# Patient Record
Sex: MALE | Race: WHITE
[De-identification: names, ages, dates, MRNs, and addresses within clinical notes are randomized per-mention and may not be internally consistent; named-entity substitution may affect disease eponyms.]

---

## 2017-01-23 ENCOUNTER — HOSPITAL ENCOUNTER (OUTPATIENT)
Dept: HOSPITAL 75 - PREOP | Age: 77
End: 2017-01-23
Attending: SURGERY
Payer: MEDICARE

## 2017-01-23 VITALS — HEIGHT: 66 IN | WEIGHT: 205 LBS | BODY MASS INDEX: 32.95 KG/M2

## 2017-01-23 DIAGNOSIS — K57.92: ICD-10-CM

## 2017-01-23 DIAGNOSIS — Z01.818: Primary | ICD-10-CM

## 2017-01-23 NOTE — XMS REPORT
Continuity of Care Document

 Created on: 2017



Moshe Lopez

External Reference #: [id number]

: 1940

Sex: Male



Demographics







 Address  [address] [place]  [address]

 

 Home Phone  [phone]

 

 Preferred Language  English

 

 Marital Status  

 

 Faith Affiliation  Unknown

 

 Race  White or 

 

 Ethnic Group  Not  or 





Author







 Author  [facility]

 

 Organization  [facility]

 

 Address  Unknown

 

 Phone  Unavailable







Care Team Providers







 Care Team Member Name  Role  Phone

 

 Jaelyn Velez  PCP  [phone]







Source Comments

Some departments are not documenting in the electronic medical record.  If you 
do not see the information that you expected, contact Release of Information in 
the Health Information Management department at [phone] for further 
assistance in locating additional records.[facility]



Active Allergies and Adverse Reactions







    



  Allergen   Noted Date   Severity   Reactions   Comments

 

    



  Niacin   2012    RASH 







Current Medications







      



  Prescription   Sig.   Disp.   Refills   Start   End Date   Status



      Date  

 

      



  clopiDOGrel (PLAVIX) 75   Take 75 mg by mouth       Active



  mg tablet   daily.     

 

      



  DOCOSAHEXANOIC ACID/EPA   Take 1,400 mg by mouth       Active



  (FISH OIL PO)   daily.     

 

      



  ascorbic acid (VITAMIN-C)   Take 1,000 mg by mouth       Active



  500 mg tablet   daily.     

 

      



  vitamins, multiple   Take 1 Tab by mouth       Active



  (MULTI-VITAMIN HI-PO)   daily.     



  tablet      

 

      



  CALCIUM POLYCARBOPHIL   Take  by mouth.       Active



  (FIBERCON PO)      

 

      



  vitamins, B complex Tab   Take 1 Tab by mouth       Active



   daily.     

 

      



  Calcium Carbonate-Vitamin   Take  by mouth.       Active



  D3 (VITAMIN D-3)      



  180-5,000 mg-unit Tab      

 

      



  docusate (COLACE) 100 mg   Take 100 mg by mouth       Active



  capsule   twice daily.     

 

      



  isosorbide mononitrate SR   Take 60 mg by mouth twice       Active



  (IMDUR) 60 mg tablet   daily.     

 

      



  Phenylephrine-Guaifenesin   Take  by mouth.       Active



  (MUCUS RELIEF SINUS)      



   mg Tab      

 

      



  vitamin E 100 unit   Take 100 Units by mouth       Active



  capsule   daily.     

 

      



  ALPRAZolam (XANAX) 0.5 mg   Take 0.5 mg by mouth       Active



  tablet   three times daily as     



   needed.     

 

      



  testosterone cypionate   Inject 200 mg to area(s)       Active



  (DEPO-TESTOSTERONE) 200   as directed every 14     



  mg/mL injection   days.     

 

      



  aspirin EC 81 mg tablet   Take 81 mg by mouth       Active



   daily.     

 

      



  CETIRIZINE HCL (ZYRTEC   Take 1 Tab by mouth at       Active



  PO)   bedtime daily.     

 

      



  lisinopril (PRINIVIL;   Take 4 Tabs by mouth at   180 Tab   3   20    
Active



  ZESTRIL) 10 mg tablet   bedtime daily.     13  

 

      



  labetalol (NORMODYNE) 300   Take 1 Tab by mouth twice   180 Tab   3       Active



  mg tablet   daily.     13  

 

      



  hydrochlorothiazide   1 Tab daily.   90 Tab   3   20    Active



  (HYDRODIURIL) 25 mg      14  



  tablet      

 

      



  simvastatin (ZOCOR) 40 mg   TAKE 1 TABLET BY MOUTH   90 Tab   1   20    
Active



  tablet   EVERY NIGHT AT BEDTIME     14  







Active Problems







 



  Problem   Noted Date

 

 



  Hyperlipemia   2013













  Overview:



  Myalgias w/ Lipitor, tolerates Vytorin. HDL chronically under 30.





  Last Assessment & Plan:



  He can have a fasting lipid panel tomorrow. LDL goal is 70. He can increase



  Vytorin if needed.









 



  CAD (coronary artery disease)   2012













  Overview:



  0395-0124 Multiple PCIs prox & Mild LAD, Prox RCA DE & Bare metal stents



  [facility], last PCI [place] 10/18/12 Prox RCA BMetal Stent EF



  60%





  Post RCA PCI in late 2012L Diaphragm artifact EF 53%.





  2012: [facility] Normal LV size and function EF 60%





  





  L



  ast Assessment & Plan:



  He has exercise intolerance due to deconditioning and possibly the



  bradycardia from carvedilol. He had a benign stress nuclear study in



  October after his PCI and does not have sufficient symptoms to warrant



  another stress test.





  Increasing his exercise program gradually is the best route to improved



  exercise capacity and physical conditioning. I reassured him that his



  cardiovascular prognosis and findings after his  last PCI were favorable



  and that he faces greater risks from inactivity and deconditioning than



  from becoming more active.









 



  HTN (hypertension)   2012













  Overview:



  10/2012 Shortly after RCA Bare metal stent: BP  200 mmHg+ [place],  life



  flighted back to [place].where  myocardial perfusion imaging studyShowed Ef



  53%, th mild inferior wall hypokinesis and a borderline defect in the



  inferior wall that was felt to likely be diaphragmatic attenuation.Renal



  artery duplex:No   evidence of renal artery stenosis.





  Last Assessment & Plan:



  His blood pressure log show readings to 150 to170 range on carvedilol 25



  BID and lisinopril 20 BID. I'm starting him on hydrochlorothiazide 25.





  If blood pressure control remains suboptimal,  I plan to switch him to



  labetalol to improve blood pressure control over what we are seeing now



  with carvedilol . His heart rate in 50s may be part of his exercise



  intolerance. Labetalol with likely give him better blood pressure control



  with less bradycardia and fatigue.









 



  Low testosterone   2012

 

 



  Sleep apnea 













  Overview:



  Uses CPAP and has for years.





  L



  ast Assessment & Plan:



  Continuing CPAP









 



  Ruptured lumbar disc 

 

 



  Diverticulitis 







Social History







    



  Tobacco Use   Types   Packs/Day   Years Used   Date

 

    



  Former Smoker   Cigarettes, Pipe, Cigars   1   15   Quit: 1970









   



  Smokeless Tobacco: Never   



  Used   









   



  Alcohol Use   Drinks/Week   oz/Week   Comments

 

   



  Yes   1 Cans of   0.6   1 beer every 2 weeks



   beer  







Last Filed Vital Signs







  



  Vital Sign   Reading   Time Taken

 

  



  Blood Pressure   120/68   2013  4:18 PM CST

 

  



  Pulse   54   2013  4:18 PM CST

 

  



  Temperature   -   -

 

  



  Respiratory Rate   -   -

 

  



  Height   1.702 m (5' 7.01")   2013  4:18 PM CST

 

  



  Weight   100.517 kg (221 lb 9.6   2013  4:18 PM CST



   oz) 

 

  



  Body Mass Index   34.7   2013  4:18 PM CST

 

  



  Oxygen Saturation   -   -







Plan of Care







   



  Health Maintenance   Due Date   Last Done   Comments

 

   



  Physical (Comprehensive)   1948  



  Exam   

 

   



  Pertussis Vaccine   1952  

 

   



  Tetanus Vaccine   1958  

 

   



  Colorectal Cancer   1991  



  Screening   

 

   



  Shingles Vaccine   2001  

 

   



  Prevnar/Pneumovax (#1)   2006  

 

   



  Influenza Vaccine   2016  







Results from Last 3 Months

Not on file

## 2017-01-25 ENCOUNTER — HOSPITAL ENCOUNTER (OUTPATIENT)
Dept: HOSPITAL 75 - ENDO | Age: 77
Discharge: HOME | End: 2017-01-25
Attending: SURGERY
Payer: MEDICARE

## 2017-01-25 VITALS — DIASTOLIC BLOOD PRESSURE: 62 MMHG | SYSTOLIC BLOOD PRESSURE: 118 MMHG

## 2017-01-25 VITALS — WEIGHT: 205 LBS | HEIGHT: 66 IN | BODY MASS INDEX: 32.95 KG/M2

## 2017-01-25 VITALS — SYSTOLIC BLOOD PRESSURE: 118 MMHG | DIASTOLIC BLOOD PRESSURE: 62 MMHG

## 2017-01-25 VITALS — DIASTOLIC BLOOD PRESSURE: 60 MMHG | SYSTOLIC BLOOD PRESSURE: 112 MMHG

## 2017-01-25 VITALS — SYSTOLIC BLOOD PRESSURE: 149 MMHG | DIASTOLIC BLOOD PRESSURE: 74 MMHG

## 2017-01-25 DIAGNOSIS — E78.00: ICD-10-CM

## 2017-01-25 DIAGNOSIS — I25.10: ICD-10-CM

## 2017-01-25 DIAGNOSIS — Z87.891: ICD-10-CM

## 2017-01-25 DIAGNOSIS — Z86.010: ICD-10-CM

## 2017-01-25 DIAGNOSIS — K64.1: ICD-10-CM

## 2017-01-25 DIAGNOSIS — I10: ICD-10-CM

## 2017-01-25 DIAGNOSIS — N40.0: ICD-10-CM

## 2017-01-25 DIAGNOSIS — K57.90: Primary | ICD-10-CM

## 2017-01-25 RX ADMIN — FENTANYL CITRATE PRN MCG: 50 INJECTION, SOLUTION INTRAMUSCULAR; INTRAVENOUS at 09:42

## 2017-01-25 RX ADMIN — FENTANYL CITRATE PRN MCG: 50 INJECTION, SOLUTION INTRAMUSCULAR; INTRAVENOUS at 09:59

## 2017-01-25 RX ADMIN — MIDAZOLAM HYDROCHLORIDE PRN MG: 1 INJECTION, SOLUTION INTRAMUSCULAR; INTRAVENOUS at 09:43

## 2017-01-25 RX ADMIN — MIDAZOLAM HYDROCHLORIDE PRN MG: 1 INJECTION, SOLUTION INTRAMUSCULAR; INTRAVENOUS at 09:45

## 2017-01-25 RX ADMIN — MIDAZOLAM HYDROCHLORIDE PRN MG: 1 INJECTION, SOLUTION INTRAMUSCULAR; INTRAVENOUS at 09:51

## 2017-01-25 RX ADMIN — FENTANYL CITRATE PRN MCG: 50 INJECTION, SOLUTION INTRAMUSCULAR; INTRAVENOUS at 09:44

## 2017-01-25 RX ADMIN — FENTANYL CITRATE PRN MCG: 50 INJECTION, SOLUTION INTRAMUSCULAR; INTRAVENOUS at 09:52

## 2017-01-25 NOTE — PROGRESS NOTE-PRE OPERATIVE
Pre-Operative Progress Note


H&P Reviewed


The H&P was reviewed, patient examined and no changes noted.


Date H&P Reviewed:  Jan 25, 2017


Time H&P Reviewed:  09:15


Pre-Operative Diagnosis:  hx diverticulitis








DAIJA HAWK MD Jan 25, 2017 9:18 am

## 2017-01-25 NOTE — XMS REPORT
Continuity of Care Document

 Created on: 2017



Moshe Lopez

External Reference #: [id number]

: 1940

Sex: Male



Demographics







 Address  [address] [place]  [address]

 

 Home Phone  [phone]

 

 Preferred Language  English

 

 Marital Status  

 

 Faith Affiliation  Unknown

 

 Race  White or 

 

 Ethnic Group  Not  or 





Author







 Author  [facility]

 

 Organization  [facility]

 

 Address  Unknown

 

 Phone  Unavailable







Care Team Providers







 Care Team Member Name  Role  Phone

 

 Jaelyn Velez  PCP  [phone]







Source Comments

Some departments are not documenting in the electronic medical record.  If you 
do not see the information that you expected, contact Release of Information in 
the Health Information Management department at [phone] for further 
assistance in locating additional records.[facility]



Active Allergies and Adverse Reactions







    



  Allergen   Noted Date   Severity   Reactions   Comments

 

    



  Niacin   2012    RASH 







Current Medications







      



  Prescription   Sig.   Disp.   Refills   Start   End Date   Status



      Date  

 

      



  clopiDOGrel (PLAVIX) 75   Take 75 mg by mouth       Active



  mg tablet   daily.     

 

      



  DOCOSAHEXANOIC ACID/EPA   Take 1,400 mg by mouth       Active



  (FISH OIL PO)   daily.     

 

      



  ascorbic acid (VITAMIN-C)   Take 1,000 mg by mouth       Active



  500 mg tablet   daily.     

 

      



  vitamins, multiple   Take 1 Tab by mouth       Active



  (MULTI-VITAMIN HI-PO)   daily.     



  tablet      

 

      



  CALCIUM POLYCARBOPHIL   Take  by mouth.       Active



  (FIBERCON PO)      

 

      



  vitamins, B complex Tab   Take 1 Tab by mouth       Active



   daily.     

 

      



  Calcium Carbonate-Vitamin   Take  by mouth.       Active



  D3 (VITAMIN D-3)      



  180-5,000 mg-unit Tab      

 

      



  docusate (COLACE) 100 mg   Take 100 mg by mouth       Active



  capsule   twice daily.     

 

      



  isosorbide mononitrate SR   Take 60 mg by mouth twice       Active



  (IMDUR) 60 mg tablet   daily.     

 

      



  Phenylephrine-Guaifenesin   Take  by mouth.       Active



  (MUCUS RELIEF SINUS)      



   mg Tab      

 

      



  vitamin E 100 unit   Take 100 Units by mouth       Active



  capsule   daily.     

 

      



  ALPRAZolam (XANAX) 0.5 mg   Take 0.5 mg by mouth       Active



  tablet   three times daily as     



   needed.     

 

      



  testosterone cypionate   Inject 200 mg to area(s)       Active



  (DEPO-TESTOSTERONE) 200   as directed every 14     



  mg/mL injection   days.     

 

      



  aspirin EC 81 mg tablet   Take 81 mg by mouth       Active



   daily.     

 

      



  CETIRIZINE HCL (ZYRTEC   Take 1 Tab by mouth at       Active



  PO)   bedtime daily.     

 

      



  lisinopril (PRINIVIL;   Take 4 Tabs by mouth at   180 Tab   3   20    
Active



  ZESTRIL) 10 mg tablet   bedtime daily.     13  

 

      



  labetalol (NORMODYNE) 300   Take 1 Tab by mouth twice   180 Tab   3       Active



  mg tablet   daily.     13  

 

      



  hydrochlorothiazide   1 Tab daily.   90 Tab   3   20    Active



  (HYDRODIURIL) 25 mg      14  



  tablet      

 

      



  simvastatin (ZOCOR) 40 mg   TAKE 1 TABLET BY MOUTH   90 Tab   1   20    
Active



  tablet   EVERY NIGHT AT BEDTIME     14  







Active Problems







 



  Problem   Noted Date

 

 



  Hyperlipemia   2013













  Overview:



  Myalgias w/ Lipitor, tolerates Vytorin. HDL chronically under 30.





  Last Assessment & Plan:



  He can have a fasting lipid panel tomorrow. LDL goal is 70. He can increase



  Vytorin if needed.









 



  CAD (coronary artery disease)   2012













  Overview:



  9566-1204 Multiple PCIs prox & Mild LAD, Prox RCA DE & Bare metal stents



  [facility], last PCI [place] 10/18/12 Prox RCA BMetal Stent EF



  60%





  Post RCA PCI in late 2012L Diaphragm artifact EF 53%.





  2012: [facility] Normal LV size and function EF 60%





  





  L



  ast Assessment & Plan:



  He has exercise intolerance due to deconditioning and possibly the



  bradycardia from carvedilol. He had a benign stress nuclear study in



  October after his PCI and does not have sufficient symptoms to warrant



  another stress test.





  Increasing his exercise program gradually is the best route to improved



  exercise capacity and physical conditioning. I reassured him that his



  cardiovascular prognosis and findings after his  last PCI were favorable



  and that he faces greater risks from inactivity and deconditioning than



  from becoming more active.









 



  HTN (hypertension)   2012













  Overview:



  10/2012 Shortly after RCA Bare metal stent: BP  200 mmHg+ [place],  life



  flighted back to [place].where  myocardial perfusion imaging studyShowed Ef



  53%, th mild inferior wall hypokinesis and a borderline defect in the



  inferior wall that was felt to likely be diaphragmatic attenuation.Renal



  artery duplex:No   evidence of renal artery stenosis.





  Last Assessment & Plan:



  His blood pressure log show readings to 150 to170 range on carvedilol 25



  BID and lisinopril 20 BID. I'm starting him on hydrochlorothiazide 25.





  If blood pressure control remains suboptimal,  I plan to switch him to



  labetalol to improve blood pressure control over what we are seeing now



  with carvedilol . His heart rate in 50s may be part of his exercise



  intolerance. Labetalol with likely give him better blood pressure control



  with less bradycardia and fatigue.









 



  Low testosterone   2012

 

 



  Sleep apnea 













  Overview:



  Uses CPAP and has for years.





  L



  ast Assessment & Plan:



  Continuing CPAP









 



  Ruptured lumbar disc 

 

 



  Diverticulitis 







Social History







    



  Tobacco Use   Types   Packs/Day   Years Used   Date

 

    



  Former Smoker   Cigarettes, Pipe, Cigars   1   15   Quit: 1970









   



  Smokeless Tobacco: Never   



  Used   









   



  Alcohol Use   Drinks/Week   oz/Week   Comments

 

   



  Yes   1 Cans of   0.6   1 beer every 2 weeks



   beer  







Last Filed Vital Signs







  



  Vital Sign   Reading   Time Taken

 

  



  Blood Pressure   120/68   2013  4:18 PM CST

 

  



  Pulse   54   2013  4:18 PM CST

 

  



  Temperature   -   -

 

  



  Respiratory Rate   -   -

 

  



  Height   1.702 m (5' 7.01")   2013  4:18 PM CST

 

  



  Weight   100.517 kg (221 lb 9.6   2013  4:18 PM CST



   oz) 

 

  



  Body Mass Index   34.7   2013  4:18 PM CST

 

  



  Oxygen Saturation   -   -







Plan of Care







   



  Health Maintenance   Due Date   Last Done   Comments

 

   



  Physical (Comprehensive)   1948  



  Exam   

 

   



  Pertussis Vaccine   1952  

 

   



  Tetanus Vaccine   1958  

 

   



  Colorectal Cancer   1991  



  Screening   

 

   



  Shingles Vaccine   2001  

 

   



  Prevnar/Pneumovax (#1)   2006  

 

   



  Influenza Vaccine   2016  







Results from Last 3 Months

Not on file

## 2017-01-25 NOTE — DISCHARGE INST-SURGICAL
D/C Lap Instructions-CARINE


Will call for F/U. will schedule surgery





Activity as tolerated








High Fiber Diet 25g or more per day





Avoid Alcohol, Caffeine, Spicy Greasy and Acid foods.





Drink 64 fluid oz or more of fluids per day.





Symptoms to Report: Fever over 101 degree F, Nausea/Vomiting 


If any problems/questions: Contact your physician or go to Emergency Room








DAIJA HAWK MD Jan 25, 2017 10:29 am

## 2017-01-25 NOTE — XMS REPORT
Continuity of Care Document

 Created on: 2017



Moshe Lopez

External Reference #: [id number]

: 1940

Sex: Male



Demographics







 Address  [address] [place]  [address]

 

 Home Phone  [phone]

 

 Preferred Language  English

 

 Marital Status  

 

 Rastafarian Affiliation  Unknown

 

 Race  White or 

 

 Ethnic Group  Not  or 





Author







 Author  [facility]

 

 Organization  [facility]

 

 Address  Unknown

 

 Phone  Unavailable







Care Team Providers







 Care Team Member Name  Role  Phone

 

 Jaelyn Velez  PCP  [phone]







Source Comments

Some departments are not documenting in the electronic medical record.  If you 
do not see the information that you expected, contact Release of Information in 
the Health Information Management department at [phone] for further 
assistance in locating additional records.[facility]



Active Allergies and Adverse Reactions







    



  Allergen   Noted Date   Severity   Reactions   Comments

 

    



  Niacin   2012    RASH 







Current Medications







      



  Prescription   Sig.   Disp.   Refills   Start   End Date   Status



      Date  

 

      



  clopiDOGrel (PLAVIX) 75   Take 75 mg by mouth       Active



  mg tablet   daily.     

 

      



  DOCOSAHEXANOIC ACID/EPA   Take 1,400 mg by mouth       Active



  (FISH OIL PO)   daily.     

 

      



  ascorbic acid (VITAMIN-C)   Take 1,000 mg by mouth       Active



  500 mg tablet   daily.     

 

      



  vitamins, multiple   Take 1 Tab by mouth       Active



  (MULTI-VITAMIN HI-PO)   daily.     



  tablet      

 

      



  CALCIUM POLYCARBOPHIL   Take  by mouth.       Active



  (FIBERCON PO)      

 

      



  vitamins, B complex Tab   Take 1 Tab by mouth       Active



   daily.     

 

      



  Calcium Carbonate-Vitamin   Take  by mouth.       Active



  D3 (VITAMIN D-3)      



  180-5,000 mg-unit Tab      

 

      



  docusate (COLACE) 100 mg   Take 100 mg by mouth       Active



  capsule   twice daily.     

 

      



  isosorbide mononitrate SR   Take 60 mg by mouth twice       Active



  (IMDUR) 60 mg tablet   daily.     

 

      



  Phenylephrine-Guaifenesin   Take  by mouth.       Active



  (MUCUS RELIEF SINUS)      



   mg Tab      

 

      



  vitamin E 100 unit   Take 100 Units by mouth       Active



  capsule   daily.     

 

      



  ALPRAZolam (XANAX) 0.5 mg   Take 0.5 mg by mouth       Active



  tablet   three times daily as     



   needed.     

 

      



  testosterone cypionate   Inject 200 mg to area(s)       Active



  (DEPO-TESTOSTERONE) 200   as directed every 14     



  mg/mL injection   days.     

 

      



  aspirin EC 81 mg tablet   Take 81 mg by mouth       Active



   daily.     

 

      



  CETIRIZINE HCL (ZYRTEC   Take 1 Tab by mouth at       Active



  PO)   bedtime daily.     

 

      



  lisinopril (PRINIVIL;   Take 4 Tabs by mouth at   180 Tab   3   20    
Active



  ZESTRIL) 10 mg tablet   bedtime daily.     13  

 

      



  labetalol (NORMODYNE) 300   Take 1 Tab by mouth twice   180 Tab   3       Active



  mg tablet   daily.     13  

 

      



  hydrochlorothiazide   1 Tab daily.   90 Tab   3   20    Active



  (HYDRODIURIL) 25 mg      14  



  tablet      

 

      



  simvastatin (ZOCOR) 40 mg   TAKE 1 TABLET BY MOUTH   90 Tab   1   20    
Active



  tablet   EVERY NIGHT AT BEDTIME     14  







Active Problems







 



  Problem   Noted Date

 

 



  Hyperlipemia   2013













  Overview:



  Myalgias w/ Lipitor, tolerates Vytorin. HDL chronically under 30.





  Last Assessment & Plan:



  He can have a fasting lipid panel tomorrow. LDL goal is 70. He can increase



  Vytorin if needed.









 



  CAD (coronary artery disease)   2012













  Overview:



  1527-9807 Multiple PCIs prox & Mild LAD, Prox RCA DE & Bare metal stents



  [facility], last PCI [place] 10/18/12 Prox RCA BMetal Stent EF



  60%





  Post RCA PCI in late 2012L Diaphragm artifact EF 53%.





  2012: [facility] Normal LV size and function EF 60%





  





  L



  ast Assessment & Plan:



  He has exercise intolerance due to deconditioning and possibly the



  bradycardia from carvedilol. He had a benign stress nuclear study in



  October after his PCI and does not have sufficient symptoms to warrant



  another stress test.





  Increasing his exercise program gradually is the best route to improved



  exercise capacity and physical conditioning. I reassured him that his



  cardiovascular prognosis and findings after his  last PCI were favorable



  and that he faces greater risks from inactivity and deconditioning than



  from becoming more active.









 



  HTN (hypertension)   2012













  Overview:



  10/2012 Shortly after RCA Bare metal stent: BP  200 mmHg+ [place],  life



  flighted back to [place].where  myocardial perfusion imaging studyShowed Ef



  53%, th mild inferior wall hypokinesis and a borderline defect in the



  inferior wall that was felt to likely be diaphragmatic attenuation.Renal



  artery duplex:No   evidence of renal artery stenosis.





  Last Assessment & Plan:



  His blood pressure log show readings to 150 to170 range on carvedilol 25



  BID and lisinopril 20 BID. I'm starting him on hydrochlorothiazide 25.





  If blood pressure control remains suboptimal,  I plan to switch him to



  labetalol to improve blood pressure control over what we are seeing now



  with carvedilol . His heart rate in 50s may be part of his exercise



  intolerance. Labetalol with likely give him better blood pressure control



  with less bradycardia and fatigue.









 



  Low testosterone   2012

 

 



  Sleep apnea 













  Overview:



  Uses CPAP and has for years.





  L



  ast Assessment & Plan:



  Continuing CPAP









 



  Ruptured lumbar disc 

 

 



  Diverticulitis 







Social History







    



  Tobacco Use   Types   Packs/Day   Years Used   Date

 

    



  Former Smoker   Cigarettes, Pipe, Cigars   1   15   Quit: 1970









   



  Smokeless Tobacco: Never   



  Used   









   



  Alcohol Use   Drinks/Week   oz/Week   Comments

 

   



  Yes   1 Cans of   0.6   1 beer every 2 weeks



   beer  







Last Filed Vital Signs







  



  Vital Sign   Reading   Time Taken

 

  



  Blood Pressure   120/68   2013  4:18 PM CST

 

  



  Pulse   54   2013  4:18 PM CST

 

  



  Temperature   -   -

 

  



  Respiratory Rate   -   -

 

  



  Height   1.702 m (5' 7.01")   2013  4:18 PM CST

 

  



  Weight   100.517 kg (221 lb 9.6   2013  4:18 PM CST



   oz) 

 

  



  Body Mass Index   34.7   2013  4:18 PM CST

 

  



  Oxygen Saturation   -   -







Plan of Care







   



  Health Maintenance   Due Date   Last Done   Comments

 

   



  Physical (Comprehensive)   1947  



  Exam   

 

   



  Pertussis Vaccine   1952  

 

   



  Tetanus Vaccine   1958  

 

   



  Colorectal Cancer   1991  



  Screening   

 

   



  Shingles Vaccine   2001  

 

   



  Prevnar/Pneumovax (#1)   2006  

 

   



  Influenza Vaccine   2016  







Results from Last 3 Months

Not on file

## 2017-01-25 NOTE — OPERATIVE REPORT
PROCEDURE PHYSICIAN:   DAIJA ARITA

 

DATE OF PROCEDURE:  

01/25/2017

 

ATTENDING PRIMARY CARE PHYSICIAN: 

Dr. Velez  

 

PREOPERATIVE DIAGNOSIS:

Recurrent symptomatic severe sigmoid diverticulitis. 

 

POSTOPERATIVE DIAGNOSIS:

1.   Chronic stage II external and internal hemorrhoids. 

2.   Severe sigmoid diverticulosis. 

 

PROCEDURE:

Colonoscopy. 

 

SURGEON:

Dr. Arita. 

 

ANESTHESIA:

Conscious sedation. 

 

ESTIMATED BLOOD LOSS:

Minimal. 

 

FINDINGS:

1.   Chronic, stage II external and internal hemorrhoids, not

actively edematous or inflamed and no bleeding. 

2.   Prostate gland was palpable and appeared normal. 

3.   There was a severe sigmoid diverticulosis with no mucosal

inflammatory changes to indicate any active diverticulitis. 

 

DISPOSITION:

The patient tolerated the procedure well. 

 

Mr. Moshe Lopez is a 76-year-old male known to us. He has a

history of severe sigmoid diverticulosis and recurrent episodes

of diverticulitis. He has had several colonoscopies done in the

past. He had one done in 2008 were a benign polyp was identified

and removed. We had done a colonoscopy on him December 2013,

which did show significant diverticulosis of the sigmoid colon;

however, there were no polyps identified. He reports overall he

has had 9 different episodes of diverticulitis which was

requiring extended hospitalization and IV antibiotics. A CT scan

was performed in 2016 which did show significant signs of chronic

inflammatory changes of the sigmoid colon and descending colon

consistent with chronic diverticulitis. He also does have other

medical comorbidities including coronary artery disease,

hypertension, hypercholesterolemia, as well as previous cardiac

catheterization and stent placement. Due to his recurrent

symptomatic episodes he would like to proceed with sigmoid colon

resection however, he did have a recent episode of diverticulitis

and we will proceed with the colonoscopy. 

 

PROCEDURE:

The patient was brought to the endoscopy suite, laid in the left

lateral decubitus position. After adequate IV pain and sedative

medications and conscious sedation anesthesia administered a

digital rectal examination was performed. Mild chronic, stage II

external and internal hemorrhoids were identified, which were not

actively edematous or inflamed and no bleeding. Normal sphincter

tone was felt and there were no palpable masses. The prostate

gland was palpable and appeared normal. 

 

The endoscope was then intubated into the anus and the rectum

gently insufflated. The endoscope was then advanced through the

valves of Rosas the rectum with no polyps or any neoplasms

identified.  We then proceeded through the sigmoid colon where a

severe sigmoid diverticulosis identified which did also encompass

part of the descending colon. There were no active inflammation to

indicate any active diverticulitis. The endoscope was then

advanced through the remainder of the descending, transverse, and

ascending colon to the cecum. These segments were normal. There

were no polyps or any neoplasms identified. The endoscope was

then slowly withdrawn while taking a second look and suctioning

of residual air with no additional findings. 

 

The patient tolerated the procedure well. We will have him

continue with medical management for now with a high fiber diet.

We will also await cardiac clearance and then proceed with

scheduling him for laparoscopic sigmoid colon resection and

anastomosis as well as a temporary diverting loop ileostomy which

will then be reversed later due to the significant medical

comorbidities,  

 

 

 

Job ID: [id number]

Dictated Date: 01/25/2017 10:27:44 

Transcription Date: 01/25/2017 14:30:46 / kat GARDNER

## 2017-01-25 NOTE — PROGRESS NOTE-POST OPERATIVE
Post-Operative Progess Note


Pre-Operative Diagnosis


hx diverticulitis





Post-Operative Diagnosis





chronic stage 2 ext and int hemorrhoids, severe sigmoid diverticulosis.





Post-Op Procedure Note


Date of Procedure:  Jan 25, 2017


Name of Procedure:  


Colonoscopy.


Anesthesia Type


CS


Estimated blood loss (mL):  DAIJA Parker MD Jan 25, 2017 10:27 am

## 2017-01-25 NOTE — CONSCIOUS SEDATION/ASA
Conscious Sedation Pre-Proced


Time Reviewed:  09:15


ASA Class:  2











Airway Mallampati Classification: (Pala appropriate class) I.  II.  III,  IV


 


Lungs 


 


Heart 


 


 ASA score


 


 ASA 1: a normal healthy patient


 


 ASA 2:  a patient with a mild systemic disease (mid diabetes, controlled 

hypertension, obesity 


 


 ASA 3:  a patient with a severe systemic disease that limits activity  (angina

, COPD, prior Myocardial infarction)


 


 ASA 4:  a patient with an incapacitating disease that is a constant threat to 

life (CHF, renal failure)


 


 ASA 5:  a moribund patient not expected to survive 24 hrs.  (ruptured aneurysm)


 


 ASA 6:  a declared brain dead patient whose organs are being harvested.


 


 For emergent operations, add the letter E after the classification








Grade 2


Sedation Plan:  Analgesia, Amnesia, Plan communicated to team members, 

Discussed options with patient/fam, Discussed risks with patient/fam


Note


The patient is an appropriate candidate to undergo the planned procedure, 

sedation, and anesthesia.





The patient immediately re-assessed prior to indication.








DAIJA HAWK MD Jan 25, 2017 9:18 am

## 2017-02-08 ENCOUNTER — HOSPITAL ENCOUNTER (OUTPATIENT)
Dept: HOSPITAL 75 - PREOP | Age: 77
Discharge: HOME | End: 2017-02-08
Attending: SURGERY
Payer: MEDICARE

## 2017-02-08 VITALS — SYSTOLIC BLOOD PRESSURE: 126 MMHG | DIASTOLIC BLOOD PRESSURE: 59 MMHG

## 2017-02-08 VITALS — BODY MASS INDEX: 33.91 KG/M2 | WEIGHT: 211 LBS | HEIGHT: 66 IN

## 2017-02-08 DIAGNOSIS — Z11.2: ICD-10-CM

## 2017-02-08 DIAGNOSIS — Z01.812: Primary | ICD-10-CM

## 2017-02-08 DIAGNOSIS — K57.32: ICD-10-CM

## 2017-02-08 LAB
ANION GAP SERPL CALC-SCNC: 11 MMOL/L (ref 5–14)
BASOPHILS # BLD AUTO: 0 10^3/UL (ref 0–0.1)
BASOPHILS NFR BLD AUTO: 1 % (ref 0–10)
BUN SERPL-MCNC: 27 MG/DL (ref 7–18)
BUN/CREAT SERPL: 18
CALCIUM SERPL-MCNC: 9.2 MG/DL (ref 8.5–10.1)
CHLORIDE SERPL-SCNC: 106 MMOL/L (ref 98–107)
CO2 SERPL-SCNC: 23 MMOL/L (ref 21–32)
CREAT SERPL-MCNC: 1.46 MG/DL (ref 0.6–1.3)
EOSINOPHIL # BLD AUTO: 0.3 10^3/UL (ref 0–0.3)
EOSINOPHIL NFR BLD AUTO: 4 % (ref 0–10)
ERYTHROCYTE [DISTWIDTH] IN BLOOD BY AUTOMATED COUNT: 13.6 % (ref 10–14.5)
GFR SERPLBLD BASED ON 1.73 SQ M-ARVRAT: 47 ML/MIN
GLUCOSE SERPL-MCNC: 86 MG/DL (ref 70–105)
LYMPHOCYTES # BLD AUTO: 1.9 X 10^3 (ref 1–4)
LYMPHOCYTES NFR BLD AUTO: 30 % (ref 12–44)
MCH RBC QN AUTO: 32 PG (ref 25–34)
MCHC RBC AUTO-ENTMCNC: 35 G/DL (ref 32–36)
MCV RBC AUTO: 91 FL (ref 80–99)
MONOCYTES # BLD AUTO: 0.6 X 10^3 (ref 0–1)
MONOCYTES NFR BLD AUTO: 9 % (ref 0–12)
NEUTROPHILS # BLD AUTO: 3.7 X 10^3 (ref 1.8–7.8)
NEUTROPHILS NFR BLD AUTO: 56 % (ref 42–75)
PLATELET # BLD: 199 10^3/UL (ref 130–400)
PMV BLD AUTO: 11 FL (ref 7.4–10.4)
POTASSIUM SERPL-SCNC: 4.1 MMOL/L (ref 3.6–5)
RBC # BLD AUTO: 4.26 10^6/UL (ref 4.35–5.85)
SODIUM SERPL-SCNC: 140 MMOL/L (ref 135–145)
WBC # BLD AUTO: 6.5 10^3/UL (ref 4.3–11)

## 2017-02-08 PROCEDURE — 85025 COMPLETE CBC W/AUTO DIFF WBC: CPT

## 2017-02-08 PROCEDURE — 86850 RBC ANTIBODY SCREEN: CPT

## 2017-02-08 PROCEDURE — 86900 BLOOD TYPING SEROLOGIC ABO: CPT

## 2017-02-08 PROCEDURE — 86901 BLOOD TYPING SEROLOGIC RH(D): CPT

## 2017-02-08 PROCEDURE — 36415 COLL VENOUS BLD VENIPUNCTURE: CPT

## 2017-02-08 PROCEDURE — 80048 BASIC METABOLIC PNL TOTAL CA: CPT

## 2017-02-08 PROCEDURE — 87081 CULTURE SCREEN ONLY: CPT

## 2017-02-08 NOTE — XMS REPORT
Continuity of Care Document

 Created on: 2017



Moshe Lopez

External Reference #: [id number]

: 1940

Sex: Male



Demographics







 Address  [address] [place]  [address]

 

 Home Phone  [phone]

 

 Preferred Language  English

 

 Marital Status  

 

 Shinto Affiliation  Unknown

 

 Race  White or 

 

 Ethnic Group  Not  or 





Author







 Author  [facility]

 

 Organization  [facility]

 

 Address  Unknown

 

 Phone  Unavailable







Care Team Providers







 Care Team Member Name  Role  Phone

 

 Jaelyn Velez  PCP  [phone]







Source Comments

Some departments are not documenting in the electronic medical record.  If you 
do not see the information that you expected, contact Release of Information in 
the Health Information Management department at [phone] for further 
assistance in locating additional records.[facility]



Active Allergies and Adverse Reactions







    



  Allergen   Noted Date   Severity   Reactions   Comments

 

    



  Niacin   2012    RASH 







Current Medications







      



  Prescription   Sig.   Disp.   Refills   Start   End Date   Status



      Date  

 

      



  clopiDOGrel (PLAVIX) 75   Take 75 mg by mouth       Active



  mg tablet   daily.     

 

      



  DOCOSAHEXANOIC ACID/EPA   Take 1,400 mg by mouth       Active



  (FISH OIL PO)   daily.     

 

      



  ascorbic acid (VITAMIN-C)   Take 1,000 mg by mouth       Active



  500 mg tablet   daily.     

 

      



  vitamins, multiple   Take 1 Tab by mouth       Active



  (MULTI-VITAMIN HI-PO)   daily.     



  tablet      

 

      



  CALCIUM POLYCARBOPHIL   Take  by mouth.       Active



  (FIBERCON PO)      

 

      



  vitamins, B complex Tab   Take 1 Tab by mouth       Active



   daily.     

 

      



  Calcium Carbonate-Vitamin   Take  by mouth.       Active



  D3 (VITAMIN D-3)      



  180-5,000 mg-unit Tab      

 

      



  docusate (COLACE) 100 mg   Take 100 mg by mouth       Active



  capsule   twice daily.     

 

      



  isosorbide mononitrate SR   Take 60 mg by mouth twice       Active



  (IMDUR) 60 mg tablet   daily.     

 

      



  Phenylephrine-Guaifenesin   Take  by mouth.       Active



  (MUCUS RELIEF SINUS)      



   mg Tab      

 

      



  vitamin E 100 unit   Take 100 Units by mouth       Active



  capsule   daily.     

 

      



  ALPRAZolam (XANAX) 0.5 mg   Take 0.5 mg by mouth       Active



  tablet   three times daily as     



   needed.     

 

      



  testosterone cypionate   Inject 200 mg to area(s)       Active



  (DEPO-TESTOSTERONE) 200   as directed every 14     



  mg/mL injection   days.     

 

      



  aspirin EC 81 mg tablet   Take 81 mg by mouth       Active



   daily.     

 

      



  CETIRIZINE HCL (ZYRTEC   Take 1 Tab by mouth at       Active



  PO)   bedtime daily.     

 

      



  lisinopril (PRINIVIL;   Take 4 Tabs by mouth at   180 Tab   3   20    
Active



  ZESTRIL) 10 mg tablet   bedtime daily.     13  

 

      



  labetalol (NORMODYNE) 300   Take 1 Tab by mouth twice   180 Tab   3       Active



  mg tablet   daily.     13  

 

      



  hydrochlorothiazide   1 Tab daily.   90 Tab   3   20    Active



  (HYDRODIURIL) 25 mg      14  



  tablet      

 

      



  simvastatin (ZOCOR) 40 mg   TAKE 1 TABLET BY MOUTH   90 Tab   1   20    
Active



  tablet   EVERY NIGHT AT BEDTIME     14  







Active Problems







 



  Problem   Noted Date

 

 



  Hyperlipemia   2013













  Overview:



  Myalgias w/ Lipitor, tolerates Vytorin. HDL chronically under 30.





  Last Assessment & Plan:



  He can have a fasting lipid panel tomorrow. LDL goal is 70. He can increase



  Vytorin if needed.









 



  CAD (coronary artery disease)   2012













  Overview:



  [id number] Multiple PCIs prox & Mild LAD, Prox RCA DE & Bare metal stents



  [facility], last PCI [facility] 10/18/12 Prox RCA BMetal Stent EF



  60%





  Post RCA PCI in late 2012L Diaphragm artifact EF 53%.





  2012: Echo [facility] Normal LV size and function EF 60%





  





  L



  ast Assessment & Plan:



  He has exercise intolerance due to deconditioning and possibly the



  bradycardia from carvedilol. He had a benign stress nuclear study in



  October after his PCI and does not have sufficient symptoms to warrant



  another stress test.





  Increasing his exercise program gradually is the best route to improved



  exercise capacity and physical conditioning. I reassured him that his



  cardiovascular prognosis and findings after his  last PCI were favorable



  and that he faces greater risks from inactivity and deconditioning than



  from becoming more active.









 



  HTN (hypertension)   2012













  Overview:



  10/2012 Shortly after RCA Bare metal stent: BP  200 mmHg+ [place],  life



  flighted back to [facility].where  myocardial perfusion imaging studyShowed Ef



  53%, th mild inferior wall hypokinesis and a borderline defect in the



  inferior wall that was felt to likely be diaphragmatic attenuation.Renal



  artery duplex:No   evidence of renal artery stenosis.





  Last Assessment & Plan:



  His blood pressure log show readings to 150 to170 range on carvedilol 25



  BID and lisinopril 20 BID. I'm starting him on hydrochlorothiazide 25.





  If blood pressure control remains suboptimal,  I plan to switch him to



  labetalol to improve blood pressure control over what we are seeing now



  with carvedilol . His heart rate in 50s may be part of his exercise



  intolerance. Labetalol with likely give him better blood pressure control



  with less bradycardia and fatigue.









 



  Low testosterone   2012

 

 



  Sleep apnea 













  Overview:



  Uses CPAP and has for years.





  L



  ast Assessment & Plan:



  Continuing CPAP









 



  Ruptured lumbar disc 

 

 



  Diverticulitis 







Social History







    



  Tobacco Use   Types   Packs/Day   Years Used   Date

 

    



  Former Smoker   Cigarettes, Pipe, Cigars   1   15   Quit: 1970









   



  Smokeless Tobacco: Never   



  Used   









   



  Alcohol Use   Drinks/Week   oz/Week   Comments

 

   



  Yes   1 Cans of   0.6   1 beer every 2 weeks



   beer  







Last Filed Vital Signs







  



  Vital Sign   Reading   Time Taken

 

  



  Blood Pressure   120/68   2013  4:18 PM CST

 

  



  Pulse   54   2013  4:18 PM CST

 

  



  Temperature   -   -

 

  



  Respiratory Rate   -   -

 

  



  Height   1.702 m (5' 7.01")   2013  4:18 PM CST

 

  



  Weight   100.517 kg (221 lb 9.6   2013  4:18 PM CST



   oz) 

 

  



  Body Mass Index   34.7   2013  4:18 PM CST

 

  



  Oxygen Saturation   -   -







Plan of Care







   



  Health Maintenance   Due Date   Last Done   Comments

 

   



  Physical (Comprehensive)   1947  



  Exam   

 

   



  Pertussis Vaccine   1952  

 

   



  Tetanus Vaccine   1958  

 

   



  Colorectal Cancer   1991  



  Screening   

 

   



  Shingles Vaccine   2001  

 

   



  Prevnar/Pneumovax (#1)   2006  

 

   



  Influenza Vaccine   2016  







Results from Last 3 Months

Not on file

## 2017-02-23 ENCOUNTER — HOSPITAL ENCOUNTER (INPATIENT)
Dept: HOSPITAL 75 - SURG | Age: 77
LOS: 4 days | Discharge: HOME HEALTH | DRG: 330 | End: 2017-02-27
Attending: SURGERY | Admitting: SURGERY
Payer: MEDICARE

## 2017-02-23 VITALS — WEIGHT: 211 LBS | BODY MASS INDEX: 33.91 KG/M2 | HEIGHT: 66 IN

## 2017-02-23 VITALS — DIASTOLIC BLOOD PRESSURE: 67 MMHG | SYSTOLIC BLOOD PRESSURE: 137 MMHG

## 2017-02-23 VITALS — DIASTOLIC BLOOD PRESSURE: 54 MMHG | SYSTOLIC BLOOD PRESSURE: 132 MMHG

## 2017-02-23 VITALS — DIASTOLIC BLOOD PRESSURE: 66 MMHG | SYSTOLIC BLOOD PRESSURE: 126 MMHG

## 2017-02-23 VITALS — DIASTOLIC BLOOD PRESSURE: 71 MMHG | SYSTOLIC BLOOD PRESSURE: 135 MMHG

## 2017-02-23 VITALS — SYSTOLIC BLOOD PRESSURE: 163 MMHG | DIASTOLIC BLOOD PRESSURE: 76 MMHG

## 2017-02-23 VITALS — DIASTOLIC BLOOD PRESSURE: 73 MMHG | SYSTOLIC BLOOD PRESSURE: 156 MMHG

## 2017-02-23 VITALS — DIASTOLIC BLOOD PRESSURE: 74 MMHG | SYSTOLIC BLOOD PRESSURE: 149 MMHG

## 2017-02-23 VITALS — DIASTOLIC BLOOD PRESSURE: 57 MMHG | SYSTOLIC BLOOD PRESSURE: 128 MMHG

## 2017-02-23 VITALS — DIASTOLIC BLOOD PRESSURE: 64 MMHG | SYSTOLIC BLOOD PRESSURE: 139 MMHG

## 2017-02-23 VITALS — SYSTOLIC BLOOD PRESSURE: 160 MMHG | DIASTOLIC BLOOD PRESSURE: 68 MMHG

## 2017-02-23 DIAGNOSIS — E29.1: ICD-10-CM

## 2017-02-23 DIAGNOSIS — N40.0: ICD-10-CM

## 2017-02-23 DIAGNOSIS — I12.9: ICD-10-CM

## 2017-02-23 DIAGNOSIS — E78.00: ICD-10-CM

## 2017-02-23 DIAGNOSIS — K21.9: ICD-10-CM

## 2017-02-23 DIAGNOSIS — K42.9: ICD-10-CM

## 2017-02-23 DIAGNOSIS — K57.30: Primary | ICD-10-CM

## 2017-02-23 DIAGNOSIS — K94.12: ICD-10-CM

## 2017-02-23 DIAGNOSIS — I25.10: ICD-10-CM

## 2017-02-23 DIAGNOSIS — G47.33: ICD-10-CM

## 2017-02-23 DIAGNOSIS — N18.9: ICD-10-CM

## 2017-02-23 DIAGNOSIS — Z80.0: ICD-10-CM

## 2017-02-23 DIAGNOSIS — L03.311: ICD-10-CM

## 2017-02-23 DIAGNOSIS — Z95.5: ICD-10-CM

## 2017-02-23 DIAGNOSIS — F41.9: ICD-10-CM

## 2017-02-23 DIAGNOSIS — J98.11: ICD-10-CM

## 2017-02-23 DIAGNOSIS — F32.9: ICD-10-CM

## 2017-02-23 DIAGNOSIS — Z87.891: ICD-10-CM

## 2017-02-23 PROCEDURE — 84484 ASSAY OF TROPONIN QUANT: CPT

## 2017-02-23 PROCEDURE — 0WQF0ZZ REPAIR ABDOMINAL WALL, OPEN APPROACH: ICD-10-PCS | Performed by: SURGERY

## 2017-02-23 PROCEDURE — 36415 COLL VENOUS BLD VENIPUNCTURE: CPT

## 2017-02-23 PROCEDURE — 81000 URINALYSIS NONAUTO W/SCOPE: CPT

## 2017-02-23 PROCEDURE — 0DTN4ZZ RESECTION OF SIGMOID COLON, PERCUTANEOUS ENDOSCOPIC APPROACH: ICD-10-PCS | Performed by: SURGERY

## 2017-02-23 PROCEDURE — 88307 TISSUE EXAM BY PATHOLOGIST: CPT

## 2017-02-23 PROCEDURE — 0DBM4ZZ EXCISION OF DESCENDING COLON, PERCUTANEOUS ENDOSCOPIC APPROACH: ICD-10-PCS | Performed by: SURGERY

## 2017-02-23 PROCEDURE — 85025 COMPLETE CBC W/AUTO DIFF WBC: CPT

## 2017-02-23 PROCEDURE — 85027 COMPLETE CBC AUTOMATED: CPT

## 2017-02-23 PROCEDURE — 80048 BASIC METABOLIC PNL TOTAL CA: CPT

## 2017-02-23 PROCEDURE — 0DBP4ZZ EXCISION OF RECTUM, PERCUTANEOUS ENDOSCOPIC APPROACH: ICD-10-PCS | Performed by: SURGERY

## 2017-02-23 PROCEDURE — 94664 DEMO&/EVAL PT USE INHALER: CPT

## 2017-02-23 PROCEDURE — 94761 N-INVAS EAR/PLS OXIMETRY MLT: CPT

## 2017-02-23 PROCEDURE — 94640 AIRWAY INHALATION TREATMENT: CPT

## 2017-02-23 PROCEDURE — 93005 ELECTROCARDIOGRAM TRACING: CPT

## 2017-02-23 PROCEDURE — 0D1B0Z4 BYPASS ILEUM TO CUTANEOUS, OPEN APPROACH: ICD-10-PCS | Performed by: SURGERY

## 2017-02-23 PROCEDURE — 71010: CPT

## 2017-02-23 PROCEDURE — 94760 N-INVAS EAR/PLS OXIMETRY 1: CPT

## 2017-02-23 PROCEDURE — 80053 COMPREHEN METABOLIC PANEL: CPT

## 2017-02-23 RX ADMIN — MORPHINE SULFATE PRN MG: 10 INJECTION, SOLUTION INTRAMUSCULAR; INTRAVENOUS at 13:48

## 2017-02-23 RX ADMIN — SODIUM CHLORIDE, SODIUM LACTATE, POTASSIUM CHLORIDE, AND CALCIUM CHLORIDE SCH MLS/HR: 600; 310; 30; 20 INJECTION, SOLUTION INTRAVENOUS at 15:52

## 2017-02-23 RX ADMIN — METRONIDAZOLE SCH MLS/HR: 5 INJECTION, SOLUTION INTRAVENOUS at 23:03

## 2017-02-23 RX ADMIN — Medication SCH ML: at 23:02

## 2017-02-23 RX ADMIN — SODIUM CHLORIDE, SODIUM LACTATE, POTASSIUM CHLORIDE, AND CALCIUM CHLORIDE PRN MLS/HR: 600; 310; 30; 20 INJECTION, SOLUTION INTRAVENOUS at 11:17

## 2017-02-23 RX ADMIN — ALBUTEROL SULFATE SCH MG: 2.5 SOLUTION RESPIRATORY (INHALATION) at 18:20

## 2017-02-23 RX ADMIN — METOPROLOL TARTRATE SCH MG: 1 INJECTION, SOLUTION INTRAVENOUS at 23:02

## 2017-02-23 RX ADMIN — MORPHINE SULFATE PRN MG: 10 INJECTION, SOLUTION INTRAMUSCULAR; INTRAVENOUS at 13:53

## 2017-02-23 RX ADMIN — ENOXAPARIN SODIUM SCH MG: 100 INJECTION SUBCUTANEOUS at 23:02

## 2017-02-23 RX ADMIN — METRONIDAZOLE SCH MLS/HR: 5 INJECTION, SOLUTION INTRAVENOUS at 15:53

## 2017-02-23 RX ADMIN — Medication PRN MCG: at 23:53

## 2017-02-23 RX ADMIN — SODIUM CHLORIDE, SODIUM LACTATE, POTASSIUM CHLORIDE, AND CALCIUM CHLORIDE PRN MLS/HR: 600; 310; 30; 20 INJECTION, SOLUTION INTRAVENOUS at 07:00

## 2017-02-23 RX ADMIN — ALBUTEROL SULFATE SCH MG: 2.5 SOLUTION RESPIRATORY (INHALATION) at 15:28

## 2017-02-23 RX ADMIN — SODIUM CHLORIDE, SODIUM LACTATE, POTASSIUM CHLORIDE, AND CALCIUM CHLORIDE SCH MLS/HR: 600; 310; 30; 20 INJECTION, SOLUTION INTRAVENOUS at 23:01

## 2017-02-23 RX ADMIN — METOPROLOL TARTRATE SCH MG: 1 INJECTION, SOLUTION INTRAVENOUS at 17:07

## 2017-02-23 RX ADMIN — Medication PRN MCG: at 15:58

## 2017-02-23 RX ADMIN — ALBUTEROL SULFATE SCH MG: 2.5 SOLUTION RESPIRATORY (INHALATION) at 22:38

## 2017-02-23 RX ADMIN — SODIUM CHLORIDE, SODIUM LACTATE, POTASSIUM CHLORIDE, AND CALCIUM CHLORIDE PRN MLS/HR: 600; 310; 30; 20 INJECTION, SOLUTION INTRAVENOUS at 09:30

## 2017-02-23 RX ADMIN — ALBUTEROL SULFATE SCH MG: 2.5 SOLUTION RESPIRATORY (INHALATION) at 15:32

## 2017-02-23 RX ADMIN — CEFAZOLIN SCH MLS/HR: 10 INJECTION, POWDER, FOR SOLUTION INTRAVENOUS at 23:01

## 2017-02-23 NOTE — DIAGNOSTIC IMAGING REPORT
Indication: Central line placement.



Discussion: Single portable semiupright view of the chest was

obtained, comparison 4/20/2016. Cardiomegaly is stable. Low lung

volumes. Central atelectasis is present. No pneumothorax. New

left subclavian line with tip just into the proximal SVC.

Enteric tube tip is in the gastric body.



Impression:

Left subclavian central venous catheter with tip just entering

the SVC. No pneumothorax.



Dictated by:



Dictated on workstation # [id number]

## 2017-02-23 NOTE — PROGRESS NOTE-PRE OPERATIVE
Pre-Operative Progress Note


H&P Reviewed


The H&P was reviewed, patient examined and no changes noted.


Date H&P Reviewed:  Feb 23, 2017


Time H&P Reviewed:  07:40


Pre-Operative Diagnosis:  Severe Recurrent Diverticulitis








MUKESH MARQUEZ Feb 23, 2017 7:44 am

## 2017-02-23 NOTE — PROGRESS NOTE-POST OPERATIVE
Post-Operative Progess Note


Pre-Operative Diagnosis


Severe Recurrent Diverticulitis





Post-Operative Diagnosis





same, umbilical hernia





Post-Op Procedure Note


Date of Procedure:  Feb 23, 2017


Name of Procedure:  


laparoscopic low anterior sigmoid colorectal resection, takedown splenic


flexure, laparoscopic


umbilical hernia repair, loop diverting ileostomy, left subclavian central


venous cath placement.


Anesthesia Type


GET


Estimated blood loss (mL):  minimal


Specimen(s) collected


sigmoid colon








DAIJA HAWK MD Feb 23, 2017 12:45 pm

## 2017-02-23 NOTE — XMS REPORT
[facility] Clinical Summary

 Created on: 2015



Moshe Machado

External Reference #: [id number]

: 1940

Sex: Male



Demographics







 Address  [address] [place]  [address]

 

 Home Phone  [phone]

 

 Preferred Language  Unknown

 

 Marital Status  M

 

 [employer] Affiliation  Unknown

 

 Race  [id number]

 

 Ethnic Group  Not  or 





Author







 Author  User, BECCA

 

 Organization  [facility]

 

 Address  Unknown

 

 Phone  [phone]







Allergies, Adverse Reactions, Alerts







 Allergy Name  Reaction Description  Start Date  Severity  Status  Provider

 

 NIACIN        Critical  Active  Karla Valles







Conditions or Problems







 Problem Name  Problem Code  Onset Date  Status  Entry Date  Provider  Comment  
Standard Description  Annotate

 

 COMPRESSION FRACTURE, LUMBAR VERTEBRAE  805.4    Resolved    Jaelyn Velez     Closed fracture of lumbar vertebra without mention 
of spinal cord injury   

 

 CAD  414.00    Resolved    Jaelyn Velez     
Coronary atherosclerosis of unspecified type of vessel, native or graft   

 

 MERALGIA PARESTHETICA  355.1    Resolved    Jaelyn Velez     Meralgia paresthetica  left

 

 HYPERCHOLESTEROLEMIA  272.0    Active    Jaelyn Velez     Pure hypercholesterolemia   

 

 BACK PAIN  724.5    Resolved    Jaelyn Velez     
Backache, unspecified   

 

 HERPES ZOSTER  053.9    Resolved    Jaelyn Velez  
   Herpes zoster without mention of complication  presumed w/o rash left chest ~
T4 dermatome

 

 LESION, NONALLOPATHIC, LUMBAR REGION  739.3    Resolved    
Jaelyn Velez     Nonallopathic lesions of lumbar region, not elsewhere 
classified   

 

 DIABETES MELLITUS, TYPE II, FAMILY HX  V18.0    Resolved  
  Jaelyn Velez     Family history of diabetes mellitus   

 

 BURSITIS, LEFT ELBOW  726.33    Resolved    Jaelyn Velez     Olecranon bursitis   

 

 HEMATOCHEZIA  578.1  2007/05/10  Resolved    Jaelyn Velez   
  Blood in stool   

 

 RECTAL FISSURE  565.0  2007/05/10  Resolved    Jaelyn Velez 
    Anal fissure   

 

 SHOULDER PAIN  719.41    Resolved    Jaelyn Velez 
    Pain in joint involving shoulder region   

 

 SINUSITIS  473.9    Resolved    Jaelyn Velez     
Unspecified sinusitis (chronic)   

 

 MUSCLE PAIN  729.1  2007/10/16  Resolved    Jaelyn Velez     
Myalgia and myositis, unspecified   

 

 RHINITIS, ALLERGIC NEC  477.8    Resolved    Jaelyn Velez     Allergic rhinitis due to other allergen   

 

 VACCINE AGAINST STREPTOCOCCUS PNEUMONIAE  V03.82    Resolved    Jaelyn Velez     Need for prophylactic vaccination against 
Streptococcus pneumoniae [pneumococcus]   

 

 COUGH  786.2    Resolved    Jaelyn Velez     Cough
   

 

 SYMPTOM, CYANOSIS  782.5    Resolved    Jaelyn Velez     Cyanosis   

 

 HYPOXEMIA  799.02    Resolved    Jaelyn Veelz     
Hypoxemia   

 

 BRONCHITIS  490    Resolved    Jaelyn Velez     
Bronchitis, not specified as acute or chronic   

 

 BENIGN POSITIONAL VERTIGO  386.11    Resolved    Jaelyn Velez     Benign paroxysmal positional vertigo   

 

 NAUSEA  787.02    Resolved    Jaelyn Velez     
Nausea alone   

 

 FOOT PAIN, RIGHT  729.5    Resolved    Jaelyn Velez     Pain in limb   

 

 CELLULITIS  682.9    Resolved    Jaelyn Velez     
Cellulitis and abscess of unspecified sites   

 

 SYNCOPE  780.2    Resolved    Jaelyn Velez     
Syncope and collapse   

 

 BENIGN POSITIONAL VERTIGO  386.11    Resolved    Jaelyn Velez     Benign paroxysmal positional vertigo   

 

 ELBOW PAIN  719.42    Resolved    Jaelyn Velez     
Pain in joint involving upper arm   

 

 HYPERTENSION  401.1    Active    Jaelyn Velez     
Benign essential hypertension   

 

 EPICONDYLITIS, RIGHT  726.32    Resolved    Jaelyn Velez     Lateral epicondylitis   

 

 CORONARY ATHEROSCLEROSIS, NATIVE VESSEL  414.01    Active    Jaelyn Velez     Coronary atherosclerosis of native coronary artery
   

 

 PHARYNGITIS, ACUTE  462  2010/12/10  Resolved    Jaelyn Velez     Acute pharyngitis   

 

 SUPRAVENTRICULAR TACHYCARDIA  427.89    Active    Jaelyn Velez     Other specified cardiac dysrhythmias   

 

 AV RHETT REENTRY TACHYCARDIA  427.89    Active    Jaelyn Velez     Other specified cardiac dysrhythmias   

 

 CHEST PAIN, ATYPICAL  786.59    Resolved    Jaelyn Velez     Other chest pain   

 

 SINUSITIS, SPHENOIDAL, ACUTE  461.3    Resolved    Jaelyn Velez     Acute sphenoidal sinusitis   

 

 DIVERTICULITIS, ACUTE  562.11    Active    Jaelyn Velez     Diverticulitis of colon without mention of hemorrhage   

 

 ACUTE PROSTATITIS  601.0    Resolved    Jaelyn Velez     Acute prostatitis   

 

 BENIGN PROSTATIC HYPERTROPHY, WITH OBSTRUCTION  600.01    Active  
  Jaelyn Velez     Hypertrophy (benign) of prostate with 
urinary obstruction and other lower urinary tract symptoms (LUTS)   

 

 ANGINA  413.9    Active    Jaelyn Velez     Other 
and unspecified angina pectoris   

 

 SINUSITIS, SPHENOIDAL, ACUTE  461.3    Inactive    Jaelyn Velez     Acute sphenoidal sinusitis   

 

 POLYCYTHEMIA  289.0  2014/07/15  Active  2014/07/15  Jaelyn Velez     
Polycythemia, secondary   

 

 OBSTRUCTIVE SLEEP APNEA  780.57  2014/07/15  Active  2014/07/15  Jaelyn Velez     Unspecified sleep apnea   

 

 PVD  443.9    Active    Jaelyn Velez     
Peripheral vascular disease, unspecified   

 

 FOOT ULCER, RIGHT  707.10    Resolved  2015/02/10  Jaelyn Velez     Ulcer of lower limb, unspecified   

 

 DIABETES MELLITUS, NONINSULIN DEPENDENT (NIDDM)  250.02    Active  
  Jaelyn Velez     Diabetes mellitus without mention of 
complication, type II or unspecified type, uncontrolled   

 

 ANGINA, UNSTABLE  411.1    Resolved  2015/02/10  Jaelyn Velez     Intermediate coronary syndrome   

 

 SINUSITIS, SPHENOIDAL, ACUTE  461.3    Inactive    Jaelyn Velez     Acute sphenoidal sinusitis   

 

 WEIGHT GAIN, ABNORMAL  783.1  2015/02/10  Active  2015/02/10  Jaelyn Velez     Abnormal weight gain   







Medication List







 Medication  Instructions  Start Date  Stop Date  Generic Name  NDC  Status  
Provider  Patient Instruction

 

 LIPITOR 20 MG TAB  1 PO QD  2015/02/10     ATORVASTATIN CALCIUM  67703502873  
Active  Jaelyn Velez   

 

 NORVASC 10 MG TAB  1 PO QD       AMLODIPINE BESYLATE  18829552687  
Active  Jaelyn Velez   

 

 NITROSTAT 0.4 MG SL TAB  1 pill under tongue prn chest pain. May repeat twice 
then must go to ER       NITROGLYCERIN  24653959935  Active  Karla Valles   

 

 ISOSORBIDE MONONITRATE 10 MG TABS  1 PO BID    
ISOSORBIDE MONONITRATE  66184431541  No Longer Active  Jaelyn Velez   

 

 FAMOTIDINE 20 MG TABS  1 PO BID       FAMOTIDINE  98498662429  
Active  Karla Valles   

 

 IMDUR 30 MG XR24H-TAB  1 po daily       ISOSORBIDE MONONITRATE  
49407014567  No Longer Active  Jaelyn Velez   

 

 AMOXICILLIN 875 MG TABS  1 PO BID    AMOXICILLIN  
31899777072  No Longer Active  Jaelyn Velez   

 

 ZOSTAVAX 70700 UNT/0.65ML SOLR  1 injection once to prevent Shingles    ZOSTER VACCINE LIVE  31259593856  No Longer Active  Jaelyn Velez   

 

 MUCINEX 600 MG TB12  1 PO BID        GUAIFENESIN  43472328142  Active  Jaelyn 
Lexi Velez   

 

 PROSCAR 5 MG TABS  1 PO daily        FINASTERIDE  96971462298  Active  Jaelyn 
Lexi Velez   

 

 LISINOPRIL 10 MG TABS  2 PO BID        LISINOPRIL  71960304369  Active  Jaelyn 
Lexi Velez   

 

 ZOCOR 20 MG TAB  1 PO daily        SIMVASTATIN  31492569294  No Longer Active  
Jealyn Lexi ROCHA'S NASAL SPRAY (DEXAMETHASONE, GENTAMICIN, SALINE)  2 puffs each 
nostril TID for 10 days    DR. ROCHA'S NASAL SPRAY (
DEXAMETHASONE, GENTAMICIN, SALINE)     No Longer Active  Jaelyn Lexi Velez   

 

 AUGMENTIN 500-125 MG TAB  1 PO BID    AMOXICILLIN-POT 
CLAVULANATE  26979546479  No Longer Active  Jaelyn Lexi Velez   

 

 RANEXA 500 MG XR12H-TAB  1 po bid       RANOLAZINE  71349147061  No 
Longer Active  Jaelyn Lexi Velez   

 

 DEPO-TESTOSTERONE 100 MG/ML OIL  1 injection every 2 weeks per Dr Tawil        
TESTOSTERONE CYPIONATE  67375023208  Active  Jaelyn Lexi Velez   

 

 FLONASE 50 MCG/ACT SUSP  2 puffs each nostril once daily    FLUTICASONE PROPIONATE  97904086000  No Longer Active  Jaelyn Lexi Velez
   

 

 TOPROL XL 25 MG TB24  1 PO daily        METOPROLOL SUCCINATE  76157213449  
Active  Jaelyn Lexi Velez   

 

 PENICILLIN V POTASSIUM 500 MG TAB  1 PO QID    
PENICILLIN V POTASSIUM  11118819309  No Longer Active  Jaelyn Lexi Velez   

 

 VITAMIN E 400 UNIT CAPS  1 po daily       VITAMIN E  34799342027  No 
Longer Active  Jaelyn Lexi Velez   

 

 VITAMIN B COMPLEX TABS  1 po daily       B COMPLEX VITAMINS  
95974203132  No Longer Active  Jaelyn Lexi Velez   

 

 MIRALAX POWD  1 tbsp in 8 oz of fluid BID PRN  2007/05/10    
POLYETHYLENE GLYCOL 3350  62193333157  No Longer Active  Jaelyn Lexi Velez   

 

 FIBERCON 625 MG TABS  1 po daily       POLYCARBOPHIL CALCIUM  
63000953826  No Longer Active  Jaelyn Lexi Velez   

 

 VITAMIN C 1000 MG TABS  1 po daily       ASCORBIC ACID  36092686241  
No Longer Active  Jaelyn Lexi Velez   

 

 DEPO-TESTOSTERONE 200 MG/ML OIL  1 injection every 2 weeks       
TESTOSTERONE CYPIONATE  81188970760  No Longer Active  Jaelyn Lexi Velez   

 

 MUCINEX 600 MG TB12  1 PO BID       GUAIFENESIN  56103711727  No 
Longer Active  Jaelyn Lexi Velez   

 

 MECLIZINE HCL 25 MG TABS  1 po Q 6 hrs prn dizziness    
MECLIZINE HCL  68063403466  No Longer Active  Jaelyn Lexi Velez   

 

 DARVOCET-N 100 TABS  1 PO Q4hrs PRN  2007/05/10    PROPOXYPHENE N-
APAP TABS  76658082755  No Longer Active  Jaelyndonell Velez   

 

 LABETALOL  MG TABS  1 PO BID       LABETALOL HCL  10696093791
  No Longer Active  Jaelyn Lexi Velez   

 

 PROTONIX 40 MG TBEC  1 po qd       PANTOPRAZOLE SODIUM  13378583988  
Active  Jaelyn Lexi Velez   

 

 FLOMAX 0.4 MG CAPS  1 PO qhs        TAMSULOSIN HCL  35552565026  Active  Jaelyn 
Lexi Velez   

 

 ALPRAZOLAM 0.5 MG TABS  1 PO Q4 hrs prn       ALPRAZOLAM  
80640447653  Active  Karla Valles   

 

 AUGMENTIN 500-125 MG TAB  1 PO BID    AMOXICILLIN-POT 
CLAVULANATE  92296098989  No Longer Active  Jaelyndonell Velez   

 

 TOPROL XL 25 MG TB24  1/2 tablet QOD       METOPROLOL SUCCINATE  
99116472803  No Longer Active  Jaelyn ROCHA'S NASAL SPRAY (DEXAMETHASONE, GENTAMICIN, SALINE)  2 puffs each 
nostril TID for 10 days    DR. ROCHA'S NASAL SPRAY (
DEXAMETHASONE, GENTAMICIN, SALINE)     No Longer Active  Jaelyn Lexi Velez   

 

 ATROVENT 0.06 % SOLN  2 puffs each nostril TID prn runny nose  2010/12/10  2010
/12/17  IPRATROPIUM BROMIDE  73491561782  No Longer Active  Jaelyndonell Velez   

 

 TUSSIONEX PENNKINETIC ER 8-10 MG/5ML LQCR  1 tsp po q 6hrs prn cough  2010/12/
10    CHLORPHENIRAMINE-HYDROCODONE  01433941810  No Longer Active  
Jaelyndonell Velez   

 

 AMOXICILLIN 500 MG CAP  1 PO TID  2010/12/10    AMOXICILLIN  
48928894603  No Longer Active  Jaelyn Lexi Velez   

 

 LORTAB 5 5-500 MG TABS  1 PO Q4-6hrs prn pain       HYDROCODONE-
ACETAMINOPHEN  87463288082  No Longer Active  Jaelyndonell Velez   

 

 VITAMIN D 1000 UNIT TABS  2 PO Daily        CHOLECALCIFEROL  05404729747  
Active  Jaelyndonell Velez   

 

 FEXOFENADINE  MG TABS  1 po daily for 10 days    
FEXOFENADINE HCL  76225387684  No Longer Active  Jaelyn Velez   

 

 TRANSDERM-SCOP 1.5 MG PT72  1 patch behind ear and change every 3 days    SCOPOLAMINE BASE  04354825920  No Longer Active  Jean Gibson
   

 

 PREDNISONE 20 MG TAB  3 pills daily at once for 2 days, 2 pills daily at once 
for 2 days, 1 once daily for 2 days    PREDNISONE  
48094364063  No Longer Active  Jaelyn Velez   

 

 BIAXIN 500 MG TABS  1PO BID for 7 days    CLARITHROMYCIN
  65083045259  No Longer Active  Jaelyn Velez   

 

 METROGEL 1 % GEL  apply to affected area TID    2009/06/10  
METRONIDAZOLE  25842226974  No Longer Active  Jaelyndonell Velez   

 

 TETRACYCLINE  MG CAPS  1 PO TID with meals    2009/06/10  
TETRACYCLINE HCL  01668659239  No Longer Active  Jaelyndonell Velez   

 

 PREDNISONE 20 MG TAB  3 pills daily at once for 2 days, 2 pills daily at once 
for 2 days, 1 once daily for 2 days    PREDNISONE  
30586815441  No Longer Active  Jaelyn Lexi Velez   

 

 TESSALON 200 MG CAPS  1 PO TID prn cough    BENZONATATE  
24182085024  No Longer Active  Jaelyn Lexi Velez   

 

 AMBIEN 10 MG TABS  1 PO QHS PRN  2007/05/10    ZOLPIDEM TARTRATE  
41248925855  No Longer Active  Jaelyn Lexi Velez   

 

 PHENTERMINE HCL 37.5 MG CAPS  1 PO Daily       PHENTERMINE HCL  
79173866875  No Longer Active  Jaelyn Lexi Velez   

 

 VYTORIN 10-20 MG TABS  1 PO daily       EZETIMIBE-SIMVASTATIN  
49704975450  No Longer Active  Serge Parada   

 

 ALLEGRA 180 MG TABS  1 PO Daily    FEXOFENADINE HCL  
47589486728  No Longer Active  Jaelyn Lexi Velez   

 

 COLACE 100 MG CAPS  1 PO BID to prevent constipation.  2007/05/10    
DOCUSATE SODIUM  23594026626  No Longer Active  Jaelyn Lexi Velez   

 

 LACTULOSE 10 GM/15ML SOLN  1 tbsp PO TID PRN  2007/05/10    
LACTULOSE  59244722266  No Longer Active  Jaelyn Velez   

 

 NASACORT AQ  AERS  2 puffs each nostril BID for 7 days  
  TRIAMCINOLONE ACETONIDE(NASAL) AERS  74757192936  No Longer Active  Ramonita Springer   

 

 PREDNISONE 20 MG TAB  2 Daily for 3 days and 1 Daily for 2 Days    PREDNISONE  95511918920  No Longer Active  Jaelyn Lexi Velez   

 

 VOLTAREN- MG TB24  1 PO daily    2007/10/16  DICLOFENAC SODIUM
  64951990260  No Longer Active  Jaelyn Lexi Velez   

 

 VYTORIN 10-20 MG TABS  1 PO daily    2007/10/16  EZETIMIBE-
SIMVASTATIN  88189743306  No Longer Active  Jaelyn Lexi Velez   

 

 AUGMENTIN 500-125 MG TAB  1 PO BID    AMOXICILLIN-POT 
CLAVULANATE  26078623106  No Longer Active  Jaelyn ROCHA'S NASAL SPRAY (DEXAMETHASONE, GENTAMICIN, SALINE)  2 puffs each 
nostril TID for 7 days.    DR. ROCHA'S NASAL SPRAY (
DEXAMETHASONE, GENTAMICIN, SALINE)     No Longer Active  Jaelyn Velez   

 

 VYTORIN 10-20 MG TABS  1 po daily       EZETIMIBE-SIMVASTATIN  
07586890530  No Longer Active  Jaelyn Velez   

 

 LORTAB 5 5-500 MG TABS  1 to 2 PO Q6hrs prn    
ACETAMINOPHEN-HYDROCODONE  23995899511  No Longer Active  Jaelyn Velez 
  

 

 VALTREX 500 MG TABS  1 PO BID for 7 days    VALACYCLOVIR 
HCL  31682528584  No Longer Active  Jaelyn Velez   

 

 CHLORPHENIRAMINE MALEATE 4 MG TABS  1 PO Q6hrs prn for cold    CHLORPHENIRAMINE MALEATE  90435924027  No Longer Active  Karla Valles   

 

 ZYRTEC 10 MG TABS  1 po daily        CETIRIZINE HCL  20186063866  Active  
Cass Megee   

 

 FISH OIL 1000 MG CAPS  1 po TID        OMEGA-3 FATTY ACIDS  43605168766  
Active  Karla Valles   

 

 ASPIRIN 81 MG TABS  1 po daily        ASPIRIN  83827945932  Active  Karla Valles   

 

 PLAVIX 75 MG TABS  1 po daily        CLOPIDOGREL BISULFATE  41361363625  
Active  Jaelyn Velez   







Immunizations







 Vaccine  Administration Date  Value  Standard Description

 

 Influenza vaccine given    done  influenza virus vaccine, 
unspecified formulation

 

 tetanus immunization   at ER  tetanus toxoid, unspecified 
formulation







Vital Signs







 Date  Name  Value  Unit  Range  Description

 

 2015/02/10  blood pressure, diastolic - 8462-4  62  mm[Hg]     BP haskins

 

 2015/02/10  blood pressure, systolic - 8480-6  124  mm[Hg]     BP sys

 

 2015/02/10  pulse rate E&M - 8867-4  66  /min     Heart rate

 

 2015/02/10  respiratory rate E&M - 9279-1  14  /min     Resp rate

 

 2015/02/10  temperature E&M  98.6  [degF]     Body temperature

 

 2015/02/10  weight E&M - 3141-9  221  [lb_av]     Weight Measured

 

   blood pressure, diastolic - 8462-4  68  mm[Hg]     BP haskins

 

   blood pressure, systolic - 8480-6  132  mm[Hg]     BP sys

 

   pulse rate E&M - 8867-4  72  /min     Heart rate

 

   respiratory rate E&M - 9279-1  14  /min     Resp rate

 

   weight E&M - 3141-9  218  [lb_av]     Weight Measured

 

   blood pressure, diastolic - 8462-4  60  mm[Hg]     BP haskins

 

   blood pressure, systolic - 8480-6  112  mm[Hg]     BP sys

 

   pulse rate E&M - 8867-4  64  /min     Heart rate

 

   respiratory rate E&M - 9279-1  14  /min     Resp rate

 

   temperature E&M  98.6  [degF]     Body temperature

 

   weight E&M - 3141-9  208  [lb_av]     Weight Measured

 

 2014/07/15  blood pressure, diastolic - 8462-4  55  mm[Hg]     BP haskins

 

 2014/07/15  blood pressure, systolic - 8480-6  110  mm[Hg]     BP sys

 

 2014/07/15  pulse rate E&M - 8867-4  64  /min     Heart rate

 

 2014/07/15  respiratory rate E&M - 9279-1  14  /min     Resp rate

 

 2014/07/15  temperature E&M  98.6  [degF]     Body temperature

 

 2014/07/15  weight E&M - 3141-9  210  [lb_av]     Weight Measured







Diagnostic Results







 Date  Name  Value  Unit  Range  Description









 Clinical Lists Update: CBC,CMP,FLP   [facility] - Chemistry









   calcium, serum  9.4  mg/dL      

 

   bilirubin, serum, total  0.5  mg/dL      

 

   HDL cholesterol, serum  24.0  mg/dL      

 

   cholesterol/HDL ratio, serum, percent  5.1         

 

   triglyceride, serum, fasting  109  mg/dL      

 

   LDL cholesterol, serum  76  mg/dL      

 

   Estimated Glomerular Filtration Rate (calc)  53  mL/min/1.73m2    
  

 

   potassium, serum  4.7  mmol/L      

 

   albumin, serum  4.4  g/dL      

 

   sodium, serum  139  mmol/L      

 

   alkaline phosphatase, serum  58  U/L      

 

   protein, total, serum  6.6  g/dL      

 

   urea nitrogen, blood  31  mg/dL      

 

   glucose, plasma fasting  100  mg/dL      

 

   creatinine, serum  1.4  mg/dL      

 

   aspartate aminotransferase (SGOT), serum  17  U/L      

 

   chloride, serum  104  mmol/L      

 

   anion gap, serum  11         

 

   cholesterol, serum  122  mg/dL      

 

   alanine aminotransferase (SGPT), serum  22  U/L      

 

   carbon dioxide, venous blood  29.0  mmol/L      









 Clinical Lists Update: CBC,CMP,FLP   [facility] - Hematology









   hematocrit, blood  55  %      

 

   red blood cell distribution width  15.9  %      

 

   mean corpuscular volume, RBC  99  fL      

 

   leukocyte count, blood  7.0  10*3/mm3      

 

   erythrocyte (RBC) count  5.57  10*6/mm3      

 

   platelet count  167  10*3/mm3      

 

   hemoglobin, blood  17.6  g/dL      









 Clinical Lists Update: CMP,FLP,HGA1C - Chemistry









   albumin, serum  4.3  g/dL      

 

   alkaline phosphatase, serum  64  U/L      

 

   urea nitrogen, blood  27  mg/dL      

 

   calcium, serum  9.0  mg/dL      

 

   chloride, serum  105  mmol/L      

 

   cholesterol, serum  136  mg/dL      

 

   carbon dioxide, venous blood  30.0  mmol/L      

 

   creatinine, serum  1.3  mg/dL      

 

   HDL cholesterol, serum  29.0  mg/dL      

 

   hemoglobin A1C, blood, as % of total hemoglobin  5.9  %      

 

   LDL cholesterol, serum  81  mg/dL      

 

   potassium, serum  4.3  mmol/L      

 

   protein, total, serum  6.3  g/dL      

 

   aspartate aminotransferase (SGOT), serum  18  U/L      

 

   alanine aminotransferase (SGPT), serum  32  U/L      

 

   bilirubin, serum, total  0.4  mg/dL      

 

   triglyceride, serum, fasting  130  mg/dL      

 

   sodium, serum  142  mmol/L      

 

   anion gap, serum  11         

 

   cholesterol/HDL ratio, serum, percent  4.7         

 

   glucose, plasma fasting  107  mg/dL      

 

   Estimated Glomerular Filtration Rate (calc)  1.3  mL/min/1.73m2   
   









 Clinical Lists Update: [facility] - Chemistry









   Estimated Glomerular Filtration Rate (calc)  >60  mL/min/1.73m2   
   

 

   glucose, plasma fasting  91  mg/dL      

 

   sodium, serum  139  mmol/L      

 

   bilirubin, serum, total  0.5  mg/dL      

 

   alanine aminotransferase (SGPT), serum  37  U/L      

 

   aspartate aminotransferase (SGOT), serum  28  U/L      

 

   protein, total, serum  6.4  g/dL      

 

   potassium, serum  4.6  mmol/L      

 

   creatinine, serum  1.16  mg/dL      

 

   carbon dioxide, venous blood  23  mmol/L      

 

   chloride, serum  107  mmol/L      

 

   calcium, serum  8.4  mg/dL      

 

   urea nitrogen, blood  24  mg/dL      

 

   alkaline phosphatase, serum  59  U/L      

 

   albumin, serum  3.7  g/dL      









 Clinical Lists Update: [facility] - Coagulation









   prothrombin time (patient)  12.9  s      

 

   PTT patient  34  s      

 

   international normalized ratio (INR)  1.0         









 Clinical Lists Update: [facility] - Hematology









   red blood cell distribution width  14.0  %      

 

   hemoglobin, blood  18.0  g/dL      

 

   hematocrit, blood  52  %      

 

   mean corpuscular volume, RBC  91  fL      

 

   leukocyte count, blood  6.9  10*3/mm3      

 

   erythrocyte (RBC) count  5.71  10*6/mm3      

 

   platelet count  143  10*3/mm3      









 Clinical Lists Update: TSH,HgA1c - Chemistry









   thyroid stimulating hormone, serum  3.02  u[iU]/mL      

 

   hemoglobin A1C, blood, as % of total hemoglobin  6.1  %      







Encounters







 Code  Encounter  Date  Provider  Facility

 

 CPT-55886  Ofc Vst, Est Level IV   20:15:33 CST  Jaelyn Lexi Velez  [facility]

 

 CPT-74749  Ofc Vst, Est Level IV   16:05:52 CDT  Jaelyn Lexi Velez  [facility]

 

 CPT-55842  Ofc Vst, Est Level IV  2014/07/15 16:12:48 CDT  Jaelyn Lexi Velez  [facility]

 

 CPT-40908  Ofc Vst, Est Level III   12:54:51 CST  Jaelyn Velez DO, FACP

 

 CPT-70469  Ofc Vst, Est Level IV   20:06:11 CST  Jaelyn Velez DO, FACP

 

 CPT-69613  Ofc Vst, Est Level IV   15:27:45 CDT  Jaelyndonell Velez DO, FACP

 

 CPT-12567  Ofc Vst, Est Level III   13:12:11 CST  Jaelyn Velez DO, FACP

 

 CPT-88313  Ofc Vst, Est Level IV   14:28:32 CDT  Jaelyn Lexi Velez  [facility]

 

 CPT-45587  Ofc Vst, Est Level IV   13:58:02 CST  Jaelyn Velez  [facility]

 

 CPT-41025  Ofc Vst, Est Level IV   16:02:21 CDT  Jaelyn Lexi Velez  [facility]

 

 CPT-30288  Ofc Vst, Est Level III  2010/12/10 16:52:27 CST  Jaelyn Velez  [facility]

 

 CPT-86383  Ofc Vst, Est Level IV   14:59:32 CDT  Jaelyndonell Velez  [facility]

 

 CPT-08026  Ofc Vst, Est Level IV   15:18:37 CST  Jaelyn Lexi Velez  [facility]

 

 CPT-46517  Ofc Vst, Est Level IV   11:31:16 CST  Jaelyn CUMMINS Velez, DO, FACP

 

 CPT-38621  Ofc Vst, Est Level IV   10:24:20 CDT  Jaelyn Lexi CUMMINS Velez, DO, FACP

 

 CPT-19736  Ofc Vst, Est Level V   11:49:45 CDT  Jaelyn Lexi CUMMINS Velez, DO, FACP

 

 CPT-68041  Ofc Vst, Est Level III   11:39:15 CDT  Jaelyn Lexi CUMMINS Agustin, DO, FACP

 

 CPT-00341  Ofc Vst, Est Level IV   16:34:41 CDT  Jaelyn Lexi Velez  [place] [facility]

 

 CPT-01630  Ofc Vst, Est Level IV   14:01:28 CDT  Jaelyndonell eVlez  [place] [facility]

 

 CPT-28154  Ofc Vst, Est Level V   14:37:33 CDT  Jaelyndonell Velez
  [facility]

 

 CPT-19280  Ofc Vst, Est Level IV   14:05:07 CST  Jaelyn CUMMINS Velez, DO, FACP

 

 CPT-81077  Ofc Vst, Est Level III   16:08:45 CDT  Jaelyn Lexi CUMMINS Velez, DO, FACP

 

 CPT-80812  Ofc Vst, Est Level II   16:04:32 CST  Jaelyn Lexi Christy S Velez, DO, FACP

 

 CPT-11698  Ofc Vst, Est Level IV   14:42:05 CST  Jaelyn Lexi CUMMINS Velez, DO, FACP

 

 CPT-94270  Ofc Vst, Est Level III   13:48:14 CST  Jaelyn Lexi Velez, DO, FACP

 

 CPT-91627  Ofc Vst, Est Level III  2007/10/23 11:18:49 CDT  Jaelyn Lexi 
Agustin Velez, DO, FACP

 

 CPT-79918  Ofc Vst, Est Level IV  2007/10/16 11:31:49 CDT  Jaelyndonell Elliott 
Agustin Velez, DO, FACP

 

 CPT-59059  Ofc Vst, Est Level III   14:59:37 CDT  Jaelyn Lexi 
Agustin Velez, DO, FACP

 

 CPT-16606  Ofc Vst, Est Level IV   09:42:47 CDT  Jaelyn Velez  [facility] [place]

 

 CPT-33788  Ofc Vst, Est Level IV  2007/05/10 14:28:25 CDT  Jaelyn Velez  [facility] [place]

 

 CPT-56034  Ofc Vst, Est Level III   14:19:28 CST  Jaelyn Velez  [facility] [place]

 

 CPT-56413  Ofc Vst, Est Level III   15:18:37 CDT  Jaelyn Lexi Velez  [facility] [place]

 

 CPT-42727  Ofc Vst, Est Level III   13:17:16 CST  Jaelyn Velez  [facility] [place]

 

 CPT-95763  Ofc Vst, Est Level III   13:57:33 CST  Jaelyn Velez  [facility] [facility] [place]

 

 CPT-11632  Ofc Vst, New Level III   10:57:25 CST  Jaelyn Velez  [facility] [place]







Procedures







 Code  Procedure Name  Date  Entry Date  Standard Description

 

 CPT-  Medicare Annual Wellness Visit  2015/02/10 15:36:56 CST  2015/02/10
   

 

 CPT-  E-Prescribing Medication Sent   12:54:51 CST   
  

 

 CPT-  E-Prescribing Not sent due to no medication given   20:06:
11 CST     

 

 CPT-  E-Prescribing not done due to controlled substance   14:23
:25 CDT     

 

 CPT-  Medicare Annual Wellness Visit Initial   14:23:25 CDT  
   

 

 CPT-  E-Prescribing Medication Sent   15:27:45 CDT   
  

 

 CPT-  E-Prescribing not done due to controlled substance   13:12
:11 CST     

 

 CPT-  E-Prescribing Not sent due to no medication given   14:28:
32 CDT     

 

 CPT-8446  E-Prescribing not done due to controlled substance   11:39:
15 CDT     

 

 CPT-8446  E-Prescribing not done due to controlled substance   16:34:
41 CDT     

 

 CPT-  E-Prescribing Medication Sent   14:01:28 CDT   
  

 

 CPT-85835  Injection, Pneumovax   16:04:32 CST     

 

 CPT-  Welcome to Medicare EK   15:18:37 CDT

## 2017-02-24 VITALS — SYSTOLIC BLOOD PRESSURE: 146 MMHG | DIASTOLIC BLOOD PRESSURE: 73 MMHG

## 2017-02-24 VITALS — DIASTOLIC BLOOD PRESSURE: 60 MMHG | SYSTOLIC BLOOD PRESSURE: 127 MMHG

## 2017-02-24 VITALS — DIASTOLIC BLOOD PRESSURE: 55 MMHG | SYSTOLIC BLOOD PRESSURE: 113 MMHG

## 2017-02-24 VITALS — DIASTOLIC BLOOD PRESSURE: 56 MMHG | SYSTOLIC BLOOD PRESSURE: 116 MMHG

## 2017-02-24 VITALS — DIASTOLIC BLOOD PRESSURE: 56 MMHG | SYSTOLIC BLOOD PRESSURE: 129 MMHG

## 2017-02-24 VITALS — DIASTOLIC BLOOD PRESSURE: 56 MMHG | SYSTOLIC BLOOD PRESSURE: 127 MMHG

## 2017-02-24 VITALS — SYSTOLIC BLOOD PRESSURE: 115 MMHG | DIASTOLIC BLOOD PRESSURE: 66 MMHG

## 2017-02-24 VITALS — DIASTOLIC BLOOD PRESSURE: 54 MMHG | SYSTOLIC BLOOD PRESSURE: 121 MMHG

## 2017-02-24 VITALS — SYSTOLIC BLOOD PRESSURE: 146 MMHG | DIASTOLIC BLOOD PRESSURE: 66 MMHG

## 2017-02-24 VITALS — SYSTOLIC BLOOD PRESSURE: 132 MMHG | DIASTOLIC BLOOD PRESSURE: 60 MMHG

## 2017-02-24 VITALS — DIASTOLIC BLOOD PRESSURE: 71 MMHG | SYSTOLIC BLOOD PRESSURE: 126 MMHG

## 2017-02-24 VITALS — DIASTOLIC BLOOD PRESSURE: 58 MMHG | SYSTOLIC BLOOD PRESSURE: 120 MMHG

## 2017-02-24 VITALS — DIASTOLIC BLOOD PRESSURE: 59 MMHG | SYSTOLIC BLOOD PRESSURE: 131 MMHG

## 2017-02-24 VITALS — DIASTOLIC BLOOD PRESSURE: 54 MMHG | SYSTOLIC BLOOD PRESSURE: 120 MMHG

## 2017-02-24 VITALS — SYSTOLIC BLOOD PRESSURE: 131 MMHG | DIASTOLIC BLOOD PRESSURE: 58 MMHG

## 2017-02-24 VITALS — DIASTOLIC BLOOD PRESSURE: 59 MMHG | SYSTOLIC BLOOD PRESSURE: 130 MMHG

## 2017-02-24 LAB
ANION GAP SERPL CALC-SCNC: 10 MMOL/L (ref 5–14)
BUN SERPL-MCNC: 21 MG/DL (ref 7–18)
BUN/CREAT SERPL: 14
CALCIUM SERPL-MCNC: 8.8 MG/DL (ref 8.5–10.1)
CHLORIDE SERPL-SCNC: 102 MMOL/L (ref 98–107)
CO2 SERPL-SCNC: 26 MMOL/L (ref 21–32)
CREAT SERPL-MCNC: 1.49 MG/DL (ref 0.6–1.3)
ERYTHROCYTE [DISTWIDTH] IN BLOOD BY AUTOMATED COUNT: 13.4 % (ref 10–14.5)
GFR SERPLBLD BASED ON 1.73 SQ M-ARVRAT: 46 ML/MIN
GLUCOSE SERPL-MCNC: 117 MG/DL (ref 70–105)
MCH RBC QN AUTO: 32 PG (ref 25–34)
MCHC RBC AUTO-ENTMCNC: 35 G/DL (ref 32–36)
MCV RBC AUTO: 93 FL (ref 80–99)
PLATELET # BLD: 170 10^3/UL (ref 130–400)
PMV BLD AUTO: 11 FL (ref 7.4–10.4)
POTASSIUM SERPL-SCNC: 4.6 MMOL/L (ref 3.6–5)
RBC # BLD AUTO: 3.75 10^6/UL (ref 4.35–5.85)
SODIUM SERPL-SCNC: 138 MMOL/L (ref 135–145)
WBC # BLD AUTO: 10.8 10^3/UL (ref 4.3–11)

## 2017-02-24 RX ADMIN — CEFAZOLIN SCH MLS/HR: 10 INJECTION, POWDER, FOR SOLUTION INTRAVENOUS at 04:24

## 2017-02-24 RX ADMIN — SODIUM CHLORIDE, SODIUM LACTATE, POTASSIUM CHLORIDE, AND CALCIUM CHLORIDE SCH MLS/HR: 600; 310; 30; 20 INJECTION, SOLUTION INTRAVENOUS at 07:40

## 2017-02-24 RX ADMIN — ALBUTEROL SULFATE SCH MG: 2.5 SOLUTION RESPIRATORY (INHALATION) at 22:29

## 2017-02-24 RX ADMIN — METOPROLOL TARTRATE SCH MG: 1 INJECTION, SOLUTION INTRAVENOUS at 14:05

## 2017-02-24 RX ADMIN — METRONIDAZOLE SCH MLS/HR: 5 INJECTION, SOLUTION INTRAVENOUS at 06:44

## 2017-02-24 RX ADMIN — METOPROLOL TARTRATE SCH MG: 1 INJECTION, SOLUTION INTRAVENOUS at 18:28

## 2017-02-24 RX ADMIN — Medication SCH ML: at 06:45

## 2017-02-24 RX ADMIN — ACETAMINOPHEN PRN MG: 325 TABLET ORAL at 21:23

## 2017-02-24 RX ADMIN — Medication SCH ML: at 14:00

## 2017-02-24 RX ADMIN — SODIUM CHLORIDE, SODIUM LACTATE, POTASSIUM CHLORIDE, AND CALCIUM CHLORIDE SCH MLS/HR: 600; 310; 30; 20 INJECTION, SOLUTION INTRAVENOUS at 20:55

## 2017-02-24 RX ADMIN — ALBUTEROL SULFATE SCH MG: 2.5 SOLUTION RESPIRATORY (INHALATION) at 06:41

## 2017-02-24 RX ADMIN — Medication PRN MCG: at 10:07

## 2017-02-24 RX ADMIN — METOPROLOL TARTRATE SCH MG: 1 INJECTION, SOLUTION INTRAVENOUS at 09:58

## 2017-02-24 RX ADMIN — METOPROLOL TARTRATE SCH MG: 1 INJECTION, SOLUTION INTRAVENOUS at 21:23

## 2017-02-24 RX ADMIN — PANTOPRAZOLE SODIUM SCH MG: 40 INJECTION, POWDER, FOR SOLUTION INTRAVENOUS at 09:57

## 2017-02-24 RX ADMIN — ALBUTEROL SULFATE SCH MG: 2.5 SOLUTION RESPIRATORY (INHALATION) at 19:08

## 2017-02-24 RX ADMIN — ALBUTEROL SULFATE SCH MG: 2.5 SOLUTION RESPIRATORY (INHALATION) at 10:31

## 2017-02-24 RX ADMIN — METOPROLOL TARTRATE SCH MG: 1 INJECTION, SOLUTION INTRAVENOUS at 04:24

## 2017-02-24 RX ADMIN — ALBUTEROL SULFATE SCH MG: 2.5 SOLUTION RESPIRATORY (INHALATION) at 02:23

## 2017-02-24 RX ADMIN — Medication PRN MCG: at 16:54

## 2017-02-24 RX ADMIN — Medication SCH ML: at 21:24

## 2017-02-24 RX ADMIN — METOPROLOL TARTRATE SCH MG: 1 INJECTION, SOLUTION INTRAVENOUS at 00:00

## 2017-02-24 RX ADMIN — ENOXAPARIN SODIUM SCH MG: 100 INJECTION SUBCUTANEOUS at 09:57

## 2017-02-24 RX ADMIN — SODIUM CHLORIDE, SODIUM LACTATE, POTASSIUM CHLORIDE, AND CALCIUM CHLORIDE SCH MLS/HR: 600; 310; 30; 20 INJECTION, SOLUTION INTRAVENOUS at 02:30

## 2017-02-24 RX ADMIN — ALBUTEROL SULFATE SCH MG: 2.5 SOLUTION RESPIRATORY (INHALATION) at 15:10

## 2017-02-24 RX ADMIN — SODIUM CHLORIDE, SODIUM LACTATE, POTASSIUM CHLORIDE, AND CALCIUM CHLORIDE SCH MLS/HR: 600; 310; 30; 20 INJECTION, SOLUTION INTRAVENOUS at 14:05

## 2017-02-24 RX ADMIN — ENOXAPARIN SODIUM SCH MG: 100 INJECTION SUBCUTANEOUS at 20:55

## 2017-02-24 NOTE — ANESTHESIA-GENERAL POST-OP
General


Patient Condition


Mental Status/LOC:  Same as Preop


Cardiovascular:  Satisfactory


Nausea/Vomiting:  Absent


Respiratory:  Satisfactory


Pain:  Controlled


Complications:  Absent





Post Op Complications


Complications


None





Follow Up Care/Instructions


Patient Instructions


None needed.





Anesthesia/Patient Condition


Patient Condition


Patient is doing well, no complaints, stable vital signs, no apparent adverse 

anesthesia problems.   


No complications reported per nursing.








JERMAINE CARTER CRNA Feb 24, 2017 12:21

## 2017-02-24 NOTE — PROGRESS NOTE (SOAP)
Subjective


Subjective/Events-last exam


doing well. pain better controlled. no SOB. ileostomy functional.





Objective


Exam





 Vital Signs








  Date Time  Temp Pulse Resp B/P Pulse Ox O2 Delivery O2 Flow Rate FiO2


 


2/24/17 10:32     98   


 


2/24/17 10:07   20     


 


2/24/17 07:00  68      


 


2/24/17 06:43     98  4.00 


 


2/24/17 06:00  64 20 129/56 99 Room Air  


 


2/24/17 05:00  64 22 120/58 99 Room Air  


 


2/24/17 04:00      Nasal Cannula 5.00 


 


2/24/17 04:00  68 24 121/54 97 Room Air  


 


2/24/17 04:00 100.0       


 


2/24/17 03:00  67 12 113/55 97 Room Air  


 


2/24/17 02:24     94  5.00 


 


2/24/17 02:00  67 15 116/56 98 Room Air  


 


2/24/17 01:00  69      


 


2/24/17 01:00  68 12 120/54 97 Room Air  


 


2/24/17 00:00 98.3       


 


2/24/17 00:00  73 18 127/56 95 Room Air  


 


2/24/17 00:00      Nasal Cannula 5.00 


 


2/23/17 23:53   15     


 


2/23/17 23:00  92 21 132/54 97 Room Air  


 


2/23/17 22:38     96  5.00 


 


2/23/17 22:00  68 13 128/57 96 Room Air  


 


2/23/17 21:00   18     


 


2/23/17 21:00      Nasal Cannula 5.00 


 


2/23/17 21:00  72 13 126/66 98 Room Air  


 


2/23/17 20:00  76 17 137/67 98 Room Air  


 


2/23/17 20:00 99.0       


 


2/23/17 19:00   15     


 


2/23/17 19:00  76 19 135/71 97 Room Air  


 


2/23/17 19:00  73      


 


2/23/17 18:21     94  5.00 


 


2/23/17 18:00  75 15 139/64 97 Room Air  


 


2/23/17 17:00  82 16 156/73 97 Room Air  


 


2/23/17 16:00  83 7 163/76 97 Room Air  


 


2/23/17 16:00 98.8       


 


2/23/17 15:58   16     


 


2/23/17 15:33     97  5.00 


 


2/23/17 15:21  77      


 


2/23/17 15:00  71 15 149/74 96 Room Air  


 


2/23/17 14:30      Nasal Cannula 5.00 














 I & O 


 


 2/24/17





 07:00


 


Intake Total 3300 ml


 


Output Total 3025 ml


 


Balance 275 ml





Capillary Refill :


General Appearance:   No Apparent Distress


HEENT:   PERRL/EOMI


Neck:   Full Range of Motion


Respiratory:   Chest Non Tender Lungs Clear Normal Breath Sounds


Cardiovascular:   Regular Rate, Rhythm


Gastrointestinal:   soft tenderness other (wounds clean/dry. ostomy viable)


Extremity:   Normal Capillary Refill


Neurologic/Psychiatric:   Alert Oriented x3


Skin:   Normal Color





Results


Lab


 Laboratory Tests


2/24/17 04:05: 


Anion Gap 10, BUN/Creatinine Ratio 14, Blood Urea Nitrogen 21H, Calcium Level 

8.8, Carbon Dioxide Level 26, Chloride Level 102, Creatinine 1.49H, Estimat 

Glomerular Filtration Rate 46, Glucose Level 117H, Hematocrit 35L, Hemoglobin 

12.0L, Mean Corpuscular Hemoglobin 32, Mean Corpuscular Hemoglobin Concent 35, 

Mean Corpuscular Volume 93, Mean Platelet Volume 11.0H, Platelet Count 170, 

Potassium Level 4.6, Red Blood Count 3.75L, Red Cell Distribution Width 13.4, 

Sodium Level 138, White Blood Count 10.8





Assessment/Plan


Assessment/Plan


Assess & Plan/Chief Complaint


s/p laparoscopic LAR, umblical hernia repair, diverting loop ileostomy.


d/c NGT and dee.


clearl liquid diet. 


ambulate.


transfer to floor.


Diagnosis/Problems:  





Clinical Quality Measures


DVT/VTE Risk/Contraindication:


Risk Factor Score Per Nursing:  10


RFS Level Per Nursing on Admit:  4+=Very High








DAIJA HAWK MD Feb 24, 2017 10:39 am

## 2017-02-24 NOTE — CONSULTATION-HOSPITALIST
HPI


History of Present Illness:


HPI/Chief Complaint


CC: Medical management following intestinal surgery





HPI: This is a 76yoWM clinic pt of [facility] for the past 13 years with past medical 

hx of HTN, extensive CAD, Hyperlipidemia and BPH that presents to ICU10. Dr. Arita performed sigmoidectomy that required diverting ileostomy because of the 

extensive nature of the recurrent diverticulitis scarring issues. Max fever 

100.0, WBC 10.8, Hgb 12, Creat 1.49.





RN Review:


RN states that pt is doing well and is able to move to 4th floor.





Patient Interview:


Pt states that he has pain in his lower abdomen.


Pt has not been ambulatory since surgery.


Physical exam stable.


Pt states that his throat is sore.


Pt normally sleeps with CPAP.


Dr. Velez encourages pt and informs him that he is doing very well.





Scribed by Placido Nunez under the direct supervision of Dr. Velez.


Source:  patient


Date Seen


2/24/17


Attending Physician


Leona Arita MD


PCP


Jaelyn Velez DO


Referring Physician





Date of Admission


Feb 23, 2017 at 06:22





Home Medications & Allergies


Home Medications


Reviewed patient Home Medication Reconciliation Form





Allergies


Coded Allergies:  


     niacin (Verified  Allergy, Unknown, 2/8/17)


Uncoded Allergies:  


     TAPE (Allergy, Intermediate, BLISTERS, 2/8/17)





Past Medical-Social-Family Hx


Patient Social History


Marrital Status:  


Employed/Student:  retired


Alcohol Use:  Occasionally Uses


Recreational Drug Use:  No


Smoking Status:  Former Smoker


Former smoker/When Quit:  Dec 21, 1973


Type Used:  Cigarettes


Physical Abuse Screen:  No


Sexual Abuse:  No


Recent Foreign Travel:  No


Contact w/other who traveled:  No


Recent Hopitalizations:  No


Recent Infectious Disease Expo:  No





Immunizations Up To Date


Tetanus Booster (TDap):  Unknown


Date of Pneumonia Vaccine:  Oct 1, 2011


Date of Influenza Vaccine:  Oct 1, 2016





Seasonal Allergies


Seasonal Allergies:  Yes





Surgeries


HX Surgeries:  Yes (rotator cuff x3, bilat CTR, )


Surgeries:  Cardiac, Coronary Stent, Orthopedic





Respiratory


Hx Respiratory Disorders:  Yes


Respiratory Disorders:  Sleep Apnea





Cardiovascular


Hx Cardiovascular Disorders:  Yes (CARDIAC STENTS X 9)


Cardiac Disorders:  Coronary Artery Disease, Hypertension





Neurological


Hx Neurological Disorders:  Yes


Neurological Disorders:  Vertigo





Reproductive System


Hx Reproductive Disorders:  No


Sexually Transmitted Disease:  No


HIV/AIDS:  No





Genitourinary


Hx Genitourinary Disorders:  Yes


Genitourinary Disorders:  Benign Prostatic Hyperpl, Prostate Problems





Gastrointestinal


Hx Gastrointestinal Disorders:  Yes


Gastrointestinal Disorders:  Gastroesophageal Reflux, Diverticulosis





Musculoskeletal


Hx Musculoskeletal Disorders:  Yes (ROTATOR CUFF AND CARPAL TUNNEL SURGERIES)


Musculoskeletal Disorders:  Arthritis, Gout





Endocrine


Hx Endocrine Disorders:  No





HEENT


HX ENT Disorders:  No (GLASSES)


Loss of Vision:  Denies


Hearing Impairment:  Denies





Cancer


Hx Cancer:  No





Psychosocial


Hx Psychiatric Problems:  Yes


Behavioral Health Disorders:  Anxiety, Depression





Integumentary


HX Skin/Integumentary Disorder:  No





Blood Transfusions


Hx Blood Disorders:  No


Adverse Reaction to a Blood Tr:  No





Family Medical History


Family Hx:  


Cancer


  03 FATHER


  09 SISTER


Cancer of colon


  03 FATHER


Cardiovascular disease


  19 MOTHER


  GRANDMOTHER


  GRANDFATHER


Myocardial infarction


  GRANDFATHER


Stroke


  AUNT





No Family History of:


  Abdominal aortic aneurysm


  [place]'s disease


  Alcoholism


  Aphasia


  Cataract


  Chest pain


  Congenital heart disease


  Congestive heart failure


  Cystic fibrosis


  Dementia


  Dysphagia


  Family history: Allergy


  Family history: Alzheimer's disease


  Family history: Arthritis


  Family history: Asthma


  Family history: Breast disease


  Family history: Cardiovascular disease


  Family history: Coronary thrombosis


  Family history: Diabetes mellitus


  Family history: Gastrointestinal disease


  Family history: Glaucoma


  Family history: Hypertension


  Family history: Osteoporosis


  Family history: Thyroid disorder


  Headache


  Hearing loss


  Heart disease


  Hereditary disease


  History of - anemia


  History of - disorder


  History of - respiratory disease


  History of drug abuse


  Human immunodeficiency virus (HIV) seropositivity


  Hypercholesterolemia


  Infertile


  Kidney disease


  Malignant neoplasm of lung


  Parkinson's disease


  Prostate cancer


  Psychotic disorder


  Seizure disorder


  Tuberculosis


  Visual impairment





Review of Systems


Constitutional:   see HPI


EENTM:   throat pain


Respiratory:   no symptoms reported


Cardiovascular:   no symptoms reported


Gastrointestinal:   abdominal pain (LUQ)


Genitourinary:   no symptoms reported


Musculoskeletal:   no symptoms reported


Skin:   no symptoms reported


Psychiatric/Neurological:   No Symptoms Reported


All Other Systems Reviewed


Negative Unless Noted:  Yes





Physical Exam


Physical Exam


Vital Signs





 Vital Sign - Last 12Hours








 2/23/17 2/23/17





 08:07 14:30


 


Temp 97.7 


 


Pulse 70 


 


Resp 16 


 


B/P 160/68 


 


Pulse Ox 95 


 


O2 Delivery Room Air 


 


O2 Flow Rate  5.00





Capillary Refill :


General Appearance:   No Apparent Distress WD/WN Chronically ill Obese


Eyes:  Bilateral Eye Normal Inspection, Bilateral Eye PERRL


HEENT:   PERRL/EOMI Normal ENT Inspection Pharynx Normal Other (NGT in place)


Neck:   Full Range of Motion Normal Inspection Non Tender Supple Carotid Bruit


Respiratory:   Chest Non Tender Lungs Clear Normal Breath Sounds No Accessory 

Muscle Use No Respiratory Distress


Cardiovascular:   Regular Rate, Rhythm No Edema No Gallop No JVD No Murmur 

Normal Peripheral Pulses


Gastrointestinal:   No Organomegaly No Pulsatile Mass Abnormal Bowel Sounds 

Distended Guarding Tenderness


Back:   Normal Inspection No CVA Tenderness No Vertebral Tenderness


Extremity:   Normal Capillary Refill Normal Inspection Normal Range of Motion 

Non Tender No Calf Tenderness No Pedal Edema


Neurologic/Psychiatric:   Alert Oriented x3 No Motor/Sensory Deficits Normal 

Mood/Affect


Skin:   Normal Color Warm/Dry


Lymphatic:   No Adenopathy





Results


Results/Procedures


Lab


Laboratory Tests


2/24/17 04:05














Assessment/Plan


Admission Diagnosis


Assessment:


S/P uncomplicated sigmoidectomy that required diverting ileostomy due to 

extensive scar tissue from


recurrent diverticulitis now w/post op ileus


CAD multiple stents placed in past


HTN


HLP


MATHEW on CPAP


CRI


BPH


Hypogonadism





Assessment and Plan


Plan:


Chloraseptic spray


Consult Cardiology for monitoring due to extensive CAD


Move to 4th when able





Clinical Quality Measures


DVT/VTE Risk/Contraindication:


Risk Factor Score Per Nursing:  10


RFS Level Per Nursing on Admit:  4+=Very High








JAELYN VELEZ DO Feb 24, 2017 11:00

## 2017-02-24 NOTE — OPERATIVE REPORT
PROCEDURE PHYSICIAN:   DAIJA ARITA

 

DATE OF PROCEDURE:  

02/23/2017

 

ATTENDING PRIMARY CARE PHYSICIAN: 

Dr. Jaelyn Velez. 

 

PREOPERATIVE DIAGNOSIS:

Severe recurrent symptomatic sigmoid diverticulitis. 

 

POSTOPERATIVE DIAGNOSIS: 

Severe recurrent symptomatic sigmoid diverticulitis. 

 

PROCEDURE:

1.   Laparoscopic low anterior sigmoid rectal resection.

2.   Laparoscopic umbilical hernia repair.

3.   Diverting loop ileostomy placement. 

4.   Placement left subclavian Groshong implantable catheter. 

 

SURGEON:

Dr. Arita. 

 

ASSISTANT:

NOELLE Howell. 

 

ANESTHESIA:

General endotracheal. 

 

ESTIMATED BLOOD LOSS:

150 mL. 

 

FINDINGS:

Severe sigmoid diverticulosis with chronic inflammatory changes.

There were no signs of any acute infection. There was also an

umbilical hernia, approximately 1.5 cm in size. 

 

DISPOSITION:

The patient tolerated the procedure well. 

 

Mr. Moshe Machado is a 76-year-old male known to us. He has a

history of severe diverticulosis and diverticulitis. He has had

several colonoscopies in the past and the first we had done was

in 2008 were a benign polyp was identified; however, he was also

found to have significant diverticulosis. He then underwent a

colonoscopy 2013 and found again to have a significant

diverticulosis. This gentleman also does have a family history of

colon cancer with his father having the disease. He reports that

he has had over 10 episodes of acute diverticulitis with two

requiring hospitalization with extended IV antibiotics. A CT scan

was done in September 2016, which did show significant signs of

chronic inflammatory changes of the sigmoid colon and descending

colon. He had another colonoscopy performed which again showed

the diverticulosis and no active diverticulitis as well as no

other lesions identified. This gentleman also does have other

medical comorbidities including coronary artery disease,

hypertension, hypercholesterolemia, as well as previous cardiac

catheterization and stent placement. 

 

The patient was brought to the operating room, laid supine on the

table. After adequate IV pain and sedative medications the neck

and chest were prepped and draped in the standard surgical

fashion. 

 

The left subclavian vein was cannulated withdrawing of venous

blood. Guidewire was inserted without any resistance. The

cannulating needle removed and a small incision made using 11

blade. A tract was then created using a venous dilator. Through

this opening a triple lumen central venous catheter was placed

over the guidewire using Seldinger technique. The guidewire was

removed and all 3 ports abdoulaye venous blood and flush pushed in

without any resistance. The catheter was then sutured to the skin

using 3-0 interrupted sutures. The catheter was then cleaned and

covered with sterile gauze, followed by OpSite. 

 

The abdomen and then perineum were then prepped and draped in

standard surgical fashion. The patient was placed in a modified

lithotomy position as well. 

 

0.5% Marcaine with epinephrine was used to anesthetize the

overlying skin in the left upper abdominal quadrant. A small

transverse skin incision made using a 15 blade. An 0 silk suture

was applied to the medial aspect of the incision for retraction.

A Veress needle inserted with a low opening pressure of 0 mmHg

and the abdomen was then insufflated 15 mmHg pressure. The Veress

needle removed and a 5 mm Xcel trocar placed followed by a 5 mm,

45 degrees angle laparoscope, visualizing the peritoneal cavity.

 

 

A four-quadrant abdominal exploration was performed. There was

significant diverticulosis identified at the sigmoid colon and

descending colon, as well as chronic inflammatory changes;

however, no acute inflammation. An umbilical hernia was also

identified. No other abnormalities detected. Under direct

visualization we then proceeded to place a supraumbilical 10 mm

port under direct visualization after the skin and peritoneum

were anesthetized using 0.5% Marcaine with epinephrine and a

transverse skin incision made using a 15 blade. In a similar

manner, a suprapubic 10 mm port was placed. 

 

The patient was then placed in Trendelenburg position as well as

planed left side up, right side down. We first  proceeded with

mesorectal excision from a mediolateral standpoint of the sigmoid

colon using a Sonicision. We then proceeded superiorly. We then

proceeded with lateral dissection taking down the white lines of

Toldt. We then proceeded with inferior dissection of the

sigmoidal branch arteries until we were near the sigmoid rectal

junction. Good hemostasis was observed. The diverticulosis was

extensive and we found an area that showed minimal diverticulosis

of the descending colon. We did not have enough length at that

point to make the anastomosis so it was decided to take down the

hepatic flexure. The hepatic flexure connective tissue fibers, as

well the gastrocolic ligament was then taken down along the left

lateral aspect using the Sonicision with visualization of good

hemostasis. 

 

A transverse skin incision then made in the left lateral abdomen

and the abdominal wall layers opening through the fascia using

electrocautery under direct visualization. Before this, the

sigmoid rectal junction was stapled and transected using a EILEEN 60

mm stapler with a 3.5 mm load with visualization of good

hemostasis. The transected end was then pulled through this

incision and the clear area of the descending colon was

identified. This was pursestring and cut with a 10 blade. The

descending colon was then dilated to 28 mm.  It was decided to

use a 29 mm EEA stapler. The anvil was placed into the end and

sutured with a pursestring. 

 

The descending colon, as well as the anvil was then placed back

into the peritoneal cavity. The rectum was then dilated with a 28

mm dilator with minimal resistance. The EEA stapler was then

placed under direct visualization. The stem was opened under

direct visualization the anvil was then placed on the stem close

to moderate resistance and fired. The stapler was then removed

and the rings identified which were both intact. The peritoneal

cavity was then irrigated and suctioned out. 

 

The area of the distal ileum, approximately 15 cm from the

ileocecal valve was identified and brought up to the abdominal

wall through a small separate incision made for our diverting

loop ileostomy in the right upper abdominal quadrant.  Our skin

incision was made using cautery on the cut setting and the fascia

was opened using cautery as well. Good hemostasis was observed.

The ileostomy was brought through this and sutured to the

subcutaneous tissue to hold it in place using 3-0 Vicryl

interrupted sutures. 

 

Under direct visualization the suprapubic 10 mm port was closed

using a Jag-Kt device and an 0 Vicryl suture. It was

then decided to fix the umbilical hernia primarily and

laparoscopically. Using a Jag-Kt device and multiple

interrupted 2-0 PDS sutures the fascial defect of the umbilicus

was closed in a vertical fashion with visualization of good

hemostasis. The abdomen was then desufflated. The left lateral

incision fascia and peritoneum were then closed using 0 PDS loop

suture. Subcutaneous tissue was then reapproximated using 3-0

Vicryl interrupted sutures. All skin incisions were closed using

4-0 Monocryl running subcuticular sutures. 

 

The loop ileostomy was then matured. A vertical incision was made

using a Metzenbaum scissors. There was a good perfusion with

bleeding edges. We then proceeded with imbrication of the

ileostomy using 3-0 Vicryl interrupted sutures causing it to

pucker out as well. Good hemostasis was observed. The ostomy was

then covered with an ostomy bag. 

 

The patient tolerated the procedure well. We will admit him to

the ICU. We will start IV and oral pain medication as well as DVT

prophylaxis with early ambulation, calf SCDs, as well as Lovenox

injections. We will also proceed with a Fentanyl PCA for pain

control. Once he has ileostomy output we will remove his NG tube

and start clear liquids and advance as tolerated. Once he is

tolerating diet, has good pain control with oral pain medication

is ambulating well, we will discharge him home. We will in

approximately 6 to 8 weeks proceed with lower GI contrast study

and if no leaks identified, we will reverse the ileostomy. 

 

 

 

Job ID: [id number]

Dictated Date: 02/23/2017 13:19:05 

Transcription Date: 02/24/2017 11:58:28 / kat GARDNER

## 2017-02-25 VITALS — DIASTOLIC BLOOD PRESSURE: 71 MMHG | SYSTOLIC BLOOD PRESSURE: 122 MMHG

## 2017-02-25 VITALS — SYSTOLIC BLOOD PRESSURE: 148 MMHG | DIASTOLIC BLOOD PRESSURE: 72 MMHG

## 2017-02-25 VITALS — DIASTOLIC BLOOD PRESSURE: 58 MMHG | SYSTOLIC BLOOD PRESSURE: 126 MMHG

## 2017-02-25 VITALS — SYSTOLIC BLOOD PRESSURE: 137 MMHG | DIASTOLIC BLOOD PRESSURE: 64 MMHG

## 2017-02-25 VITALS — SYSTOLIC BLOOD PRESSURE: 139 MMHG | DIASTOLIC BLOOD PRESSURE: 63 MMHG

## 2017-02-25 VITALS — DIASTOLIC BLOOD PRESSURE: 65 MMHG | SYSTOLIC BLOOD PRESSURE: 145 MMHG

## 2017-02-25 LAB
ALBUMIN SERPL-MCNC: 3.4 G/DL (ref 3.2–4.5)
ALT SERPL-CCNC: 26 U/L (ref 0–55)
ANION GAP SERPL CALC-SCNC: 10 MMOL/L (ref 5–14)
AST SERPL-CCNC: 26 U/L (ref 5–34)
BASOPHILS # BLD AUTO: 0 10^3/UL (ref 0–0.1)
BASOPHILS NFR BLD AUTO: 0 % (ref 0–10)
BILIRUB SERPL-MCNC: 0.7 MG/DL (ref 0.1–1)
BILIRUB UR QL STRIP: NEGATIVE
BUN SERPL-MCNC: 18 MG/DL (ref 7–18)
BUN/CREAT SERPL: 13
CALCIUM SERPL-MCNC: 8.7 MG/DL (ref 8.5–10.1)
CHLORIDE SERPL-SCNC: 104 MMOL/L (ref 98–107)
CO2 SERPL-SCNC: 25 MMOL/L (ref 21–32)
CREAT SERPL-MCNC: 1.38 MG/DL (ref 0.6–1.3)
EOSINOPHIL # BLD AUTO: 0.1 10^3/UL (ref 0–0.3)
EOSINOPHIL NFR BLD AUTO: 1 % (ref 0–10)
ERYTHROCYTE [DISTWIDTH] IN BLOOD BY AUTOMATED COUNT: 13.7 % (ref 10–14.5)
GFR SERPLBLD BASED ON 1.73 SQ M-ARVRAT: 50 ML/MIN
GLUCOSE SERPL-MCNC: 91 MG/DL (ref 70–105)
KETONES UR QL STRIP: NEGATIVE
LEUKOCYTE ESTERASE UR QL STRIP: NEGATIVE
LYMPHOCYTES # BLD AUTO: 1.4 X 10^3 (ref 1–4)
LYMPHOCYTES NFR BLD AUTO: 17 % (ref 12–44)
MCH RBC QN AUTO: 32 PG (ref 25–34)
MCHC RBC AUTO-ENTMCNC: 34 G/DL (ref 32–36)
MCV RBC AUTO: 95 FL (ref 80–99)
MONOCYTES # BLD AUTO: 0.7 X 10^3 (ref 0–1)
MONOCYTES NFR BLD AUTO: 9 % (ref 0–12)
NEUTROPHILS # BLD AUTO: 5.8 X 10^3 (ref 1.8–7.8)
NEUTROPHILS NFR BLD AUTO: 73 % (ref 42–75)
NITRITE UR QL STRIP: NEGATIVE
PH UR STRIP: 7 [PH] (ref 5–9)
PLATELET # BLD: 138 10^3/UL (ref 130–400)
PMV BLD AUTO: 10.8 FL (ref 7.4–10.4)
POTASSIUM SERPL-SCNC: 4.2 MMOL/L (ref 3.6–5)
PROT SERPL-MCNC: 5.5 G/DL (ref 6.4–8.2)
PROT UR QL STRIP: NEGATIVE
RBC # BLD AUTO: 3.48 10^6/UL (ref 4.35–5.85)
SODIUM SERPL-SCNC: 139 MMOL/L (ref 135–145)
SP GR UR STRIP: 1 (ref 1.02–1.02)
UROBILINOGEN UR-MCNC: NORMAL MG/DL
WBC # BLD AUTO: 8 10^3/UL (ref 4.3–11)
WBC #/AREA URNS HPF: (no result) /HPF

## 2017-02-25 RX ADMIN — METOPROLOL TARTRATE SCH MG: 1 INJECTION, SOLUTION INTRAVENOUS at 11:46

## 2017-02-25 RX ADMIN — Medication SCH ML: at 14:26

## 2017-02-25 RX ADMIN — SODIUM CHLORIDE, SODIUM LACTATE, POTASSIUM CHLORIDE, AND CALCIUM CHLORIDE SCH MLS/HR: 600; 310; 30; 20 INJECTION, SOLUTION INTRAVENOUS at 23:20

## 2017-02-25 RX ADMIN — SODIUM CHLORIDE, SODIUM LACTATE, POTASSIUM CHLORIDE, AND CALCIUM CHLORIDE SCH MLS/HR: 600; 310; 30; 20 INJECTION, SOLUTION INTRAVENOUS at 16:53

## 2017-02-25 RX ADMIN — ALBUTEROL SULFATE SCH MG: 2.5 SOLUTION RESPIRATORY (INHALATION) at 10:55

## 2017-02-25 RX ADMIN — METOPROLOL TARTRATE SCH MG: 1 INJECTION, SOLUTION INTRAVENOUS at 09:30

## 2017-02-25 RX ADMIN — ENOXAPARIN SODIUM SCH MG: 100 INJECTION SUBCUTANEOUS at 20:29

## 2017-02-25 RX ADMIN — ENOXAPARIN SODIUM SCH MG: 100 INJECTION SUBCUTANEOUS at 09:30

## 2017-02-25 RX ADMIN — SODIUM CHLORIDE SCH MLS/HR: 900 INJECTION INTRAVENOUS at 16:53

## 2017-02-25 RX ADMIN — METOPROLOL TARTRATE SCH MG: 1 INJECTION, SOLUTION INTRAVENOUS at 16:54

## 2017-02-25 RX ADMIN — SODIUM CHLORIDE, SODIUM LACTATE, POTASSIUM CHLORIDE, AND CALCIUM CHLORIDE SCH MLS/HR: 600; 310; 30; 20 INJECTION, SOLUTION INTRAVENOUS at 09:29

## 2017-02-25 RX ADMIN — PANTOPRAZOLE SODIUM SCH MG: 40 INJECTION, POWDER, FOR SOLUTION INTRAVENOUS at 09:30

## 2017-02-25 RX ADMIN — ALBUTEROL SULFATE SCH MG: 2.5 SOLUTION RESPIRATORY (INHALATION) at 02:40

## 2017-02-25 RX ADMIN — Medication SCH ML: at 20:29

## 2017-02-25 RX ADMIN — Medication SCH ML: at 06:36

## 2017-02-25 RX ADMIN — METOPROLOL TARTRATE SCH MG: 1 INJECTION, SOLUTION INTRAVENOUS at 00:38

## 2017-02-25 RX ADMIN — ACETAMINOPHEN PRN MG: 325 TABLET ORAL at 20:34

## 2017-02-25 RX ADMIN — METOPROLOL TARTRATE SCH MG: 1 INJECTION, SOLUTION INTRAVENOUS at 20:28

## 2017-02-25 RX ADMIN — ALBUTEROL SULFATE SCH MG: 2.5 SOLUTION RESPIRATORY (INHALATION) at 07:39

## 2017-02-25 RX ADMIN — Medication PRN MCG: at 02:49

## 2017-02-25 RX ADMIN — SODIUM CHLORIDE, SODIUM LACTATE, POTASSIUM CHLORIDE, AND CALCIUM CHLORIDE SCH MLS/HR: 600; 310; 30; 20 INJECTION, SOLUTION INTRAVENOUS at 02:46

## 2017-02-25 RX ADMIN — METOPROLOL TARTRATE SCH MG: 1 INJECTION, SOLUTION INTRAVENOUS at 04:53

## 2017-02-25 NOTE — PROGRESS NOTE (SOAP)
Subjective


Subjective/Events-last exam


Patient seen with Dr. Arita. Patient reports doing well. Just finished shower 

and reports that abdomen painful when getting out of bed but minimal discomfort 

once standing. No N/V. Does report a fever last night that was around 101.0 F. 

Patient reports that LLQ incision hurts the most. No SOB, but reports that his 

O2 stats were low this morning around the lower 80's when he woke up. 

Tolerating diet.


Review of Systems


General:  No Chills,  Night Sweats


Gastrointestinal:  : Abdominal PainNo: Nausea, Vomiting





Objective


Exam





 Vital Signs








  Date Time  Temp Pulse Resp B/P Pulse Ox O2 Delivery O2 Flow Rate FiO2


 


2/25/17 07:39     84   


 


2/25/17 04:00 98.5 73 20 137/64 93 NIV/CPAP 2.00 


 


2/25/17 02:51   20     


 


2/25/17 02:49   20     


 


2/25/17 02:40     91  2.00 


 


2/25/17 00:40  59      


 


2/25/17 00:00 98.0 75 20 122/71 91 NIV/CPAP 2.00 


 


2/24/17 23:26 100.2       


 


2/24/17 22:32  74      


 


2/24/17 22:29     94  2.00 


 


2/24/17 22:25 100.2       


 


2/24/17 21:23 100.8       


 


2/24/17 20:05 100.8 75 20 146/73 92 Nasal Cannula 2.00 


 


2/24/17 20:00      Nasal Cannula 2.00 


 


2/24/17 19:08     92  2.00 


 


2/24/17 18:33     94 Nasal Cannula 2.00 


 


2/24/17 18:27  73  146/66    


 


2/24/17 16:54   21     


 


2/24/17 16:25 99.1 75 18 130/59 91 Nasal Cannula 3.00 


 


2/24/17 15:10     95  2.00 


 


2/24/17 14:06 100.0 65  132/60    


 


2/24/17 12:00 98.9     Nasal Cannula 2.00 


 


2/24/17 11:00  68 21 131/58 96 Nasal Cannula 4.00 


 


2/24/17 10:37 100.0       


 


2/24/17 10:32     98   


 


2/24/17 10:07   20     


 


2/24/17 10:00  65 24 126/71 97 Nasal Cannula 4.00 














 I & O 


 


 2/25/17





 07:00


 


Intake Total 2390 ml


 


Output Total 1400 ml


 


Balance 990 ml





Capillary Refill :


General Appearance:   No Apparent Distress WD/WN


HEENT:   PERRL/EOMI


Neck:   Full Range of Motion Normal Inspection Non Tender Supple


Respiratory:   No Accessory Muscle Use No Respiratory Distress


Cardiovascular:   Regular Rate, Rhythm


Gastrointestinal:   normal bowel sounds soft tenderness (LLQ incision painful 

with mild palpation. All incisions C/D/I. No redness.) other (Ileostomy 

functioning with liquid brown stool in bag. Stoma pink and moist.)


Extremity:   Normal Capillary Refill Normal Inspection Normal Range of Motion


Neurologic/Psychiatric:   Alert Oriented x3


Skin:   Normal Color Warm/Dry





Results


Lab


 Laboratory Tests


2/25/17 05:37: 


Alanine Aminotransferase (ALT/SGPT) 26, Albumin 3.4, Alkaline Phosphatase 42, 

Anion Gap 10, Aspartate Amino Transf (AST/SGOT) 26, BUN/Creatinine Ratio 13, 

Basophils # (Auto) 0.0, Basophils (%) (Auto) 0, Blood Urea Nitrogen 18, Calcium 

Level 8.7, Carbon Dioxide Level 25, Chloride Level 104, Creatinine 1.38H, 

Eosinophils # (Auto) 0.1, Eosinophils (%) (Auto) 1, Estimat Glomerular 

Filtration Rate 50, Glucose Level 91, Hematocrit 33L, Hemoglobin 11.1L, 

Lymphocytes # (Auto) 1.4, Lymphocytes (%) (Auto) 17, Mean Corpuscular 

Hemoglobin 32, Mean Corpuscular Hemoglobin Concent 34, Mean Corpuscular Volume 

95, Mean Platelet Volume 10.8H, Monocytes # (Auto) 0.7, Monocytes (%) (Auto) 9, 

Neutrophils # (Auto) 5.8, Neutrophils (%) (Auto) 73, Platelet Count 138, 

Potassium Level 4.2, Red Blood Count 3.48L, Red Cell Distribution Width 13.7, 

Sodium Level 139, Total Bilirubin 0.7, Total Protein 5.5L, White Blood Count 8.0





Assessment/Plan


Assessment/Plan


Assess & Plan/Chief Complaint


A 76 year old male with recurrent severe diverticulitis, S/P laparoscopic low 

anterior sigmoid colon resection with diverting loop ileostomy.


Fever - UA and Chest x-ray for possible pneumonia and start zosyn.


Continue pain and nausea medication.


Ambulation.


Continue IS and Albuterol.


Labs stable.


PUD prophylaxis


DYS3 Diet.


Medical management.


Diagnosis/Problems:  





Clinical Quality Measures


DVT/VTE Risk/Contraindication:


Risk Factor Score Per Nursing:  10


RFS Level Per Nursing on Admit:  4+=Very High








MUKESH MARQUEZ Feb 25, 2017 09:46

## 2017-02-25 NOTE — PROGRESS NOTE-HOSPITALIST
Progress Note


HPI/CC on Admission


CC: Medical management following intestinal surgery





HPI: This is a 76yoWM clinic pt of [facility] for the past 13 years with past medical 

hx of HTN, extensive CAD, Hyperlipidemia and BPH that presents to ICU10. Dr. Arita performed sigmoidectomy that required diverting ileostomy because of the 

extensive nature of the recurrent diverticulitis scarring issues. Max fever 

100.0, WBC 10.8, Hgb 12, Creat 1.49.





RN Review:


RN states that pt is doing well and is able to move to 4th floor.





Patient Interview:


Pt states that he has pain in his lower abdomen.


Pt has not been ambulatory since surgery.


Physical exam stable.


Pt states that his throat is sore.


Pt normally sleeps with CPAP.


Dr. Velez encourages pt and informs him that he is doing very well.





Scribed by Placido Nunez under the direct supervision of Dr. Velez.





Progress Notes/Assess & Plan


Date Seen


2/25/17


Admission Dx/Process


Assessment:


S/P uncomplicated sigmoidectomy that required diverting ileostomy due to 

extensive scar tissue from


recurrent diverticulitis now w/post op ileus


CAD multiple stents placed in past


HTN


HLP


MATHEW on CPAP


CRI


BPH


Hypogonadism


Diagonsis/Assessment & Plan


Patient doing much better and is able to ambulate and ileostomy is working well


Noted low-grade fever and the ileostomy site is mildly pink so addressing that


Chest x-ray has been ordered but on exam atelectasis is suspected but nebulizer 

treatments and incentive spirometry are ordered


Pain is well-controlled





Low-grade fever 100.1, pleasant, oriented 3, improved, wife at bedside


Regular rate and rhythm, clear to auscultation bilaterally but diminished in 

bases


No edema





Laboratory Tests


2/25/17 05:37

















Assessment:


S/P uncomplicated sigmoidectomy that required diverting ileostomy due to 

extensive scar tissue from recurrent diverticulitis now w/post op ileus POD # 2


CAD multiple stents placed in past


HTN


HLP


MATHEW on CPAP


CRI


BPH


Hypogonadism





Plan:


Ambulate


Follow-up chest x-ray


Monitor fever but likely Atelectasis


Home medMICHOACANO Nair DO Feb 25, 2017 10:28

## 2017-02-25 NOTE — CONSULTATION-CARDIOLOGY
HPI-Cardiology


Cardiology Consultation:


Date of Consultation


2/25/17


Date of Admission





Attending Physician


Leona Arita MD


Admitting Physician


Jaelyn Velez DO


Consulting Physician


MACHO ROA MD





HPI:


Chief Complaint:


CAD


this is a 76-year-old gentleman who is a patient of Dr. Mayfeild.  He has history 

of severe recurrent diverticulitis and underwent abdominal surgery with Dr. ARITA.  He is now recovering postoperatively.  He denies any cardiac symptoms 

including chest pain or shortness of breath.  Cardiology is consulted for 

management of coronary artery disease and timing of reintroduction of cardiac 

medications.





Review of Systems-Cardiology


Review of Systems


Constitutional:  No As described under HPI, No no symptoms reported, No chills, 

No fever, No lightheadedness, No malaise, No tiredness, No weight loss, No 

weight gain, No other


Eyes:  No As described under HPI, No no symptoms reported, No blindness, No 

blurred vision, No contact lenses, No drainage, No decreased acuity, No foreign 

body sensation, No glasses, No inflammation, No pain, No photophobia, No 

previous injury, No shadows, No tunnel vision, No other, No vision change


Ears/Nose/Throat:  No As described under HPI, No no symptoms reported, No 

chronic hearing loss, No epistaxis, No ear discharge, No ear pain, No loose 

teeth, No mouth pain, No mouth swelling, No nasal drainage, No nose pain, No 

recent hearing loss, No throat pain, No throat swelling, No ulcerations, No 

other


Respiratory:  No no symptoms reported, No As described under HPI, No cough, No 

orthopnea, No shortness of breath, No SOB with excertion, No SOB at rest, No 

stridor, No wheezing, No other


Cardiovascular:  No no symptoms reported, No As described under HPI, No chest 

pain, No edema, No irregular heart rate, No lightheadedness, No palpitations, 

No syncope, No other


Gastrointestinal:   As described under HPI


Genitourinary:  No no symptoms reported, No As described under HPI, No burning, 

No dysuria, No discharge, No frequency, No flank pain, No hematuria, No 

incontinence, No pain, No urgency, No other, No urine frequency changes, No 

urine coloration changes


Musculoskeletal:  No no symptoms reported, No As describe under HPI, No back 

pain, No gout, No joint pain, No joint swelling, No muscle pain, No muscle 

stiffness, No neck pain, No other


Skin:  No no symptoms reported, No As described under HPI, No change in color, 

No change in hair/nails, No dryness, No lesions, No lumps, No rash, No other, 

No skin related problems, No ulcerations, No rash on exposed areas, No 

ulcerations on exposed areas


Psychiatric/Neurological:  No As described under HPI, No anxiety, No depression

, No emotional problems, No focal weakness, No headache, No no symptoms reported

, No numbness, No other, No pre-existing deficit, No seizure, No syncope, No 

tingling, No tremors, No weakness


Hematologic:  No no symptoms reported, No As described under HPI, No anemia, No 

blood clots, No easy bleeding, No easy bruising, No swollen glands, No other, 

No bleeding abnormalities





All Other Systems Reviewed


Negative Unless Noted:  Yes





PMH-Social-Family Hx


Patient Social History


Marrital Status:  


Employed/Student:  retired


Alcohol Use:  Occasionally Uses


Recreational Drug Use:  No


Smoking Status:  Former Smoker


Former smoker/When Quit:  Dec 21, 1973


Type Used:  Cigarettes


Recent Foreign Travel:  No


Recent Infectious Disease Expo:  No


Physical Abuse Screen:  No


Sexual Abuse:  No





Immunizations Up To Date


Tetanus Booster (TDap):  Unknown


Date of Pneumonia Vaccine:  Oct 1, 2011


Date of Influenza Vaccine:  Oct 1, 2016





Past Medical History


PMH


As described under Assessment.





Family Medical History


Family History:  


Cancer


  03 FATHER


  09 SISTER


Cancer of colon


  03 FATHER


Cardiovascular disease


  19 MOTHER


  GRANDMOTHER


  GRANDFATHER


Myocardial infarction


  GRANDFATHER


Stroke


  AUNT





No Family History of:


  Abdominal aortic aneurysm


  [place]'s disease


  Alcoholism


  Aphasia


  Cataract


  Chest pain


  Congenital heart disease


  Congestive heart failure


  Cystic fibrosis


  Dementia


  Dysphagia


  Family history: Allergy


  Family history: Alzheimer's disease


  Family history: Arthritis


  Family history: Asthma


  Family history: Breast disease


  Family history: Cardiovascular disease


  Family history: Coronary thrombosis


  Family history: Diabetes mellitus


  Family history: Gastrointestinal disease


  Family history: Glaucoma


  Family history: Hypertension


  Family history: Osteoporosis


  Family history: Thyroid disorder


  Headache


  Hearing loss


  Heart disease


  Hereditary disease


  History of - anemia


  History of - disorder


  History of - respiratory disease


  History of drug abuse


  Human immunodeficiency virus (HIV) seropositivity


  Hypercholesterolemia


  Infertile


  Kidney disease


  Malignant neoplasm of lung


  Parkinson's disease


  Prostate cancer


  Psychotic disorder


  Seizure disorder


  Tuberculosis


  Visual impairment





Allergies and Home Medications


Allergies


Coded Allergies:  


     niacin (Verified  Allergy, Unknown, 2/8/17)


Uncoded Allergies:  


     TAPE (Allergy, Intermediate, BLISTERS, 2/8/17)





Home Medications


Ascorbic Acid 500 Mg Capsule.sa 1,000 MG PO DAILY (Reported) 


Aspirin 81 Mg Tablet.dr 81 MG PO DAILY (Reported) 


Calcium Polycarbophil 625 Mg Tablet 625 MG PO DAILY (Reported) 


Cetirizine HCl 10 Mg Tablet 10 MG PO HS (Reported) 


Cholecalciferol (Vitamin D3) 2,000 Unit Tablet 2,000 UNIT PO DAILY (Reported) 


Clopidogrel Bisulfate 75 Mg Tablet 75 MG PO DAILY (Reported) 


Docusate Sodium 100 Mg Tablet 100 MG PO BID PRN PRN CONSTIPATION (Reported) 


Famotidine 20 Mg Tablet 20 MG PO BID (Reported) 


Fenofibrate 160 Mg Tablet 160 MG PO HS (Reported) 


Finasteride 5 Mg Tablet 5 MG PO DAILY (Reported) 


Hydrochlorothiazide 25 Mg Tablet 25 MG PO DAILY (Reported) 


Lisinopril 20 Mg Tablet 20 MG PO DAILY (Reported) 


Loratadine 10 Mg Tablet 10 MG PO DAILY (Reported) 


Magnesium Oxide 250 Mg Tablet 250 MG PO DAILY (Reported) 


Multivitamin 1 Each Tablet 1 TAB PO DAILY (Reported) 


Omega-3/Dha/Epa/Fish Oil 1 Each Capsule.dr 1,400 MG PO BID (Reported) 


Omeprazole 20 Mg Capsule.dr 20 MG PO DAILY (Reported) 


Rosuvastatin Calcium 5 Mg Tablet 5 MG PO HS (Reported) 


Tamsulosin HCl 0.4 Mg Cap.er.24h 0.4 MG PO HS (Reported) 


Vitamin B Complex 1 Cap Capsule 1 CAP PO DAILY (Reported) 





Physical Exam-Cardiology


Physical Exam


Vital Signs/I&O





 Vital Sign - Last 12Hours








 2/25/17 2/25/17 2/25/17 2/25/17





 02:40 02:49 02:51 04:00


 


Temp    98.5


 


Pulse    73


 


Resp  20 20 20


 


B/P    137/64


 


Pulse Ox 91   93


 


O2 Delivery    NIV/CPAP


 


O2 Flow Rate 2.00   2.00


 


    





 2/25/17 2/25/17 2/25/17 2/25/17





 07:39 08:00 10:57 12:00


 


Temp  101.0  99.9


 


Pulse  80  70


 


Resp  18  20


 


B/P  148/72  126/58


 


Pulse Ox 84 93  91


 


O2 Delivery  Nasal Cannula  Nasal Cannula


 


O2 Flow Rate  4.00 2.00 4.00














 Intake and Output 


 


 2/25/17





 00:00


 


Intake Total 1290 ml


 


Output Total 250 ml


 


Balance 1040 ml





Capillary Refill :


Constitutional:  No appears stated age, No AAO x 3, No apparent distress, No 

PERRL, No well-developed, No well-nourished, No other


HEENT:  No PERRL, No normal ENT inspection, No TMs normal, No pharynx normal, 

No scleral icterus (R), No scleral icterus (L), No pale conjunctivae (R), No 

pale conjunctivae (L), No photophobia, No TM abnormal (R), No TM abnormal (L), 

No pharyngeal erythema, No tonsillar exudate, No other, No discharge, No EOMI, 

No hearing is well preserved, No hard of hearing, No oral hygience is good, No 

ulceration, No xanthelasmas are seen


Neck:  No non-tender, No full range of motion, No supple, No normal inspection, 

No carotid bruit, No limited range of motion, No lymphadenopathy (R), No 

lymphadenopathy (L), No tender lateral, No tender midline, No thyromegaly, No 

other, No carotid pulses are 2 + bilaterally, No with good upstrokes


Respiratory:  No accessory muscle use, No respiratory distress, No chest tender

, No chest expansion is symmetric, No chest is bilaterally symmetric, No lungs 

clear to percussion, No lungs clear to auscultation, No crackles, No rhonchi, 

No rales, No stridor, No wheezing, No pleural rub, No other


Cardiovascular:  No regular rate-rhythm, No irregularly irregular, No extra 

beats, No parasternal heave is noted, No JVD, No edema, No bradycardia, No 

tachycardia, No point of maximal impulse, No cardiac thrills are palpable, No 

S1 and S2, No gallop/S3, No gallop/S4, No diastolic murmur, No systolic murmur, 

No friction rub, No click, No other


Gastrointestinal:   soft round


Rectal:   deferred


Extremities:  No normal range of motion, No non-tender, No normal inspection, 

No pedal edema, No calf tenderness, No normal capillary refill, No pelvis stable

, No calf tenderness, No inflammation, No pedal edema, No slow capillary refill

, No swelling, No other, No abrasion, No clubbing, No cyanosis, No ecchymosis, 

No laceration, No no lower extremity edema bilateral, No significant edema, No 

tenderness, No wound


Neurologic/Psychiatric:  No CNs II-XII nml as tested, No no motor/sensory 

deficits, No alert, No normal mood/affect, No oriented x 3, No abnormal 

cerebellar tests, No abnormal CNs II-XII, No abnormal gait, No aphasia, No EOM 

palsy, No facial droop, No motor weakness, No sensory deficit, No depressed 

affect, No disoriented x 3, No other, No grossly intact, No power is 5/5 both 

on sides


Skin:  No normal color, No warm/dry, No cyanosis, No cool, No diaphoresis, No 

damp, No ecchymosis, No jaundice, No mottled, No pallor, No rash, No tattoos/

piercings, No ulcerations, No rash on exposed areas, No ulcerations on exposed 

areas, No other





Data Review


Labs


 Laboratory Tests


2/25/17 05:37: 


Alanine Aminotransferase (ALT/SGPT) 26, Albumin 3.4, Alkaline Phosphatase 42, 

Anion Gap 10, Aspartate Amino Transf (AST/SGOT) 26, BUN/Creatinine Ratio 13, 

Basophils # (Auto) 0.0, Basophils (%) (Auto) 0, Blood Urea Nitrogen 18, Calcium 

Level 8.7, Carbon Dioxide Level 25, Chloride Level 104, Creatinine 1.38H, 

Eosinophils # (Auto) 0.1, Eosinophils (%) (Auto) 1, Estimat Glomerular 

Filtration Rate 50, Glucose Level 91, Hematocrit 33L, Hemoglobin 11.1L, 

Lymphocytes # (Auto) 1.4, Lymphocytes (%) (Auto) 17, Mean Corpuscular 

Hemoglobin 32, Mean Corpuscular Hemoglobin Concent 34, Mean Corpuscular Volume 

95, Mean Platelet Volume 10.8H, Monocytes # (Auto) 0.7, Monocytes (%) (Auto) 9, 

Neutrophils # (Auto) 5.8, Neutrophils (%) (Auto) 73, Platelet Count 138, 

Potassium Level 4.2, Red Blood Count 3.48L, Red Cell Distribution Width 13.7, 

Sodium Level 139, Total Bilirubin 0.7, Total Protein 5.5L, White Blood Count 8.0








A/P-Cardiology


Assessment/Admission Diagnosis


recent abdominal surgery,


coronary artery disease





Plan


this is a 76-year-old gentleman who is a patient of Dr. Mayfield as an outpatient.

  He has history of coronary artery disease and multiple PCI and stents.  For 

severe recurrent diverticulitis he underwent abdominal surgery during this 

admission with Dr. ARITA.  Dr. Velez consulted cardiology for advise on 

coronary artery disease and reintroduction of cardiac medications.  I have 

discussed with Dr. ARITA and we will start aspirin, Plavix, statin, beta blocker 

and ACE inhibitor today.  Dr. Mayfield will take over care on Monday.  If the 

patient is already discharge he can follow-up with Dr. Mayfield as an outpatient.








Thank you for your consultation. Please call me if you have any questions.








MIKE Roa MD, FACP, FACC, FSCAI, FHRS, CCDS


Interventional Cardiology


Cardiac Electrophysiology


Vascular Medicine and Endovascular Interventions





Clinical Quality Measures


DVT/VTE Risk/Contraindication:


Risk Factor Score Per Nursing:  10


RFS Level Per Nursing on Admit:  4+=Very High








MACHO ROA MD Feb 25, 2017 13:30

## 2017-02-25 NOTE — DIAGNOSTIC IMAGING REPORT
INDICATION: Fever.



Comparison is made with prior examination from 2/23/17



FINDINGS: There is cardiomegaly. There is bilateral perihilar

atelectasis and/or pneumonitis, left greater than right. There

is no pleural effusion or pneumothorax. Mediastinum is

unremarkable. Left subclavian central venous catheter remains in

place.



IMPRESSION: Cardiomegaly and some bilateral perihilar

atelectasis and/or pneumonitis, left greater than right.



Dictated by:



Dictated on workstation # [id number]

## 2017-02-26 VITALS — SYSTOLIC BLOOD PRESSURE: 128 MMHG | DIASTOLIC BLOOD PRESSURE: 60 MMHG

## 2017-02-26 VITALS — SYSTOLIC BLOOD PRESSURE: 125 MMHG | DIASTOLIC BLOOD PRESSURE: 61 MMHG

## 2017-02-26 VITALS — DIASTOLIC BLOOD PRESSURE: 60 MMHG | SYSTOLIC BLOOD PRESSURE: 128 MMHG

## 2017-02-26 VITALS — DIASTOLIC BLOOD PRESSURE: 71 MMHG | SYSTOLIC BLOOD PRESSURE: 163 MMHG

## 2017-02-26 VITALS — DIASTOLIC BLOOD PRESSURE: 62 MMHG | SYSTOLIC BLOOD PRESSURE: 136 MMHG

## 2017-02-26 VITALS — SYSTOLIC BLOOD PRESSURE: 146 MMHG | DIASTOLIC BLOOD PRESSURE: 65 MMHG

## 2017-02-26 RX ADMIN — SODIUM CHLORIDE, SODIUM LACTATE, POTASSIUM CHLORIDE, AND CALCIUM CHLORIDE SCH MLS/HR: 600; 310; 30; 20 INJECTION, SOLUTION INTRAVENOUS at 05:53

## 2017-02-26 RX ADMIN — SODIUM CHLORIDE SCH MLS/HR: 900 INJECTION INTRAVENOUS at 23:34

## 2017-02-26 RX ADMIN — METOPROLOL TARTRATE SCH MG: 1 INJECTION, SOLUTION INTRAVENOUS at 20:00

## 2017-02-26 RX ADMIN — Medication SCH ML: at 14:00

## 2017-02-26 RX ADMIN — METOPROLOL TARTRATE SCH MG: 1 INJECTION, SOLUTION INTRAVENOUS at 00:36

## 2017-02-26 RX ADMIN — METOPROLOL TARTRATE SCH MG: 1 INJECTION, SOLUTION INTRAVENOUS at 12:45

## 2017-02-26 RX ADMIN — SODIUM CHLORIDE SCH MLS/HR: 900 INJECTION INTRAVENOUS at 00:36

## 2017-02-26 RX ADMIN — PANTOPRAZOLE SODIUM SCH MG: 40 INJECTION, POWDER, FOR SOLUTION INTRAVENOUS at 09:05

## 2017-02-26 RX ADMIN — METOPROLOL TARTRATE SCH MG: 1 INJECTION, SOLUTION INTRAVENOUS at 16:37

## 2017-02-26 RX ADMIN — SODIUM CHLORIDE SCH MLS/HR: 900 INJECTION INTRAVENOUS at 09:06

## 2017-02-26 RX ADMIN — ENOXAPARIN SODIUM SCH MG: 100 INJECTION SUBCUTANEOUS at 09:05

## 2017-02-26 RX ADMIN — SODIUM CHLORIDE SCH MLS/HR: 900 INJECTION INTRAVENOUS at 16:37

## 2017-02-26 RX ADMIN — ENOXAPARIN SODIUM SCH MG: 100 INJECTION SUBCUTANEOUS at 21:09

## 2017-02-26 RX ADMIN — METOPROLOL TARTRATE SCH MG: 1 INJECTION, SOLUTION INTRAVENOUS at 03:42

## 2017-02-26 RX ADMIN — Medication SCH ML: at 05:53

## 2017-02-26 RX ADMIN — METOPROLOL TARTRATE SCH MG: 1 INJECTION, SOLUTION INTRAVENOUS at 09:05

## 2017-02-26 NOTE — PROGRESS NOTE-HOSPITALIST
Progress Note


HPI/CC on Admission


CC: Medical management following intestinal surgery





HPI: This is a 76yoWM clinic pt of [facility] for the past 13 years with past medical 

hx of HTN, extensive CAD, Hyperlipidemia and BPH that presents to ICU10. Dr. Arita performed sigmoidectomy that required diverting ileostomy because of the 

extensive nature of the recurrent diverticulitis scarring issues. Max fever 

100.0, WBC 10.8, Hgb 12, Creat 1.49.





RN Review:


RN states that pt is doing well and is able to move to 4th floor.





Patient Interview:


Pt states that he has pain in his lower abdomen.


Pt has not been ambulatory since surgery.


Physical exam stable.


Pt states that his throat is sore.


Pt normally sleeps with CPAP.


Dr. Velez encourages pt and informs him that he is doing very well.





Scribed by Placido Nunez under the direct supervision of Dr. Velez.





Progress Notes/Assess & Plan


Date Seen


2/26/17


Admission Dx/Process


Assessment:


S/P uncomplicated sigmoidectomy that required diverting ileostomy due to 

extensive scar tissue from


recurrent diverticulitis now w/post op ileus


CAD multiple stents placed in past


HTN


HLP


MATHEW on CPAP


CRI


BPH


Hypogonadism


Diagonsis/Assessment & Plan


Patient doing much better and is able to ambulate and ileostomy is working well


On antibiotics due to the severity of his atelectasis on chest x-ray and to 

treat any type early cellulitis around ileostomy


Pain is well-controlled





Low-grade fever 99, pleasant, oriented 3, improved, wife at bedside


Regular rate and rhythm, clear to auscultation bilaterally much improved from 

yesterday


No edema





Assessment:


S/P uncomplicated sigmoidectomy that required diverting ileostomy due to 

extensive scar tissue from recurrent diverticulitis now w/post op ileus now 

resolved POD # 3


CAD multiple stents placed in past


HTN


HLP


MATHEW on CPAP


CRI


BPH


Hypogonadism


Low-grade fever due to atelectasis and early cellulitis around ileostomy site 

placed on Zosyn empirically and we'll shift Augmentin once discharge tomorrow





Plan:


Ambulate


Monitor fever but covered for bacterial causes


Home meds








MICHOACANO VELEZ DO Feb 26, 2017 11:25

## 2017-02-26 NOTE — PROGRESS NOTE (SOAP)
Subjective


Subjective/Events-last exam


Patient seen with Dr. Arita.  Patient reports that he is doing well. No SOB. No N

/V. Continued abdominal pain, especially LLQ incision. Ileostomy functioning. 

Tolerating diet and fluids. Ambulating.


Review of Systems


General:  No Chills,  Night Sweats


Gastrointestinal:  : Abdominal PainNo: Nausea, Vomiting





Objective


Exam





 Vital Signs








  Date Time  Temp Pulse Resp B/P Pulse Ox O2 Delivery O2 Flow Rate FiO2


 


2/26/17 06:00   20     


 


2/26/17 04:00 98.3 59 20 128/60 95 NIV/CPAP 2.00 


 


2/26/17 01:00  65      


 


2/26/17 00:00 99.9 67 20 136/62 95 NIV/CPAP 2.00 


 


2/25/17 22:00   20     


 


2/25/17 20:51       2.00 


 


2/25/17 20:20      Nasal Cannula 2.00 


 


2/25/17 20:10 100.4 72 20 145/65 72 Nasal Cannula 2.00 


 


2/25/17 19:00  64      


 


2/25/17 16:52 99.0 69 18 139/63 94 Nasal Cannula 2.00 


 


2/25/17 14:00   20     


 


2/25/17 13:20  86      


 


2/25/17 12:00 99.9 70 20 126/58 91 Nasal Cannula 4.00 


 


2/25/17 10:57       2.00 














 I & O 


 


 2/26/17





 07:00


 


Intake Total 3580 ml


 


Output Total 3525 ml


 


Balance 55 ml





Capillary Refill :


General Appearance:   No Apparent Distress WD/WN


HEENT:   PERRL/EOMI


Neck:   Full Range of Motion Normal Inspection Non Tender Supple


Respiratory:   No Accessory Muscle Use No Respiratory Distress


Cardiovascular:   Regular Rate, Rhythm No Edema No JVD


Gastrointestinal:   normal bowel sounds soft tenderness other (Incisions C/D/I. 

LLQ incision painful with mild palpation. No redness/erythema/drainage noted. 

Ileostomy has liquid brown stool in bag.)


Extremity:   Normal Capillary Refill Normal Inspection Normal Range of Motion 

Non Tender No Calf Tenderness


Neurologic/Psychiatric:   Alert Oriented x3


Skin:   Normal Color Warm/Dry





Results


Lab


 Laboratory Tests


2/25/17 16:05: 


Urine Bacteria NONE, Urine Bilirubin NEGATIVE, Urine Casts NONE, Urine Clarity 

CLEAR, Urine Color YELLOW, Urine Crystals NONE, Urine Culture Indicated NO, 

Urine Glucose (UA) NEGATIVE, Urine Ketones NEGATIVE, Urine Leukocyte Esterase 

NEGATIVE, Urine Mucus NEGATIVE, Urine Nitrite NEGATIVE, Urine Protein NEGATIVE, 

Urine RBC RARE, Urine RBC (Auto) NEGATIVE, Urine Specific Gravity 1.005L, Urine 

Urobilinogen NORMAL, Urine WBC RARE, Urine pH 7





Assessment/Plan


Assessment/Plan


Assess & Plan/Chief Complaint


A 76 year old male with recurrent severe diverticulitis, S/P laparoscopic low 

anterior sigmoid colon resection with diverting loop ileostomy.


Fever - UA and Chest x-ray for possible pneumonia and start zosyn.


Continue pain and nausea medication.


Ambulation.


Continue IS and Albuterol.


Labs stable.


PUD prophylaxis


DYS3 Diet.


May stop fluids.


DC PCA.


Can restart anticoag.


Medical management.


Diagnosis/Problems:  





Clinical Quality Measures


DVT/VTE Risk/Contraindication:


Risk Factor Score Per Nursing:  10


RFS Level Per Nursing on Admit:  4+=Very High








MUKESH MARQUEZ Feb 26, 2017 09:35

## 2017-02-27 VITALS — SYSTOLIC BLOOD PRESSURE: 176 MMHG | DIASTOLIC BLOOD PRESSURE: 74 MMHG

## 2017-02-27 VITALS — DIASTOLIC BLOOD PRESSURE: 67 MMHG | SYSTOLIC BLOOD PRESSURE: 148 MMHG

## 2017-02-27 VITALS — DIASTOLIC BLOOD PRESSURE: 88 MMHG | SYSTOLIC BLOOD PRESSURE: 138 MMHG

## 2017-02-27 VITALS — SYSTOLIC BLOOD PRESSURE: 168 MMHG | DIASTOLIC BLOOD PRESSURE: 71 MMHG

## 2017-02-27 VITALS — SYSTOLIC BLOOD PRESSURE: 138 MMHG | DIASTOLIC BLOOD PRESSURE: 65 MMHG

## 2017-02-27 RX ADMIN — OXYCODONE HYDROCHLORIDE AND ACETAMINOPHEN PRN TAB: 5; 325 TABLET ORAL at 07:01

## 2017-02-27 RX ADMIN — CLOPIDOGREL BISULFATE ONE MG: 75 TABLET, FILM COATED ORAL at 01:49

## 2017-02-27 RX ADMIN — SODIUM CHLORIDE SCH MLS/HR: 900 INJECTION INTRAVENOUS at 08:18

## 2017-02-27 RX ADMIN — ACETAMINOPHEN PRN MG: 325 TABLET ORAL at 01:02

## 2017-02-27 RX ADMIN — ENOXAPARIN SODIUM SCH MG: 100 INJECTION SUBCUTANEOUS at 08:05

## 2017-02-27 RX ADMIN — ASPIRIN ONE MG: 81 TABLET ORAL at 01:48

## 2017-02-27 RX ADMIN — ASPIRIN ONE MG: 81 TABLET ORAL at 02:49

## 2017-02-27 RX ADMIN — PANTOPRAZOLE SODIUM SCH MG: 40 INJECTION, POWDER, FOR SOLUTION INTRAVENOUS at 08:08

## 2017-02-27 RX ADMIN — CLOPIDOGREL BISULFATE ONE MG: 75 TABLET, FILM COATED ORAL at 02:49

## 2017-02-27 RX ADMIN — OXYCODONE HYDROCHLORIDE AND ACETAMINOPHEN PRN TAB: 5; 325 TABLET ORAL at 11:59

## 2017-02-27 RX ADMIN — OXYCODONE HYDROCHLORIDE AND ACETAMINOPHEN PRN TAB: 5; 325 TABLET ORAL at 01:02

## 2017-02-27 NOTE — CARDIOLOGY PROGRESS NOTE
Cardiology SOAP Progress Note


Subjective:


No cardiac symptoms





Objective:


I&O/Vital Signs





 Vital Sign - Last 12Hours








 2/27/17 2/27/17 2/27/17 2/27/17





 04:00 08:00 09:56 12:00


 


Temp 98.3 99.7  100.0


 


Pulse 59 57  61


 


Resp 18 12  20


 


B/P 148/67 168/71  138/65


 


Pulse Ox 97 95 90 95


 


O2 Delivery Room Air Room Air  Room Air


 


O2 Flow Rate 2.00  1.00 














 Intake and Output 


 


 2/27/17





 00:00


 


Intake Total 2980 ml


 


Output Total 1725 ml


 


Balance 1255 ml








Weight (Pounds):  211


Weight (Ounces):  0.0


Weight (Calculated Kilograms):  95.900524


Constitutional:  No appears stated age, No AAO x 3, No apparent distress, No 

PERRL, No well-developed, No well-nourished, No other


Respiratory:  No accessory muscle use, No respiratory distress, No chest tender

, No chest expansion is symmetric, No chest is bilaterally symmetric, No lungs 

clear to percussion, No lungs clear to auscultation, No crackles, No rhonchi, 

No rales, No stridor, No wheezing, No pleural rub, No other


Cardiovascular:  No regular rate-rhythm, No irregularly irregular, No extra 

beats, No parasternal heave is noted, No JVD, No edema, No bradycardia, No 

tachycardia, No point of maximal impulse, No cardiac thrills are palpable, No 

S1 and S2, No gallop/S3, No gallop/S4, No diastolic murmur, No systolic murmur, 

No friction rub, No click, No other


Gastrointestional:   soft round


Extremities:  No normal range of motion, No non-tender, No normal inspection, 

No pedal edema, No calf tenderness, No normal capillary refill, No pelvis stable

, No calf tenderness, No inflammation, No pedal edema, No slow capillary refill

, No swelling, No other, No abrasion, No clubbing, No cyanosis, No ecchymosis, 

No laceration, No no lower extremity edema bilateral, No significant edema, No 

tenderness, No wound


Neurologic/Psychiatric:  No CNs II-XII nml as tested, No no motor/sensory 

deficits, No alert, No normal mood/affect, No oriented x 3, No abnormal 

cerebellar tests, No abnormal CNs II-XII, No abnormal gait, No aphasia, No EOM 

palsy, No facial droop, No motor weakness, No sensory deficit, No depressed 

affect, No disoriented x 3, No other, No grossly intact, No power is 5/5 both 

on sides


Skin:  No normal color, No warm/dry, No cyanosis, No cool, No diaphoresis, No 

damp, No ecchymosis, No jaundice, No mottled, No pallor, No rash, No tattoos/

piercings, No ulcerations, No rash on exposed areas, No ulcerations on exposed 

areas, No other





Results/Procedures:


Labs


 Laboratory Tests


2/27/17 01:55: Troponin I < 0.30


2/27/17 08:20: Troponin I < 0.30








A/P:


Assessment/Dx:


recent abdominal surgery,


coronary artery disease


Plan:


this is a 76-year-old gentleman who is a patient of Dr. Mayfield as an outpatient.

  He has history of coronary artery disease and multiple PCI and stents.  For 

severe recurrent diverticulitis he underwent abdominal surgery during this 

admission with Dr. HAWK.  





Patient was restarted on aspirin, Plavix, statin, TriCor, ACE inhibitor.  Dr. Mayfield to follow the patient from tomorrow if still inpatient.








Thank you for your consultation. Please call me if you have any questions.








MIKE Roa MD, FACP, FACC, FSCAI, FHRS, CCDS


Interventional Cardiology


Cardiac Electrophysiology


Vascular Medicine and Endovascular Interventions








MACHO ROA MD Feb 27, 2017 14:38

## 2017-02-27 NOTE — DISCHARGE INST-HOME HEALTH
Discharge Inst-to Home Health


Patient Instructions


Patient Instructions/FollowUp:  


maintain ileostomy with ostomy care per home health teaching


Patient Problems:  


new ileostomy placement with ostomy site


VIA [place], [place]


DISCHARGE ORDERS


Allergies:  


Coded Allergies:  


     niacin (Verified  Allergy, Unknown, 2/8/17)


Uncoded Allergies:  


     TAPE (Allergy, Intermediate, BLISTERS, 2/8/17)


Height (Feet):  5


Height (Inches):  6.00


Weight (Pounds):  211


Weight (Ounces):  0.0





Home Health Need/Face to Face


Reason Pt Homebound


major abdominal surgery


I Have Seen Pt Face-to-Face:  Yes


Date of Face to Face:  Feb 27, 2017


Discharged To:  Home


Diagnosis/Conditions HH Order:  


medication administration


Ileostomy teaching and care





Consult/Follow Up/New Order


*I certify that based on my findings, the following services are medically 

necessary Home Health Services:


Services:  Nursing Services, Physical Therapy-Evaluate & Treat, Other (ostomy 

care)


My clinical findings support the need for the above services; see Diagnosis.


Dicharge Diet:  Cardiac Diet, Soft Diet


Daily Activity as Tolerated:  Yes





Discharge Medications:


New, Converted, or Re-newed RX:  Transmited to Pharmacy


I certify that this patient is under my care and that I, a nurse practitioner 

or a physician; a assistant working with me, had a face to face encounter that -

meets the physician face to face encounter requirements with this patient as 

dated.








MICHOACANO DUNBAR DO Feb 27, 2017 09:30

## 2017-02-27 NOTE — PROGRESS NOTE-HOSPITALIST
Progress Note


HPI/CC on Admission


CC: Medical management following intestinal surgery





HPI: This is a 76yoWM clinic pt of [facility] for the past 13 years with past medical 

hx of HTN, extensive CAD, Hyperlipidemia and BPH that presents to ICU10. Dr. Arita performed sigmoidectomy that required diverting ileostomy because of the 

extensive nature of the recurrent diverticulitis scarring issues. Max fever 

100.0, WBC 10.8, Hgb 12, Creat 1.49.





RN Review:


RN states that pt is doing well and is able to move to 4th floor.





Patient Interview:


Pt states that he has pain in his lower abdomen.


Pt has not been ambulatory since surgery.


Physical exam stable.


Pt states that his throat is sore.


Pt normally sleeps with CPAP.


Dr. Velez encourages pt and informs him that he is doing very well.





Scribed by Placido Nunez under the direct supervision of Dr. Velez.





Progress Notes/Assess & Plan


Date Seen


2/27/17


Admission Dx/Process


Assessment:


S/P uncomplicated sigmoidectomy that required diverting ileostomy due to 

extensive scar tissue from


recurrent diverticulitis now w/post op ileus


CAD multiple stents placed in past


HTN


HLP


MATHEW on CPAP


CRI


BPH


Hypogonadism


Diagonsis/Assessment & Plan


Patient doing very well but did sleep a little bit too much after giving 

alprazolam 1 mg


Patient ready for discharge


Did require O2 throughout the night so will start process for home O2 evaluation





Vitals stable, Regular rate and rhythm, clear to auscultation bilaterally much 

improved


No edema





Assessment:


S/P uncomplicated sigmoidectomy that required diverting ileostomy due to 

extensive scar tissue from recurrent diverticulitis now w/post op ileus now 

resolved POD # 4


CAD multiple stents placed in past


HTN


HLP


MATHEW on CPAP


CRI


BPH


Hypogonadism


Low-grade fever due to atelectasis and early cellulitis around ileostomy site 

placed on Zosyn empirically and we'll shift Augmentin today at DC





Plan:


Ambulate


DC home on Abx


Home meds


Pain meds


O2 evaluation for home


HH orders given








MICHOACANO VELEZ DO Feb 27, 2017 09:35

## 2017-03-08 NOTE — DISCHARGE SUMMARY
DATE OF ADMISSION: 

02/23/2017

 

DATE OF DISCHARGE:

02/27/2017 

 

ATTENDING PRIMARY CARE PHYSICIAN:  Dr. Velez

 

ADMISSION DIAGNOSIS:

Recurrent complicated sigmoid diverticulitis. 

 

DISCHARGE DIAGNOSIS: 

Recurrent complicated sigmoid diverticulitis. 

 

ADDITIONAL DIAGNOSES:

1.   Coronary artery disease.

2.   Hypertension.

3.   Hypercholesterolemia.

4.   Degenerative joint disease. 

5.   BPH. 

 

PRINCIPAL PROCEDURE:   

Laparoscopic low anterior colorectal resection with anastomosis

and laparoscopic diverting loop ileostomy formation.  No

additional procedures. 

 

COMPLICATIONS:

None. 

 

DISPOSITION:

Home in stable condition. 

 

Mr. Moshe Machado is a 76-year-old male with a history of severe

sigmoid diverticulosis and diverticulitis. He has had several

colonoscopies done in the past. He had one in 2008 where a benign

polyp was identified and removed. We had done a colonoscopy on

him December 2013, which did show significant diverticulosis of

the sigmoid colon; however, no polyps identified. He does report

a family history of colon cancer with his father having the

disease. He reports that he has had approximately 10 episodes of

diverticulitis and 2 requiring hospitalization, as well as

extended IV antibiotics. A CT scan performed in September 2016

did show chronic inflammatory changes of the sigmoid colon and

descending colon consistent with diverticulitis. This gentleman

also has other comorbidities including coronary artery disease,

hypertension, hypercholesterolemia and has had previous cardiac

catheterizations and multiple stents placed. 

 

PAST MEDICAL HISTORY:

1.   Coronary artery disease.

2.   Hypertension.

3.   Hypercholesterolemia.

4.   Degenerative joint disease. 

5.   BPH. 

 

PAST SURGERIES:

1.   Bilateral carpal tunnel release. 

2.   Bilateral rotator cuff surgery.

3.   Repair deviated septum. 

4.   Multiple cardiac catheterizations and stent placement x9

last one in 2012. 

 

ALLERGIES:

NIACIN 

 

MEDICATIONS:

1.   Plavix 75 mg daily.

2.   Aspirin 81 mg daily.

3.   Protonix 40 mg daily.

4.   Famotidine daily.

5.   Fish oil 1400 mg b.i.d. 

6.   Hydrochlorothiazide 25 mg daily.

7.   Lisinopril 20 mg daily.

8.   Finasteride 5 mg daily.

9.   Tamsulosin 0.4 mg daily.

10.  Alprazolam 0.5 mg p.r.n. 

 

SOCIAL HISTORY:

Previous smoke, quit over 30 years ago, occasional pipe smoke.

Rare alcohol. 

 

FAMILY HISTORY:

Mother hypertension.  Father colon cancer. 

 

On 02/23/2017 he underwent laparoscopic low anterior colorectal

resection with primary anastomosis and laparoscopic diverting

loop ileostomy placement as well as placement of a left

subclavian central venous catheter. He did well after the surgery

was sent to the Intensive Care Unit. On postoperative day number

1, he did well. His vital signs were stable and he did have some

ileostomy output and the NG tube as well as Torres catheter were

removed.  He was also transferred to the general surgical floor.

He continued to do well and was able to ambulate. He then

developed adequate amounts of ileostomy output and was started on

diet which he was able to tolerate without any difficulty. He

also had adequate pain control with oral pain medication. Vital

signs remained stable. He did have a few episodes of fevers,

however this was due to atelectasis and resolved after increase

ambulation, as well as incentive spirometry. His labs remained

stable. 

 

The patient was discharged home on 02/27/2017. Home-going

instructions: 

Diet as tolerated. No activity restrictions; however, no heavy

lifting or exertion for the next 6 weeks. 

 

MEDICATIONS:

Resume previous home medications. Percocet p.r.n. alprazolam

p.r.n. 

 

Okay to shower. He is to follow-up in the office in approximately

2 weeks. 

 

 

 

Job ID: [id number] 

Dictated Date: 03/07/2017 11:13:02 

Transcription Date: 03/08/2017 11:45:13/kat

## 2017-04-11 ENCOUNTER — HOSPITAL ENCOUNTER (OUTPATIENT)
Dept: HOSPITAL 75 - PREOP | Age: 77
Discharge: HOME | End: 2017-04-11
Attending: SURGERY
Payer: MEDICARE

## 2017-04-11 VITALS — HEIGHT: 66 IN | BODY MASS INDEX: 31.74 KG/M2 | WEIGHT: 197.5 LBS

## 2017-04-11 VITALS — DIASTOLIC BLOOD PRESSURE: 54 MMHG | SYSTOLIC BLOOD PRESSURE: 126 MMHG

## 2017-04-11 DIAGNOSIS — K57.92: ICD-10-CM

## 2017-04-11 DIAGNOSIS — Z01.818: Primary | ICD-10-CM

## 2017-04-11 LAB
ANION GAP SERPL CALC-SCNC: 10 MMOL/L (ref 5–14)
BASOPHILS # BLD AUTO: 0 10^3/UL (ref 0–0.1)
BASOPHILS NFR BLD AUTO: 0 % (ref 0–10)
BUN SERPL-MCNC: 22 MG/DL (ref 7–18)
BUN/CREAT SERPL: 15
CALCIUM SERPL-MCNC: 9.4 MG/DL (ref 8.5–10.1)
CHLORIDE SERPL-SCNC: 107 MMOL/L (ref 98–107)
CO2 SERPL-SCNC: 22 MMOL/L (ref 21–32)
CREAT SERPL-MCNC: 1.48 MG/DL (ref 0.6–1.3)
EOSINOPHIL # BLD AUTO: 0.3 10^3/UL (ref 0–0.3)
EOSINOPHIL NFR BLD AUTO: 5 % (ref 0–10)
ERYTHROCYTE [DISTWIDTH] IN BLOOD BY AUTOMATED COUNT: 13.7 % (ref 10–14.5)
GFR SERPLBLD BASED ON 1.73 SQ M-ARVRAT: 46 ML/MIN
GLUCOSE SERPL-MCNC: 120 MG/DL (ref 70–105)
LYMPHOCYTES # BLD AUTO: 1.5 X 10^3 (ref 1–4)
LYMPHOCYTES NFR BLD AUTO: 22 % (ref 12–44)
MCH RBC QN AUTO: 31 PG (ref 25–34)
MCHC RBC AUTO-ENTMCNC: 35 G/DL (ref 32–36)
MCV RBC AUTO: 90 FL (ref 80–99)
MONOCYTES # BLD AUTO: 0.5 X 10^3 (ref 0–1)
MONOCYTES NFR BLD AUTO: 7 % (ref 0–12)
NEUTROPHILS # BLD AUTO: 4.2 X 10^3 (ref 1.8–7.8)
NEUTROPHILS NFR BLD AUTO: 65 % (ref 42–75)
PLATELET # BLD: 217 10^3/UL (ref 130–400)
PMV BLD AUTO: 11.1 FL (ref 7.4–10.4)
POTASSIUM SERPL-SCNC: 4.3 MMOL/L (ref 3.6–5)
RBC # BLD AUTO: 4.02 10^6/UL (ref 4.35–5.85)
SODIUM SERPL-SCNC: 139 MMOL/L (ref 135–145)
WBC # BLD AUTO: 6.5 10^3/UL (ref 4.3–11)

## 2017-04-11 PROCEDURE — 36415 COLL VENOUS BLD VENIPUNCTURE: CPT

## 2017-04-11 PROCEDURE — 80048 BASIC METABOLIC PNL TOTAL CA: CPT

## 2017-04-11 PROCEDURE — 87081 CULTURE SCREEN ONLY: CPT

## 2017-04-11 PROCEDURE — 85025 COMPLETE CBC W/AUTO DIFF WBC: CPT

## 2017-04-12 ENCOUNTER — HOSPITAL ENCOUNTER (INPATIENT)
Dept: HOSPITAL 75 - 4TH | Age: 77
LOS: 7 days | Discharge: HOME | DRG: 330 | End: 2017-04-19
Attending: SURGERY | Admitting: SURGERY
Payer: MEDICARE

## 2017-04-12 VITALS — BODY MASS INDEX: 31.74 KG/M2 | HEIGHT: 66 IN | WEIGHT: 197.5 LBS

## 2017-04-12 DIAGNOSIS — G47.33: ICD-10-CM

## 2017-04-12 DIAGNOSIS — R42: ICD-10-CM

## 2017-04-12 DIAGNOSIS — Z43.2: Primary | ICD-10-CM

## 2017-04-12 DIAGNOSIS — Z95.5: ICD-10-CM

## 2017-04-12 DIAGNOSIS — R09.02: ICD-10-CM

## 2017-04-12 DIAGNOSIS — R73.9: ICD-10-CM

## 2017-04-12 DIAGNOSIS — F41.9: ICD-10-CM

## 2017-04-12 DIAGNOSIS — M10.9: ICD-10-CM

## 2017-04-12 DIAGNOSIS — K56.7: ICD-10-CM

## 2017-04-12 DIAGNOSIS — E87.6: ICD-10-CM

## 2017-04-12 DIAGNOSIS — Z80.0: ICD-10-CM

## 2017-04-12 DIAGNOSIS — Z87.891: ICD-10-CM

## 2017-04-12 DIAGNOSIS — K21.9: ICD-10-CM

## 2017-04-12 DIAGNOSIS — N28.9: ICD-10-CM

## 2017-04-12 DIAGNOSIS — M19.91: ICD-10-CM

## 2017-04-12 DIAGNOSIS — F32.9: ICD-10-CM

## 2017-04-12 DIAGNOSIS — I25.10: ICD-10-CM

## 2017-04-12 DIAGNOSIS — N40.0: ICD-10-CM

## 2017-04-12 DIAGNOSIS — Z87.19: ICD-10-CM

## 2017-04-12 DIAGNOSIS — I10: ICD-10-CM

## 2017-04-12 DIAGNOSIS — E78.5: ICD-10-CM

## 2017-04-12 DIAGNOSIS — E66.9: ICD-10-CM

## 2017-04-12 PROCEDURE — 85027 COMPLETE CBC AUTOMATED: CPT

## 2017-04-12 PROCEDURE — 94640 AIRWAY INHALATION TREATMENT: CPT

## 2017-04-12 PROCEDURE — 81000 URINALYSIS NONAUTO W/SCOPE: CPT

## 2017-04-12 PROCEDURE — 85025 COMPLETE CBC W/AUTO DIFF WBC: CPT

## 2017-04-12 PROCEDURE — 94761 N-INVAS EAR/PLS OXIMETRY MLT: CPT

## 2017-04-12 PROCEDURE — 36415 COLL VENOUS BLD VENIPUNCTURE: CPT

## 2017-04-12 PROCEDURE — 94760 N-INVAS EAR/PLS OXIMETRY 1: CPT

## 2017-04-12 PROCEDURE — 71020: CPT

## 2017-04-12 PROCEDURE — 80053 COMPREHEN METABOLIC PANEL: CPT

## 2017-04-12 PROCEDURE — 80048 BASIC METABOLIC PNL TOTAL CA: CPT

## 2017-04-13 VITALS — SYSTOLIC BLOOD PRESSURE: 138 MMHG | DIASTOLIC BLOOD PRESSURE: 70 MMHG

## 2017-04-13 VITALS — DIASTOLIC BLOOD PRESSURE: 65 MMHG | SYSTOLIC BLOOD PRESSURE: 142 MMHG

## 2017-04-13 VITALS — DIASTOLIC BLOOD PRESSURE: 81 MMHG | SYSTOLIC BLOOD PRESSURE: 131 MMHG

## 2017-04-13 VITALS — SYSTOLIC BLOOD PRESSURE: 140 MMHG | DIASTOLIC BLOOD PRESSURE: 68 MMHG

## 2017-04-13 VITALS — SYSTOLIC BLOOD PRESSURE: 155 MMHG | DIASTOLIC BLOOD PRESSURE: 67 MMHG

## 2017-04-13 PROCEDURE — 0DBB0ZZ EXCISION OF ILEUM, OPEN APPROACH: ICD-10-PCS | Performed by: SURGERY

## 2017-04-13 RX ADMIN — HYDROMORPHONE HYDROCHLORIDE PRN MG: 2 INJECTION, SOLUTION INTRAMUSCULAR; INTRAVENOUS; SUBCUTANEOUS at 17:12

## 2017-04-13 RX ADMIN — ALBUTEROL SULFATE SCH MG: 2.5 SOLUTION RESPIRATORY (INHALATION) at 19:02

## 2017-04-13 RX ADMIN — HYDROMORPHONE HYDROCHLORIDE PRN MG: 2 INJECTION, SOLUTION INTRAMUSCULAR; INTRAVENOUS; SUBCUTANEOUS at 18:16

## 2017-04-13 RX ADMIN — ALBUTEROL SULFATE SCH MG: 2.5 SOLUTION RESPIRATORY (INHALATION) at 21:57

## 2017-04-13 RX ADMIN — MORPHINE SULFATE PRN MG: 10 INJECTION, SOLUTION INTRAMUSCULAR; INTRAVENOUS at 16:50

## 2017-04-13 RX ADMIN — ONDANSETRON SCH MG: 2 INJECTION, SOLUTION INTRAMUSCULAR; INTRAVENOUS at 17:13

## 2017-04-13 RX ADMIN — MORPHINE SULFATE PRN MG: 10 INJECTION, SOLUTION INTRAMUSCULAR; INTRAVENOUS at 17:00

## 2017-04-13 RX ADMIN — CEFAZOLIN SCH MLS/HR: 10 INJECTION, POWDER, FOR SOLUTION INTRAVENOUS at 22:06

## 2017-04-13 RX ADMIN — FENTANYL CITRATE PRN MCG: 50 INJECTION, SOLUTION INTRAMUSCULAR; INTRAVENOUS at 19:59

## 2017-04-13 RX ADMIN — METOPROLOL TARTRATE SCH MG: 1 INJECTION, SOLUTION INTRAVENOUS at 20:20

## 2017-04-13 RX ADMIN — METRONIDAZOLE SCH MLS/HR: 5 INJECTION, SOLUTION INTRAVENOUS at 18:25

## 2017-04-13 RX ADMIN — HYDROMORPHONE HYDROCHLORIDE PRN MG: 2 INJECTION, SOLUTION INTRAMUSCULAR; INTRAVENOUS; SUBCUTANEOUS at 17:08

## 2017-04-13 RX ADMIN — HYDROMORPHONE HYDROCHLORIDE PRN MG: 2 INJECTION, SOLUTION INTRAMUSCULAR; INTRAVENOUS; SUBCUTANEOUS at 22:02

## 2017-04-13 RX ADMIN — ENOXAPARIN SODIUM SCH MG: 30 INJECTION SUBCUTANEOUS at 20:24

## 2017-04-13 RX ADMIN — ONDANSETRON SCH MG: 2 INJECTION, SOLUTION INTRAMUSCULAR; INTRAVENOUS at 17:03

## 2017-04-13 RX ADMIN — SODIUM CHLORIDE AND POTASSIUM CHLORIDE SCH MLS/HR: 4.5; 1.49 INJECTION, SOLUTION INTRAVENOUS at 18:25

## 2017-04-13 NOTE — PROGRESS NOTE-POST OPERATIVE
Post-Operative Progess Note


Surgeon (s)/Assistant (s)


Surgeon


DAIJA HAWK MD


Assistant:  se alves APRN





Pre-Operative Diagnosis


complicated diverticulitis





Post-Operative Diagnosis





same





Post-Op Procedure Note


Date of Procedure:  Apr 13, 2017


Name of Procedure Performed:  


reversal ileostomy


Description of the Procedure:  


reversal ileostomy


Findings of the Procedure


.


Anesthesia Type


GET


Estimated blood loss (mL):  minimal


Specimen(s) collected/removed


none











DAIJA HAWK MD Apr 13, 2017 4:34 pm

## 2017-04-13 NOTE — PROGRESS NOTE-PRE OPERATIVE
Pre-Operative Progress Note


H&P Reviewed


The H&P was reviewed, patient examined and no changes noted.


Date H&P Reviewed:  Apr 13, 2017


Time H&P Reviewed:  14:50


Pre-Operative Diagnosis:  complicated diverticulitis











DAIJA HAWK MD Apr 13, 2017 2:54 pm

## 2017-04-14 VITALS — DIASTOLIC BLOOD PRESSURE: 49 MMHG | SYSTOLIC BLOOD PRESSURE: 108 MMHG

## 2017-04-14 VITALS — SYSTOLIC BLOOD PRESSURE: 113 MMHG | DIASTOLIC BLOOD PRESSURE: 55 MMHG

## 2017-04-14 VITALS — DIASTOLIC BLOOD PRESSURE: 66 MMHG | SYSTOLIC BLOOD PRESSURE: 134 MMHG

## 2017-04-14 VITALS — DIASTOLIC BLOOD PRESSURE: 50 MMHG | SYSTOLIC BLOOD PRESSURE: 107 MMHG

## 2017-04-14 VITALS — DIASTOLIC BLOOD PRESSURE: 54 MMHG | SYSTOLIC BLOOD PRESSURE: 117 MMHG

## 2017-04-14 VITALS — SYSTOLIC BLOOD PRESSURE: 113 MMHG | DIASTOLIC BLOOD PRESSURE: 51 MMHG

## 2017-04-14 LAB
ANION GAP SERPL CALC-SCNC: 10 MMOL/L (ref 5–14)
BUN SERPL-MCNC: 29 MG/DL (ref 7–18)
BUN/CREAT SERPL: 19
CALCIUM SERPL-MCNC: 8.9 MG/DL (ref 8.5–10.1)
CHLORIDE SERPL-SCNC: 107 MMOL/L (ref 98–107)
CO2 SERPL-SCNC: 21 MMOL/L (ref 21–32)
CREAT SERPL-MCNC: 1.56 MG/DL (ref 0.6–1.3)
ERYTHROCYTE [DISTWIDTH] IN BLOOD BY AUTOMATED COUNT: 14.1 % (ref 10–14.5)
GFR SERPLBLD BASED ON 1.73 SQ M-ARVRAT: 44 ML/MIN
GLUCOSE SERPL-MCNC: 114 MG/DL (ref 70–105)
MCH RBC QN AUTO: 31 PG (ref 25–34)
MCHC RBC AUTO-ENTMCNC: 34 G/DL (ref 32–36)
MCV RBC AUTO: 93 FL (ref 80–99)
PLATELET # BLD: 192 10^3/UL (ref 130–400)
PMV BLD AUTO: 11.5 FL (ref 7.4–10.4)
POTASSIUM SERPL-SCNC: 5.2 MMOL/L (ref 3.6–5)
RBC # BLD AUTO: 3.64 10^6/UL (ref 4.35–5.85)
SODIUM SERPL-SCNC: 138 MMOL/L (ref 135–145)
WBC # BLD AUTO: 12 10^3/UL (ref 4.3–11)

## 2017-04-14 RX ADMIN — METOPROLOL TARTRATE SCH MG: 1 INJECTION, SOLUTION INTRAVENOUS at 08:00

## 2017-04-14 RX ADMIN — HYDROCODONE BITARTRATE AND ACETAMINOPHEN PRN EA: 7.5; 325 TABLET ORAL at 18:06

## 2017-04-14 RX ADMIN — HYDROMORPHONE HYDROCHLORIDE PRN MG: 2 INJECTION, SOLUTION INTRAMUSCULAR; INTRAVENOUS; SUBCUTANEOUS at 05:04

## 2017-04-14 RX ADMIN — ONDANSETRON SCH MG: 2 INJECTION, SOLUTION INTRAMUSCULAR; INTRAVENOUS at 00:18

## 2017-04-14 RX ADMIN — METOPROLOL TARTRATE SCH MG: 1 INJECTION, SOLUTION INTRAVENOUS at 00:00

## 2017-04-14 RX ADMIN — METOCLOPRAMIDE PRN MG: 5 INJECTION, SOLUTION INTRAMUSCULAR; INTRAVENOUS at 20:13

## 2017-04-14 RX ADMIN — FENTANYL CITRATE PRN MCG: 50 INJECTION, SOLUTION INTRAMUSCULAR; INTRAVENOUS at 10:50

## 2017-04-14 RX ADMIN — FENOFIBRATE SCH MG: 134 CAPSULE ORAL at 20:09

## 2017-04-14 RX ADMIN — CEFAZOLIN SCH MLS/HR: 10 INJECTION, POWDER, FOR SOLUTION INTRAVENOUS at 05:08

## 2017-04-14 RX ADMIN — ONDANSETRON SCH MG: 2 INJECTION, SOLUTION INTRAMUSCULAR; INTRAVENOUS at 05:08

## 2017-04-14 RX ADMIN — SODIUM CHLORIDE AND POTASSIUM CHLORIDE SCH MLS/HR: 4.5; 1.49 INJECTION, SOLUTION INTRAVENOUS at 05:55

## 2017-04-14 RX ADMIN — FENTANYL CITRATE PRN MCG: 50 INJECTION, SOLUTION INTRAMUSCULAR; INTRAVENOUS at 08:20

## 2017-04-14 RX ADMIN — ALFUZOSIN HYDROCHLORIDE SCH MG: 10 TABLET, EXTENDED RELEASE ORAL at 18:06

## 2017-04-14 RX ADMIN — ALBUTEROL SULFATE SCH MG: 2.5 SOLUTION RESPIRATORY (INHALATION) at 19:08

## 2017-04-14 RX ADMIN — ALBUTEROL SULFATE SCH MG: 2.5 SOLUTION RESPIRATORY (INHALATION) at 10:19

## 2017-04-14 RX ADMIN — ENOXAPARIN SODIUM SCH MG: 30 INJECTION SUBCUTANEOUS at 08:20

## 2017-04-14 RX ADMIN — ROSUVASTATIN CALCIUM SCH MG: 5 TABLET, FILM COATED ORAL at 20:09

## 2017-04-14 RX ADMIN — METRONIDAZOLE SCH MLS/HR: 5 INJECTION, SOLUTION INTRAVENOUS at 09:40

## 2017-04-14 RX ADMIN — ALBUTEROL SULFATE SCH MG: 2.5 SOLUTION RESPIRATORY (INHALATION) at 06:33

## 2017-04-14 RX ADMIN — ALBUTEROL SULFATE SCH MG: 2.5 SOLUTION RESPIRATORY (INHALATION) at 15:02

## 2017-04-14 RX ADMIN — METRONIDAZOLE SCH MLS/HR: 5 INJECTION, SOLUTION INTRAVENOUS at 02:15

## 2017-04-14 RX ADMIN — METOPROLOL TARTRATE SCH MG: 1 INJECTION, SOLUTION INTRAVENOUS at 11:42

## 2017-04-14 RX ADMIN — ALBUTEROL SULFATE SCH MG: 2.5 SOLUTION RESPIRATORY (INHALATION) at 22:44

## 2017-04-14 RX ADMIN — OMEGA-3 FATTY ACIDS CAP 1000 MG SCH MG: 1000 CAP at 18:06

## 2017-04-14 RX ADMIN — METOPROLOL TARTRATE SCH MG: 1 INJECTION, SOLUTION INTRAVENOUS at 19:47

## 2017-04-14 RX ADMIN — OMEGA-3 FATTY ACIDS CAP 1000 MG SCH MG: 1000 CAP at 12:52

## 2017-04-14 RX ADMIN — ONDANSETRON SCH MG: 2 INJECTION, SOLUTION INTRAMUSCULAR; INTRAVENOUS at 12:52

## 2017-04-14 RX ADMIN — HYDROMORPHONE HYDROCHLORIDE PRN MG: 2 INJECTION, SOLUTION INTRAMUSCULAR; INTRAVENOUS; SUBCUTANEOUS at 16:28

## 2017-04-14 RX ADMIN — SODIUM CHLORIDE AND POTASSIUM CHLORIDE SCH MLS/HR: 4.5; 1.49 INJECTION, SOLUTION INTRAVENOUS at 04:06

## 2017-04-14 RX ADMIN — FENTANYL CITRATE PRN MCG: 50 INJECTION, SOLUTION INTRAMUSCULAR; INTRAVENOUS at 22:32

## 2017-04-14 RX ADMIN — METOPROLOL TARTRATE SCH MG: 1 INJECTION, SOLUTION INTRAVENOUS at 04:00

## 2017-04-14 RX ADMIN — CEFAZOLIN SCH MLS/HR: 10 INJECTION, POWDER, FOR SOLUTION INTRAVENOUS at 14:51

## 2017-04-14 RX ADMIN — HYDROMORPHONE HYDROCHLORIDE PRN MG: 2 INJECTION, SOLUTION INTRAMUSCULAR; INTRAVENOUS; SUBCUTANEOUS at 02:15

## 2017-04-14 RX ADMIN — SODIUM CHLORIDE SCH MLS/HR: 900 INJECTION, SOLUTION INTRAVENOUS at 09:40

## 2017-04-14 RX ADMIN — ENOXAPARIN SODIUM SCH MG: 30 INJECTION SUBCUTANEOUS at 20:09

## 2017-04-14 RX ADMIN — SODIUM CHLORIDE SCH MLS/HR: 900 INJECTION, SOLUTION INTRAVENOUS at 18:06

## 2017-04-14 RX ADMIN — HYDROCODONE BITARTRATE AND ACETAMINOPHEN PRN EA: 7.5; 325 TABLET ORAL at 10:45

## 2017-04-14 RX ADMIN — SODIUM CHLORIDE SCH MLS/HR: 900 INJECTION, SOLUTION INTRAVENOUS at 22:50

## 2017-04-14 RX ADMIN — HYDROCODONE BITARTRATE AND ACETAMINOPHEN PRN EA: 7.5; 325 TABLET ORAL at 14:43

## 2017-04-14 RX ADMIN — OXYCODONE HYDROCHLORIDE AND ACETAMINOPHEN SCH MG: 500 TABLET ORAL at 12:52

## 2017-04-14 RX ADMIN — METOPROLOL TARTRATE SCH MG: 1 INJECTION, SOLUTION INTRAVENOUS at 16:00

## 2017-04-14 RX ADMIN — PANTOPRAZOLE SODIUM SCH MG: 40 INJECTION, POWDER, FOR SOLUTION INTRAVENOUS at 08:20

## 2017-04-14 RX ADMIN — ALBUTEROL SULFATE SCH MG: 2.5 SOLUTION RESPIRATORY (INHALATION) at 02:34

## 2017-04-14 NOTE — ANESTHESIA-GENERAL POST-OP
General


Patient Condition


Mental Status/LOC:  Same as Preop


Cardiovascular:  Satisfactory


Nausea/Vomiting:  Absent


Respiratory:  Satisfactory


Pain:  Controlled


Complications:  Absent





Post Op Complications


Complications


None





Follow Up Care/Instructions


Patient Instructions


None needed.





Anesthesia/Patient Condition


Patient Condition


Patient is doing well, C/O pain getting in and out of bed, stable vital signs, 

no apparent adverse anesthesia problems.











MIGUEL DAUGHERTY DO Apr 14, 2017 13:43

## 2017-04-14 NOTE — DISCHARGE INST-SURGICAL
D/C Lap Instructions-ROCAELO


New, Converted, or Re-Newed RX:  RX on Chart


Follow Up Appt in 2 weeks





Activity as tolerated


No driving for 24 hours


No driving while on pain medications





gauze followed by ABD and medipore tape BID and PRN





Incentive Spirometry use every 2 hours while awake





Regular Diet





Symptoms to Report: Fever over 101 degree F, Nausea/Vomiting 


Infection Signs and Symptoms to report:  Increased redness, Foul odor of wound, 

Increased drainage





Bathing instructions: May shower


Operative Area Clean/Dry;  Keep incision clean/dry





If any problems/questions: Contact your physician or go to Emergency Room











DAIJA HAWK MD Apr 14, 2017 12:50 pm

## 2017-04-14 NOTE — CONSULTATION-HOSPITALIST
HPI


History of Present Illness:


HPI/Chief Complaint


CC: Medical management following reversal ileostomy





HPI: This is a 76-year-old white male clinic patient of [facility] with past medical 

history of severe and recurrent diverticulitis that underwent an uncomplicated 

sigmoid resection with diverting colostomy and has done well over the ensuing 

few months by Dr. Arita and has underwent an uncomplicated reversal ileostomy 

and is currently doing well without difficulties.  He states that the pain is 

controlled and overall having no significant issues since surgery.  He denies 

any nausea is not on any of his home medications due to ileus and has not 

passed any gas yet.


Source:  patient, caregiver


Exam Limitations:  no limitations


Date Seen


17


Attending Physician


Leona Arita MD


PCP


Jaelyn Velez DO


Referring Physician





Date of Admission


2017 at 11:50





Home Medications & Allergies


Home Medications


Reviewed patient Home Medication Reconciliation Form





Allergies





Allergies


Coded Allergies


  niacin (Verified Allergy, Unknown, 17)


Uncoded Allergies


  TAPE ( Allergy, Intermediate, BLISTERS, 17)








Past Medical-Social-Family Hx


Patient Social History


Marrital Status:  


Employed/Student:  retired


Alcohol Use:  Occasionally Uses


Recreational Drug Use:  No


Smoking Status:  Former Smoker


Former smoker/When Quit:  Dec 21, 1973


Type Used:  Cigarettes


Physical Abuse Screen:  No


Sexual Abuse:  No


Recent Foreign Travel:  No


Contact w/other who traveled:  No


Recent Hopitalizations:  No


Recent Infectious Disease Expo:  No





Immunizations Up To Date


Tetanus Booster (TDap):  Unknown


Date of Pneumonia Vaccine:  Oct 1, 2011


Date of Influenza Vaccine:  Oct 1, 2016





Seasonal Allergies


Seasonal Allergies:  Yes





Surgeries


HX Surgeries:  Yes (rotator cuff x3, bilat CTR, )


Surgeries:  Abdominal (sigmoid resection with diverting colostomy), Cardiac, 

Coronary Stent, Orthopedic





Respiratory


Hx Respiratory Disorders:  Yes


Respiratory Disorders:  Sleep Apnea





Cardiovascular


Hx Cardiovascular Disorders:  Yes (CARDIAC STENTS X 9)


Cardiac Disorders:  Coronary Artery Disease, High Cholesterol, Hypertension





Neurological


Hx Neurological Disorders:  Yes


Neurological Disorders:  Vertigo





Reproductive System


Hx Reproductive Disorders:  No


Sexually Transmitted Disease:  No


HIV/AIDS:  No





Genitourinary


Hx Genitourinary Disorders:  Yes


Genitourinary Disorders:  Benign Prostatic Hyperpl, Prostate Problems





Gastrointestinal


Hx Gastrointestinal Disorders:  Yes


Gastrointestinal Disorders:  Gastroesophageal Reflux, Diverticulosis





Musculoskeletal


Hx Musculoskeletal Disorders:  Yes (ROTATOR CUFF AND CARPAL TUNNEL SURGERIES)


Musculoskeletal Disorders:  Arthritis, Gout





Endocrine


Hx Endocrine Disorders:  No





HEENT


HX ENT Disorders:  Yes (GLASSES)


Loss of Vision:  Denies


Hearing Impairment:  Denies





Cancer


Hx Cancer:  No





Psychosocial


Hx Psychiatric Problems:  Yes


Behavioral Health Disorders:  Anxiety, Depression





Integumentary


HX Skin/Integumentary Disorder:  No





Blood Transfusions


Hx Blood Disorders:  No


Adverse Reaction to a Blood Tr:  No





Family Medical History


Family Hx:  


Cancer


  03 FATHER


  09 SISTER


Cancer of colon


  03 FATHER


Cardiovascular disease


  19 MOTHER


  GRANDMOTHER


  GRANDFATHER


Myocardial infarction


  GRANDFATHER


Stroke


  AUNT





No Family History of:


  Abdominal aortic aneurysm


  [place]'s disease


  Alcoholism


  Aphasia


  Cataract


  Chest pain


  Congenital heart disease


  Congestive heart failure


  Cystic fibrosis


  Dementia


  Dysphagia


  Family history: Allergy


  Family history: Alzheimer's disease


  Family history: Arthritis


  Family history: Asthma


  Family history: Breast disease


  Family history: Cardiovascular disease


  Family history: Coronary thrombosis


  Family history: Diabetes mellitus


  Family history: Gastrointestinal disease


  Family history: Glaucoma


  Family history: Hypertension


  Family history: Osteoporosis


  Family history: Thyroid disorder


  Headache


  Hearing loss


  Heart disease


  Hereditary disease


  History of - anemia


  History of - disorder


  History of - respiratory disease


  History of drug abuse


  Human immunodeficiency virus (HIV) seropositivity


  Hypercholesterolemia


  Infertile


  Kidney disease


  Malignant neoplasm of lung


  Parkinson's disease


  Prostate cancer


  Psychotic disorder


  Seizure disorder


  Tuberculosis


  Visual impairment





Review of Systems


Constitutional:  see HPI


EENTM:  no symptoms reported


Respiratory:  no symptoms reported


Cardiovascular:  no symptoms reported


Gastrointestinal:  abdominal pain (LLQ)


Genitourinary:  no symptoms reported


Musculoskeletal:  no symptoms reported


Skin:  no symptoms reported


Psychiatric/Neurological:  No Symptoms Reported


All Other Systems Reviewed


Negative Unless Noted:  Yes





Physical Exam


Physical Exam


Vital Signs





Vital Sign - Last 12Hours








 17





 01:21 14:20 18:00


 


Temp  98.0 


 


Pulse 53  


 


Resp  18 


 


B/P (MAP)  131/81 


 


Pulse Ox  98 


 


O2 Delivery  Room Air 


 


O2 Flow Rate   4.00





Capillary Refill :


General Appearance:  No Apparent Distress, WD/WN


Eyes:  Bilateral Eye Normal Inspection, Bilateral Eye PERRL


HEENT:  PERRL/EOMI, Normal ENT Inspection, Pharynx Normal


Neck:  Full Range of Motion, Normal Inspection, Non Tender, Supple, Carotid 

Bruit


Respiratory:  Chest Non Tender, Lungs Clear, Normal Breath Sounds, No Accessory 

Muscle Use, No Respiratory Distress


Cardiovascular:  Regular Rate, Rhythm, No Edema, No Gallop, No JVD, No Murmur, 

Normal Peripheral Pulses


Gastrointestinal:  Normal Bowel Sounds, No Organomegaly, No Pulsatile Mass, Soft

, Tenderness


Back:  Normal Inspection, No CVA Tenderness, No Vertebral Tenderness


Extremity:  Normal Capillary Refill, Normal Inspection, Normal Range of Motion, 

Non Tender, No Calf Tenderness, No Pedal Edema


Neurologic/Psychiatric:  Alert, Oriented x3, No Motor/Sensory Deficits, Normal 

Mood/Affect


Skin:  Normal Color, Warm/Dry


Lymphatic:  No Adenopathy





Results


Results/Procedures


Lab


Laboratory Tests


17 05:58














Assessment/Plan


Admission Diagnosis


Assessment:


Status post uncomplicated reversal ileostomy per Dr. Arita


Hypertension


Coronary artery disease managed by Dr. Mayfield


Chronic vertigo


Anxiety


Borderline hyperglycemia


Hyperlipidemia


Obstructive sleep apnea


Obesity


Severe diverticulosis with recurrent diverticulitis requiring the resection


Mild renal insufficiency





Assessment and Plan


Plan:


Ambulate


Pain control


Ileus management


Home meds when able to take oral





Clinical Quality Measures


DVT/VTE Risk/Contraindication:


Risk Factor Score Per Nursin


RFS Level Per Nursing on Admit:  4+=Very High











JAELYN VELEZ DO 2017 10:33

## 2017-04-14 NOTE — PROGRESS NOTE (SOAP)
Subjective


Subjective/Events-last exam


doing well. pain better controlled. tolerating ice chips. ambulating well. no 

nausea/vomiting.





Objective


Exam





Vital Signs








  Date Time  Temp Pulse Resp B/P (MAP) Pulse Ox O2 Delivery O2 Flow Rate FiO2


 


17 12:03 99.6 70 19 108/49 95 Room Air  


 


17 10:19     95   


 


17 08:42      Room Air  


 


17 08:06 98.8 61 17 107/50 92 Room Air  


 


17 06:34     95   


 


17 04:10 98.4 62 18 134/66 93 Room Air  


 


17 02:34     95  3.00 


 


17 00:26 98.7 61 18 117/54 95 Nasal Cannula 4.00 


 


17 21:57     98  4.00 


 


17 21:00 98.2 68 20 140/68 98 Nasal Cannula 4.00 


 


17 20:00 98.0 81 19 142/65 97 Nasal Cannula 4.00 


 


17 19:24  59      


 


17 19:02     97  4.00 


 


17 19:00 98.0 64 20 138/70 97 Nasal Cannula 4.00 


 


17 18:00 97.5 65 20 155/67 99 Nasal Cannula 4.00 


 


17 14:20 98.0 81 18 131/81 98 Room Air  














I & O 


 


 17





 07:00


 


Intake Total 1250 ml


 


Output Total 200 ml


 


Balance 1050 ml





Capillary Refill :


General Appearance:  No Apparent Distress


HEENT:  PERRL/EOMI


Neck:  Full Range of Motion


Respiratory:  Normal Breath Sounds


Cardiovascular:  Regular Rate, Rhythm


Gastrointestinal:  soft, other (wound draining SS, no redness erythema)


Extremity:  Normal Capillary Refill


Neurologic/Psychiatric:  Alert, Oriented x3


Skin:  Normal Color


Lymphatic:  No Adenopathy





Results


Lab


Laboratory Tests


17 05:58: 


White Blood Count 12.0H, Red Blood Count 3.64L, Hemoglobin 11.3L, Hematocrit 34L

, Mean Corpuscular Volume 93, Mean Corpuscular Hemoglobin 31, Mean Corpuscular 

Hemoglobin Concent 34, Red Cell Distribution Width 14.1, Platelet Count 192, 

Mean Platelet Volume 11.5H, Sodium Level 138, Potassium Level 5.2H, Chloride 

Level 107, Carbon Dioxide Level 21, Anion Gap 10, Blood Urea Nitrogen 29H, 

Creatinine 1.56H, Estimat Glomerular Filtration Rate 44, BUN/Creatinine Ratio 19

, Glucose Level 114H, Calcium Level 8.9





Assessment/Plan


Assessment/Plan


Assess & Plan/Chief Complaint


s/p reversal loop ileostomy for severe chronic diverticulitis.


clear liquid diet. 


increase ambulation. 


resume home meds.





Clinical Quality Measures


DVT/VTE Risk/Contraindication:


Risk Factor Score Per Nursin


RFS Level Per Nursing on Admit:  4+=Very High











DAIJA HAWK MD 2017 12:46

## 2017-04-14 NOTE — OPERATIVE REPORT
PROCEDURE PHYSICIAN:   DAIJA HAWK

 

DATE OF PROCEDURE:  

04/13/2017

 

PREOPERATIVE DIAGNOSIS: 

Complicated diverticulitis. 

 

POSTOPERATIVE DIAGNOSIS: 

Complicated diverticulitis. 

 

PROCEDURE:

Open reversal of loop ileostomy. 

 

SURGEON:

Dr. Hawk. 

 

ASSISTANT:

NOELLE Howell. 

 

ANESTHESIA:

General endotracheal. 

 

ESTIMATED BLOOD LOSS:

Minimal. 

 

FINDINGS:

Intact small bowel and well perfused. 

 

DISPOSITION:

The patient tolerated the procedure well. 

 

Mr. Moshe Machado is a 76-year-old male with a history of severe

sigmoid diverticulosis and diverticulitis. He has had several

colonoscopies done in the past. The first and was in 2008 where a

benign polyp was identified and removed. He was also found to

have significant diverticulosis. He underwent another colonoscopy

in 2013 and again diverticulosis identified. He also does report

a family history of colon cancer with his father having the

disease. He has had over 10 episodes of diverticulitis with 2

requiring hospitalizations and extended IV antibiotics. A CT scan

was performed in, September 2016 which did show significant signs

of chronic inflammatory changes of the sigmoid colon and

descending colon. He had another colonoscopy performed, which

again showed the diverticulosis and no polyps identified. This

gentleman also does have other medical comorbidities including

coronary artery disease, hypertension, hypercholesterolemia, as

well as previous cardiac catheterization and stent placement. 

 

On 02/23/2017 he underwent laparoscopic low anterior sigmoid

colon resection, as well as laparoscopic umbilical hernia repair

with diverting loop ileostomy placement. The patient tolerated

procedure well and was discharged later that admission. He has

done well on postoperative visits in the office. He is tolerating

a regular diet and having normal ileostomy output and has not

lost any weight. He is here for reversal of his ileostomy. 

 

The patient was brought to the operating room, laid supine on the

table. After adequate IV pain and sedative medications and

general endotracheal intubation skin of the ostomy was closed

transversely in reference to directionality of the two lumens of

the small bowel. An 0 silk running suture was applied to do this.

The abdomen was prepped and draped in standard surgical fashion.

 

 

0.5% Marcaine with epinephrine was then used to anesthetize the

overlying skin. An area of skin around the suture line was then

opened using a 10 blade. Subcutaneous tissue was then dissected

down using electrocautery. The two ends of the small bowel were

identified and completely dissected out until the fascia was

identified. At this junction, the fascia was opened using

Metzenbaum scissors. We then proceeded with circumferential

dissection of the small bowel lumens under direct visualization

using electrocautery as well as Metzenbaum scissors with

visualization of good hemostasis and no injury to the small

bowel. Adequate length of small bowel was able to be eviscerated

out of the incision. The skin and suture line were then resected

using electrocautery. We then proceeded with seromuscular sutures

were then placed over the staple end. Good hemostasis was

observed. These lumen were patent as well. The small bowel was

then reduced back into the peritoneal cavity. The fascia was then

closed transversely using number 1 PDS loop suture. The wound was

then copiously irrigated. A few interrupted 3-0 nylon sutures

were used to very loosely approximate the skin edges and allow

for granulation of the rest of the subcutaneous tissue as well as

skin over time and with proper drainage. For now we will proceed

with gauze dressing on a b.i.d. as well as p.r.n. basis. 

 

The patient tolerated the procedure well. We will admit him to

the floor and await bowel function. We will proceed with DVT

prophylaxis with calf SCDs, early ambulation, as well as Lovenox

injections. We will also await bowel function and once he does

have functional we will start a clear liquid diet and advance as

tolerated. Once he is tolerating a diet, has good pain control

with oral pain medication and continues to ambulate well, we will

discharge him home.

 

 

 

Job ID: [id number]

Dictated Date: 04/13/2017 16:53:30 

Transcription Date: 04/14/2017 10:03:54 / kat

## 2017-04-15 VITALS — SYSTOLIC BLOOD PRESSURE: 107 MMHG | DIASTOLIC BLOOD PRESSURE: 56 MMHG

## 2017-04-15 VITALS — SYSTOLIC BLOOD PRESSURE: 122 MMHG | DIASTOLIC BLOOD PRESSURE: 57 MMHG

## 2017-04-15 VITALS — SYSTOLIC BLOOD PRESSURE: 116 MMHG | DIASTOLIC BLOOD PRESSURE: 62 MMHG

## 2017-04-15 VITALS — DIASTOLIC BLOOD PRESSURE: 53 MMHG | SYSTOLIC BLOOD PRESSURE: 105 MMHG

## 2017-04-15 VITALS — DIASTOLIC BLOOD PRESSURE: 58 MMHG | SYSTOLIC BLOOD PRESSURE: 109 MMHG

## 2017-04-15 VITALS — DIASTOLIC BLOOD PRESSURE: 62 MMHG | SYSTOLIC BLOOD PRESSURE: 120 MMHG

## 2017-04-15 RX ADMIN — METOCLOPRAMIDE SCH MG: 5 INJECTION, SOLUTION INTRAMUSCULAR; INTRAVENOUS at 17:48

## 2017-04-15 RX ADMIN — ROSUVASTATIN CALCIUM SCH MG: 5 TABLET, FILM COATED ORAL at 20:45

## 2017-04-15 RX ADMIN — FENTANYL CITRATE PRN MCG: 50 INJECTION, SOLUTION INTRAMUSCULAR; INTRAVENOUS at 05:52

## 2017-04-15 RX ADMIN — METOPROLOL TARTRATE SCH MG: 1 INJECTION, SOLUTION INTRAVENOUS at 07:39

## 2017-04-15 RX ADMIN — METOPROLOL TARTRATE SCH MG: 1 INJECTION, SOLUTION INTRAVENOUS at 00:26

## 2017-04-15 RX ADMIN — METOPROLOL TARTRATE SCH MG: 1 INJECTION, SOLUTION INTRAVENOUS at 20:06

## 2017-04-15 RX ADMIN — METOPROLOL TARTRATE SCH MG: 1 INJECTION, SOLUTION INTRAVENOUS at 04:00

## 2017-04-15 RX ADMIN — FENTANYL CITRATE PRN MCG: 50 INJECTION, SOLUTION INTRAMUSCULAR; INTRAVENOUS at 23:18

## 2017-04-15 RX ADMIN — HYDROMORPHONE HYDROCHLORIDE PRN MG: 2 INJECTION, SOLUTION INTRAMUSCULAR; INTRAVENOUS; SUBCUTANEOUS at 07:35

## 2017-04-15 RX ADMIN — FENTANYL CITRATE PRN MCG: 50 INJECTION, SOLUTION INTRAMUSCULAR; INTRAVENOUS at 02:44

## 2017-04-15 RX ADMIN — OMEGA-3 FATTY ACIDS CAP 1000 MG SCH MG: 1000 CAP at 17:00

## 2017-04-15 RX ADMIN — OXYCODONE HYDROCHLORIDE AND ACETAMINOPHEN SCH MG: 500 TABLET ORAL at 06:11

## 2017-04-15 RX ADMIN — FENTANYL CITRATE PRN MCG: 50 INJECTION, SOLUTION INTRAMUSCULAR; INTRAVENOUS at 16:00

## 2017-04-15 RX ADMIN — FENOFIBRATE SCH MG: 134 CAPSULE ORAL at 20:45

## 2017-04-15 RX ADMIN — METOPROLOL TARTRATE SCH MG: 1 INJECTION, SOLUTION INTRAVENOUS at 16:00

## 2017-04-15 RX ADMIN — LISINOPRIL SCH MG: 20 TABLET ORAL at 09:16

## 2017-04-15 RX ADMIN — PANTOPRAZOLE SODIUM SCH MG: 40 INJECTION, POWDER, FOR SOLUTION INTRAVENOUS at 07:43

## 2017-04-15 RX ADMIN — HYDROCODONE BITARTRATE AND ACETAMINOPHEN PRN EA: 7.5; 325 TABLET ORAL at 13:10

## 2017-04-15 RX ADMIN — CLOPIDOGREL BISULFATE SCH MG: 75 TABLET, FILM COATED ORAL at 09:15

## 2017-04-15 RX ADMIN — Medication SCH EA: at 06:11

## 2017-04-15 RX ADMIN — LORATADINE SCH MG: 10 TABLET ORAL at 09:14

## 2017-04-15 RX ADMIN — ALBUTEROL SULFATE SCH MG: 2.5 SOLUTION RESPIRATORY (INHALATION) at 10:56

## 2017-04-15 RX ADMIN — Medication SCH MG: at 06:11

## 2017-04-15 RX ADMIN — ALFUZOSIN HYDROCHLORIDE SCH MG: 10 TABLET, EXTENDED RELEASE ORAL at 17:48

## 2017-04-15 RX ADMIN — METOPROLOL TARTRATE SCH MG: 1 INJECTION, SOLUTION INTRAVENOUS at 12:00

## 2017-04-15 RX ADMIN — ALBUTEROL SULFATE SCH MG: 2.5 SOLUTION RESPIRATORY (INHALATION) at 18:36

## 2017-04-15 RX ADMIN — ALBUTEROL SULFATE SCH MG: 2.5 SOLUTION RESPIRATORY (INHALATION) at 02:36

## 2017-04-15 RX ADMIN — ALBUTEROL SULFATE SCH MG: 2.5 SOLUTION RESPIRATORY (INHALATION) at 07:13

## 2017-04-15 RX ADMIN — FENTANYL CITRATE PRN MCG: 50 INJECTION, SOLUTION INTRAMUSCULAR; INTRAVENOUS at 20:06

## 2017-04-15 RX ADMIN — ENOXAPARIN SODIUM SCH MG: 30 INJECTION SUBCUTANEOUS at 20:45

## 2017-04-15 RX ADMIN — DOCUSATE SODIUM PRN MG: 100 CAPSULE ORAL at 09:19

## 2017-04-15 RX ADMIN — OMEGA-3 FATTY ACIDS CAP 1000 MG SCH MG: 1000 CAP at 06:11

## 2017-04-15 RX ADMIN — SODIUM CHLORIDE, SODIUM LACTATE, POTASSIUM CHLORIDE, AND CALCIUM CHLORIDE SCH MLS/HR: 600; 310; 30; 20 INJECTION, SOLUTION INTRAVENOUS at 17:48

## 2017-04-15 RX ADMIN — ALBUTEROL SULFATE SCH MG: 2.5 SOLUTION RESPIRATORY (INHALATION) at 22:33

## 2017-04-15 RX ADMIN — HYDROCHLOROTHIAZIDE SCH MG: 25 TABLET ORAL at 09:16

## 2017-04-15 RX ADMIN — ALBUTEROL SULFATE SCH MG: 2.5 SOLUTION RESPIRATORY (INHALATION) at 15:23

## 2017-04-15 RX ADMIN — ASPIRIN SCH MG: 81 TABLET ORAL at 09:15

## 2017-04-15 RX ADMIN — HYDROCODONE BITARTRATE AND ACETAMINOPHEN PRN EA: 7.5; 325 TABLET ORAL at 09:17

## 2017-04-15 RX ADMIN — FINASTERIDE SCH MG: 5 TABLET ORAL at 09:15

## 2017-04-15 RX ADMIN — CALCIUM POLYCARBOPHIL SCH MG: 625 TABLET, FILM COATED ORAL at 09:15

## 2017-04-15 RX ADMIN — ENOXAPARIN SODIUM SCH MG: 30 INJECTION SUBCUTANEOUS at 09:17

## 2017-04-15 NOTE — PROGRESS NOTE-STANDARD
Standard Progress Note


Progress Notes/Assess & Plan


Progress/Assessment & Plan


4 /15/17:erythema around the ileostomy incision.  Slight abdominal distention.  

Has not passed flatus.  Poor oxygen saturation requiring supplemental nasal 

oxygen.  Encouraged using incentive spirometry.


Final Diagnosis


diverticular disease, reversal of ileostomy











AMINTA HAMMOND MD Apr 15, 2017 12:36 pm

## 2017-04-15 NOTE — PROGRESS NOTE-HOSPITALIST
Subjective


HPI/CC On Admission


CC: Medical management following reversal ileostomy





HPI: This is a 76-year-old white male clinic patient of [facility] with past medical 

history of severe and recurrent diverticulitis that underwent an uncomplicated 

sigmoid resection with diverting colostomy and has done well over the ensuing 

few months by Dr. Arita and has underwent an uncomplicated reversal ileostomy 

and is currently doing well without difficulties.  He states that the pain is 

controlled and overall having no significant issues since surgery.  He denies 

any nausea is not on any of his home medications due to ileus and has not 

passed any gas yet.


Date Seen


4/15/17


Subjective/Events-last exam


patient complains of incisional site pain.  He walked today without any 

shortness of breath but is had persistent nausea.  Clear liquids has not had 

any flatus.





Review of Systems


Gastrointestinal:  Abdominal Pain, Nausea





Objective


Exam


Vital Signs





Vital Sign - Last 12Hours








 4/13/17 4/13/17 4/13/17





 01:21 14:20 18:00


 


Temp  98.0 


 


Pulse 53  


 


Resp  18 


 


B/P (MAP)  131/81 


 


Pulse Ox  98 


 


O2 Delivery  Room Air 


 


O2 Flow Rate   4.00





Capillary Refill :


General Appearance:  No Apparent Distress, Obese


HEENT:  Normal ENT Inspection


Respiratory:  Lungs Clear, Normal Breath Sounds, No Accessory Muscle Use, 

Decreased Breath Sounds


Cardiovascular:  Regular Rate, Rhythm, No Gallop, No Murmur


Gastrointestinal:  Normal Bowel Sounds, Soft


Extremity:  Pedal Edema


Neurologic/Psychiatric:  Alert, Oriented x3, Normal Mood/Affect





Assessment/Plan


Assessment and Plan


Assess & Plan/Chief Complaint


Assessment:


Status post uncomplicated reversal ileostomy per Dr. Arita-day number 2,mild  

nausea, no flatus


Hypertension


Coronary artery disease managed by Dr. Mayfield


Chronic vertigo


Anxiety


Borderline hyperglycemia


Hyperlipidemia


Obstructive sleep apnea-has had persistent low O2 sats today will continue to 

monitor


Obesity


Severe diverticulosis with recurrent diverticulitis requiring the resection


Mild renal insufficiency-we'll recheck labs in the morning











GRIS SCHROEDER MD Apr 15, 2017 10:53

## 2017-04-16 VITALS — DIASTOLIC BLOOD PRESSURE: 65 MMHG | SYSTOLIC BLOOD PRESSURE: 145 MMHG

## 2017-04-16 VITALS — DIASTOLIC BLOOD PRESSURE: 57 MMHG | SYSTOLIC BLOOD PRESSURE: 141 MMHG

## 2017-04-16 VITALS — DIASTOLIC BLOOD PRESSURE: 72 MMHG | SYSTOLIC BLOOD PRESSURE: 147 MMHG

## 2017-04-16 VITALS — DIASTOLIC BLOOD PRESSURE: 62 MMHG | SYSTOLIC BLOOD PRESSURE: 152 MMHG

## 2017-04-16 VITALS — DIASTOLIC BLOOD PRESSURE: 55 MMHG | SYSTOLIC BLOOD PRESSURE: 115 MMHG

## 2017-04-16 VITALS — DIASTOLIC BLOOD PRESSURE: 55 MMHG | SYSTOLIC BLOOD PRESSURE: 144 MMHG

## 2017-04-16 VITALS — SYSTOLIC BLOOD PRESSURE: 159 MMHG | DIASTOLIC BLOOD PRESSURE: 65 MMHG

## 2017-04-16 LAB
ALBUMIN SERPL-MCNC: 3.6 G/DL (ref 3.2–4.5)
ALT SERPL-CCNC: 25 U/L (ref 0–55)
ANION GAP SERPL CALC-SCNC: 10 MMOL/L (ref 5–14)
AST SERPL-CCNC: 20 U/L (ref 5–34)
BILIRUB SERPL-MCNC: 0.5 MG/DL (ref 0.1–1)
BUN SERPL-MCNC: 15 MG/DL (ref 7–18)
BUN/CREAT SERPL: 12
CALCIUM SERPL-MCNC: 8.9 MG/DL (ref 8.5–10.1)
CHLORIDE SERPL-SCNC: 104 MMOL/L (ref 98–107)
CO2 SERPL-SCNC: 23 MMOL/L (ref 21–32)
CREAT SERPL-MCNC: 1.23 MG/DL (ref 0.6–1.3)
ERYTHROCYTE [DISTWIDTH] IN BLOOD BY AUTOMATED COUNT: 14.2 % (ref 10–14.5)
GFR SERPLBLD BASED ON 1.73 SQ M-ARVRAT: 57 ML/MIN
GLUCOSE SERPL-MCNC: 120 MG/DL (ref 70–105)
MCH RBC QN AUTO: 31 PG (ref 25–34)
MCHC RBC AUTO-ENTMCNC: 33 G/DL (ref 32–36)
MCV RBC AUTO: 93 FL (ref 80–99)
PLATELET # BLD: 154 10^3/UL (ref 130–400)
PMV BLD AUTO: 11.1 FL (ref 7.4–10.4)
POTASSIUM SERPL-SCNC: 4.2 MMOL/L (ref 3.6–5)
PROT SERPL-MCNC: 5.8 G/DL (ref 6.4–8.2)
RBC # BLD AUTO: 3.28 10^6/UL (ref 4.35–5.85)
SODIUM SERPL-SCNC: 137 MMOL/L (ref 135–145)
WBC # BLD AUTO: 6.7 10^3/UL (ref 4.3–11)

## 2017-04-16 RX ADMIN — ASPIRIN SCH MG: 81 TABLET ORAL at 09:54

## 2017-04-16 RX ADMIN — ROSUVASTATIN CALCIUM SCH MG: 5 TABLET, FILM COATED ORAL at 20:33

## 2017-04-16 RX ADMIN — ALBUTEROL SULFATE SCH MG: 2.5 SOLUTION RESPIRATORY (INHALATION) at 14:27

## 2017-04-16 RX ADMIN — HYDROCODONE BITARTRATE AND ACETAMINOPHEN PRN EA: 7.5; 325 TABLET ORAL at 19:44

## 2017-04-16 RX ADMIN — METOPROLOL TARTRATE SCH MG: 1 INJECTION, SOLUTION INTRAVENOUS at 12:00

## 2017-04-16 RX ADMIN — ALPRAZOLAM PRN MG: 0.5 TABLET ORAL at 20:33

## 2017-04-16 RX ADMIN — METOCLOPRAMIDE PRN MG: 5 INJECTION, SOLUTION INTRAMUSCULAR; INTRAVENOUS at 03:26

## 2017-04-16 RX ADMIN — SODIUM CHLORIDE, SODIUM LACTATE, POTASSIUM CHLORIDE, AND CALCIUM CHLORIDE SCH MLS/HR: 600; 310; 30; 20 INJECTION, SOLUTION INTRAVENOUS at 03:23

## 2017-04-16 RX ADMIN — FENTANYL CITRATE PRN MCG: 50 INJECTION, SOLUTION INTRAMUSCULAR; INTRAVENOUS at 20:34

## 2017-04-16 RX ADMIN — HYDROCHLOROTHIAZIDE SCH MG: 25 TABLET ORAL at 09:54

## 2017-04-16 RX ADMIN — ALBUTEROL SULFATE SCH MG: 2.5 SOLUTION RESPIRATORY (INHALATION) at 02:31

## 2017-04-16 RX ADMIN — OMEGA-3 FATTY ACIDS CAP 1000 MG SCH MG: 1000 CAP at 05:59

## 2017-04-16 RX ADMIN — SODIUM CHLORIDE, SODIUM LACTATE, POTASSIUM CHLORIDE, AND CALCIUM CHLORIDE SCH MLS/HR: 600; 310; 30; 20 INJECTION, SOLUTION INTRAVENOUS at 22:23

## 2017-04-16 RX ADMIN — ALBUTEROL SULFATE SCH MG: 2.5 SOLUTION RESPIRATORY (INHALATION) at 10:37

## 2017-04-16 RX ADMIN — METOCLOPRAMIDE SCH MG: 5 INJECTION, SOLUTION INTRAMUSCULAR; INTRAVENOUS at 12:23

## 2017-04-16 RX ADMIN — ALBUTEROL SULFATE SCH MG: 2.5 SOLUTION RESPIRATORY (INHALATION) at 07:05

## 2017-04-16 RX ADMIN — FINASTERIDE SCH MG: 5 TABLET ORAL at 09:54

## 2017-04-16 RX ADMIN — METOCLOPRAMIDE PRN MG: 5 INJECTION, SOLUTION INTRAMUSCULAR; INTRAVENOUS at 10:43

## 2017-04-16 RX ADMIN — ENOXAPARIN SODIUM SCH MG: 30 INJECTION SUBCUTANEOUS at 08:13

## 2017-04-16 RX ADMIN — METOCLOPRAMIDE SCH MG: 5 INJECTION, SOLUTION INTRAMUSCULAR; INTRAVENOUS at 05:59

## 2017-04-16 RX ADMIN — SODIUM CHLORIDE, SODIUM LACTATE, POTASSIUM CHLORIDE, AND CALCIUM CHLORIDE SCH MLS/HR: 600; 310; 30; 20 INJECTION, SOLUTION INTRAVENOUS at 12:23

## 2017-04-16 RX ADMIN — LISINOPRIL SCH MG: 20 TABLET ORAL at 09:54

## 2017-04-16 RX ADMIN — FENOFIBRATE SCH MG: 134 CAPSULE ORAL at 20:33

## 2017-04-16 RX ADMIN — METOPROLOL TARTRATE SCH MG: 1 INJECTION, SOLUTION INTRAVENOUS at 08:00

## 2017-04-16 RX ADMIN — FENTANYL CITRATE PRN MCG: 50 INJECTION, SOLUTION INTRAMUSCULAR; INTRAVENOUS at 17:54

## 2017-04-16 RX ADMIN — LORATADINE SCH MG: 10 TABLET ORAL at 09:54

## 2017-04-16 RX ADMIN — CLOPIDOGREL BISULFATE SCH MG: 75 TABLET, FILM COATED ORAL at 09:54

## 2017-04-16 RX ADMIN — ALPRAZOLAM PRN MG: 0.5 TABLET ORAL at 09:54

## 2017-04-16 RX ADMIN — METOPROLOL TARTRATE SCH MG: 1 INJECTION, SOLUTION INTRAVENOUS at 19:45

## 2017-04-16 RX ADMIN — Medication SCH EA: at 06:00

## 2017-04-16 RX ADMIN — METOPROLOL TARTRATE SCH MG: 1 INJECTION, SOLUTION INTRAVENOUS at 00:05

## 2017-04-16 RX ADMIN — ALFUZOSIN HYDROCHLORIDE SCH MG: 10 TABLET, EXTENDED RELEASE ORAL at 17:29

## 2017-04-16 RX ADMIN — OMEGA-3 FATTY ACIDS CAP 1000 MG SCH MG: 1000 CAP at 17:29

## 2017-04-16 RX ADMIN — ENOXAPARIN SODIUM SCH MG: 30 INJECTION SUBCUTANEOUS at 20:33

## 2017-04-16 RX ADMIN — PANTOPRAZOLE SODIUM SCH MG: 40 INJECTION, POWDER, FOR SOLUTION INTRAVENOUS at 08:11

## 2017-04-16 RX ADMIN — HYDROCODONE BITARTRATE AND ACETAMINOPHEN PRN EA: 7.5; 325 TABLET ORAL at 14:54

## 2017-04-16 RX ADMIN — OXYCODONE HYDROCHLORIDE AND ACETAMINOPHEN SCH MG: 500 TABLET ORAL at 06:00

## 2017-04-16 RX ADMIN — ALBUTEROL SULFATE SCH MG: 2.5 SOLUTION RESPIRATORY (INHALATION) at 22:03

## 2017-04-16 RX ADMIN — HYDROCODONE BITARTRATE AND ACETAMINOPHEN PRN EA: 7.5; 325 TABLET ORAL at 00:05

## 2017-04-16 RX ADMIN — METOPROLOL TARTRATE SCH MG: 1 INJECTION, SOLUTION INTRAVENOUS at 03:58

## 2017-04-16 RX ADMIN — Medication SCH MG: at 06:00

## 2017-04-16 RX ADMIN — FENTANYL CITRATE PRN MCG: 50 INJECTION, SOLUTION INTRAMUSCULAR; INTRAVENOUS at 23:53

## 2017-04-16 RX ADMIN — ALBUTEROL SULFATE SCH MG: 2.5 SOLUTION RESPIRATORY (INHALATION) at 18:47

## 2017-04-16 RX ADMIN — METOCLOPRAMIDE SCH MG: 5 INJECTION, SOLUTION INTRAMUSCULAR; INTRAVENOUS at 17:29

## 2017-04-16 RX ADMIN — METOPROLOL TARTRATE SCH MG: 1 INJECTION, SOLUTION INTRAVENOUS at 23:47

## 2017-04-16 RX ADMIN — FENTANYL CITRATE PRN MCG: 50 INJECTION, SOLUTION INTRAMUSCULAR; INTRAVENOUS at 14:52

## 2017-04-16 RX ADMIN — CALCIUM POLYCARBOPHIL SCH MG: 625 TABLET, FILM COATED ORAL at 09:54

## 2017-04-16 RX ADMIN — METOPROLOL TARTRATE SCH MG: 1 INJECTION, SOLUTION INTRAVENOUS at 15:57

## 2017-04-16 RX ADMIN — FENTANYL CITRATE PRN MCG: 50 INJECTION, SOLUTION INTRAMUSCULAR; INTRAVENOUS at 10:43

## 2017-04-16 RX ADMIN — METOCLOPRAMIDE SCH MG: 5 INJECTION, SOLUTION INTRAMUSCULAR; INTRAVENOUS at 00:05

## 2017-04-16 RX ADMIN — HYDROCODONE BITARTRATE AND ACETAMINOPHEN PRN EA: 7.5; 325 TABLET ORAL at 10:43

## 2017-04-16 RX ADMIN — DOCUSATE SODIUM PRN MG: 100 CAPSULE ORAL at 09:54

## 2017-04-16 NOTE — PROGRESS NOTE-HOSPITALIST
Subjective


HPI/CC On Admission


CC: Medical management following reversal ileostomy





HPI: This is a 76-year-old white male clinic patient of [facility] with past medical 

history of severe and recurrent diverticulitis that underwent an uncomplicated 

sigmoid resection with diverting colostomy and has done well over the ensuing 

few months by Dr. Arita and has underwent an uncomplicated reversal ileostomy 

and is currently doing well without difficulties.  He states that the pain is 

controlled and overall having no significant issues since surgery.  He denies 

any nausea is not on any of his home medications due to ileus and has not 

passed any gas yet.


Date Seen


4/16/17


Subjective/Events-last exam


patient developed an ileus overnight.  He did pass some flatus last night and 

has felt a bit better but he continues to have hypoactive bowel sounds.





Review of Systems


Gastrointestinal:  Abdominal Pain





Objective


Exam


Vital Signs





Vital Sign - Last 12Hours








 4/13/17 4/13/17 4/13/17





 01:21 14:20 18:00


 


Temp  98.0 


 


Pulse 53  


 


Resp  18 


 


B/P (MAP)  131/81 


 


Pulse Ox  98 


 


O2 Delivery  Room Air 


 


O2 Flow Rate   4.00





Capillary Refill :


General Appearance:  Mild Distress


HEENT:  Normal ENT Inspection


Respiratory:  Lungs Clear, Decreased Breath Sounds


Cardiovascular:  Regular Rate, Rhythm, No Gallop


Gastrointestinal:  Abnormal Bowel Sounds, Distended, Tenderness


Rectal:  Deferred


Extremity:  No Calf Tenderness


Neurologic/Psychiatric:  Alert, Oriented x3, No Motor/Sensory Deficits, Normal 

Mood/Affect





Results/Procedures


Lab


Laboratory Tests


4/16/17 04:26














Assessment/Plan


Assessment and Plan


Assess & Plan/Chief Complaint


Assessment:


Status post uncomplicated reversal ileostomy per Dr. Arita-day number 3, 

interval development of an ileus


Hypertension


Coronary artery disease managed by Dr. Mayfield


Chronic vertigo


Anxiety


Borderline hyperglycemia


Hyperlipidemia


Obstructive sleep apnea-has had persistent low O2 sats today will continue to 

monitor


Obesity


Severe diverticulosis with recurrent diverticulitis requiring the resection


Mild renal insufficiency-we'll recheck labs in the morning-GRIS Flanagan MD Apr 16, 2017 12:12

## 2017-04-16 NOTE — PROGRESS NOTE-STANDARD
Standard Progress Note


Progress Notes/Assess & Plan


Progress/Assessment & Plan


4 /15/17:erythema around the ileostomy incision.  Slight abdominal distention.  

Has not passed flatus.  Poor oxygen saturation requiring supplemental nasal 

oxygen.  Encouraged using incentive spirometry.


4/16/17:setback last night with the nausea and more distention.  Currently 

improved.  Passed flatus.  We'll continue IV fluids for now


Final Diagnosis


diverticular disease.  Reversal of colostomy.  Postoperative ileus











AMINTA HAMMOND MD Apr 16, 2017 11:37 am

## 2017-04-17 VITALS — SYSTOLIC BLOOD PRESSURE: 127 MMHG | DIASTOLIC BLOOD PRESSURE: 57 MMHG

## 2017-04-17 VITALS — SYSTOLIC BLOOD PRESSURE: 147 MMHG | DIASTOLIC BLOOD PRESSURE: 63 MMHG

## 2017-04-17 VITALS — DIASTOLIC BLOOD PRESSURE: 67 MMHG | SYSTOLIC BLOOD PRESSURE: 138 MMHG

## 2017-04-17 VITALS — SYSTOLIC BLOOD PRESSURE: 119 MMHG | DIASTOLIC BLOOD PRESSURE: 50 MMHG

## 2017-04-17 VITALS — SYSTOLIC BLOOD PRESSURE: 127 MMHG | DIASTOLIC BLOOD PRESSURE: 56 MMHG

## 2017-04-17 LAB
ANION GAP SERPL CALC-SCNC: 10 MMOL/L (ref 5–14)
BUN SERPL-MCNC: 12 MG/DL (ref 7–18)
BUN/CREAT SERPL: 11
CALCIUM SERPL-MCNC: 8.7 MG/DL (ref 8.5–10.1)
CHLORIDE SERPL-SCNC: 101 MMOL/L (ref 98–107)
CO2 SERPL-SCNC: 27 MMOL/L (ref 21–32)
CREAT SERPL-MCNC: 1.07 MG/DL (ref 0.6–1.3)
ERYTHROCYTE [DISTWIDTH] IN BLOOD BY AUTOMATED COUNT: 14.2 % (ref 10–14.5)
GFR SERPLBLD BASED ON 1.73 SQ M-ARVRAT: > 60 ML/MIN
GLUCOSE SERPL-MCNC: 108 MG/DL (ref 70–105)
MCH RBC QN AUTO: 31 PG (ref 25–34)
MCHC RBC AUTO-ENTMCNC: 33 G/DL (ref 32–36)
MCV RBC AUTO: 93 FL (ref 80–99)
PLATELET # BLD: 156 10^3/UL (ref 130–400)
PMV BLD AUTO: 10.8 FL (ref 7.4–10.4)
POTASSIUM SERPL-SCNC: 3.9 MMOL/L (ref 3.6–5)
RBC # BLD AUTO: 3.15 10^6/UL (ref 4.35–5.85)
SODIUM SERPL-SCNC: 138 MMOL/L (ref 135–145)
WBC # BLD AUTO: 8.3 10^3/UL (ref 4.3–11)

## 2017-04-17 RX ADMIN — ALBUTEROL SULFATE SCH MG: 2.5 SOLUTION RESPIRATORY (INHALATION) at 07:27

## 2017-04-17 RX ADMIN — FENTANYL CITRATE PRN MCG: 50 INJECTION, SOLUTION INTRAMUSCULAR; INTRAVENOUS at 06:30

## 2017-04-17 RX ADMIN — ASPIRIN SCH MG: 81 TABLET ORAL at 08:32

## 2017-04-17 RX ADMIN — METOPROLOL TARTRATE SCH MG: 1 INJECTION, SOLUTION INTRAVENOUS at 17:07

## 2017-04-17 RX ADMIN — LORATADINE SCH MG: 10 TABLET ORAL at 08:32

## 2017-04-17 RX ADMIN — ALFUZOSIN HYDROCHLORIDE SCH MG: 10 TABLET, EXTENDED RELEASE ORAL at 18:06

## 2017-04-17 RX ADMIN — ALBUTEROL SULFATE SCH MG: 2.5 SOLUTION RESPIRATORY (INHALATION) at 18:27

## 2017-04-17 RX ADMIN — ALBUTEROL SULFATE SCH MG: 2.5 SOLUTION RESPIRATORY (INHALATION) at 22:27

## 2017-04-17 RX ADMIN — PANTOPRAZOLE SODIUM SCH MG: 40 INJECTION, POWDER, FOR SOLUTION INTRAVENOUS at 08:31

## 2017-04-17 RX ADMIN — FENTANYL CITRATE PRN MCG: 50 INJECTION, SOLUTION INTRAMUSCULAR; INTRAVENOUS at 20:08

## 2017-04-17 RX ADMIN — HYDROCHLOROTHIAZIDE SCH MG: 25 TABLET ORAL at 08:32

## 2017-04-17 RX ADMIN — ENOXAPARIN SODIUM SCH MG: 30 INJECTION SUBCUTANEOUS at 08:32

## 2017-04-17 RX ADMIN — METOCLOPRAMIDE SCH MG: 5 INJECTION, SOLUTION INTRAMUSCULAR; INTRAVENOUS at 18:05

## 2017-04-17 RX ADMIN — CALCIUM POLYCARBOPHIL SCH MG: 625 TABLET, FILM COATED ORAL at 08:32

## 2017-04-17 RX ADMIN — HYDROCODONE BITARTRATE AND ACETAMINOPHEN PRN EA: 7.5; 325 TABLET ORAL at 04:13

## 2017-04-17 RX ADMIN — METOCLOPRAMIDE SCH MG: 5 INJECTION, SOLUTION INTRAMUSCULAR; INTRAVENOUS at 00:01

## 2017-04-17 RX ADMIN — FENOFIBRATE SCH MG: 134 CAPSULE ORAL at 20:02

## 2017-04-17 RX ADMIN — FENTANYL CITRATE PRN MCG: 50 INJECTION, SOLUTION INTRAMUSCULAR; INTRAVENOUS at 09:42

## 2017-04-17 RX ADMIN — LISINOPRIL SCH MG: 20 TABLET ORAL at 08:32

## 2017-04-17 RX ADMIN — FENTANYL CITRATE PRN MCG: 50 INJECTION, SOLUTION INTRAMUSCULAR; INTRAVENOUS at 14:26

## 2017-04-17 RX ADMIN — Medication SCH MG: at 06:24

## 2017-04-17 RX ADMIN — ALBUTEROL SULFATE SCH MG: 2.5 SOLUTION RESPIRATORY (INHALATION) at 02:33

## 2017-04-17 RX ADMIN — METOPROLOL TARTRATE SCH MG: 1 INJECTION, SOLUTION INTRAVENOUS at 20:02

## 2017-04-17 RX ADMIN — METOCLOPRAMIDE SCH MG: 5 INJECTION, SOLUTION INTRAMUSCULAR; INTRAVENOUS at 06:23

## 2017-04-17 RX ADMIN — OMEGA-3 FATTY ACIDS CAP 1000 MG SCH MG: 1000 CAP at 17:10

## 2017-04-17 RX ADMIN — OMEGA-3 FATTY ACIDS CAP 1000 MG SCH MG: 1000 CAP at 06:24

## 2017-04-17 RX ADMIN — HYDROCODONE BITARTRATE AND ACETAMINOPHEN PRN EA: 7.5; 325 TABLET ORAL at 17:07

## 2017-04-17 RX ADMIN — SODIUM CHLORIDE, SODIUM LACTATE, POTASSIUM CHLORIDE, AND CALCIUM CHLORIDE SCH MLS/HR: 600; 310; 30; 20 INJECTION, SOLUTION INTRAVENOUS at 18:06

## 2017-04-17 RX ADMIN — ENOXAPARIN SODIUM SCH MG: 30 INJECTION SUBCUTANEOUS at 20:01

## 2017-04-17 RX ADMIN — CLOPIDOGREL BISULFATE SCH MG: 75 TABLET, FILM COATED ORAL at 08:32

## 2017-04-17 RX ADMIN — HYDROCODONE BITARTRATE AND ACETAMINOPHEN PRN EA: 7.5; 325 TABLET ORAL at 12:31

## 2017-04-17 RX ADMIN — FINASTERIDE SCH MG: 5 TABLET ORAL at 08:32

## 2017-04-17 RX ADMIN — ROSUVASTATIN CALCIUM SCH MG: 5 TABLET, FILM COATED ORAL at 20:01

## 2017-04-17 RX ADMIN — ALBUTEROL SULFATE SCH MG: 2.5 SOLUTION RESPIRATORY (INHALATION) at 11:13

## 2017-04-17 RX ADMIN — OXYCODONE HYDROCHLORIDE AND ACETAMINOPHEN SCH MG: 500 TABLET ORAL at 06:24

## 2017-04-17 RX ADMIN — SODIUM CHLORIDE, SODIUM LACTATE, POTASSIUM CHLORIDE, AND CALCIUM CHLORIDE SCH MLS/HR: 600; 310; 30; 20 INJECTION, SOLUTION INTRAVENOUS at 08:28

## 2017-04-17 RX ADMIN — ALBUTEROL SULFATE SCH MG: 2.5 SOLUTION RESPIRATORY (INHALATION) at 15:24

## 2017-04-17 RX ADMIN — METOCLOPRAMIDE SCH MG: 5 INJECTION, SOLUTION INTRAMUSCULAR; INTRAVENOUS at 12:31

## 2017-04-17 RX ADMIN — HYDROCODONE BITARTRATE AND ACETAMINOPHEN PRN EA: 7.5; 325 TABLET ORAL at 08:31

## 2017-04-17 RX ADMIN — METOPROLOL TARTRATE SCH MG: 1 INJECTION, SOLUTION INTRAVENOUS at 08:46

## 2017-04-17 RX ADMIN — Medication SCH EA: at 06:23

## 2017-04-17 RX ADMIN — FENTANYL CITRATE PRN MCG: 50 INJECTION, SOLUTION INTRAMUSCULAR; INTRAVENOUS at 04:12

## 2017-04-17 RX ADMIN — METOPROLOL TARTRATE SCH MG: 1 INJECTION, SOLUTION INTRAVENOUS at 12:00

## 2017-04-17 RX ADMIN — METOPROLOL TARTRATE SCH MG: 1 INJECTION, SOLUTION INTRAVENOUS at 04:00

## 2017-04-17 NOTE — PROGRESS NOTE-HOSPITALIST
Progress Note


HPI/CC on Admission


CC: Medical management following reversal ileostomy





HPI: This is a 76-year-old white male clinic patient of [facility] with past medical 

history of severe and recurrent diverticulitis that underwent an uncomplicated 

sigmoid resection with diverting colostomy and has done well over the ensuing 

few months by Dr. Arita and has underwent an uncomplicated reversal ileostomy 

and is currently doing well without difficulties.  He states that the pain is 

controlled and overall having no significant issues since surgery.  He denies 

any nausea is not on any of his home medications due to ileus and has not 

passed any gas yet.





Progress Notes/Assess & Plan


Date Seen


4/17/17


Admission Dx/Process


Assessment:


Status post uncomplicated reversal ileostomy per Dr. Arita


Hypertension


Coronary artery disease managed by Dr. Mayfield


Chronic vertigo


Anxiety


Borderline hyperglycemia


Hyperlipidemia


Obstructive sleep apnea


Obesity


Severe diverticulosis with recurrent diverticulitis requiring the resection


Mild renal insufficiency


Diagonsis/Assessment & Plan


Patient still dealing with ileus


No vomiting


Ambulating well


Hypoxia just like last time may require home O2 evaluation at discharge


Dr. Arita saw the patient and encouraged him to continue ambulating





No fever, vital signs stable, pleasant


Regular rate and rhythm, clear to auscultation bilaterally


No edema








Assessment:


Status post uncomplicated reversal ileostomy per Dr. Arita POD # 4


Hypertension


Coronary artery disease managed by Dr. Mayfield


Chronic vertigo


Anxiety


Borderline hyperglycemia


Hyperlipidemia


Obstructive sleep apnea


Obesity


Severe diverticulosis with recurrent diverticulitis requiring the resection


Mild renal insufficiency





Plan:


Ambulate


Pain control


Ileus management











MICHOACANO DUNBAR DO Apr 17, 2017 10:08

## 2017-04-17 NOTE — PROGRESS NOTE (SOAP)
Subjective


Subjective/Events-last exam


doing better today. less abd distention. tolerating sips of clears. no nausea/

vomiting. small amount flatus.





Objective


Exam





Vital Signs








  Date Time  Temp Pulse Resp B/P (MAP) Pulse Ox O2 Delivery O2 Flow Rate FiO2


 


17 07:29     92  2.00 


 


17 06:00 100.0       


 


17 05:02 100.6       


 


17 05:02 100.6       


 


17 04:25 101.6 85 20 127/57 90 Room Air  


 


17 04:13 101.6       


 


17 02:33     90  2.00 


 


17 01:00  68      


 


17 23:12 100.0 76 20 144/55 93 Nasal Cannula 2.00 


 


17 22:03     92  2.00 


 


17 20:00 100.2 86 20 145/65 94 Nasal Cannula 2.00 


 


17 18:57  70      


 


17 18:47     93  2.00 


 


17 16:00 98.6 77 20 141/57 94 Nasal Cannula 2.00 


 


17 14:27     95  2.00 


 


17 13:00  80      


 


17 12:00 98.9 104 18 115/55 94 Nasal Cannula 2.00 


 


17 10:37     92  2.00 














I & O 


 


 17





 07:00


 


Intake Total 2410 ml


 


Output Total 2000 ml


 


Balance 410 ml





Capillary Refill :


General Appearance:  No Apparent Distress


HEENT:  PERRL/EOMI


Neck:  Full Range of Motion


Respiratory:  Chest Non Tender, Normal Breath Sounds


Cardiovascular:  Regular Rate, Rhythm


Gastrointestinal:  soft, distended, other (wound clean, no redness/erythema)


Extremity:  Normal Capillary Refill


Neurologic/Psychiatric:  Alert, Oriented x3


Skin:  Normal Color





Results


Lab


Laboratory Tests


17 05:44: 


White Blood Count 8.3, Red Blood Count 3.15L, Hemoglobin 9.8L, Hematocrit 29L, 

Mean Corpuscular Volume 93, Mean Corpuscular Hemoglobin 31, Mean Corpuscular 

Hemoglobin Concent 33, Red Cell Distribution Width 14.2, Platelet Count 156, 

Mean Platelet Volume 10.8H, Sodium Level 138, Potassium Level 3.9, Chloride 

Level 101, Carbon Dioxide Level 27, Anion Gap 10, Blood Urea Nitrogen 12, 

Creatinine 1.07, Estimat Glomerular Filtration Rate > 60, BUN/Creatinine Ratio 

11, Glucose Level 108H, Calcium Level 8.7





Assessment/Plan


Assessment/Plan


Assess & Plan/Chief Complaint


s/p reversal loop ileostomy for severe chronic diverticulitis.


clear liquid diet. 


ileus, await more bowel fxn. ambulating well.





Clinical Quality Measures


DVT/VTE Risk/Contraindication:


Risk Factor Score Per Nursin


RFS Level Per Nursing on Admit:  4+=Very High











DAIJA HAWK MD 2017 9:34 am

## 2017-04-18 VITALS — SYSTOLIC BLOOD PRESSURE: 120 MMHG | DIASTOLIC BLOOD PRESSURE: 56 MMHG

## 2017-04-18 VITALS — DIASTOLIC BLOOD PRESSURE: 59 MMHG | SYSTOLIC BLOOD PRESSURE: 127 MMHG

## 2017-04-18 VITALS — DIASTOLIC BLOOD PRESSURE: 56 MMHG | SYSTOLIC BLOOD PRESSURE: 123 MMHG

## 2017-04-18 VITALS — SYSTOLIC BLOOD PRESSURE: 115 MMHG | DIASTOLIC BLOOD PRESSURE: 63 MMHG

## 2017-04-18 VITALS — DIASTOLIC BLOOD PRESSURE: 60 MMHG | SYSTOLIC BLOOD PRESSURE: 131 MMHG

## 2017-04-18 LAB
BILIRUB UR QL STRIP: NEGATIVE
KETONES UR QL STRIP: (no result)
LEUKOCYTE ESTERASE UR QL STRIP: (no result)
NITRITE UR QL STRIP: NEGATIVE
PH UR STRIP: 5 [PH] (ref 5–9)
PROT UR QL STRIP: (no result)
SP GR UR STRIP: 1.01 (ref 1.02–1.02)
SQUAMOUS #/AREA URNS HPF: (no result) /HPF
UROBILINOGEN UR-MCNC: NORMAL MG/DL
WBC #/AREA URNS HPF: (no result) /HPF

## 2017-04-18 RX ADMIN — ALBUTEROL SULFATE SCH MG: 2.5 SOLUTION RESPIRATORY (INHALATION) at 14:48

## 2017-04-18 RX ADMIN — OMEGA-3 FATTY ACIDS CAP 1000 MG SCH MG: 1000 CAP at 06:04

## 2017-04-18 RX ADMIN — HYDROCODONE BITARTRATE AND ACETAMINOPHEN PRN EA: 7.5; 325 TABLET ORAL at 10:41

## 2017-04-18 RX ADMIN — IBUPROFEN PRN MG: 600 TABLET ORAL at 13:20

## 2017-04-18 RX ADMIN — METOPROLOL TARTRATE SCH MG: 1 INJECTION, SOLUTION INTRAVENOUS at 13:24

## 2017-04-18 RX ADMIN — ALFUZOSIN HYDROCHLORIDE SCH MG: 10 TABLET, EXTENDED RELEASE ORAL at 16:45

## 2017-04-18 RX ADMIN — LORATADINE SCH MG: 10 TABLET ORAL at 10:28

## 2017-04-18 RX ADMIN — METOPROLOL TARTRATE SCH MG: 1 INJECTION, SOLUTION INTRAVENOUS at 00:16

## 2017-04-18 RX ADMIN — ALBUTEROL SULFATE SCH MG: 2.5 SOLUTION RESPIRATORY (INHALATION) at 02:20

## 2017-04-18 RX ADMIN — ALBUTEROL SULFATE SCH MG: 2.5 SOLUTION RESPIRATORY (INHALATION) at 07:31

## 2017-04-18 RX ADMIN — CLOPIDOGREL BISULFATE SCH MG: 75 TABLET, FILM COATED ORAL at 10:27

## 2017-04-18 RX ADMIN — LISINOPRIL SCH MG: 20 TABLET ORAL at 13:25

## 2017-04-18 RX ADMIN — HYDROCODONE BITARTRATE AND ACETAMINOPHEN PRN EA: 7.5; 325 TABLET ORAL at 00:19

## 2017-04-18 RX ADMIN — ROSUVASTATIN CALCIUM SCH MG: 5 TABLET, FILM COATED ORAL at 20:05

## 2017-04-18 RX ADMIN — FINASTERIDE SCH MG: 5 TABLET ORAL at 10:27

## 2017-04-18 RX ADMIN — OMEGA-3 FATTY ACIDS CAP 1000 MG SCH MG: 1000 CAP at 16:45

## 2017-04-18 RX ADMIN — CALCIUM POLYCARBOPHIL SCH MG: 625 TABLET, FILM COATED ORAL at 10:27

## 2017-04-18 RX ADMIN — ENOXAPARIN SODIUM SCH MG: 30 INJECTION SUBCUTANEOUS at 20:05

## 2017-04-18 RX ADMIN — FENOFIBRATE SCH MG: 134 CAPSULE ORAL at 20:05

## 2017-04-18 RX ADMIN — Medication SCH MG: at 06:04

## 2017-04-18 RX ADMIN — HYDROCODONE BITARTRATE AND ACETAMINOPHEN PRN EA: 7.5; 325 TABLET ORAL at 04:40

## 2017-04-18 RX ADMIN — ALBUTEROL SULFATE SCH MG: 2.5 SOLUTION RESPIRATORY (INHALATION) at 11:34

## 2017-04-18 RX ADMIN — METOCLOPRAMIDE SCH MG: 5 INJECTION, SOLUTION INTRAMUSCULAR; INTRAVENOUS at 00:16

## 2017-04-18 RX ADMIN — Medication SCH EA: at 06:04

## 2017-04-18 RX ADMIN — ALBUTEROL SULFATE SCH MG: 2.5 SOLUTION RESPIRATORY (INHALATION) at 22:52

## 2017-04-18 RX ADMIN — ENOXAPARIN SODIUM SCH MG: 30 INJECTION SUBCUTANEOUS at 10:28

## 2017-04-18 RX ADMIN — IBUPROFEN PRN MG: 600 TABLET ORAL at 18:10

## 2017-04-18 RX ADMIN — METOCLOPRAMIDE SCH MG: 5 INJECTION, SOLUTION INTRAMUSCULAR; INTRAVENOUS at 13:19

## 2017-04-18 RX ADMIN — METOCLOPRAMIDE SCH MG: 5 INJECTION, SOLUTION INTRAMUSCULAR; INTRAVENOUS at 06:04

## 2017-04-18 RX ADMIN — OXYCODONE HYDROCHLORIDE AND ACETAMINOPHEN SCH MG: 500 TABLET ORAL at 06:04

## 2017-04-18 RX ADMIN — PANTOPRAZOLE SODIUM SCH MG: 40 INJECTION, POWDER, FOR SOLUTION INTRAVENOUS at 10:27

## 2017-04-18 RX ADMIN — METOPROLOL TARTRATE SCH MG: 1 INJECTION, SOLUTION INTRAVENOUS at 04:09

## 2017-04-18 RX ADMIN — ALBUTEROL SULFATE SCH MG: 2.5 SOLUTION RESPIRATORY (INHALATION) at 19:34

## 2017-04-18 RX ADMIN — HYDROCHLOROTHIAZIDE SCH MG: 25 TABLET ORAL at 13:25

## 2017-04-18 RX ADMIN — ASPIRIN SCH MG: 81 TABLET ORAL at 09:30

## 2017-04-18 RX ADMIN — SODIUM CHLORIDE, SODIUM LACTATE, POTASSIUM CHLORIDE, AND CALCIUM CHLORIDE SCH MLS/HR: 600; 310; 30; 20 INJECTION, SOLUTION INTRAVENOUS at 03:41

## 2017-04-18 NOTE — PROGRESS NOTE-HOSPITALIST
Progress Note


HPI/CC on Admission


CC: Medical management following reversal ileostomy





HPI: This is a 76-year-old white male clinic patient of [facility] with past medical 

history of severe and recurrent diverticulitis that underwent an uncomplicated 

sigmoid resection with diverting colostomy and has done well over the ensuing 

few months by Dr. Ariat and has underwent an uncomplicated reversal ileostomy 

and is currently doing well without difficulties.  He states that the pain is 

controlled and overall having no significant issues since surgery.  He denies 

any nausea is not on any of his home medications due to ileus and has not 

passed any gas yet.





Progress Notes/Assess & Plan


Date Seen


4/18/17


Admission Dx/Process


Assessment:


Status post uncomplicated reversal ileostomy per Dr. Arita


Hypertension


Coronary artery disease managed by Dr. Mayfield


Chronic vertigo


Anxiety


Borderline hyperglycemia


Hyperlipidemia


Obstructive sleep apnea


Obesity


Severe diverticulosis with recurrent diverticulitis requiring the resection


Mild renal insufficiency


Diagonsis/Assessment & Plan


ileus resolved


Pain controlled


No vomiting


Ambulating well


Febrile and we discussed the fact that this could occur after extensive surgery


Once the urine checked to be sure it's not UTI which I doubt so will put in 

order


Home O2 eval since O2 sat continues to be borderline low needing supplement at 

discharge





No fever, vital signs stable, pleasant


Regular rate and rhythm, clear to auscultation bilaterally


No edema








Assessment:


Status post uncomplicated reversal ileostomy per Dr. Arita POD # 5


Hypertension


Coronary artery disease managed by Dr. Mayfield


Chronic vertigo


Anxiety


Borderline hyperglycemia


Hyperlipidemia


Obstructive sleep apnea


Obesity


Severe diverticulosis with recurrent diverticulitis requiring the resection


Mild renal insufficiency


Post op hypoxia





Plan:


Ambulate


Pain control


PO meds


home O2 eval











MICHOACANO DUNBAR DO Apr 18, 2017 10:50

## 2017-04-19 VITALS — DIASTOLIC BLOOD PRESSURE: 66 MMHG | SYSTOLIC BLOOD PRESSURE: 147 MMHG

## 2017-04-19 VITALS — DIASTOLIC BLOOD PRESSURE: 67 MMHG | SYSTOLIC BLOOD PRESSURE: 137 MMHG

## 2017-04-19 LAB
ALBUMIN SERPL-MCNC: 3.6 G/DL (ref 3.2–4.5)
ALT SERPL-CCNC: 20 U/L (ref 0–55)
ANION GAP SERPL CALC-SCNC: 11 MMOL/L (ref 5–14)
AST SERPL-CCNC: 19 U/L (ref 5–34)
BASOPHILS # BLD AUTO: 0 10^3/UL (ref 0–0.1)
BASOPHILS NFR BLD AUTO: 0 % (ref 0–10)
BILIRUB SERPL-MCNC: 0.6 MG/DL (ref 0.1–1)
BUN SERPL-MCNC: 14 MG/DL (ref 7–18)
BUN/CREAT SERPL: 13
CALCIUM SERPL-MCNC: 9 MG/DL (ref 8.5–10.1)
CHLORIDE SERPL-SCNC: 101 MMOL/L (ref 98–107)
CO2 SERPL-SCNC: 27 MMOL/L (ref 21–32)
CREAT SERPL-MCNC: 1.12 MG/DL (ref 0.6–1.3)
EOSINOPHIL # BLD AUTO: 0.4 10^3/UL (ref 0–0.3)
EOSINOPHIL NFR BLD AUTO: 5 % (ref 0–10)
ERYTHROCYTE [DISTWIDTH] IN BLOOD BY AUTOMATED COUNT: 14.6 % (ref 10–14.5)
GFR SERPLBLD BASED ON 1.73 SQ M-ARVRAT: > 60 ML/MIN
GLUCOSE SERPL-MCNC: 154 MG/DL (ref 70–105)
LYMPHOCYTES # BLD AUTO: 0.8 X 10^3 (ref 1–4)
LYMPHOCYTES NFR BLD AUTO: 10 % (ref 12–44)
MCH RBC QN AUTO: 31 PG (ref 25–34)
MCHC RBC AUTO-ENTMCNC: 33 G/DL (ref 32–36)
MCV RBC AUTO: 93 FL (ref 80–99)
MONOCYTES # BLD AUTO: 0.6 X 10^3 (ref 0–1)
MONOCYTES NFR BLD AUTO: 8 % (ref 0–12)
NEUTROPHILS # BLD AUTO: 5.9 X 10^3 (ref 1.8–7.8)
NEUTROPHILS NFR BLD AUTO: 77 % (ref 42–75)
PLATELET # BLD: 204 10^3/UL (ref 130–400)
PMV BLD AUTO: 10.6 FL (ref 7.4–10.4)
POTASSIUM SERPL-SCNC: 3.1 MMOL/L (ref 3.6–5)
PROT SERPL-MCNC: 6.1 G/DL (ref 6.4–8.2)
RBC # BLD AUTO: 3.44 10^6/UL (ref 4.35–5.85)
SODIUM SERPL-SCNC: 139 MMOL/L (ref 135–145)
WBC # BLD AUTO: 7.7 10^3/UL (ref 4.3–11)

## 2017-04-19 RX ADMIN — CLOPIDOGREL BISULFATE SCH MG: 75 TABLET, FILM COATED ORAL at 08:41

## 2017-04-19 RX ADMIN — Medication SCH EA: at 06:31

## 2017-04-19 RX ADMIN — ENOXAPARIN SODIUM SCH MG: 30 INJECTION SUBCUTANEOUS at 08:41

## 2017-04-19 RX ADMIN — LORATADINE SCH MG: 10 TABLET ORAL at 08:41

## 2017-04-19 RX ADMIN — HYDROCHLOROTHIAZIDE SCH MG: 25 TABLET ORAL at 08:41

## 2017-04-19 RX ADMIN — CALCIUM POLYCARBOPHIL SCH MG: 625 TABLET, FILM COATED ORAL at 08:40

## 2017-04-19 RX ADMIN — Medication SCH MG: at 06:31

## 2017-04-19 RX ADMIN — PANTOPRAZOLE SODIUM SCH MG: 40 INJECTION, POWDER, FOR SOLUTION INTRAVENOUS at 08:41

## 2017-04-19 RX ADMIN — ALBUTEROL SULFATE SCH MG: 2.5 SOLUTION RESPIRATORY (INHALATION) at 03:04

## 2017-04-19 RX ADMIN — LISINOPRIL SCH MG: 20 TABLET ORAL at 08:41

## 2017-04-19 RX ADMIN — ALBUTEROL SULFATE SCH MG: 2.5 SOLUTION RESPIRATORY (INHALATION) at 10:27

## 2017-04-19 RX ADMIN — IBUPROFEN PRN MG: 600 TABLET ORAL at 07:38

## 2017-04-19 RX ADMIN — HYDROCODONE BITARTRATE AND ACETAMINOPHEN PRN EA: 7.5; 325 TABLET ORAL at 09:47

## 2017-04-19 RX ADMIN — OMEGA-3 FATTY ACIDS CAP 1000 MG SCH MG: 1000 CAP at 06:31

## 2017-04-19 RX ADMIN — OXYCODONE HYDROCHLORIDE AND ACETAMINOPHEN SCH MG: 500 TABLET ORAL at 06:31

## 2017-04-19 RX ADMIN — ALBUTEROL SULFATE SCH MG: 2.5 SOLUTION RESPIRATORY (INHALATION) at 07:06

## 2017-04-19 RX ADMIN — FINASTERIDE SCH MG: 5 TABLET ORAL at 08:40

## 2017-04-19 RX ADMIN — ASPIRIN SCH MG: 81 TABLET ORAL at 08:41

## 2017-04-19 RX ADMIN — HYDROCODONE BITARTRATE AND ACETAMINOPHEN PRN EA: 7.5; 325 TABLET ORAL at 00:13

## 2017-04-19 NOTE — DIAGNOSTIC IMAGING REPORT
INDICATION: Shortness of air. Fever.



COMPARISON: 2/25/2017



FINDINGS: Frontal and lateral views of the chest demonstrate

normal heart size and pulmonary vascularity. The lungs show some

platelike atelectasis in the left perihilar region, but are

otherwise clear. There are no signs of infiltrate, pleural

effusions or pneumothoraces. The visualized osseous structures

show no acute abnormalities.



IMPRESSION:

1. No acute process. No signs of infiltrates, effusions or

pneumothoraces.



Dictated by:



Dictated on workstation # [id number]

## 2017-04-19 NOTE — PROGRESS NOTE-HOSPITALIST
Progress Note


HPI/CC on Admission


CC: Medical management following reversal ileostomy





HPI: This is a 76-year-old white male clinic patient of [facility] with past medical 

history of severe and recurrent diverticulitis that underwent an uncomplicated 

sigmoid resection with diverting colostomy and has done well over the ensuing 

few months by Dr. Arita and has underwent an uncomplicated reversal ileostomy 

and is currently doing well without difficulties.  He states that the pain is 

controlled and overall having no significant issues since surgery.  He denies 

any nausea is not on any of his home medications due to ileus and has not 

passed any gas yet.





Progress Notes/Assess & Plan


Date Seen


4/19/17


Admission Dx/Process


Assessment:


Status post uncomplicated reversal ileostomy per Dr. Arita


Hypertension


Coronary artery disease managed by Dr. Mayfield


Chronic vertigo


Anxiety


Borderline hyperglycemia


Hyperlipidemia


Obstructive sleep apnea


Obesity


Severe diverticulosis with recurrent diverticulitis requiring the resection


Mild renal insufficiency


Diagonsis/Assessment & Plan


Chart Review:


WBC 7.7


Hgb 10 .6


CMP pending


CXR no infiltrate





Patient Interview:


Pt was able to ambulate yesterday but had SOB.


Pt had a BM yesterday, it is very light colored though. He admits to eating a 

light diet.


Urine test was discussed, it was normal


Physical exam stable


Pt used home O2 for one week last time.








No fever, vital signs stable, pleasant


Regular rate and rhythm, clear to auscultation bilaterally


No edema








Assessment:


Status post uncomplicated reversal ileostomy per Dr. Arita POD # 6


Hypertension


Coronary artery disease managed by Dr. Mayfield


Chronic vertigo


Anxiety


Borderline hyperglycemia


Hyperlipidemia


Obstructive sleep apnea


Obesity


Severe diverticulosis with recurrent diverticulitis requiring the resection


Mild renal insufficiency


Post op hypoxia


Hypokalemia





Plan:


Ambulate


Pain control


PO meds


Home O2


Follow up with Dr. Ramirez and Dr. Dunbar in May.


Replace potassium for 2 weeks





Scribed by Placido Nunez under the direct supervision of Dr. Dunbar.











MICHOACANO DUNBAR DO Apr 19, 2017 11:10

## 2017-04-19 NOTE — PROGRESS NOTE (SOAP)
Subjective


Subjective/Events-last exam


doing well. tolerating low residue diet and having multiple BM's. no fever/

chills.





Objective


Exam





Vital Signs








  Date Time  Temp Pulse Resp B/P (MAP) Pulse Ox O2 Delivery O2 Flow Rate FiO2


 


17 10:27     95  2.00 


 


17 08:00     92  2.00 


 


17 07:29 98.5 75 20 137/67 92 Nasal Cannula 2.00 


 


17 03:05     92  2.00 


 


17 00:25 98.1 100 18 147/66 95 Nasal Cannula 2.00 


 


17 22:52     93  2.00 


 


17 19:34     93  2.00 


 


17 15:25 98.9 89 20 120/56 95 Room Air  


 


17 14:48     92  2.00 


 


17 13:00  82      


 


17 11:39 100.3 85 22 115/63 90 Room Air  


 


17 11:34     93  2.00 














I & O 


 


 17





 07:00


 


Intake Total 3070 ml


 


Output Total 200 ml


 


Balance 2870 ml





Capillary Refill :


General Appearance:  No Apparent Distress


HEENT:  PERRL/EOMI


Neck:  Full Range of Motion


Respiratory:  Chest Non Tender, Lungs Clear, Normal Breath Sounds


Cardiovascular:  Regular Rate, Rhythm


Gastrointestinal:  normal bowel sounds, soft, other (wound clean/dry)


Extremity:  Normal Capillary Refill


Neurologic/Psychiatric:  Alert, Oriented x3


Skin:  Normal Color


Lymphatic:  No Adenopathy





Results


Lab


Laboratory Tests


17 13:47: 


Urine Color YELLOW, Urine Clarity SLIGHTLY CLOUDY, Urine pH 5, Urine Specific 

Gravity 1.015L, Urine Protein 1+H, Urine Glucose (UA) NEGATIVE, Urine Ketones 1+

H, Urine Nitrite NEGATIVE, Urine Bilirubin NEGATIVE, Urine Urobilinogen NORMAL, 

Urine Leukocyte Esterase 1+H, Urine RBC (Auto) NEGATIVE, Urine RBC NONE, Urine 

WBC 0-2, Urine Squamous Epithelial Cells NONE, Urine Crystals NONE, Urine 

Bacteria NEGATIVE, Urine Casts NONE, Urine Mucus LARGEH, Urine Culture 

Indicated NO


17 09:44: 


White Blood Count 7.7, Red Blood Count 3.44L, Hemoglobin 10.6L, Hematocrit 32L, 

Mean Corpuscular Volume 93, Mean Corpuscular Hemoglobin 31, Mean Corpuscular 

Hemoglobin Concent 33, Red Cell Distribution Width 14.6H, Platelet Count 204, 

Mean Platelet Volume 10.6H, Neutrophils (%) (Auto) 77H, Lymphocytes (%) (Auto) 

10L, Monocytes (%) (Auto) 8, Eosinophils (%) (Auto) 5, Basophils (%) (Auto) 0, 

Neutrophils # (Auto) 5.9, Lymphocytes # (Auto) 0.8L, Monocytes # (Auto) 0.6, 

Eosinophils # (Auto) 0.4H, Basophils # (Auto) 0.0, Sodium Level 139, Potassium 

Level 3.1L, Chloride Level 101, Carbon Dioxide Level 27, Anion Gap 11, Blood 

Urea Nitrogen 14, Creatinine 1.12, Estimat Glomerular Filtration Rate > 60, BUN/

Creatinine Ratio 13, Glucose Level 154H, Calcium Level 9.0, Total Bilirubin 0.6

, Aspartate Amino Transf (AST/SGOT) 19, Alanine Aminotransferase (ALT/SGPT) 20, 

Alkaline Phosphatase 54, Total Protein 6.1L, Albumin 3.6





Assessment/Plan


Assessment/Plan


Assess & Plan/Chief Complaint


s/p reversal loop ileostomy for severe chronic diverticulitis.


doing well. 


tolerating reg diet with BM's.


ok for home when ok with PMD.





Clinical Quality Measures


DVT/VTE Risk/Contraindication:


Risk Factor Score Per Nursin


RFS Level Per Nursing on Admit:  4+=Very High











DAIJA HAWK MD 2017 11:35 am

## 2017-07-04 VITALS — DIASTOLIC BLOOD PRESSURE: 62 MMHG | SYSTOLIC BLOOD PRESSURE: 135 MMHG

## 2017-07-05 ENCOUNTER — HOSPITAL ENCOUNTER (OUTPATIENT)
Dept: HOSPITAL 75 - CARD | Age: 77
End: 2017-07-05
Attending: INTERNAL MEDICINE
Payer: MEDICARE

## 2017-07-05 VITALS — SYSTOLIC BLOOD PRESSURE: 135 MMHG | DIASTOLIC BLOOD PRESSURE: 55 MMHG

## 2017-07-05 VITALS — SYSTOLIC BLOOD PRESSURE: 132 MMHG | DIASTOLIC BLOOD PRESSURE: 54 MMHG

## 2017-07-05 DIAGNOSIS — E78.5: ICD-10-CM

## 2017-07-05 DIAGNOSIS — I49.5: ICD-10-CM

## 2017-07-05 DIAGNOSIS — I10: ICD-10-CM

## 2017-07-05 DIAGNOSIS — I25.10: Primary | ICD-10-CM

## 2017-07-05 PROCEDURE — 93017 CV STRESS TEST TRACING ONLY: CPT

## 2017-07-05 PROCEDURE — 78452 HT MUSCLE IMAGE SPECT MULT: CPT

## 2017-07-05 NOTE — STRESS TEST
PROCEDURE :   THEO ALMEIDA

 

DATE OF PROCEDURE:  07/05/2017

 

LEXISCAN  MYOVIEW STRESS TEST REPORT:  

 

REFERRING PHYSICIAN:

Dr. Jaelyn Velez 

 

BASELINE HEART RATE:   

45 

 

BASELINE BLOOD PRESSURE:   

133/61

 

BASELINE EKG:  

Sinus bradycardia with no ischemic changes. 

 

IN SUMMARY:

The patient was injected with 10.43 mCi of technetium 99 Myoview

and the resting images were obtained. Then the patient received

0.4 mg of Lexiscan followed by 32.1 mCi of technetium 99 Myoview.

Throughout the test, there were no EKG changes. 

 

The resting and stress images were reviewed and compared in the

short axis, horizontal long axis, and vertical long axis views.

Review of the images showed diaphragmatic attenuation with mild

apical thinning. No significant ischemia or infarction was seen.

SSS is 2, SDS 1, TID value 1.07. On the gated images, the left

ventricle appeared to be normal size with normal contractility.

Calculated ejection fraction 56%. 

 

IN CONCLUSION:

1.   The patient tolerated Lexiscan well. 

2.   Diaphragmatic attenuation with apical thinning. No

significant ischemia or infarction on SPECT images. 

3.   Normal left ventricular size with normal contractility.

Calculated ejection fraction 56%. 

 

 

 

Job ID: [id number]

Dictated Date: 07/05/2017 14:11:48 

Transcription Date: 07/05/2017 16:54:42 / odessa

## 2018-02-05 ENCOUNTER — HOSPITAL ENCOUNTER (EMERGENCY)
Dept: HOSPITAL 75 - ER | Age: 78
Discharge: HOME | End: 2018-02-05
Payer: MEDICARE

## 2018-02-05 VITALS — WEIGHT: 165 LBS | BODY MASS INDEX: 26.52 KG/M2 | HEIGHT: 66 IN

## 2018-02-05 VITALS — SYSTOLIC BLOOD PRESSURE: 165 MMHG | DIASTOLIC BLOOD PRESSURE: 83 MMHG

## 2018-02-05 DIAGNOSIS — N40.0: ICD-10-CM

## 2018-02-05 DIAGNOSIS — Z93.3: ICD-10-CM

## 2018-02-05 DIAGNOSIS — Z79.82: ICD-10-CM

## 2018-02-05 DIAGNOSIS — K21.9: ICD-10-CM

## 2018-02-05 DIAGNOSIS — Z87.19: ICD-10-CM

## 2018-02-05 DIAGNOSIS — I25.10: ICD-10-CM

## 2018-02-05 DIAGNOSIS — M10.9: ICD-10-CM

## 2018-02-05 DIAGNOSIS — Z82.49: ICD-10-CM

## 2018-02-05 DIAGNOSIS — G47.30: ICD-10-CM

## 2018-02-05 DIAGNOSIS — Z91.048: ICD-10-CM

## 2018-02-05 DIAGNOSIS — Z87.891: ICD-10-CM

## 2018-02-05 DIAGNOSIS — I10: ICD-10-CM

## 2018-02-05 DIAGNOSIS — K86.1: Primary | ICD-10-CM

## 2018-02-05 DIAGNOSIS — Z88.3: ICD-10-CM

## 2018-02-05 DIAGNOSIS — Z95.5: ICD-10-CM

## 2018-02-05 DIAGNOSIS — F41.9: ICD-10-CM

## 2018-02-05 DIAGNOSIS — Z80.0: ICD-10-CM

## 2018-02-05 DIAGNOSIS — E78.00: ICD-10-CM

## 2018-02-05 DIAGNOSIS — F32.9: ICD-10-CM

## 2018-02-05 LAB
ALBUMIN SERPL-MCNC: 4.6 GM/DL (ref 3.2–4.5)
ALP SERPL-CCNC: 69 U/L (ref 40–136)
ALT SERPL-CCNC: 26 U/L (ref 0–55)
AMYLASE SERPL-CCNC: 85 U/L (ref 25–125)
APTT BLD: 31 SEC (ref 24–35)
APTT PPP: YELLOW S
BACTERIA #/AREA URNS HPF: NEGATIVE /HPF
BASOPHILS # BLD AUTO: 0 10^3/UL (ref 0–0.1)
BASOPHILS NFR BLD AUTO: 0 % (ref 0–10)
BASOPHILS NFR BLD MANUAL: 0 %
BILIRUB SERPL-MCNC: 0.3 MG/DL (ref 0.1–1)
BILIRUB UR QL STRIP: NEGATIVE
BUN/CREAT SERPL: 23
CALCIUM SERPL-MCNC: 9.9 MG/DL (ref 8.5–10.1)
CHLORIDE SERPL-SCNC: 103 MMOL/L (ref 98–107)
CO2 SERPL-SCNC: 23 MMOL/L (ref 21–32)
CREAT SERPL-MCNC: 1.17 MG/DL (ref 0.6–1.3)
EOSINOPHIL # BLD AUTO: 0.1 10^3/UL (ref 0–0.3)
EOSINOPHIL NFR BLD AUTO: 1 % (ref 0–10)
EOSINOPHIL NFR BLD MANUAL: 1 %
ERYTHROCYTE [DISTWIDTH] IN BLOOD BY AUTOMATED COUNT: 16.6 % (ref 10–14.5)
FIBRINOGEN PPP-MCNC: CLEAR MG/DL
GFR SERPLBLD BASED ON 1.73 SQ M-ARVRAT: > 60 ML/MIN
GLUCOSE SERPL-MCNC: 129 MG/DL (ref 70–105)
GLUCOSE UR STRIP-MCNC: NEGATIVE MG/DL
HCT VFR BLD CALC: 41 % (ref 40–54)
HGB BLD-MCNC: 14.1 G/DL (ref 13.3–17.7)
INR PPP: 1 (ref 0.8–1.4)
KETONES UR QL STRIP: NEGATIVE
LEUKOCYTE ESTERASE UR QL STRIP: NEGATIVE
LIPASE SERPL-CCNC: 27 U/L (ref 8–78)
LYMPHOCYTES # BLD AUTO: 0.7 X 10^3 (ref 1–4)
LYMPHOCYTES NFR BLD AUTO: 6 % (ref 12–44)
MAGNESIUM SERPL-MCNC: 2.1 MG/DL (ref 1.8–2.4)
MANUAL DIFFERENTIAL PERFORMED BLD QL: YES
MCH RBC QN AUTO: 30 PG (ref 25–34)
MCHC RBC AUTO-ENTMCNC: 35 G/DL (ref 32–36)
MCV RBC AUTO: 87 FL (ref 80–99)
MONOCYTES # BLD AUTO: 0.5 X 10^3 (ref 0–1)
MONOCYTES NFR BLD AUTO: 4 % (ref 0–12)
MONOCYTES NFR BLD: 3 %
NEUTROPHILS # BLD AUTO: 12 X 10^3 (ref 1.8–7.8)
NEUTROPHILS NFR BLD AUTO: 90 % (ref 42–75)
NEUTS BAND NFR BLD MANUAL: 80 %
NEUTS BAND NFR BLD: 3 %
NITRITE UR QL STRIP: NEGATIVE
PH UR STRIP: 7 [PH] (ref 5–9)
PLATELET # BLD: 216 10^3/UL (ref 130–400)
PMV BLD AUTO: 10.3 FL (ref 7.4–10.4)
POTASSIUM SERPL-SCNC: 4.3 MMOL/L (ref 3.6–5)
PROT SERPL-MCNC: 8.1 GM/DL (ref 6.4–8.2)
PROT UR QL STRIP: NEGATIVE
PROTHROMBIN TIME: 12.9 SEC (ref 12.2–14.7)
RBC # BLD AUTO: 4.69 10^6/UL (ref 4.35–5.85)
RBC #/AREA URNS HPF: (no result) /HPF
RBC MORPH BLD: NORMAL
SODIUM SERPL-SCNC: 140 MMOL/L (ref 135–145)
SP GR UR STRIP: 1.01 (ref 1.02–1.02)
UROBILINOGEN UR-MCNC: NORMAL MG/DL
VARIANT LYMPHS NFR BLD MANUAL: 13 %
WBC # BLD AUTO: 13.3 10^3/UL (ref 4.3–11)
WBC #/AREA URNS HPF: (no result) /HPF

## 2018-02-05 PROCEDURE — 96374 THER/PROPH/DIAG INJ IV PUSH: CPT

## 2018-02-05 PROCEDURE — 96375 TX/PRO/DX INJ NEW DRUG ADDON: CPT

## 2018-02-05 PROCEDURE — 74022 RADEX COMPL AQT ABD SERIES: CPT

## 2018-02-05 PROCEDURE — 85007 BL SMEAR W/DIFF WBC COUNT: CPT

## 2018-02-05 PROCEDURE — 85610 PROTHROMBIN TIME: CPT

## 2018-02-05 PROCEDURE — 83690 ASSAY OF LIPASE: CPT

## 2018-02-05 PROCEDURE — 83735 ASSAY OF MAGNESIUM: CPT

## 2018-02-05 PROCEDURE — 74177 CT ABD & PELVIS W/CONTRAST: CPT

## 2018-02-05 PROCEDURE — 96361 HYDRATE IV INFUSION ADD-ON: CPT

## 2018-02-05 PROCEDURE — 85027 COMPLETE CBC AUTOMATED: CPT

## 2018-02-05 PROCEDURE — 82150 ASSAY OF AMYLASE: CPT

## 2018-02-05 PROCEDURE — 81000 URINALYSIS NONAUTO W/SCOPE: CPT

## 2018-02-05 PROCEDURE — 85730 THROMBOPLASTIN TIME PARTIAL: CPT

## 2018-02-05 PROCEDURE — 36415 COLL VENOUS BLD VENIPUNCTURE: CPT

## 2018-02-05 PROCEDURE — 80053 COMPREHEN METABOLIC PANEL: CPT

## 2018-02-05 NOTE — XMS REPORT
Encounter Summary

 Created on: 2018



Moshe Lopez

External Reference #: [id number]

: 1940

Sex: Male



Demographics







 Address  [address] [place] [address]  [address]

 

 Home Phone  [phone]

 

 Preferred Language  English

 

 Marital Status  Unknown

 

 Voodoo Affiliation  NON

 

 Race  White

 

 Ethnic Group  Not  or 





Author







 Author  [facility]

 

 Organization  [facility]

 

 Address  Unknown

 

 Phone  Unavailable







Support







 Name  Relationship  Address  Phone

 

 Latisha Lopez  Unknown  [phone]







Care Team Providers







 Care Team Member Name  Role  Phone

 

  PCP  Unavailable







Encounter Details







    



  Date   Type   Department   Care Team   Description

 

    



  2018   Procedure Pass   Gastrointenstinal  



    Endoscopy  



    [address] [place] [address] [phone]  







Social History







    



  Tobacco Use   Types   Packs/Day   Years Used   Date

 

    



  Former Smoker   Cigarettes, Pipe, Cigars   1   15   Quit: 1975

 

    



  Smokeless Tobacco: Never   



  Used   









   



  Alcohol Use   Drinks/Week   oz/Week   Comments

 

   



  Yes   1 Cans of   0.6   1 beer every 2 weeks



   beer  









 



  Sex Assigned at Birth   Date Recorded

 

 



  Not on file 



as of this encounter



Functional Status







  



  Functional Status   Response   Date of Assessment

 

  



  Does the patient have a hearing impairment:   No   2017

 

  



  Does the patient have a visual impairment:   Yes   2017

 

  



  Does the patient have impaired ambulation:   No   2017

 

  



  Does the patient have an activity of daily living   No   2017



  (ADL) impairment:  

 

  



  Does the patient have an instrumental activity of   No   2017



  daily living (IADL) impairment:  









  



  Cognitive Status   Response   Date of Assessment

 

  



  Does the patient have a cognitive impairment:   No   2017



as of this encounter



Plan of Treatment







    



  Date   Type   Specialty   Care Team   Description

 

    



  2018   Procedure Pass   Infectious Diseases  

 

    



  2018   Surgery    Fan Tidwell MD   ESOPHAGOGASTRODUODENOSCOP



     [address]



     [address] 



     [place] [address] [phone] (Fax) 

 

    



  2018   Procedure Pass   

 

    



  2018   Hospital    Fan Tidwell MD   Pancreatic necrosis



   Encounter    [address] 



     [address] 



     [place] [address] [phone] (Fax) 



as of this encounter



Visit Diagnoses

Not on filein this encounter

## 2018-02-05 NOTE — XMS REPORT
Encounter Summary

 Created on: 2018



Moshe Lopez

External Reference #: [id number]

: 1940

Sex: Male



Demographics







 Address  [address] [place]  [address]

 

 Home Phone  [phone]

 

 Preferred Language  English

 

 Marital Status  Unknown

 

 Islam Affiliation  NON

 

 Race  White

 

 Ethnic Group  Not  or 





Author







 Author  [facility]

 

 Organization  [facility]

 

 Address  Unknown

 

 Phone  Unavailable







Support







 Name  Relationship  Address  Phone

 

 Latisha Lopez  ECON  Unknown  [phone]







Care Team Providers







 Care Team Member Name  Role  Phone

 

 Velez Jaelyn CHO  PCP  [phone]

 

 Jaelyn Velez DO  Unavailable  [phone]







Reason for Visit

* 





 



  Reason   Comments

 

 



  Appointment Request   ERCP









Encounter Details







    



  Date   Type   Department   Care Team   Description

 

    



  2017   Telephone   [facility]   Nicole Ledezma   Appointment 
Request



    Physicians - Internal    (ERCP)



    Medicine  



    [address]ND FLOOR [address]OD [address]  



    [address] [facility]  



    [place]  



    [address] [phone]  







Social History







    



  Tobacco Use   Types   Packs/Day   Years Used   Date

 

    



  Former Smoker   Cigarettes, Pipe, Cigars   1   15   Quit: 1975

 

    



  Smokeless Tobacco: Never   



  Used   









   



  Alcohol Use   Drinks/Week   oz/Week   Comments

 

   



  Yes   1 Cans of   0.6   1 beer every 2 weeks



   beer  









 



  Sex Assigned at Birth   Date Recorded

 

 



  Not on file 



as of this encounter



Functional Status







  



  Functional Status   Response   Date of Assessment

 

  



  Does the patient have a hearing impairment:   No   2017

 

  



  Does the patient have a visual impairment:   Yes   2017

 

  



  Does the patient have impaired ambulation:   No   2017

 

  



  Does the patient have an activity of daily living   No   2017



  (ADL) impairment:  

 

  



  Does the patient have an instrumental activity of   No   2017



  daily living (IADL) impairment:  









  



  Cognitive Status   Response   Date of Assessment

 

  



  Does the patient have a cognitive impairment:   No   2017



as of this encounter



Miscellaneous Notes

* Telephone Encounter - Nicole Ledezma - 2017  2:42 PM CST



Scheduled ERCP with Dr Tidwell on 18. Verbal instructions given over the 
phone. Advised patient to contact the prescribing provider for any blood 
thinning medication and/or aspirin recommendations 1 week prior to having 
procedure. Patient verbalized understanding.



in this encounter



Plan of Treatment







    



  Date   Type   Specialty   Care Team   Description

 

    



  2018   Procedure Pass   Infectious Diseases  

 

    



  2018   Surgery    Fan Tidwell MD   ESOPHAGOGASTRODUODENOSCOP



     [address]



     [address] 



     [place] [address] [phone] (Fax) 

 

    



  2018   Procedure Pass   

 

    



  2018   Hospital    Fan Tidwell MD   Pancreatic necrosis



   Encounter    [address] 



     [address] 



     [place] [address] [phone] (Fax) 



as of this encounter



Visit Diagnoses

Not on filein this encounter

## 2018-02-05 NOTE — XMS REPORT
Encounter Summary

 Created on: 2018



Moshe Lopez

External Reference #: [id number]

: 1940

Sex: Male



Demographics







 Address  [address] [place]  [address]

 

 Home Phone  [phone]

 

 Preferred Language  English

 

 Marital Status  Unknown

 

 Buddhist Affiliation  NON

 

 Race  White

 

 Ethnic Group  Not  or 





Author







 Author  [facility]

 

 Organization  [facility]

 

 Address  Unknown

 

 Phone  Unavailable







Support







 Name  Relationship  Address  Phone

 

 Latisha Lopez  ECON  Unknown  [phone]







Care Team Providers







 Care Team Member Name  Role  Phone

 

 Jaelyn Velez DO  PCP  [phone]

 

 VelezJaelyn DO  Unavailable  [phone]







Encounter Details







    



  Date   Type   Department   Care Team   Description

 

    



  2018   Outpt.   [facility]   Aby Bah MD 



   Antibiotic   Physicians - Internal   [address] [facility] 



   Therapy   Medicine   [address] 



    [address] [place] [address] 



    [address] [facility] [address]   [phone] 



    OFFICE [address]   [phone] (Fax) 



    [place]  



    [address] [phone]  







Social History







    



  Tobacco Use   Types   Packs/Day   Years Used   Date

 

    



  Former Smoker   Cigarettes, Pipe, Cigars   1   15   Quit: 1975

 

    



  Smokeless Tobacco: Never   



  Used   









   



  Alcohol Use   Drinks/Week   oz/Week   Comments

 

   



  Yes   1 Cans of   0.6   1 beer every 2 weeks



   beer  









 



  Sex Assigned at Birth   Date Recorded

 

 



  Not on file 



as of this encounter



Functional Status







  



  Functional Status   Response   Date of Assessment

 

  



  Does the patient have a hearing impairment:   No   2017

 

  



  Does the patient have a visual impairment:   Yes   2017

 

  



  Does the patient have impaired ambulation:   No   2017

 

  



  Does the patient have an activity of daily living   No   2017



  (ADL) impairment:  

 

  



  Does the patient have an instrumental activity of   No   2017



  daily living (IADL) impairment:  









  



  Cognitive Status   Response   Date of Assessment

 

  



  Does the patient have a cognitive impairment:   No   2017



as of this encounter



Plan of Treatment







    



  Date   Type   Specialty   Care Team   Description

 

    



  2018   Procedure Pass   Infectious Diseases  

 

    



  2018   Surgery    Fan Tidwell MD   ESOPHAGOGASTRODUODENOSCOP



     [address]



     [address] 



     [place] [address] 



     [phone] (Fax) 

 

    



  2018   Procedure Pass   

 

    



  2018   Hospital    Fan Tidwell MD   Pancreatic necrosis



   Encounter    [address] 



     [address] 



     [place] [address] 



     [phone] (Fax) 



as of this encounter



Results

* BUN (2018)





  



  Component   Value   Ref Range

 

  



  Blood Urea Nitrogen   26 









 



  Specimen   Performing Laboratory

 

 



  Blood   OTHER OUTSIDE LAB





* ALT (SGPT) (2018)





  



  Component   Value   Ref Range

 

  



  ALT (SGPT)   17 









 



  Specimen   Performing Laboratory

 

 



  Blood   OTHER OUTSIDE LAB





* AST (SGOT) (2018)





  



  Component   Value   Ref Range

 

  



  AST (SGOT)   19 









 



  Specimen   Performing Laboratory

 

 



  Blood   OTHER OUTSIDE LAB





* POTASSIUM (2018)





  



  Component   Value   Ref Range

 

  



  Potassium   4.2 









 



  Specimen   Performing Laboratory

 

 



  Blood   OTHER OUTSIDE LAB





* ALK PHOS TOTAL (2018)





  



  Component   Value   Ref Range

 

  



  Alk Phosphatase   65 









 



  Specimen   Performing Laboratory

 

 



  Blood   OTHER OUTSIDE LAB





* CREATININE (2018)





  



  Component   Value   Ref Range

 

  



  Creatinine   0.99 









 



  Specimen   Performing Laboratory

 

 



  Blood   OTHER OUTSIDE LAB





* PLATELET COUNT (2018)





  



  Component   Value   Ref Range

 

  



  Platelet Count   204 









 



  Specimen   Performing Laboratory

 

 



  Blood   OTHER OUTSIDE LAB





* CBC (2018)





  



  Component   Value   Ref Range

 

  



  White Blood Cells   6.05 









 



  Specimen   Performing Laboratory

 

 



  Blood   OTHER OUTSIDE LAB





* HEMOGLOBIN (2018)





  



  Component   Value   Ref Range

 

  



  Hemoglobin   12.4 









 



  Specimen   Performing Laboratory

 

 



  Blood   OTHER OUTSIDE LAB





in this encounter



Visit Diagnoses

Not on filein this encounter

## 2018-02-05 NOTE — XMS REPORT
Encounter Summary

 Created on: 2018



Moshe Lopez

External Reference #: [id number]

: 1940

Sex: Male



Demographics







 Address  [address] [place]  [address]

 

 Home Phone  [phone]

 

 Preferred Language  English

 

 Marital Status  Unknown

 

 Church Affiliation  NON

 

 Race  White

 

 Ethnic Group  Not  or 





Author







 Author  [facility]

 

 Organization  [facility]

 

 Address  Unknown

 

 Phone  Unavailable







Support







 Name  Relationship  Address  Phone

 

 Latisha Lopez  ECON  Unknown  [phone]







Care Team Providers







 Care Team Member Name  Role  Phone

 

 Jaelyn Velez DO  PCP  [phone]

 

 Jaelyn Velez DO  Unavailable  [phone]







Reason for Visit

* 





 



  Reason   Comments

 

 



  Follow-up Phone Call 









Encounter Details







    



  Date   Type   Department   Care Team   Description

 

    



  2017   Telephone   Case Management - Ashley Garcia, RN   Follow-
up Phone Call



    Work  



    [address]  



    [place] [address]  







Social History







    



  Tobacco Use   Types   Packs/Day   Years Used   Date

 

    



  Former Smoker   Cigarettes, Pipe, Cigars   1   15   Quit: 1975

 

    



  Smokeless Tobacco: Never   



  Used   









   



  Alcohol Use   Drinks/Week   oz/Week   Comments

 

   



  Yes   1 Cans of   0.6   1 beer every 2 weeks



   beer  









 



  Sex Assigned at Birth   Date Recorded

 

 



  Not on file 



as of this encounter



Functional Status







  



  Functional Status   Response   Date of Assessment

 

  



  Does the patient have a hearing impairment:   No   2017

 

  



  Does the patient have a visual impairment:   Yes   10/12/2017

 

  



  Does the patient have impaired ambulation:   Yes   10/12/2017

 

  



  Does the patient have an activity of daily living   No   10/12/2017



  (ADL) impairment:  

 

  



  Does the patient have an instrumental activity of   No   10/12/2017



  daily living (IADL) impairment:  









  



  Cognitive Status   Response   Date of Assessment

 

  



  Does the patient have a cognitive impairment:   No   10/12/2017



as of this encounter



Miscellaneous Notes

* Telephone Encounter - Ashley Maldonado, RN - 2017  3:16 PM CST



Formatting of this note may be different from the original.

[facility] Case Management Note



[facility]  contacted patient's wife, Latisha, by phone for 7-
10 day post discharge follow up. 



Future Appointments

Date Time Provider Department Center 

2017 8:00 AM CT[facility]B [facility] 

2017 1:00 PM Fan Tidwell MD [facility] 

2017 8:30 AM Aby Bah MD [facility] 

2017 8:30 AM SURGERY [facility] 



Transportation Verified: YES



Signs and Symptoms: Wife denies fever or chills. She states patient has had one 
vomiting episode since discharge. 

PICC line dressing was changed today by infusion team at [facility]. 
Last infusion this morning. 



Intervention/Plan of Care: NCM will continue to follow and answer any questions 
or concerns the patient may have.



Ashley Maldonado, RN, BSN

p. [phone]

in this encounter



Plan of Treatment







    



  Date   Type   Specialty   Care Team   Description

 

    



  2018   Procedure Pass   Infectious Diseases  

 

    



  2018   Surgery    Fan Tidwell MD   ESOPHAGOGASTRODUODENOSCOP



     [address]



     [address] 



     [place] [address] 



     [phone] (Fax) 

 

    



  2018   Procedure Pass   

 

    



  2018   Hospital    Fan Tidwell MD   Pancreatic necrosis



   Encounter    [address] 



     [address] 



     [place] [address] 



     [phone] (Fax) 



as of this encounter



Visit Diagnoses

Not on filein this encounter

## 2018-02-05 NOTE — XMS REPORT
Encounter Summary

 Created on: 2018



Moshe Lopez

External Reference #: [id number]

: 1940

Sex: Male



Demographics







 Address  [address] [place]  [address]

 

 Home Phone  [phone]

 

 Preferred Language  English

 

 Marital Status  Unknown

 

 Confucianism Affiliation  NON

 

 Race  White

 

 Ethnic Group  Not  or 





Author







 Author  [facility]

 

 Organization  [facility]

 

 Address  Unknown

 

 Phone  Unavailable







Support







 Name  Relationship  Address  Phone

 

 Latisha Lopez  ECON  Unknown  [phone]







Care Team Providers







 Care Team Member Name  Role  Phone

 

 Agustin Jaelyn CHO  PCP  [phone]

 

 Jaelyn Velez DO  Unavailable  [phone]







Encounter Details







    



  Date   Type   Department   Care Team   Description

 

    



  2018   Hospital   [facility]   Fan Tidwell MD 



   Encounter   Hospital Radiology   [address] 



    [address] 



    [address] [place] [address] 



    [place] [address]   [phone] (Fax) 







Social History







    



  Tobacco Use   Types   Packs/Day   Years Used   Date

 

    



  Former Smoker   Cigarettes, Pipe, Cigars   1   15   Quit: 1975

 

    



  Smokeless Tobacco: Never   



  Used   









   



  Alcohol Use   Drinks/Week   oz/Week   Comments

 

   



  Yes   1 Cans of   0.6   1 beer every 2 weeks



   beer  









 



  Sex Assigned at Birth   Date Recorded

 

 



  Not on file 



as of this encounter



Functional Status







  



  Functional Status   Response   Date of Assessment

 

  



  Does the patient have a hearing impairment:   No   2017

 

  



  Does the patient have a visual impairment:   Yes   2017

 

  



  Does the patient have impaired ambulation:   No   2017

 

  



  Does the patient have an activity of daily living   No   2017



  (ADL) impairment:  

 

  



  Does the patient have an instrumental activity of   No   2017



  daily living (IADL) impairment:  









  



  Cognitive Status   Response   Date of Assessment

 

  



  Does the patient have a cognitive impairment:   No   2017



as of this encounter



Medications at Time of Discharge







     



  Medication   Sig.   Disp.   Refills   Start Date   End Date

 

     



  acetaminophen (TYLENOL)   Take 2 tablets by mouth    0   2017 



  325 mg tablet   every 4 hours as needed.    

 

     



  aspirin EC 81 mg tablet   Take 81 mg by mouth    



   daily.    

 

     



  citalopram (CELEXA) 20 mg   Take 20 mg by mouth    



  tablet   daily.    

 

     



  docusate (COLACE) 100 mg   Take 1 capsule by mouth   180 capsule   3   2017 



  capsule   twice daily.    

 

     



  famotidine (PEPCID) 20 mg   Take 20 mg by mouth twice    



  tablet   daily.    

 

     



  fenofibrate(+) (TRIGLIDE)   Take 160 mg by mouth    



  160 mg tablet   daily. Take with food.    

 

     



  finasteride (PROSCAR) 5   Take 5 mg by mouth daily.    



  mg tablet     

 

     



  fish oil- omega 3-DHA/EPA   Take 1 Cap by mouth twice    



  300/1,000 mg capsule   daily.    

 

     



  levoFLOXacin (LEVAQUIN)   Take 1 tablet by mouth   14 tablet   0   2018 



  750 mg tablet   daily.    

 

     



  lisinopril (PRINIVIL;   Take 20 mg by mouth    



  ZESTRIL) 20 mg tablet   daily.    

 

     



  loratadine (CLARITIN) 10   Take 10 mg by mouth every    



  mg tablet   morning.    

 

     



  melatonin 3 mg tab   Take 1 tablet by mouth at     2017 



   bedtime as needed    



   (insomnia).    

 

     



  metroNIDAZOLE (FLAGYL)   Take 1 tablet by mouth   42 tablet   0   2018 



  500 mg tablet   three times daily. Take    



   with food. Do not drink    



   alcohol while on    



   metronidazole.    

 

     



  MULTIVITAMINS WITH   Take 1 Tab by mouth    



  FLUORIDE (MULTI-VITAMIN   daily.    



  PO)     

 

     



  omeprazole DR(+)   Take 2 capsules by mouth   90 capsule   3   2017 



  (PRILOSEC) 20 mg capsule   daily before breakfast.    

 

     



  oxyCODONE (ROXICODONE,   Take 1 tablet by mouth   40 tablet   0   2017 



  OXY-IR) 5 mg tablet   every 4 hours as needed    

 

     



  polyethylene glycol 3350   Take 1 packet by mouth   12 each   5   2017 



  (MIRALAX) 17 g packet   daily.    

 

     



  simethicone (MYLICON) 80   Chew 1 tablet by mouth   30 tablet   0   2017 



  mg chew tablet   every 6 hours as needed    



   for Flatulence.    

 

     



  tamsulosin (FLOMAX) 0.4   Take 0.4 mg by mouth    



  mg capsule   daily. Do not crush, chew    



   or open capsules. Take 30    



   minutes following the    



   same meal each day.    

 

     



  vitamins, B complex tab   Take 1 Tab by mouth    



   daily.    



as of this encounter



Plan of Treatment







    



  Date   Type   Specialty   Care Team   Description

 

    



  2018   Procedure Pass   Infectious Diseases  

 

    



  2018   Surgery    Fan Tidwell MD   ESOPHAGOGASTRODUODENOSCOP



     [address]



     [address] 



     [place] [address] [phone] (Fax) 

 

    



  2018   Procedure Pass   

 

    



  2018   [facility]    Fan Tidwell MD   Pancreatic necrosis



   Encounter    [address] 



     [address] 



     [place] [address] [phone] (Fax) 



as of this encounter



Visit Diagnoses

Not on filein this encounter

## 2018-02-05 NOTE — XMS REPORT
Encounter Summary

 Created on: 2018



Moshe Lopez

External Reference #: [id number]

: 1940

Sex: Male



Demographics







 Address  [address] [place] [address]  [address]

 

 Home Phone  [phone]

 

 Preferred Language  English

 

 Marital Status  Unknown

 

 Druze Affiliation  NON

 

 Race  White

 

 Ethnic Group  Not  or 





Author







 Author  [facility]

 

 Organization  [facility]

 

 Address  Unknown

 

 Phone  Unavailable







Support







 Name  Relationship  Address  Phone

 

 Latisha Lopez  Unknown  [phone]







Care Team Providers







 Care Team Member Name  Role  Phone

 

 Jaelyn Velez   PCP  [phone]

 

 AgustinJaelyn   Unavailable  [phone]







Reason for Visit

* Auth/Cert





     



  Status   Reason   Specialty   Diagnoses /   Referred By   Referred To



     Procedures   Contact   Contact

 

     



     Diagnoses  



     Pancreatic  



     pseudocyst  



     Pancreatic  



     pseudocyst  



     [K86.3]

  



     P  



     rocedures  



     CHOLANGIOPANCREA  



     TOGRAPHY  



     ENDOSCOPY  



     RETROGRADE  











Encounter Details







    



  Date   Type   Department   Care Team   Description

 

    



  2018   Surgery   [facility]   Fan Tidwell MD   
CHOLANGIOPANCREATOGRAPHY



    Endoscopy   [address]   ENDOSCOPY RETROGRADE



    [address]   [address] 



    [place] [address]   [place] [address] 



    [phone] (Fax) 







Social History







    



  Tobacco Use   Types   Packs/Day   Years Used   Date

 

    



  Former Smoker   Cigarettes, Pipe, Cigars   1   15   Quit: 1975

 

    



  Smokeless Tobacco: Never   



  Used   









   



  Alcohol Use   Drinks/Week   oz/Week   Comments

 

   



  Yes   1 Cans of   0.6   1 beer every 2 weeks



   beer  









 



  Sex Assigned at Birth   Date Recorded

 

 



  Not on file 



as of this encounter



Last Filed Vital Signs







  



  Vital Sign   Reading   Time Taken

 

  



  Blood Pressure   141/69   2018 11:49 AM CST

 

  



  Pulse   56   2018 11:49 AM CST

 

  



  Temperature   36.5   C (97.7   F)   2018 11:26 AM CST

 

  



  Respiratory Rate   -   -

 

  



  Oxygen Saturation   98%   2018 11:49 AM CST

 

  



  Inhaled Oxygen   -   -



  Concentration  

 

  



  Weight   73 kg (161 lb)   2018  8:34 AM CST

 

  



  Height   167.6 cm (5' 6")   2018  8:34 AM CST

 

  



  Body Mass Index   25.99   2018  8:34 AM CST



in this encounter



Functional Status







  



  Functional Status   Response   Date of Assessment

 

  



  Does the patient have a hearing impairment:   No   2017

 

  



  Does the patient have a visual impairment:   Yes   2017

 

  



  Does the patient have impaired ambulation:   No   2017

 

  



  Does the patient have an activity of daily living   No   2017



  (ADL) impairment:  

 

  



  Does the patient have an instrumental activity of   No   2017



  daily living (IADL) impairment:  









  



  Cognitive Status   Response   Date of Assessment

 

  



  Does the patient have a cognitive impairment:   No   2017



as of this encounter



Discharge Instructions

* Discharge Instr - Education - Valentina Boone RN - 2018 11:36 AM CST



EGD/Upper EUS/ERCP/Antegrade Enteroscopy

Post Upper Endoscopy Instructions



 



-You may have a sore throat after the procedure for 2-3 days.  Try sucrets or 
lozenges to help ease the pain.  If it continues please contact us.



-If you feel feverish, have a temperature of 101 degrees or higher, persistent 
nausea and vomiting, abdominal pain or dark stools; please notify your nurse or 
GI physician.



-You may have abdominal cramping following the procedure this can be relieved 
by belching or passing air.



-If you have redness or swelling at the IV site, place a warm, wet washcloth 
over the affected areas for 15 minutes, 3-4 times a day until the redness 
subsides.  If symptoms continue for 2-3 days, contact your regular physician.



- If you have bleeding from your mouth, over 2 tablespoons and increasing, 
please notify your physician.  A small amount of bleeding is normal if a biopsy 
or polyps were taken.  If you are vomiting blood you need to seek immediate 
medical attention.



- You may resume all your routine medications, if medications need to be held 
your physician and/or nurse will notify you post procedure.



Diet after Procedure:



Please return to your previous diet as tolerated.



OUTPATIENTS:

A. Because of sedation and lack of coordination, UNTIL TOMORROW, DO NOT:

1. Operate any motorized vehicle - this includes driving.

2. Sign any legal documents or conduct important business matters.

3. Use any dangerous machinery (chain saw, lawnmower, etc.).

4. Drink any alcoholic beverages.



***Should you have any questions or concerns after your procedure please call 
[phone] M-F 8am-5:00 pm. After 5:00 pm, holidays or weekends call [phone]
[phone] and ask for the GI Doctor on call.



in this encounter



Medications at Time of Discharge







     



  Medication   Sig.   Disp.   Refills   Start Date   End Date

 

     



  acetaminophen (TYLENOL)   Take 2 tablets by mouth    0   2017 



  325 mg tablet   every 4 hours as needed.    

 

     



  aspirin EC 81 mg tablet   Take 81 mg by mouth    



   daily.    

 

     



  citalopram (CELEXA) 20 mg   Take 20 mg by mouth    



  tablet   daily.    

 

     



  docusate (COLACE) 100 mg   Take 1 capsule by mouth   180 capsule   3   2017 



  capsule   twice daily.    

 

     



  famotidine (PEPCID) 20 mg   Take 20 mg by mouth twice    



  tablet   daily.    

 

     



  fenofibrate(+) (TRIGLIDE)   Take 160 mg by mouth    



  160 mg tablet   daily. Take with food.    

 

     



  finasteride (PROSCAR) 5   Take 5 mg by mouth daily.    



  mg tablet     

 

     



  fish oil- omega 3-DHA/EPA   Take 1 Cap by mouth twice    



  300/1,000 mg capsule   daily.    

 

     



  lisinopril (PRINIVIL;   Take 20 mg by mouth    



  ZESTRIL) 20 mg tablet   daily.    

 

     



  loratadine (CLARITIN) 10   Take 10 mg by mouth every    



  mg tablet   morning.    

 

     



  melatonin 3 mg tab   Take 1 tablet by mouth at     2017 



   bedtime as needed    



   (insomnia).    

 

     



  MULTIVITAMINS WITH   Take 1 Tab by mouth    



  FLUORIDE (MULTI-VITAMIN   daily.    



  PO)     

 

     



  omeprazole DR(+)   Take 2 capsules by mouth   90 capsule   3   2017 



  (PRILOSEC) 20 mg capsule   daily before breakfast.    

 

     



  oxyCODONE (ROXICODONE,   Take 1 tablet by mouth   40 tablet   0   2017 



  OXY-IR) 5 mg tablet   every 4 hours as needed    

 

     



  polyethylene glycol 3350   Take 1 packet by mouth   12 each   5   2017 



  (MIRALAX) 17 g packet   daily.    

 

     



  simethicone (MYLICON) 80   Chew 1 tablet by mouth   30 tablet   0   2017 



  mg chew tablet   every 6 hours as needed    



   for Flatulence.    

 

     



  tamsulosin (FLOMAX) 0.4   Take 0.4 mg by mouth    



  mg capsule   daily. Do not crush, chew    



   or open capsules. Take 30    



   minutes following the    



   same meal each day.    

 

     



  vitamins, B complex tab   Take 1 Tab by mouth    



   daily.    



as of this encounter



H&P Notes

* Joe Irene MD - 2018 10:21 AM CST



Formatting of this note may be different from the original.

Pre Procedure History and Physical/Sedation Plan



Name:Moshe Lopez                                                       
            MRN: [id number]                 :1940          Age: 77 y.o.

Date of Service: 2018



Date of Procedure:  2018



Planned Procedure(s):  GI:  ERCP

Sedation/Medication Plan: MAC (Monitored Anesthesia Care)

Discussion/Reviews:  Physician has discussed risks and alternatives of this 
type of sedation and above planned procedures with patient

___________________________________________________________________

Chief Complaint: 



History of Present Illness: Moshe Lopez is a 77 y.o. male patient. Here 
for ERCP for history of necrotizing pancreatitis s/p axios stent placement in 
the past.



Previous Anesthetic/Sedation History: 



Past Medical History: 

Diagnosis Date 

 CAD (coronary artery disease)  

 Coronary artery disease  

 s/p 9 CABGs 

 Diverticulitis  

 Diverticulosis  

 s/p LAR in 2017 

 HTN (hypertension)  

 Hyperlipidemia  

 Hypertension  

 Low testosterone  

 Pancreatitis  

 Ruptured lumbar disc  

 Sleep apnea  



Past Surgical History: 

Procedure Laterality Date 

 IA ESOPHAGOGASTRODUODENOSCOPY US SCOPE W/ADJ STRXRS N/A 2017 

 ESOPHAGOGASTRODUODENOSCOPY ENDOSCOPIC ULTRASOUND performed by Fan Tidwell MD 
at ENDO/GI 

 UPPER GASTROINTESTINAL ENDOSCOPY N/A 2017 

 ESOPHAGOGASTRODUODENOSCOPY performed by Fan Tidwell MD at ENDO/GI 

 UPPER GASTROINTESTINAL ENDOSCOPY N/A 9/3/2017 

 ESOPHAGOGASTRODUODENOSCOPY performed by Fan Tidwell MD at ENDO/GI 

 IA ESOPHAGOGASTRODUODENOSCOPY TRANSORAL DIAGNOSTIC N/A 2017 

 ESOPHAGOGASTRODUODENOSCOPY performed by Fan Tidwell MD at ENDO/GI 

 UPPER GASTROINTESTINAL ENDOSCOPY N/A 10/19/2017 

 ESOPHAGOGASTRODUODENOSCOPY with NECROSECTOMY performed by Bean Dean MD 
at ENDO/GI 

 UPPER GASTROINTESTINAL ENDOSCOPY N/A 10/23/2017 

 ESOPHAGOGASTRODUODENOSCOPY performed by Bean Dean MD at ENDO/GI 

 HX CARPAL TUNNEL RELEASE   

 HX COLOSTOMY   

 colostomy reversal in 2017 

 HX HEART CATHETERIZATION   

 HX ROTATOR CUFF REPAIR   

 left and right 

 HX ROTATOR CUFF REPAIR Bilateral  

 HX SEPTOPLASTY   

 SIGMOIDECTOMY   



Pertinent medical/surgical history reviewed

Pertinent family history reviewed

Social History 

Substance Use Topics 

 Smoking status: Former Smoker 

  Packs/day: 1.00 

  Years: 15.00 

  Types: Cigarettes, Pipe, Cigars 

  Quit date: 1975 

 Smokeless tobacco: Never Used 

 Alcohol use 0.6 oz/week 

  1 Cans of beer per week 

   Comment: 1 beer every 2 weeks 



History 

Drug Use No 



Allergies:  Niacin and Ativan [lorazepam]

Medications

No current facility-administered medications for this encounter.  



Review of Systems:

A 14 point review of systems was negative except for: mentioned above

       

Physical Exam:

Temp: 36.8 C (98.2 F) (834)

Pulse: 64 (834)

BP: 154/84 (834)

General appearance: alert

Throat: Lips, mucosa, and tongue normal. Teeth and gums normal

Lungs: clear to auscultation bilaterally

Heart: regular rate and rhythm, S1, S2 normal, no murmur, click, rub or gallop

Abdomen: soft, non-tender. Bowel sounds normal. No masses,  no organomegaly

Extremities: extremities normal, atraumatic, no cyanosis or edema

@

Airway:  airway assessment performed  Mallampati III (soft palate, base of 
uvula visible)

Anesthesia Classification:  ASA III (A patient with a severe systemic disease 
that limits activity, but is not incapacitating)

NPO Status: Acceptable

Pregnancy Status: Not Pregnant



Lab/Radiology/Other Diagnostic Tests

Labs:  24-hour labs:  No results found for this visit on 18 (from the 
past 24 hour(s)).



Joe Irene MD

Pager [phone]

in this encounter



Plan of Treatment







    



  Date   Type   Specialty   Care Team   Description

 

    



  2018   Procedure Pass   Infectious Diseases  

 

    



  2018   Surgery    Fan Tidwell MD   ESOPHAGOGASTRODUODENOSCOP



     [address]   [address]



     [address] 



     [place] [address] 



     [phone] (Fax) 

 

    



  2018   Procedure Pass   

 

    



  2018   [facility]    Fan Tidwell MD   Pancreatic necrosis



   Encounter    [address] [facility] 



     [address] 



     [place] [address] 



     [phone] (Fax) 



as of this encounter



Procedures







    



  Procedure Name   Priority   Date/Time   Associated Diagnosis   Comments

 

    



  TELEMETRY STRIPS-SCAN    01/10/2018    Results for this



    3:25 PM CST    procedure are in the



      results section.

 

    



  CHOLANGIOPANCREATOGRAPHY    2018   Pancreatic pseudocyst 



  ENDOSCOPY RETROGRADE WITH    9:30 AM CST  



  STENT PLACEMENT    

 

  



   Special



   Needs



   3 day -



   Reminder



   Call -



   spoke with



   pt. wife,



   17 @



   1256 (cs)

 

    



  ESOPHAGOGASTRODUODENOSCOP    2018   Pancreatic pseudocyst 



  Y ENDOSCOPIC ULTRASOUND    9:30 AM CST  

 

  



   Special



   Needs



   3 day -



   Reminder



   Call -



   spoke with



   pt. wife,



   17 @



   1256 (cs)

 

    



  CHOLANGIOPANCREATOGRAPHY    2018   Pancreatic pseudocyst 



  ENDOSCOPY RETROGRADE    9:30 AM CST  

 

  



   Special



   Needs



   3 day -



   Reminder



   Call -



   spoke with



   pt. wife,



   17 @



   1256 (cs)



in this encounter



Results

* TELEMETRY STRIPS-SCAN (01/10/2018  3:25 PM)





 Narrative

 

 



Ordered by an unspecified provider.





* GI ENDO CATH LOIR & PANC DUCT (2018 11:00 AM)





 



  Specimen   Performing Laboratory

 

 



   [facility]









 Narrative

 

 



This order has been auto finalized and does not contain a result.





* ERCP (2018 10:28 AM)





  



  Component   Value   Ref Range

 

  



  Provation Report   Patient Name: John Mesa 



   Procedure Date: 2018 10:28 AM 



   MRN: [id number] 



   CSN: [id number] 



   Case ID: [id number] 



   YOB: 1940 



   Gender: Male 



   Attending Physician: Fan Tidwell MD 



   Procedure: 



   ERCP 



   Indications:                                    Hi 



   story of Acute necrotizing pancreatitis, 



   Walled off pancreatic necrosis s/p 



   endosocpic 



   necrosectomy and placement of AXIOS 



   stent. 



   Providers: 



   Fan Tidwell MD (Doctor), Joe Irene MD 



   (Fellow), Jinny Ji RN (Nurse), 



   Guillermina Gonzalez Technician (Technician) 



   Referring Physician:                    David Stuart 



   Medications:                                    Mo 



   nitored Anesthesia Care 



   Complications:                                No 



   immediate complications. 



   Procedure: 



   Pre-Anesthesia Assessment: 



   - Prior to the procedure, a History and 



   Physical was performed, and 



   patient medications and allergies were 



   reviewed. The patient's tolerance 



   of previous anesthesia was also reviewed. 



   The risks and benefits of the 



   procedure and the sedation options and 



   risks were discussed with the 



   patient. All questions were answered, and 



   informed consent was obtained. 



   Prior Anticoagulants: The patient has 



   taken no previous anticoagulant or 



   antiplatelet agents. ASA Grade 



   Assessment: III - A patient with severe 



   systemic disease. After reviewing the 



   risks and benefits, the patient 



   was deemed in satisfactory condition to 



   undergo the procedure. 



   After obtaining informed consent, the 



   scope was passed under direct 



   vision. Throughout the procedure, the 



   patient's blood pressure, pulse, 



   and oxygen saturations were monitored 



   continuously. The Duodenoscope 



   0911 was introduced through the mouth, 



   and advanced to the duodenum and 



   used to inject contrast into the ventral 



   pancreatic duct. The ERCP was 



   accomplished without difficulty. The 



   patient tolerated the procedure 



   well. 



   Findings: 



   The limited view of the esophagus, 



   stomach and duodenum unremarkable. 



   There was a large diverticulum seen in 



   the second portion of duodenum. 



   The ampulla was seen at the rim of this 



   diverticulum at 5 o'clock 



   position. It was facing inwards. This was 



   everted with the help of the 



   sphincterotome. The pancreatic duct was 



   easily cannulated. Injection of 



   contrast revealed a 2 mm pancreatic duct 



   in the head. There was abrupt 



   termination of the pancreatic duct in the 



   region of the genu. No 



   contrast was seen coming out of the 



   pancreatic duct and the pancreatic 



   duct in the body and tail could not be 



   opacified. This was suggestive of 



   complete pancreatic duct disconnection. A 



   guidewire was passed. We then 



   performed a 3-4 mm pancreatic 



   sphincterotomy. We then passed a 5 French 



   3 mm pancreatic duct stent with retention 



   flange (Jean stent) to 



   decrease the risk of post ERCP 



   pancreatitis. Patient was also given 100 



   mg of rectal indomethacin. 



   The linear EUS scope was then passed. The 



   celiac and was seen and there 



   were no celiac lymph nodes visualized. 



   The left adrenal in the left lobe 



   of the liver were normal-appearing. The 



   body of the pancreas was 



   visualized with normal-appearing 



   parenchyma. The pancreatic duct was 1.8 



   mm in the body. The AXIOS stent was seen. 



   No large necrotic area was 



   visualized in the region of the body. 



   There was some fluid seen around 



   the AXIOS stent but this was minimal. 



   The scope was then passed to the duodenal 



   bulb. The CBD was 7.9 mm. Some 



   inflammation was seen in the head of the 



   pancreas. At this point we 



   decided to leave the AXIOS stent and will 



   remove it in 4 weeks. 



   Impression: 



   The limited view of the esophagus, stomach and 



   duodenum unremarkable. There was a large

 



   diverticulum seen in the second portion 



   of 



   duodenum. The ampulla was seen at the 



   rim of 



   this diverticulum at 5 o'clock position. 



   It was 



   facing inwards. This was everted with 



   the help 



   of the sphincterotome. The pancreatic 



   duct was 



   easily cannulated. Injection of contrast

 



   revealed a 2 mm pancreatic duct in the 



   head. 



   There was abrupt termination of the 



   pancreatic 



   duct in the region of the genu. No 



   contrast was 



   seen coming out of the pancreatic duct 



   and the 



   pancreatic duct in the body and tail 



   could not 



   be opacified. This was suggestive of 



   complete 



   pancreatic duct disconnection. A 



   guidewire was 



   passed. We then performed a 3-4 mm 



   pancreatic 



   sphincterotomy. We then passed a 5 



   French 3 mm 



   pancreatic duct stent with retention 



   flange 



   (Jean stent) to decrease the risk of 



   post ERCP 



   pancreatitis. Patient was also given 100 



   mg of 



   rectal indomethacin. 



   The linear EUS scope was then passed. 



   The 



   celiac and was seen and there were no 



   celiac 



   lymph nodes visualized. The left adrenal 



   in the 



   left lobe of the liver were 



   normal-appearing. 



   The body of the pancreas was visualized 



   with 



   normal-appearing parenchyma. The 



   pancreatic 



   duct was 1.8 mm in the body. The AXIOS 



   stent 



   was seen. No large necrotic area was 



   visualized 



   in the region of the body. There was 



   some fluid 



   seen around the AXIOS stent but this was

 



   minimal. 



   The scope was then passed to the 



   duodenal bulb. 



   The CBD was 7.9 mm. Some inflammation 



   was seen 



   in the head of the pancreas. At this 



   point we 



   decided to leave the AXIOS stent and 



   will 



   remove it in 4 weeks. 



   Estimated Blood Loss:                 Estimated 



   blood loss: none. 



   Recommendation:                             - 



   Written discharge instructions were provided 



   to the patient. 



   - Resume previous diet. 



   - Continue present medications. 



   - EGD in 4 weeks to remove the stents 



   Scope In: 10:48:50 AM 



   Scope Out: 11:20:41 AM 



   Total Procedure Duration Time 0 hours 31 minutes 



   51 seconds 



   Procedure Code(s):                        --- 



   Professional --- 



   68728, Endoscopic retrograde 



   cholangiopancreatography (ERCP); with 



   placement 



   of endoscopic stent into biliary or 



   pancreatic 



   duct, including pre- and post-dilation 



   and 



   guide wire passage, when performed, 



   including 



   sphincterotomy, when performed, each 



   stent 



   99516, Esophagogastroduodenoscopy, 



   flexible, 



   transoral; with endoscopic ultrasound 



   examination, including the esophagus, 



   stomach, 



   and either the duodenum or a surgically 



   altered 



   stomach where the jejunum is examined 



   distal to 



   the anastomosis 



   CPT copyright 2016 [employer]. 



   All rights reserved. 



   The codes documented in this report are 



   preliminary and upon  review may 



   be revised to meet current compliance 



   requirements. 



   Attending Participation: 



   I personally performed the entire 



   procedure. 



   Fan Tidwell MD 



   2018 11:28:54 AM 



   The attending physician has electronically signed 



   and finalized this document. 



   Joe Irene MD 



   Number of Addenda: 0 



   Note Initiated On: 2018 10:28 AM 









 



  Specimen   Performing Laboratory

 

 



   [facility] OTHER RESULTS





in this encounter



Visit Diagnoses











  Diagnosis

 





  Pancreatic pseudocyst

 





  Cyst and pseudocyst of pancreas







Admitting Diagnoses











  Diagnosis

 





  Pancreatic pseudocyst - Pancreatic pseudocyst [K86.3]

 





  Cyst and pseudocyst of pancreas







Administered Medications







     



  Medication Order   MAR Action   Action Date   Dose   Rate   Site

 

     



  Indomethacin (INDOCIN) suppository   Given   2018   2  



  INTRA-PROCEDURE MED, Starting Tue 18    11:07 CST   suppositorie  



  at 1107, Until Tue 18 at 1335,     s  



  Intra-op     

 

  

 

     



  iopamidol 300 (ISOVUE-300) injection   Given   2018   7 mL  



  INTRA-PROCEDURE MED, Starting Tue 18    10:57 CST   



  at 1057, Until Tue 18 at 1335,     



  Intra-op     

 

  



in this encounter

## 2018-02-05 NOTE — XMS REPORT
Encounter Summary

 Created on: 2018



Moshe Lopez

External Reference #: [id number]

: 1940

Sex: Male



Demographics







 Address  [address] [place] [address]  [address]

 

 Home Phone  [phone]

 

 Preferred Language  English

 

 Marital Status  Unknown

 

 Yarsanism Affiliation  NON

 

 Race  White

 

 Ethnic Group  Not  or 





Author







 Author  [facility]

 

 Organization  [facility]

 

 Address  Unknown

 

 Phone  Unavailable







Support







 Name  Relationship  Address  Phone

 

 Latisha Lopez  ECON  Unknown  [phone]







Care Team Providers







 Care Team Member Name  Role  Phone

 

 VelezJaelyn beck   PCP  [phone]

 

 Agustin Jaelyn DO  Unavailable  [phone]







Reason for Visit

* 





 



  Reason   Comments

 

 



  Procedure 









Encounter Details







    



  Date   Type   Department   Care Team   Description

 

    



  2018   Telephone   [facility]   Fan Tidwell MD   Procedure



    Physicians - Internal   [address] [facility] 



    Medicine   [address] 



    [address] [place] [address] 



    [address] [facility] MED   [phone] 



    OFFICE BLDG   [phone] (Fax) 



    [place]  



    [address] [phone]  







Social History







    



  Tobacco Use   Types   Packs/Day   Years Used   Date

 

    



  Former Smoker   Cigarettes, Pipe, Cigars   1   15   Quit: 1975

 

    



  Smokeless Tobacco: Never   



  Used   









   



  Alcohol Use   Drinks/Week   oz/Week   Comments

 

   



  Yes   1 Cans of   0.6   1 beer every 2 weeks



   beer  









 



  Sex Assigned at Birth   Date Recorded

 

 



  Not on file 



as of this encounter



Functional Status







  



  Functional Status   Response   Date of Assessment

 

  



  Does the patient have a hearing impairment:   No   2017

 

  



  Does the patient have a visual impairment:   Yes   2017

 

  



  Does the patient have impaired ambulation:   No   2017

 

  



  Does the patient have an activity of daily living   No   2017



  (ADL) impairment:  

 

  



  Does the patient have an instrumental activity of   No   2017



  daily living (IADL) impairment:  









  



  Cognitive Status   Response   Date of Assessment

 

  



  Does the patient have a cognitive impairment:   No   2017



as of this encounter



Miscellaneous Notes

* Telephone Encounter - Rosa Maria Sullivan RN - 2018 10:04 AM CST



Orders placed. 

* Telephone Encounter - Rosa Maria Sullivan RN - 2018  9:43 AM CST



----- Message from Marla Moncada RN sent at 2018  9:23 AM CST -----



----- Message -----

   From: Fan Tidwell MD

   Sent: 2018  11:29 AM

     To: Marla Kessler, RN



Schedule EGD in 4 weeks

in this encounter



Plan of Treatment







    



  Date   Type   Specialty   Care Team   Description

 

    



  2018   Procedure Pass   Infectious Diseases  

 

    



  2018   Surgery    Fan Tidwell MD   ESOPHAGOGASTRODUODENOSCOP



     [address]   [address]



     [address] 



     [place] [address] [phone] (Fax) 

 

    



  2018   Procedure Pass   

 

    



  2018   [facility]    Fan Tidwell MD   Pancreatic necrosis



   Encounter    [address] 



     [address] 



     [place] [address] [phone] (Fax) 



as of this encounter



Visit Diagnoses

Not on filein this encounter

## 2018-02-05 NOTE — XMS REPORT
Encounter Summary

 Created on: 2018



Moshe Lopez

External Reference #: [id number]

: 1940

Sex: Male



Demographics







 Address  [address] [place]  [address]

 

 Home Phone  [phone]

 

 Preferred Language  English

 

 Marital Status  Unknown

 

 Tenriism Affiliation  NON

 

 Race  White

 

 Ethnic Group  Not  or 





Author







 Author  [facility]

 

 Organization  [facility]

 

 Address  Unknown

 

 Phone  Unavailable







Support







 Name  Relationship  Address  Phone

 

 Latisha Lopez  ECON  Unknown  [phone]







Care Team Providers







 Care Team Member Name  Role  Phone

 

 Jaelyn Velez   PCP  [phone]

 

 VelezJaelyn beck DO  Unavailable  [phone]







Encounter Details







    



  Date   Type   Department   Care Team   Description

 

    



  2017   Procedure Pass   [facility] Radiology  



    [address] [facility]  



    [address] FLO[address]  



    [place] [address] [phone]  







Social History







    



  Tobacco Use   Types   Packs/Day   Years Used   Date

 

    



  Former Smoker   Cigarettes, Pipe, Cigars   1   15   Quit: 1975

 

    



  Smokeless Tobacco: Never   



  Used   









   



  Alcohol Use   Drinks/Week   oz/Week   Comments

 

   



  Yes   1 Cans of   0.6   1 beer every 2 weeks



   beer  









 



  Sex Assigned at Birth   Date Recorded

 

 



  Not on file 



as of this encounter



Functional Status







  



  Functional Status   Response   Date of Assessment

 

  



  Does the patient have a hearing impairment:   No   2017

 

  



  Does the patient have a visual impairment:   Yes   10/12/2017

 

  



  Does the patient have impaired ambulation:   Yes   10/12/2017

 

  



  Does the patient have an activity of daily living   No   10/12/2017



  (ADL) impairment:  

 

  



  Does the patient have an instrumental activity of   No   10/12/2017



  daily living (IADL) impairment:  









  



  Cognitive Status   Response   Date of Assessment

 

  



  Does the patient have a cognitive impairment:   No   10/12/2017



as of this encounter



Plan of Treatment







    



  Date   Type   Specialty   Care Team   Description

 

    



  2018   Procedure Pass   Infectious Diseases  

 

    



  2018   Surgery    Fan Tidwell MD   ESOPHAGOGASTRODUODENOSCOP



     [address] [facility]   [address]



     [address] 



     [place] [address] 



     [phone] (Fax) 

 

    



  2018   Procedure Pass   

 

    



  2018   Hospital    Fan Tidwell MD   Pancreatic necrosis



   Encounter    [address] [facility] 



     [address] 



     [place] [address] 



     [phone] (Fax) 



as of this encounter



Visit Diagnoses

Not on filein this encounter

## 2018-02-05 NOTE — XMS REPORT
Encounter Summary

 Created on: 2018



Moshe Lopez

External Reference #: [id number]

: 1940

Sex: Male



Demographics







 Address  [address] [place] [address]  [address]

 

 Home Phone  [phone]

 

 Preferred Language  English

 

 Marital Status  Unknown

 

 Anabaptism Affiliation  NON

 

 Race  White

 

 Ethnic Group  Not  or 





Author







 Author  [facility]

 

 Organization  [facility]

 

 Address  Unknown

 

 Phone  Unavailable







Support







 Name  Relationship  Address  Phone

 

 Latisha Lopez  ECON  Unknown  [phone]







Care Team Providers







 Care Team Member Name  Role  Phone

 

 VelezJaelyn beck   PCP  [phone]

 

 Agustin Jaelyn DO  Unavailable  [phone]







Reason for Visit

* 





 



  Reason   Comments

 

 



  Outpatient Antibiotic 



  Therapy ([facility]) 









Encounter Details







    



  Date   Type   Department   Care Team   Description

 

    



  2018   Telephone   [facility]   Aby Bah MD   Outpatient 
Antibiotic



    Physicians - Internal   [address]   Therapy ([facility])



    Medicine   [address] 



    [address] [place] [address] 



    [address] MED   [phone] 



    OFFICE BLDG   [phone] (Fax) 



    [place]  



    [address] [phone]  







Social History







    



  Tobacco Use   Types   Packs/Day   Years Used   Date

 

    



  Former Smoker   Cigarettes, Pipe, Cigars   1   15   Quit: 1975

 

    



  Smokeless Tobacco: Never   



  Used   









   



  Alcohol Use   Drinks/Week   oz/Week   Comments

 

   



  Yes   1 Cans of   0.6   1 beer every 2 weeks



   beer  









 



  Sex Assigned at Birth   Date Recorded

 

 



  Not on file 



as of this encounter



Functional Status







  



  Functional Status   Response   Date of Assessment

 

  



  Does the patient have a hearing impairment:   No   2017

 

  



  Does the patient have a visual impairment:   Yes   2017

 

  



  Does the patient have impaired ambulation:   No   2017

 

  



  Does the patient have an activity of daily living   No   2017



  (ADL) impairment:  

 

  



  Does the patient have an instrumental activity of   No   2017



  daily living (IADL) impairment:  









  



  Cognitive Status   Response   Date of Assessment

 

  



  Does the patient have a cognitive impairment:   No   2017



as of this encounter



Miscellaneous Notes

* Telephone Encounter - Whit Madera RN - 2018 10:48 AM CST



Spoke to pt's wife re: lab draw, they will be in town on  for appt the 
following day and will have these drawn at [facility] lab, order placed in [facility]2 for 
CBC w/ diff and CMP per Dr. Bah.

in this encounter



Plan of Treatment







    



  Date   Type   Specialty   Care Team   Description

 

    



  2018   Procedure Pass   Infectious Diseases  

 

    



  2018   Surgery    Fan Tidwell MD   ESOPHAGOGASTRODUODENOSCOP



     [address]   [address]



     [address] 



     [place] [address] [phone] (Fax) 

 

    



  2018   Procedure Pass   

 

    



  2018   Hospital    Fan Tidwell MD   Pancreatic necrosis



   Encounter    [address] 



     [address] 



     [place] [address] [phone] (Fax) 



as of this encounter



Results

* COMPREHENSIVE METABOLIC PANEL (2018  3:10 PM)





  



  Component   Value   Ref Range

 

  



  Sodium   141   137 - 147 MMOL/L

 

  



  Potassium   4.0   3.5 - 5.1 MMOL/L

 

  



  Chloride   106   98 - 110 MMOL/L

 

  



  Glucose   126 (H)   70 - 100 MG/DL

 

  



  Blood Urea Nitrogen   18   7 - 25 MG/DL

 

  



  Creatinine   1.02   0.4 - 1.24 MG/DL

 

  



  Calcium   9.4   8.5 - 10.6 MG/DL

 

  



  Total Protein   6.9   6.0 - 8.0 G/DL

 

  



  Total Bilirubin   0.2 (L)   0.3 - 1.2 MG/DL

 

  



  Albumin   4.1   3.5 - 5.0 G/DL

 

  



  Alk Phosphatase   56   25 - 110 U/L

 

  



  AST (SGOT)   31   7 - 40 U/L

 

  



  CO2   27   21 - 30 MMOL/L

 

  



  ALT (SGPT)   22   7 - 56 U/L

 

  



  Anion Gap   8   3 - 12

 

  



  eGFR Non African American   >60   >60 mL/min



   Comment: 



   The eGFR is not validated for use in drug dosing 



   adjustments.    Continue to use 



   estimated creatinine clearance per dosing 



   reference text.    Please contact the 



   Clinical Pharmacist for questions. 

 

  



  eGFR    >60   >60 mL/min



   Comment: 



   The eGFR is not validated for use in drug dosing 



   adjustments.    Continue to use 



   estimated creatinine clearance per dosing 



   reference text.    Please contact the 



   Clinical Pharmacist for questions. 









 



  Specimen   Performing Laboratory

 

 



  Blood    MAIN LAB



   [address]



   [place] [address]





* CBC AND DIFF (2018  3:10 PM)





  



  Component   Value   Ref Range

 

  



  White Blood Cells   7.0   4.5 - 11.0 K/UL

 

  



  RBC   4.15 (L)   4.4 - 5.5 M/UL

 

  



  Hemoglobin   11.7 (L)   13.5 - 16.5 GM/DL

 

  



  Hematocrit   34.9 (L)   40 - 50 %

 

  



  MCV   84.2   80 - 100 FL

 

  



  MCH   28.2   26 - 34 PG

 

  



  MCHC   33.5   32.0 - 36.0 G/DL

 

  



  RDW   22.4 (H)   11 - 15 %

 

  



  Platelet Count   228   150 - 400 K/UL

 

  



  MPV   9.0   7 - 11 FL

 

  



  Neutrophils   68   41 - 77 %

 

  



  Lymphocytes   21 (L)   24 - 44 %

 

  



  Monocytes   8   4 - 12 %

 

  



  Eosinophils   3   0 - 5 %

 

  



  Basophils   0   0 - 2 %

 

  



  Absolute Neutrophil Count   4.70   1.8 - 7.0 K/UL

 

  



  Absolute Lymph Count   1.50   1.0 - 4.8 K/UL

 

  



  Absolute Monocyte Count   0.50   0 - 0.80 K/UL

 

  



  Absolute Eosinophil Count   0.20   0 - 0.45 K/UL

 

  



  Absolute Basophil Count   0.00   0 - 0.20 K/UL









 



  Specimen   Performing Laboratory

 

 



  Blood   [facility]



   [address]



   [place] [address]





in this encounter



Visit Diagnoses











  Diagnosis

 





  Encounter for long-term (current) use of antibiotics - Primary

 





  Fluid collection of pancreas

## 2018-02-05 NOTE — ED ABDOMINAL PAIN
General


Chief Complaint:  Abdominal/GI Problems


Stated Complaint:  PANCREATITIS


Nursing Triage Note:  


pt reports started having l sided lower abdominal pain at 1330 today. Has had 2 


epsiodes of vomiting since. Pt reports hx of pancreatitis.


Sepsis Screen:  No Definite Risk


Source of Information:  Patient, Spouse (WIFE DOES MOST OF TALKING)





History of Present Illness


Date Seen by Provider:  Feb 5, 2018


Time Seen by Provider:  16:35


Initial Comments


PT ARRIVES VIA POV


C/O SEVERE LUQ PAIN SINCE 1400


PT HAS EXTENSIVE HISTORY OF PANCREATITIS AND HAS HAD 8 ERCP'S AND CURRENTLY HAS 

2 STENTS IN PANCREAS--HAS APPOINTMENT AT  OF [facility] FOR RE-EVALUATION. 09/ 2017 AND 01/2018


PT BEGAN HAVING PROBLEMS 07/15/17 WITH THIS--PT STATES IT IS THOUGHT TO BE 

CAUSED BY GALLBLADDER, BUT HAS NOT HAD GALLBLADDER REMOVED.


WAS INITIALLY HOSPITALIZED AT [facility] 12 DAYS, THEN TRANSFERRED TO  

WHERE HE WAS HOSPITALIZED FOR 57 DAYS--LAST AUGUST/SEPTEMBER. 


PT ATE AT A [facility]FFET IN [place] TODAY AROUND 11:30, PAIN BEGAN AROUND 1400, AND 

WAS GOING TO DRIVE TO  FOR THIS, BUT STATES PAIN BECAME TOO SEVERE


NO FEVER/SWEATS/CHILLS


+ NAUSEA AND VOMITED X 2


NO DIARRHEA


NO PROBLEMS URINATING


TOOK OXYCODONE 5 MG AT 1400--NO RELIEF








Allergies and Home Medications


Allergies


Coded Allergies:  


     niacin (Verified  Allergy, Unknown, 2/8/17)


Uncoded Allergies:  


     TAPE (Allergy, Intermediate, BLISTERS, 2/8/17)





Home Medications


Alprazolam 0.5 Mg Tablet, 0.5 MG PO DAILY PRN for ANXIETY, (Reported)


Ascorbic Acid 500 Mg Capsule.sa, 1,000 MG PO DAILY, (Reported)


Aspirin 81 Mg Tablet.dr, 81 MG PO DAILY, (Reported)


Calcium Polycarbophil 625 Mg Tablet, 625 MG PO DAILY, (Reported)


Cetirizine HCl 10 Mg Tablet, 10 MG PO HS, (Reported)


Clopidogrel Bisulfate 75 Mg Tablet, 75 MG PO DAILY, (Reported)


Dicyclomine HCl 20 Mg Tablet, 20 MG PO Q6H, #20


   Prescribed by: DAI VARGAS on 2/5/18 2024


Docusate Sodium 100 Mg Tablet, 100 MG PO BID PRN for CONSTIPATION-1ST LINE, (

Reported)


Famotidine 20 Mg Tablet, 20 MG PO BID, (Reported)


Fenofibrate 160 Mg Tablet, 160 MG PO HS, (Reported)


Finasteride 5 Mg Tablet, 5 MG PO DAILY, (Reported)


Hydrochlorothiazide 25 Mg Tablet, 25 MG PO DAILY, (Reported)


Hydrocodone/Acetaminophen 1 Each Tablet, 1-2 EACH PO Q4H, #35


   Prescribed by: DAIJA HAWK on 4/14/17 1248


Hyoscyamine Sulfate 0.125 Mg Tab.subl, 1-2 TAB SL Q4H, #15


   Prescribed by: DAI VARGAS on 2/5/18 2024


Lisinopril 20 Mg Tablet, 20 MG PO DAILY, (Reported)


Loratadine 10 Mg Tablet, 10 MG PO DAILY, (Reported)


Magnesium Oxide 250 Mg Tablet, 250 MG PO DAILY, (Reported)


Multivitamin 1 Each Tablet, 1 TAB PO DAILY, (Reported)


Omega-3/Dha/Epa/Fish Oil 1 Each Capsule.dr, 1,400 MG PO BID, (Reported)


Omeprazole 20 Mg Capsule.dr, 20 MG PO BID, (Reported)


Oxycodone HCl/Acetaminophen 1 Each Tablet, 1-2 TAB PO EVERY 4-6 HOURS PRN for 

PAIN-SEVERE, (Reported)


Potassium Chloride 10 Meq Tab.er.prt, 10 MEQ PO DAILY, #14


   Prescribed by: MICHOACANO DUNBAR on 4/19/17 1123


Rosuvastatin Calcium 5 Mg Tablet, 5 MG PO HS, (Reported)


Tamsulosin HCl 0.4 Mg Cap.er.24h, 0.4 MG PO HS, (Reported)


Vitamin B Complex 1 Cap Capsule, 1 CAP PO DAILY, (Reported)





Review of Systems


Constitutional:  no symptoms reported, No chills, No diaphoresis, No dizziness, 

No fever


Respiratory:  No Symptoms Reported


Cardiovascular:  No Symptoms Reported


Gastrointestinal:  See HPI, Abdominal Pain, Denies Constipated, Denies Diarrhea

, Nausea, Vomiting


Genitourinary:  No Symptoms Reported


Musculoskeletal:  no symptoms reported


Skin:  no symptoms reported


Psychiatric/Neurological:  No Symptoms Reported


Endocrine:  No Symptoms Reported


Hematologic/Lymphatic:  No Symptoms Reported





Past Medical-Social-Family Hx


Patient Social History


Alcohol Use:  Rarely Uses


Number of Drinks Today:  AA


Alcohol Beverage of Choice:  Beer


Recreational Drug Use:  No


Smoking Status:  Former Smoker


Type Used:  Cigarettes


Former Smoker, Quit:  Jan 13, 1970


Recent Foreign Travel:  No


Contact w/Someone Who Travel:  No


Recent Infectious Disease Expo:  No


Recent Hopitalizations:  No


Physical Abuse:  No


Sexual Abuse:  No


Mistreated:  No


Fear:  No





Immunizations Up To Date


Tetanus Booster (TDap):  Unknown


Date of Pneumonia Vaccine:  Oct 1, 2011


Date of Influenza Vaccine:  Oct 1, 2016





Seasonal Allergies


Seasonal Allergies:  Yes





Surgeries


History of Surgeries:  Yes (CARDIAC CATHS-STENTS X 9; COLONOSCOPIES; ROTATOR 

CUFF SURGERY X 3; CARPAL TUNNEL RELEASE X 2; DEVIATED SEPTUM; ERCP'S X8 WITH 

PANCREATIC STENTS X 2-09/2017 AND 01/2018; COLON RESECTION/COLOSTOMY-ILEOSTOMY 

WITH REVERSAL 04/2017)


Surgeries:  Abdominal, Cardiac, Coronary Stent, Orthopedic, Pancreatic





Respiratory


History of Respiratory Disorde:  Yes


Respiratory Disorders:  Sleep Apnea


Currently Using CPAP:  Yes





Cardiovascular


History of Cardiac Disorders:  Yes (CARDIAC STENTS x9)


Cardiac Disorders:  Coronary Artery Disease, High Cholesterol, Hypertension





Neurological


History of Neurological Disord:  Yes


Neurological Disorders:  Vertigo





Reproductive System


Hx Reproductive Disorders:  No


Sexually Transmitted Disease:  No


HIV/AIDS:  No





Genitourinary


History of Genitourinary Disor:  Yes


Genitourinary Disorders:  Benign Prostatic Hyperpl, Prostate Problems





Gastrointestinal


History of Gastrointestinal Di:  Yes (DIVERTICULITIS WITH COLON RESECTION/

COLOSTOMY-ILEOSTOMY AND REVERSAL; ERCP'S X 8 WITH PANCREATIC STENTS X 2)


Gastrointestinal Disorders:  Gastroesophageal Reflux, Diverticulosis, 

Pancreatitis





Musculoskeletal


History of Musculoskeletal Dis:  Yes (ROTATOR CUFF & CARPAL TUNNEL SURGERIES)


Musculoskeletal Disorders:  Arthritis, Gout





Endocrine


History of Endocrine Disorders:  No





HEENT


History of HEENT Disorders:  No


Loss of Vision:  Denies


Hearing Impairment:  Denies





Cancer


History of Cancer:  No





Psychosocial


History of Psychiatric Problem:  Yes


Behavioral Health Disorders:  Anxiety, Depression


Suicide Risk Score:  0





Integumentary


History of Skin or Integumenta:  No





Blood Transfusions


History of Blood Disorders:  No


Adverse Reaction to a Blood Tr:  No





Family Medical History


Family Medial History:  


Cancer


  4 FATHER


  09 SISTER


Cancer of colon


  4 FATHER


Cardiovascular disease


  20 MOTHER


  GRANDMOTHER


  GRANDFATHER


Myocardial infarction


  GRANDFATHER


Stroke


  AUNT





No Family History of:


  Abdominal aortic aneurysm


  [place]'s disease


  Alcoholism


  Aphasia


  Cataract


  Chest pain


  Congenital heart disease


  Congestive heart failure


  Cystic fibrosis


  Dementia


  Dysphagia


  Family history: Allergy


  Family history: Alzheimer's disease


  Family history: Arthritis


  Family history: Asthma


  Family history: Breast disease


  Family history: Cardiovascular disease


  Family history: Coronary thrombosis


  Family history: Diabetes mellitus


  Family history: Gastrointestinal disease


  Family history: Glaucoma


  Family history: Hypertension


  Family history: Osteoporosis


  Family history: Thyroid disorder


  Headache


  Hearing loss


  Heart disease


  Hereditary disease


  History of - anemia


  History of - disorder


  History of - respiratory disease


  History of drug abuse


  Human immunodeficiency virus (HIV) seropositivity


  Hypercholesterolemia


  Infertile


  Kidney disease


  Malignant neoplasm of lung


  Parkinson's disease


  Prostate cancer


  Psychotic disorder


  Seizure disorder


  Tuberculosis


  Visual impairment





Physical Exam


Vital Signs





 VS - Last 72 Hours, by Label








 2/5/18





 16:47


 


Temp 97.6


 


Pulse 62


 


Resp 20


 


B/P (MAP) 181/84 (116)


 


Pulse Ox 97





Capillary Refill : Less Than 3 Seconds


General Appearance:  WD/WN, no apparent distress, other (DOES NOT APPEAR TO BE 

IN ANY DISCOMFORT AT THIS TIME. WALKS UPRIGHT)


HEENT:  No scleral icterus (R), No scleral icterus (L)


Respiratory:  normal breath sounds, no respiratory distress, no accessory 

muscle use


Cardiovascular:  regular rate, rhythm, no edema, no murmur


Gastrointestinal:  normal bowel sounds, soft, no organomegaly, no pulsatile mass

, No distended, No guarding, No rebound, tenderness (EPIGASTRIC AND LUQ 

TENDERNESS), No hernia, No mass


Back:  no CVA tenderness


Neurologic/Psychiatric:  CNs II-XII nml as tested, no motor/sensory deficits, 

alert, normal mood/affect, oriented x 3


Skin:  normal color, warm/dry





Progress/Results/Core Measures


Results/Orders


Lab Results





Laboratory Tests








Test


  2/5/18


16:43 2/5/18


17:34 Range/Units


 


 


White Blood Count


  13.3 H


  


  4.3-11.0


10^3/uL


 


Red Blood Count


  4.69 


  


  4.35-5.85


10^6/uL


 


Hemoglobin 14.1   13.3-17.7  G/DL


 


Hematocrit 41   40-54  %


 


Mean Corpuscular Volume 87   80-99  FL


 


Mean Corpuscular Hemoglobin 30   25-34  PG


 


Mean Corpuscular Hemoglobin


Concent 35 


  


  32-36  G/DL


 


 


Red Cell Distribution Width 16.6 H  10.0-14.5  %


 


Platelet Count


  216 


  


  130-400


10^3/uL


 


Mean Platelet Volume 10.3   7.4-10.4  FL


 


Neutrophils (%) (Auto) 90 H  42-75  %


 


Lymphocytes (%) (Auto) 6 L  12-44  %


 


Monocytes (%) (Auto) 4   0-12  %


 


Eosinophils (%) (Auto) 1   0-10  %


 


Basophils (%) (Auto) 0   0-10  %


 


Neutrophils # (Auto) 12.0 H  1.8-7.8  X 10^3


 


Lymphocytes # (Auto) 0.7 L  1.0-4.0  X 10^3


 


Monocytes # (Auto) 0.5   0.0-1.0  X 10^3


 


Eosinophils # (Auto)


  0.1 


  


  0.0-0.3


10^3/uL


 


Basophils # (Auto)


  0.0 


  


  0.0-0.1


10^3/uL


 


Neutrophils % (Manual) 80    %


 


Lymphocytes % (Manual) 13    %


 


Monocytes % (Manual) 3    %


 


Eosinophils % (Manual) 1    %


 


Basophils % (Manual) 0    %


 


Band Neutrophils 3    %


 


Blood Morphology Comment NORMAL    


 


Prothrombin Time 12.9   12.2-14.7  SEC


 


INR Comment 1.0   0.8-1.4  


 


Activated Partial


Thromboplast Time 31 


  


  24-35  SEC


 


 


Sodium Level 140   135-145  MMOL/L


 


Potassium Level 4.3   3.6-5.0  MMOL/L


 


Chloride Level 103     MMOL/L


 


Carbon Dioxide Level 23   21-32  MMOL/L


 


Anion Gap 14   5-14  MMOL/L


 


Blood Urea Nitrogen 27 H  7-18  MG/DL


 


Creatinine


  1.17 


  


  0.60-1.30


MG/DL


 


Estimat Glomerular Filtration


Rate > 60 


  


   


 


 


BUN/Creatinine Ratio 23    


 


Glucose Level 129 H    MG/DL


 


Calcium Level 9.9   8.5-10.1  MG/DL


 


Magnesium Level 2.1   1.8-2.4  MG/DL


 


Total Bilirubin 0.3   0.1-1.0  MG/DL


 


Aspartate Amino Transf


(AST/SGOT) 32 


  


  5-34  U/L


 


 


Alanine Aminotransferase


(ALT/SGPT) 26 


  


  0-55  U/L


 


 


Alkaline Phosphatase 69     U/L


 


Total Protein 8.1   6.4-8.2  GM/DL


 


Albumin 4.6 H  3.2-4.5  GM/DL


 


Amylase Level 85     U/L


 


Lipase 27   8-78  U/L


 


Urine Color  YELLOW   


 


Urine Clarity  CLEAR   


 


Urine pH  7  5-9  


 


Urine Specific Gravity  1.010 L 1.016-1.022  


 


Urine Protein  NEGATIVE  NEGATIVE  


 


Urine Glucose (UA)  NEGATIVE  NEGATIVE  


 


Urine Ketones  NEGATIVE  NEGATIVE  


 


Urine Nitrite  NEGATIVE  NEGATIVE  


 


Urine Bilirubin  NEGATIVE  NEGATIVE  


 


Urine Urobilinogen  NORMAL  NORMAL  MG/DL


 


Urine Leukocyte Esterase  NEGATIVE  NEGATIVE  


 


Urine RBC (Auto)  4+ H NEGATIVE  


 


Urine RBC  25-50 H  /HPF


 


Urine WBC  0-2   /HPF


 


Urine Crystals  NONE   /LPF


 


Urine Bacteria  NEGATIVE   /HPF


 


Urine Casts  NONE   /LPF


 


Urine Mucus  NEGATIVE   /LPF


 


Urine Culture Indicated  NO   








My Orders





Orders - DAI VARGAS DO


Saline Lock/Iv-Start (2/5/18 16:36)


Amylase (2/5/18 16:36)


Cbc With Automated Diff (2/5/18 16:36)


Comprehensive Metabolic Panel (2/5/18 16:36)


Lactic Acid Analyzer (2/5/18 16:36)


Lipase (2/5/18 16:36)


Magnesium (2/5/18 16:36)


Protime With Inr (2/5/18 16:36)


Partial Thromboplastin Time (2/5/18 16:36)


Ua Culture If Indicated (2/5/18 16:36)


Saline Lock/Iv-Start (2/5/18 16:36)


Lactated Ringers (Lr 1000 Ml Iv Solution (2/5/18 16:36)


Ondansetron Injection (Zofran Injectio (2/5/18 16:45)


Famotidine Injection (Pepcid Injection) (2/5/18 16:36)


Fentanyl  Injection (Sublimaze Injection (2/5/18 16:36)


Manual Differential (2/5/18 16:43)


Ct Abdomen/Pelvis W (2/5/18 17:16)


Acute Abd Series (2/5/18 17:16)


Iohexol Injection (Omnipaque 350 Mg/Ml 1 (2/5/18 17:30)


Ns (Ivpb) (Sodium Chloride 0.9% Ivpb Bag (2/5/18 17:30)


Rx-Hyoscyamine Drops (Rx-Levsin Drops) (2/5/18 20:30)


Rx-Dicyclomine Capsule (Rx-Bentyl Capsul (2/5/18 20:20)


Rx-Hyoscyamine Tab (Rx-Levsin Sl) (2/5/18 20:35)


Rx-Hyoscyamine Tab (Rx-Levsin Sl) (2/5/18 20:46)





Medications Given in ED





Current Medications








 Medications  Dose


 Ordered  Sig/Cr


 Route  Start Time


 Stop Time Status Last Admin


Dose Admin


 


 Hyoscyamine


 Sulfate  0.125 mg  Q4H  PRN


 PO  2/5/18 20:30


    2/5/18 20:42


0.125 MG


 


 Iohexol  100 ml  ONCE  ONCE


 IV  2/5/18 17:30


 2/5/18 17:33 DC 2/5/18 17:47


100 ML


 


 Lactated Ringer's  1,000 ml @ 


 0 mls/hr  Q0M ONCE


 IV  2/5/18 16:36


 2/5/18 16:44 DC 2/5/18 17:34


0 MLS/HR


 


 Ondansetron HCl  4 mg  ONCE  ONCE


 IVP  2/5/18 16:45


 2/5/18 16:46 DC 2/5/18 17:33


4 MG


 


 Sodium Chloride  100 ml  ONCE  ONCE


 IV  2/5/18 17:30


 2/5/18 17:33 DC 2/5/18 17:47


100 ML








Vital Signs/I&O





Vital Sign - Last 12Hours








 2/5/18





 16:47


 


Temp 97.6


 


Pulse 62


 


Resp 20


 


B/P (MAP) 181/84 (116)


 


Pulse Ox 97














Blood Pressure Mean:  116








Progress Note :  


Progress Note


PAIN RESOLVED WITH MEDIATIONS AND NO RETURN OF SYMPTOMS DURING ER STAY





ON REVIEWING CT FINDINGS WITH PT AND WIFE, WIFE NOW REPORTS THAT INFECTIOUS 

DISEASE DR HAS BEEN INVOLVED AND HAS BEEN TREATING "A POCKED OF FLUID" WITH 

HIGH DOSE ANTIBIOTICS --WAS ON LEVAQUIN, NOW ON AMOXIL. WIFE STATES POCKET HAS 

BEEN GETTING SMALLER. SHE HAS PRESUMED THEY WERE TREATING INFECTION/ABSCESS OF 

THIS AREA.





Diagnostic Imaging





Comments


ACUTE ABDOMEN XRAYS--NON-SPECIFIC BOWEL GAS. FOREIGN OBJECT IN MID ABDOMEN


CT ABDOMEN/PELVIS--INFLAMMATORY CHANGES TO STOMACH, DUODENUM AND  AND PANCREAS, 

WITH NON-SPECIFIC FLUID COLLECTION TO RIGHT OF DUODENUM AND MEDIAL TO 

GALLBLADDER--DUODENUM DIVERTICULUM, VS ABSCESS, FATTY INFILTRATION AND 

CALCIFICATIONS IN PANCREAS, FOREIGN BODY ANTERIOR TO PANCREAS-LIKELY DEVICE FOR 

DECOMPRESSION OF PSEUDOCYST--OTHER NON-ACUTE FINDINGS


PER RADIOLOGIST REPORTS @ 1840-


   Reviewed:  Reviewed by Me





Departure


Communication (Admissions)


Progress Notes


1839--CALLED [facility] TRANSFER LINE. NO TRIAGE NURSE AVAILABLE TO TAKE CALL.  THEY 

WILL CALL BACK. 


1902--CALLED [facility] AGAIN. STILL NO ONE AVAILABLE TO TAKE CALL. THEY WILL CALL BACK 


1933--CALLED [facility] AGAIN. STILL NO ONE AVAILABLE TO TAKE CALL. THEY WILL CALL ME 

BACK


1947--SPOKE WITH [facility] TRIAGE RN. THEY WILL CALL ME BACK. 


2007--SPOKE WITH [facility] TRIAGE RN. SHE SPOKE WITH GI ON CALL, AND STATED THAT SHE 

DOES NOT KNOW ANYTHING ABOUT PT, SO DEFERRED ALL MEDICAL DECISION MAKING TO ME. 





DISCUSSED WITH PT AND FAMILY. AS PT'S PAIN HAS BEEN GONE, AND CT FINDINGS ARE 

NOT ANYTHING NEW FOR PT, AND LAB IS ESSENTIALLY NORMAL. PT OPTS TO GO HOME AND 

FOLLOW UP LATER THIS WEEK AS SCHEDULED. 


ADVISED THAT IF SYMPTOMS RETURN/WORSEN, THEY SHOULD RETURN TO ER OR GO DIRECTLY 

TO [facility].





Impression


Impression:  


 Primary Impression:  


 Abdominal pain


 Additional Impressions:  


 Chronic pancreatitis


 SUSPECTED ABDOMINAL ABSCESS--ONGOING TREATMENT


Disposition:  01 HOME, SELF-CARE


Condition:  Improved





Departure-Patient Inst.


Referrals:  


MICHOACANO DUNBAR DO (PCP/Family)


Primary Care Physician


Patient Instructions:  Acute Abdomen (Belly Pain), Adult (DC)





Add. Discharge Instructions:  


CLEAR LIQUIDS TONIGHT--WATER, BROTH, JELLO, GATORADE





TOMORROW IF YOU ARE BETTER, ADD BRATS DIET TO CLEAR LIQUIDS--BANANAS, RICE, 

APPLESAUCE, TOAST, SALTINES





TAKE YOUR HOME PAIN MEDICATION AS NEEDED





FOLLOW UP WITH [facility] THIS WEEK AS SCHEDULED, OR SOONER IF WORSE. 





All discharge instructions reviewed with patient and/or family. Voiced 

understanding.


Scripts


Hyoscyamine Sulfate (Levsin-Sl) 0.125 Mg Tab.subl


1-2 TAB SL Q4H for Abdominal Pain, #15 TAB


   Prov: DAI VARGAS DO         2/5/18 


Dicyclomine HCl (Dicyclomine HCl) 20 Mg Tablet


20 MG PO Q6H for Abdominal Pain, #20 TAB


   Prov: DAI VARGAS DO         2/5/18











DAI VARGAS DO Feb 5, 2018 17:30

## 2018-02-05 NOTE — XMS REPORT
Encounter Summary

 Created on: 2018



Moshe Lopez

External Reference #: [id number]

: 1940

Sex: Male



Demographics







 Address  [address] [place]  [address]

 

 Home Phone  [phone]

 

 Preferred Language  English

 

 Marital Status  Unknown

 

 [employer] Affiliation  NON

 

 Race  White

 

 Ethnic Group  Not  or 





Author







 Author  [facility]

 

 Organization  [facility]

 

 Address  Unknown

 

 Phone  Unavailable







Support







 Name  Relationship  Address  Phone

 

 Latisha Lopez  ECON  Unknown  [phone]







Care Team Providers







 Care Team Member Name  Role  Phone

 

 Jaelyn Velez DO  PCP  [phone]

 

 Jaelyn Velez DO  Unavailable  [phone]







Reason for Visit

* 





 



  Reason   Comments

 

 



  Follow-up Phone Call 









Encounter Details







    



  Date   Type   Department   Care Team   Description

 

    



  2017   Telephone   Case Management - Ashley Garcia, RN   Follow-
up Phone Call



    Work  



    [address]  



    [place] [address]  







Social History







    



  Tobacco Use   Types   Packs/Day   Years Used   Date

 

    



  Former Smoker   Cigarettes, Pipe, Cigars   1   15   Quit: 1975

 

    



  Smokeless Tobacco: Never   



  Used   









   



  Alcohol Use   Drinks/Week   oz/Week   Comments

 

   



  Yes   1 Cans of   0.6   1 beer every 2 weeks



   beer  









 



  Sex Assigned at Birth   Date Recorded

 

 



  Not on file 



as of this encounter



Functional Status







  



  Functional Status   Response   Date of Assessment

 

  



  Does the patient have a hearing impairment:   No   2017

 

  



  Does the patient have a visual impairment:   Yes   10/12/2017

 

  



  Does the patient have impaired ambulation:   Yes   10/12/2017

 

  



  Does the patient have an activity of daily living   No   10/12/2017



  (ADL) impairment:  

 

  



  Does the patient have an instrumental activity of   No   10/12/2017



  daily living (IADL) impairment:  









  



  Cognitive Status   Response   Date of Assessment

 

  



  Does the patient have a cognitive impairment:   No   10/12/2017



as of this encounter



Miscellaneous Notes

* Telephone Encounter - Ashley Maldonado, RN - 2017 11:30 AM CST



Formatting of this note may be different from the original.

[facility] Case Management Note



[facility] of care  contacted patient's wife, Latisha, by phone for 24-
48 hour post discharge follow up. 



Future Appointments

Date Time Provider Department Center 

2017 8:00 AM [facility] 

2017 1:00 PM Fan Tidwell MD [facility] 

2017 8:30 AM SURGERY [facility] 



PCP apt today, 17. IV abx infusion today also at [facility] 

Transportation Verified: Patient lives 2 hours away. Wife will drive and they 
will stay at sister's house on the night of 17 to attend all 
appointments. 



Signs and Symptoms: Wife reports patient had one episode of nausea last night. 
He did not take any medication to help and it resolved on it's own.  NCM 
educated wife and asked her to contact GI or surgery if nausea persists after 
utilizing medication. 

Wife denies fever or chills. She also denies vomiting and abdominal pain. 

Patient is ambulating several times per day without problems. 

PICC line dressing will be changed every 3 days by [facility] staff and should 
be changed today. Wife denies any meagan bleeding, swelling or redness around 
line. 



Medication Questions or Concerns Addressed:

 

Were you able to  your medications after discharge? YES

Medication Affordability Issues? Wife denies

Questions about Iron instructions. Wife will address with PCP today. 
Instructions conflicting. 



Intervention/Plan of Care: NCM will continue to follow and answer any questions 
or concerns the patient may have.



Ashley Maldonado, RN, BSN

p. [phone]

in this encounter



Plan of Treatment







    



  Date   Type   Specialty   Care Team   Description

 

    



  2018   Procedure Pass   Infectious Diseases  

 

    



  2018   Surgery    Fan Tidwell MD   ESOPHAGOGASTRODUODENOSCOP



     [address]



     [address] 



     [place] [address] 



     [phone] (Fax) 

 

    



  2018   Procedure Pass   

 

    



  2018   Hospital    Fan Tidwell MD   Pancreatic necrosis



   Encounter    [address] 



     [address] 



     [place] [address] 



     [phone] (Fax) 



as of this encounter



Visit Diagnoses

Not on filein this encounter

## 2018-02-05 NOTE — XMS REPORT
Encounter Summary

 Created on: 2018



Moshe Lopez

External Reference #: [id number]

: 1940

Sex: Male



Demographics







 Address  [address] [place]  [address]

 

 Home Phone  [phone]

 

 Preferred Language  English

 

 Marital Status  Unknown

 

 Synagogue Affiliation  NON

 

 Race  White

 

 Ethnic Group  Not  or 





Author







 Author  [facility]

 

 Organization  [facility]

 

 Address  Unknown

 

 Phone  Unavailable







Support







 Name  Relationship  Address  Phone

 

 Latisha Lopez  ECON  Unknown  [phone]







Care Team Providers







 Care Team Member Name  Role  Phone

 

 Jaelyn Velez   PCP  [phone]

 

 VelezJaelyn DO  Unavailable  [phone]







Reason for Visit

* 





 



  Reason   Comments

 

 



  Other   Pancreatic necrosis-Follow up









Encounter Details







    



  Date   Type   Department   Care Team   Description

 

    



  2017   Office Visit   [facility]   Fan Tidwell MD   
Necrotizing pancreatitis



    Physicians - Internal   [facility]   (Primary Dx)



    Medicine   [address] [place] [place] [address] 



    [place] [address] [phone] (Fax) 







Social History







    



  Tobacco Use   Types   Packs/Day   Years Used   Date

 

    



  Former Smoker   Cigarettes, Pipe, Cigars   1   15   Quit: 1975

 

    



  Smokeless Tobacco: Never   



  Used   









   



  Alcohol Use   Drinks/Week   oz/Week   Comments

 

   



  Yes   1 Cans of   0.6   1 beer every 2 weeks



   beer  









 



  Sex Assigned at Birth   Date Recorded

 

 



  Not on file 



as of this encounter



Last Filed Vital Signs







  



  Vital Sign   Reading   Time Taken

 

  



  Blood Pressure   134/67   2017 12:58 PM CST

 

  



  Pulse   74   2017 12:58 PM CST

 

  



  Temperature   36.7   C (98   F)   2017 12:58 PM CST

 

  



  Respiratory Rate   12   2017 12:58 PM CST

 

  



  Oxygen Saturation   -   -

 

  



  Inhaled Oxygen   -   -



  Concentration  

 

  



  Weight   73 kg (161 lb)   2017 12:58 PM CST

 

  



  Height   170.2 cm (5' 7")   2017 12:58 PM CST

 

  



  Body Mass Index   25.22   2017 12:58 PM CST



in this encounter



Functional Status







  



  Functional Status   Response   Date of Assessment

 

  



  Does the patient have a hearing impairment:   No   2017

 

  



  Does the patient have a visual impairment:   Yes   2017

 

  



  Does the patient have impaired ambulation:   Yes   2017

 

  



  Does the patient have an activity of daily living   No   2017



  (ADL) impairment:  

 

  



  Does the patient have an instrumental activity of   No   2017



  daily living (IADL) impairment:  









  



  Cognitive Status   Response   Date of Assessment

 

  



  Does the patient have a cognitive impairment:   No   2017



as of this encounter



Instructions

* Patient Instructions - Marla Moncada RN - 2017  1:00 PM CST



You will get a call from our  to schedule your ERCP. If you do not 
hear from them within 3-4 business days please call [phone] to schedule.



If you have any questions or concerns please contact Dr. Diann Gutiérrez 's 
nurse, at [phone].



in this encounter



Progress Notes

* Fan Tidwell MD - 2017  1:00 PM CST



Formatting of this note may be different from the original.

Date of Service: 2017



Subjective:        

 

Moshe Lopez is a 77 y.o. male.



History of Present Illness



This is a 77-year-old  male with history of necrotizing pancreatitis 
status post endoscopic necrosectomy and placement of a lumen opposing stent who 
returns for follow-up.  He has been doing very well for the last 3-4 weeks.  At 
present he denies any symptoms.  He does not complain of any abdominal pain, 
fever, nausea or bloating.  His most recent CT scan shows almost complete 
resolution of the necrotic collection around the body of the pancreas.  There 
was couple of smaller collections are present on the right side in the 
pararenal space in the retroperitoneal area.  The lumen opposing stent is in 
position.



 

Review of Systems 

Gastrointestinal: Positive for vomiting. 

Genitourinary: Positive for frequency and urgency. 

Musculoskeletal: Positive for arthralgias and joint swelling. 

Skin: Positive for rash. 



Comprehensive 10 point ROS taken, and otherwise negative except as above.



Active Ambulatory Problems 

  Diagnosis Date Noted 

 CAD (coronary artery disease) 2012 

 HTN (hypertension) 2012 

 Low testosterone 2012 

 Sleep apnea  

 Ruptured lumbar disc  

 Diverticulitis  

 Hyperlipemia 2013 

 Ileus (HCC) 2017 

 Abdominal pain 2017 

 Acute hypernatremia 2017 

 Hx of pancreatitis 2017 

 CAD (coronary artery disease) 2017 

 Hypokalemia 2017 

 Malnutrition (HCC) 2017 

 Nondiabetic gastroparesis 2017 

 Bacteremia due to Staphylococcus aureus 2017 

 Pancreatitis, acute 2017 

 HCAP (healthcare-associated pneumonia) 2017 

 Pseudocyst of pancreas 2017 

 Infective necrosis of pancreas 2017 

 Necrotizing pancreatitis 2017 

 Pancreatitis 2017 

 Protein malnutrition (HCC) 2017 

 Necrosis, pancreas aseptic 10/12/2017 

 Pancreatic pseudocyst 10/18/2017 

 Fluid collection of pancreas 10/18/2017 

 Coronary artery disease  

 Sepsis (HCC) 2017 

 Nausea and vomiting 2017 

 Fever 2017 

 Dehydration 2017 

 Vomiting 2017 

 History of pancreatitis 2017 

 Iron deficiency anemia 2017 



Resolved Ambulatory Problems 

  Diagnosis Date Noted 

 No Resolved Ambulatory Problems 



Past Medical History: 

Diagnosis Date 

 CAD (coronary artery disease)  

 Coronary artery disease  

 Diverticulitis  

 Diverticulosis  

 HTN (hypertension)  

 Hyperlipidemia  

 Hypertension  

 Low testosterone  

 Pancreatitis  

 Ruptured lumbar disc  

 Sleep apnea  



Social History 



Social History 

 Marital status:  

  Spouse name: N/A 

 Number of children: N/A 

 Years of education: N/A 



Social History Main Topics 

 Smoking status: Former Smoker 

  Packs/day: 1.00 

  Years: 15.00 

  Types: Cigarettes, Pipe, Cigars 

  Quit date: 1975 

 Smokeless tobacco: Never Used 

 Alcohol use 0.6 oz/week 

  1 Cans of beer per week 

   Comment: 1 beer every 2 weeks 

 Drug use: No 

 Sexual activity: Not Asked 



Other Topics Concern 

 None 



Social History Narrative 

 ** Merged History Encounter ** 

   



Family History 

Problem Relation Age of Onset 

 Heart Failure Mother  

 Heart Attack Mother  

 Cancer Father  

 Heart Disease Maternal Aunt  

 Heart Disease Maternal Uncle  



Allergies 

Allergen Reactions 

 Niacin RASH 

  Extreme itching per pt 

 Ativan [Lorazepam] AGITATION 

  Severe agitation and confusion  



Patient Active Problem List 

 Diagnosis Date Noted 

 Iron deficiency anemia 2017 

 Dehydration 2017 

 Vomiting 2017 

 History of pancreatitis 2017 

 Sepsis (HCC) 2017 

 Nausea and vomiting 2017 

 Fever 2017 

 Coronary artery disease  

 Pancreatic pseudocyst 10/18/2017 

 Fluid collection of pancreas 10/18/2017 

 Necrosis, pancreas aseptic 10/12/2017 

 Protein malnutrition (HCC) 2017 

 HCAP (healthcare-associated pneumonia) 2017 

 Pancreatitis, acute 2017 

 Bacteremia due to Staphylococcus aureus 2017 

 Nondiabetic gastroparesis 2017 

 Ileus (HCC) 2017 

 Abdominal pain 2017 

 Acute hypernatremia 2017 

 Hx of pancreatitis 2017 

 CAD (coronary artery disease) 2017 

 Hypokalemia 2017 

 Malnutrition (HCC) 2017 

 Pseudocyst of pancreas 2017 

 Infective necrosis of pancreas 2017 

 Necrotizing pancreatitis 2017 

 Pancreatitis 2017 

 Hyperlipemia 2013 

 CAD (coronary artery disease) 2012 

 HTN (hypertension) 2012 

 Low testosterone 2012 

 Sleep apnea  

 Ruptured lumbar disc  

 Diverticulitis  



Objective:       

 acetaminophen (TYLENOL) 325 mg tablet Take 2 tablets by mouth every 4 hours 
as needed. 

 aspirin EC 81 mg tablet Take 81 mg by mouth daily.   

 citalopram (CELEXA) 20 mg tablet Take 20 mg by mouth daily. 

 docusate (COLACE) 100 mg capsule Take 1 capsule by mouth twice daily. 

 ertapenem (INVANZ) 1 g/10 mL 1 g in sodium chloride 0.9% (NS) 0.9 % 100 mL 
IVPB ([id number]) Administer 1 g through vein every 24 hours for 17 days. 

 famotidine (PEPCID) 20 mg tablet Take 20 mg by mouth twice daily. 

 fenofibrate(+) (TRIGLIDE) 160 mg tablet Take 160 mg by mouth daily. Take 
with food. 

 ferrous sulfate (FEOSOL, FEROSUL) 325 mg (65 mg iron) tablet Take 1 tablet 
by mouth three times daily with meals. Take on an empty stomach at least 1 hour 
before or 2 hours after food. 

 finasteride (PROSCAR) 5 mg tablet Take 5 mg by mouth daily. 

 fish oil- omega 3-DHA//1,000 mg capsule Take 1 Cap by mouth twice 
daily. 

 lisinopril (PRINIVIL; ZESTRIL) 20 mg tablet Take 20 mg by mouth daily. 

 loratadine (CLARITIN) 10 mg tablet Take 10 mg by mouth every morning. 

 melatonin 3 mg tab Take 1 tablet by mouth at bedtime as needed (insomnia). 

 MULTIVITAMINS WITH FLUORIDE (MULTI-VITAMIN PO) Take 1 Tab by mouth daily. 

 omeprazole DR(+) (PRILOSEC) 20 mg capsule Take 2 capsules by mouth daily 
before breakfast. 

 ondansetron (ZOFRAN ODT) 4 mg rapid dissolve tablet Dissolve 4 mg by mouth 
every 8 hours. Place on tongue to disolve.  

 oxyCODONE (ROXICODONE, OXY-IR) 5 mg tablet Take 1 tablet by mouth every 4 
hours as needed 

 polyethylene glycol 3350 (MIRALAX) 17 g packet Take 1 packet by mouth 
daily. 

 scopolamine (TRANSDERM-SCOP) 1.5 mg 3 day patch Apply 1 patch to top of 
skin as directed every 72 hours. 

 senna/docusate (SENOKOT-S) 8.6/50 mg tablet Take 1 tablet by mouth daily as 
needed. 

 simethicone (MYLICON) 80 mg chew tablet Chew 1 tablet by mouth every 6 
hours as needed for Flatulence. 

 tamsulosin (FLOMAX) 0.4 mg capsule Take 0.4 mg by mouth daily. Do not crush
, chew or open capsules. Take 30 minutes following the same meal each day. 

 vitamins, B complex tab Take 1 Tab by mouth daily. 



Vitals: 

 17 1258 

BP: 134/67 

Pulse: 74 

Resp: 12 

Temp: 36.7 C (98 F) 

TempSrc: Oral 

Weight: 73 kg (161 lb) 

Height: 170.2 cm (67") 



Body mass index is 25.22 kg/(m^2). 



Physical Exam

General appearance  alert, cooperative, no distress, appears stated age 

Head  Normocephalic, without obvious abnormality, atraumatic 

Eyes  conjunctivae/corneas clear. EOM's intact. pupils equally round, 
accommodation normal. 

Nose Nares normal. No drainage or sinus tenderness. 

Throat Lips, mucosa, and tongue normal. Teeth and gums normal 

Neck supple, symmetrical, trachea midline, and no JVD 

Back   symmetric, no curvature. ROM normal. No CVA tenderness 

Lungs   clear to auscultation bilaterally 

Chest wall  no tenderness 

Heart  regular rate and rhythm, S1, S2 normal, no murmur, click, rub or gallop 

Abdomen   soft, non-tender. Bowel sounds normal. No masses,  No organomegaly 

Extremities extremities normal, atraumatic, no cyanosis or edema 

Pulses 2+ and symmetric 

Skin Skin color, texture, turgor normal. No rashes or lesions 

Neurologic Normal 



 

Assessment and Plan:



This is a 77-year-old  male with history of acute necrotizing 
pancreatitis complicated by the development of walled off pancreatic necrosis.  
This was treated with endoscopic necrosectomy via a cyst gastrostomy and 
placement of a lumen opposing stent.  This was done on 2017.  He had 
multiple sessions of debridement.  Most recent CT scan shows almost complete 
resolution of the necrotic collection around the body the pancreas.  Overall he 
is doing much better.  He denies any abdominal pain, fever, nausea or bloating.
  Overall he is very happy with his symptom improvement.  His energy level has 
also improved.



Given his improvement and the fact that he has had the lumen opposing stent for 
almost 3-1/2 months I will schedule him for an ERCP.  This will be to evaluate 
for any pancreatic duct disruption and leak.  If this is seen then we will put 
a stent in the pancreatic duct.  Following this we can remove the lumen 
opposing stent.



In the meantime the patient was advised to call us if he were to have 
recurrence of abdominal pain, fever, chills.



Follow-up on an as-needed basis

Thank you for allowing us to participate his care and please feel free to call 
us if you have any questions.



  



 



 



in this encounter



Plan of Treatment







    



  Date   Type   Specialty   Care Team   Description

 

    



  2018   Procedure Pass   Infectious Diseases  

 

    



  2018   Surgery    Fan Tidwell MD   ESOPHAGOGASTRODUODENOSCOP



     [address]



     [address] 



     [place] [address] 



     [phone] (Fax) 

 

    



  2018   Procedure Pass   

 

    



  2018   Hospital    Fan Tidwell MD   Pancreatic necrosis



   Encounter    [address] 



     [address] 



     [place] [address] 



     [phone] (Fax) 



as of this encounter



Visit Diagnoses











  Diagnosis

 





  Necrotizing pancreatitis - Primary

 





  Acute pancreatitis

## 2018-02-05 NOTE — XMS REPORT
Continuity of Care Document

 Created on: 2018



LADONNA KEBEDE

External Reference #: [id number]

: 1940

Sex: Male



Demographics







 Address  [address]  [address]

 

 Home Phone  [phone] x

 

 Preferred Language  Unknown

 

 Marital Status  Unknown

 

 Confucianism Affiliation  Unknown

 

 Race  Unknown

 

 Ethnic Group  Unknown





Author







 Author  [facility]

 

 Organization  [facility]

 

 Address  Unknown

 

 Phone  Unavailable



              



Allergies

      





 Active            Description            Code            Type            
Severity            Reaction            Onset            Reported/Identified   
         Relationship to Patient            Clinical Status        

 

 Yes            niacin            [id number]            Drug Allergy            
Unknown            N/A                         2017                      
            

 

 Yes            TAPE            TAPE                         Moderate          
  BLISTERS                         2017                                  



                    



Medications

      



There is no data.                  



Problems

      





 Date Dx Coded            Attending            Type            Code            
Diagnosis            Diagnosed By        

 

 2011                         Ot            V45.82            
PERCUTANEOUS TRANSLUM CORON ANGIOPLASTY                      

 

 2011                         Ot            V57.89            
REHABILITATION PROC NEC                     

 

 2012                         Ot            V45.82            
PERCUTANEOUS TRANSLUM CORON ANGIOPLASTY                      

 

 2012                         Ot            V57.89            
REHABILITATION PROC NEC                     

 

 10/20/2012                         Ot            786.50            CHEST PAIN 
NOS                     

 

 10/20/2012                         Ot            793.19            OTHER 
NONSPECIFIC ABNORMAL FINDING OF CONNIE                     

 

 10/20/2012                         Ot            V45.82            
PERCUTANEOUS TRANSLUM CORON ANGIOPLASTY                      

 

 2013                         Ot            V45.82            
PERCUTANEOUS TRANSLUM CORON ANGIOPLASTY                      

 

 2013                         Ot            V57.89            
REHABILITATION PROC NEC                     

 

 2013            DAIJA HAWK MD            Ot            272.0         
   PURE HYPERCHOLESTEROLEM                     

 

 2013            DAIJA HAWK MD            Ot            401.9         
   HYPERTENSION NOS                     

 

 2013            DAIJA HAWK MD            Ot            414.01        
    CORONARY ATHEROSCLEROSIS OF NATIVE CORON                     

 

 2013            DAIJA HAWK MD            Ot            562.10        
    DIVERTICULOSIS COLON (W/O MENT OF HEMORR                     

 

 2013            DAIJA HAWK MD            Ot            715.90        
    OSTEOARTHROS NOS-UNSPEC                     

 

 2013            DAIJA HAWK MD            Ot            V12.72        
    PERSONAL HISTORY OF COLONIC POLYPS                     

 

 2013            DAIJA HAWK MD            Ot            V16.0         
   FAMILY HX-GI MALIGNANCY                     

 

 2013            DAIJA HAWK MD            Ot            V58.63        
    LONG-TERM(CURRENT)USE OF ANTIPLATELET/AN                     

 

 2013            DAIJA HAWK MD, Ot            V58.69        
    OTH MED,LT,CURRENT USE                     

 

 2013            DAIJA HAWK MD, Ot            V76.51        
    SCREEN MAL NEOP-COLON                     

 

 11/10/2013            THEO ALMEIDA MD            Ot            272.0       
     PURE HYPERCHOLESTEROLEM                     

 

 11/10/2013            THEO ALMEIDA MD            Ot            272.4       
     HYPERLIPIDEMIA NEC/NOS                     

 

 11/10/2013            THEO ALMEIDA MD            Ot            300.00      
      ANXIETY STATE NOS                     

 

 11/10/2013            THEO ALMEIDA MD            Ot            308.9       
     ACUTE STRESS REACT NOS                     

 

 11/10/2013            THEO ALMEIDA MD            Ot            311         
   DEPRESSIVE DISORDER NEC                     

 

 11/10/2013            THEO ALMEIDA MD            Ot            338.29      
      OTHER CHRONIC PAIN                     

 

 11/10/2013            THEO ALMEIDA MD            Ot            401.9       
     HYPERTENSION NOS                     

 

 11/10/2013            THEO ALMEIDA MD            Ot            412         
   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                     

 

 11/10/2013            THEO ALMEIDA MD            Ot            414.01      
      CORONARY ATHEROSCLEROSIS OF NATIVE CORON                     

 

 11/10/2013            THEO ALMEIDA MD            Ot            426.2       
     LEFT BB HEMIBLOCK                     

 

 11/10/2013            THEO ALMEIDA MD            Ot            427.69      
      PREMATURE BEATS NEC                     

 

 11/10/2013            THEO ALMEIDA MD            Ot            427.9       
     CARDIAC DYSRHYTHMIA NOS                     

 

 11/10/2013            THEO ALMEIDA MD            Ot            715.90      
      OSTEOARTHROS NOS-UNSPEC                     

 

 11/10/2013            THEO ALMEIDA MD            Ot            724.5       
     BACKACHE NOS                     

 

 11/10/2013            THEO ALMEIDA MD            Ot            780.57      
      UNSPECIFIED SLEEP APNEA                     

 

 11/10/2013            THEO ALMEIDA MD            Ot            780.79      
      OTH MALAISE FATIGUE                     

 

 11/10/2013            THEO ALMEIDA MD            Ot            786.05      
      SHORTNESS OF BREATH                     

 

 11/10/2013            THEO ALMEIDA MD            Ot            786.50      
      CHEST PAIN NOS                     

 

 11/10/2013            THEO ALMEIDA MD            Ot            V04.81      
      ND FOR PROPHYLACTIC VACCIN AND INOCULATI                     

 

 11/10/2013            THEO ALMEIDA MD            Ot            V15.81      
      HX OF PAST NONCOMPLIANCE                     

 

 11/10/2013            THEO ALMEIDA MD            Ot            V15.82      
      HISTORY OF TOBACCO USE                     

 

 11/10/2013            THEO ALMEIDA MD            Ot            V45.82      
      PERCUTANEOUS TRANSLUM CORON ANGIOPLASTY                      

 

 11/10/2013            THEO ALMEIDA MD            Ot            V58.63      
      LONG-TERM(CURRENT)USE OF ANTIPLATELET/AN                     

 

 11/10/2013            THEO ALMEIDA MD            Ot            V58.66      
      LONG-TERM (CURRENT) USE OF ASPIRIN                     

 

 11/10/2013            THEO ALMEIDA MD            Ot            V58.69      
      OTH MED,LT,CURRENT USE                     

 

 2013            BETTINA CHO, MICHOACANO            Ot            272.0         
   PURE HYPERCHOLESTEROLEM                     

 

 2013            BETTINA DO, MICHOACANO            Ot            272.4         
   HYPERLIPIDEMIA NEC/NOS                     

 

 2013            BETTINA DO, MICHOACANO            Ot            278.00        
    OBESITY, NOS                     

 

 2013            BETTINA DO MICHOACANO            Ot            300.00        
    ANXIETY STATE NOS                     

 

 2013            BETTINA DO, MICHOACANO            Ot            311            
DEPRESSIVE DISORDER NEC                     

 

 2013            BETTINA DO MICHOACANO            Ot            401.9         
   HYPERTENSION NOS                     

 

 2013            BETTINA DO, MICHOACANO            Ot            412            
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                     

 

 2013            BETTINA DO MICHOACANO            Ot            413.9         
   ANGINA PECTORIS NEC/NOS                     

 

 2013            BETTINA DO, MICHOACANO            Ot            414.01        
    CORONARY ATHEROSCLEROSIS OF NATIVE CORON                     

 

 2013            BETTINA DO, MICHOACANO            Ot            530.81        
    ESOPHAGEAL REFLUX                     

 

 2013            BETTINA CHO MICHOACANO            Ot            562.11        
    DIVERTICULITIS COLON (W/O MENT OF HEMORR                     

 

 2013            BETTINA DO, MICHOACANO            Ot            600.00        
    HYPERTROPHY (BENIGN) OF PROSTATE W/O URI                     

 

 2013            BETTINA DO, MICHOACANO            Ot            716.90        
    ARTHROPATHY NOS-UNSPEC                     

 

 2013            BETTINA CHO MICHOACANO            Ot            V45.82        
    PERCUTANEOUS TRANSLUM CORON ANGIOPLASTY                      

 

 2013            BETTINA CHO MICHOACANO            Ot            V85.33        
    BODY MASS INDEX 33.0-33.9, ADULT                     

 

 2015            BETTINA CHO MICHOACANO            Ot            244.9         
   HYPOTHYROIDISM NOS                     

 

 2015            BETTINA CHO MICHOACANO            Ot            272.4         
   HYPERLIPIDEMIA NEC/NOS                     

 

 2015            BETTINA CHO MICHOACANO            Ot            327.23        
    OBSTRUCTIVE SLEEP APNEA (ADULT) (PEDIATR                     

 

 2015            BETTINA CHO, MICHOACANO            Ot            403.90        
    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                     

 

 2015            BETTINA DO MICHOACANO            Ot            411.1         
   INTERMED CORONARY SYND                     

 

 2015            BETTINA CHO, MICHOACANO            Ot            414.01        
    CORONARY ATHEROSCLEROSIS OF NATIVE CORON                     

 

 2015            BETTINA CHO MICHOACANO            Ot            440.1         
   RENAL ARTERY ATHEROSCLER                     

 

 2015            DUNBAR DO, MICHOACANO            Ot            530.81        
    ESOPHAGEAL REFLUX                     

 

 2015            SD DUNBAR DOI            Ot            585.9         
   CHRONIC KIDNEY DISEASE, UNSPECIFIED                     

 

 2015            MICHOACANO DUNBAR DO            Ot            790.29        
    OTHER ABNORMAL GLUCOSE                     

 

 2015            MICHOACANO DUNBAR DO            Ot            V15.82        
    HISTORY OF TOBACCO USE                     

 

 2015            MICHOACANO DUNBAR DO            Ot            V45.82        
    PERCUTANEOUS TRANSLUM CORON ANGIOPLASTY                      

 

 2016            MICHOACANO DUNBAR DO            Ot            E78.5         
   HYPERLIPIDEMIA, UNSPECIFIED                     

 

 2016            MICHOACANO DUNBAR DO            Ot            G47.33        
    OBSTRUCTIVE SLEEP APNEA (ADULT) (PEDIATR                     

 

 2016            SD DUNBAR DOI            Ot            I10            
ESSENTIAL (PRIMARY) HYPERTENSION                     

 

 2016            SD DUNBAR DOI            Ot            I25.10        
    ATHSCL HEART DISEASE OF NATIVE CORONARY                      

 

 2016            DS DUNBAR DOI            Ot            I49.5         
   SICK SINUS SYNDROME                     

 

 2016            SD DUNBAR DOI            Ot            I65.23        
    OCCLUSION AND STENOSIS OF BILATERAL ALMEIDA                     

 

 2016            MICHOACANO DUNBAR DO            Ot            J44.9         
   CHRONIC OBSTRUCTIVE PULMONARY DISEASE, U                     

 

 2016            SD DUNBAR DOI            Ot            K21.9         
   GASTRO-ESOPHAGEAL REFLUX DISEASE WITHOUT                     

 

 2016            SD DUNBAR DOI            Ot            M47.896       
     OTHER SPONDYLOSIS, LUMBAR REGION                     

 

 2016            SD DUNBAR DOI            Ot            N40.0         
   ENLARGED PROSTATE WITHOUT LOWER URINARY                      

 

 2016            MICHOACANO DUNBAR DO            Ot            R42            
DIZZINESS AND GIDDINESS                     

 

 2016            MICHOACANO DUNBAR DO            Ot            R73.9         
   HYPERGLYCEMIA, UNSPECIFIED                     

 

 2016            MICHOACANO DUNBAR DO            Ot            Z87.891       
     PERSONAL HISTORY OF NICOTINE DEPENDENCE                     

 

 2016            MICHOACANO DUNBAR DO            Ot            Z95.5         
   PRESENCE OF CORONARY ANGIOPLASTY IMPLANT                     

 

 2016            MICHOACANO DUNBAR DO            Ot            E78.5         
   HYPERLIPIDEMIA, UNSPECIFIED                     

 

 2016            SD DUNBAR DOI            Ot            G47.33        
    OBSTRUCTIVE SLEEP APNEA (ADULT) (PEDIATR                     

 

 2016            SD DUNBAR DOI            Ot            I10            
ESSENTIAL (PRIMARY) HYPERTENSION                     

 

 2016            SD DUNBAR DOI            Ot            I25.10        
    ATHSCL HEART DISEASE OF NATIVE CORONARY                      

 

 2016            SD DUNBAR DOI            Ot            I49.5         
   SICK SINUS SYNDROME                     

 

 2016            MICHOACANO DUNBAR DO            Ot            I65.23        
    OCCLUSION AND STENOSIS OF BILATERAL ALMEIDA                     

 

 2016            MICHOACANO DUNBAR DO            Ot            J44.9         
   CHRONIC OBSTRUCTIVE PULMONARY DISEASE, U                     

 

 2016            MICHOACANO DUNBAR DO            Ot            K21.9         
   GASTRO-ESOPHAGEAL REFLUX DISEASE WITHOUT                     

 

 2016            MICHOACANO DUNBAR DO            Ot            M47.896       
     OTHER SPONDYLOSIS, LUMBAR REGION                     

 

 2016            MICHOACANO DUNBAR DO            Ot            N40.0         
   ENLARGED PROSTATE WITHOUT LOWER URINARY                      

 

 2016            MICHOACANO DUNBAR DO            Ot            R42            
DIZZINESS AND GIDDINESS                     

 

 2016            MICHOACANO DUNBAR DO            Ot            R73.9         
   HYPERGLYCEMIA, UNSPECIFIED                     

 

 2016            MICHOACANO DUNBAR DO            Ot            Z87.891       
     PERSONAL HISTORY OF NICOTINE DEPENDENCE                     

 

 2016            MICHOACANO DUNBAR DO            Ot            Z95.5         
   PRESENCE OF CORONARY ANGIOPLASTY IMPLANT                     

 

 10/03/2016                         Ot            401.9            HYPERTENSION 
NOS                     

 

 10/03/2016                         Ot            414.00            CORON 
ATHEROSCLER NOS TYPE VESSEL, NATIV                     

 

 10/03/2016                         Ot            786.05            SHORTNESS 
OF BREATH                     

 

 10/03/2016            DAIJA HAWK MD            Ot            V72.84        
    EXAM PRE-OPERATIVE NOS                     

 

 10/03/2016            ELLE MCNEAL, THEO BURNS            Ot            401.9       
     HYPERTENSION NOS                     

 

 10/03/2016            ELLE MCNEAL, THEO BURNS            Ot            414.00      
      CORON ATHEROSCLER NOS TYPE VESSEL, NATIV                     

 

 10/03/2016            ELLE MCNEAL, THEO BURNS            Ot            780.4       
     DIZZINESS AND GIDDINESS                     

 

 10/03/2016            MICHOACANO DUNBAR DO            Ot            443.9         
   PERIPH VASCULAR DIS NOS                     

 

 10/24/2016            MICHOACANO DUNBAR DO            Ot            K42.9         
   UMBILICAL HERNIA WITHOUT OBSTRUCTION OR                      

 

 10/24/2016            SD DUNBAR DOI            Ot            K57.00        
    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                     

 

 10/24/2016            BETTINA CHO MICHOACANO            Ot            N20.0         
   CALCULUS OF KIDNEY                     

 

 10/27/2016            MICHOACANO DUNBAR DO            Ot            K42.9         
   UMBILICAL HERNIA WITHOUT OBSTRUCTION OR                      

 

 10/27/2016            SD DUNBAR DOI            Ot            K57.00        
    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                     

 

 10/27/2016            BETTINA CHO MICHOACANO            Ot            N20.0         
   CALCULUS OF KIDNEY                     

 

 2017            DAIJA HAWK MD            Ot            K57.92        
    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                     

 

 2017            DAIJA HAWK MD, Ot            Z01.818       
     ENCOUNTER FOR OTHER PREPROCEDURAL EXAMIN                     

 

 2017            DAIJA HAWK MD, Ot            E78.00        
    PURE HYPERCHOLESTEROLEMIA, UNSPECIFIED                     

 

 2017            DAIJA HAWK MD            Ot            I10            
ESSENTIAL (PRIMARY) HYPERTENSION                     

 

 2017            DAIJA HAWK MD            Ot            I25.10        
    ATHSCL HEART DISEASE OF NATIVE CORONARY                      

 

 2017            DAIJA HAWK MD, Ot            K57.90        
    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                      

 

 2017            DAIJA HAWK MD, Ot            K64.1         
   SECOND DEGREE HEMORRHOIDS                     

 

 2017            DAIJA HAWK MD, Ot            N40.0         
   BENIGN PROSTATIC HYPERPLASIA WITHOUT LOW                     

 

 2017            DAIJA HAWK MD, Ot            Z86.010       
     PERSONAL HISTORY OF COLONIC POLYPS                     

 

 2017            DAIJA HAWK MD, Ot            Z87.891       
     PERSONAL HISTORY OF NICOTINE DEPENDENCE                     

 

 2017            DAIJA HAWK MD, Ot            E78.00        
    PURE HYPERCHOLESTEROLEMIA, UNSPECIFIED                     

 

 2017            DAIJA HAWK MD            Ot            I10            
ESSENTIAL (PRIMARY) HYPERTENSION                     

 

 2017            DAIJA HAWK MD            Ot            I25.10        
    ATHSCL HEART DISEASE OF NATIVE CORONARY                      

 

 2017            DAIJA HAWK MD, Ot            K57.90        
    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                      

 

 2017            DAIJA HAWK MD, Ot            K64.1         
   SECOND DEGREE HEMORRHOIDS                     

 

 2017            DAIJA HAWK MD, Ot            N40.0         
   BENIGN PROSTATIC HYPERPLASIA WITHOUT LOW                     

 

 2017            DAIJA HAWK MD            Ot            Z86.010       
     PERSONAL HISTORY OF COLONIC POLYPS                     

 

 2017            DAIJA HAWK MD            Ot            Z87.891       
     PERSONAL HISTORY OF NICOTINE DEPENDENCE                     

 

 2017            DAIJA HAWK MD            Ot            E78.00        
    PURE HYPERCHOLESTEROLEMIA, UNSPECIFIED                     

 

 2017            DAIJA HAWK MD            Ot            I10            
ESSENTIAL (PRIMARY) HYPERTENSION                     

 

 2017            DAIJA HAWK MD            Ot            I25.10        
    ATHSCL HEART DISEASE OF NATIVE CORONARY                      

 

 2017            DAIJA HAWK MD, Ot            K57.90        
    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                      

 

 2017            DAIJA HAWK MD            Ot            K64.1         
   SECOND DEGREE HEMORRHOIDS                     

 

 2017            DAIJA HAWK MD, Ot            N40.0         
   BENIGN PROSTATIC HYPERPLASIA WITHOUT LOW                     

 

 2017            DAIJA HAWK MD            Ot            Z86.010       
     PERSONAL HISTORY OF COLONIC POLYPS                     

 

 2017            DAIJA HAWK MD, Ot            Z87.891       
     PERSONAL HISTORY OF NICOTINE DEPENDENCE                     

 

 2017            DAIJA HAWK MD            Ot            E78.00        
    PURE HYPERCHOLESTEROLEMIA, UNSPECIFIED                     

 

 2017            DAIJA HAWK MD            Ot            I10            
ESSENTIAL (PRIMARY) HYPERTENSION                     

 

 2017            DAIJA HAWK MD            Ot            I25.10        
    ATHSCL HEART DISEASE OF NATIVE CORONARY                      

 

 2017            DAIJA HAWK MD, Ot            K57.90        
    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                      

 

 2017            DAIJA HAWK MD, Ot            K64.1         
   SECOND DEGREE HEMORRHOIDS                     

 

 2017            DAIJA HAWK MD, Ot            N40.0         
   BENIGN PROSTATIC HYPERPLASIA WITHOUT LOW                     

 

 2017            DAIJA HAWK MD            Ot            Z86.010       
     PERSONAL HISTORY OF COLONIC POLYPS                     

 

 2017            DAIJA HAWK MD, Ot            Z87.891       
     PERSONAL HISTORY OF NICOTINE DEPENDENCE                     

 

 2017            DAIJA HAWK MD, Ot            K57.32        
    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                     

 

 2017            DAIJA HAWK MD            Ot            Z01.812       
     ENCOUNTER FOR PREPROCEDURAL LABORATORY E                     

 

 2017            DAIJA HAWK MD            Ot            Z11.2         
   ENCOUNTER FOR SCREENING FOR OTHER BACTER                     

 

 2017            DAIJA HAWK MD, Ot            K57.32        
    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                     

 

 2017            DAIJA HAWK MD            Ot            Z01.812       
     ENCOUNTER FOR PREPROCEDURAL LABORATORY E                     

 

 2017            DAIJA HAWK MD            Ot            Z11.2         
   ENCOUNTER FOR SCREENING FOR OTHER BACTER                     

 

 2017            DAIJA HAWK MD            Ot            E78.00        
    PURE HYPERCHOLESTEROLEMIA, UNSPECIFIED                     

 

 2017            DAIJA HAWK MD            Ot            I10            
ESSENTIAL (PRIMARY) HYPERTENSION                     

 

 2017            DAIJA HAWK MD            Ot            I25.10        
    ATHSCL HEART DISEASE OF NATIVE CORONARY                      

 

 2017            DAIJA HAWK MD, Ot            K57.90        
    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                      

 

 2017            DAIJA HAWK MD, Ot            K64.1         
   SECOND DEGREE HEMORRHOIDS                     

 

 2017            DAIJA HAWK MD, Ot            N40.0         
   BENIGN PROSTATIC HYPERPLASIA WITHOUT LOW                     

 

 2017            DAIJA HAWK MD, Ot            Z86.010       
     PERSONAL HISTORY OF COLONIC POLYPS                     

 

 2017            DAIJA HAWK MD, Ot            Z87.891       
     PERSONAL HISTORY OF NICOTINE DEPENDENCE                     

 

 2017            DAIJA HAWK MD, Ot            E29.1         
   TESTICULAR HYPOFUNCTION                     

 

 2017            DAIJA HAWK MD, Ot            E78.00        
    PURE HYPERCHOLESTEROLEMIA, UNSPECIFIED                     

 

 2017            DAIJA HAWK MD, Ot            F32.9         
   MAJOR DEPRESSIVE DISORDER, SINGLE EPISOD                     

 

 2017            DAIJA HAWK MD, Ot            F41.9         
   ANXIETY DISORDER, UNSPECIFIED                     

 

 2017            DAIJA HAWK MD, Ot            G47.33        
    OBSTRUCTIVE SLEEP APNEA (ADULT) (PEDIATR                     

 

 2017            DAIJA HAWK MD, Ot            I12.9         
   HYPERTENSIVE CHRONIC KIDNEY DISEASE W ST                     

 

 2017            DAIJA HAWK MD, Ot            I25.10        
    ATHSCL HEART DISEASE OF NATIVE CORONARY                      

 

 2017            DAIJA HAWK MD, Ot            J98.11        
    ATELECTASIS                     

 

 2017            DAIJA HAWK MD, Ot            K21.9         
   GASTRO-ESOPHAGEAL REFLUX DISEASE WITHOUT                     

 

 2017            DAIJA HAWK MD, Ot            K42.9         
   UMBILICAL HERNIA WITHOUT OBSTRUCTION OR                      

 

 2017            DAIJA HAWK MD, Ot            K56.7         
   ILEUS, UNSPECIFIED                     

 

 2017            DAIJA HAWK MD, Ot            K57.30        
    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                     

 

 2017            DAIJA HAWK MD, Ot            K94.12        
    ENTEROSTOMY INFECTION                     

 

 2017            DAIJA HAWK MD, Ot            L03.311       
     CELLULITIS OF ABDOMINAL WALL                     

 

 2017            DAIJA HAWK MD, Ot            N18.9         
   CHRONIC KIDNEY DISEASE, UNSPECIFIED                     

 

 2017            DAIJA HAWK MD, Ot            N40.0         
   BENIGN PROSTATIC HYPERPLASIA WITHOUT LOW                     

 

 2017            DAIJA HAWK MD, Ot            Z80.0         
   FAMILY HISTORY OF MALIGNANT NEOPLASM OF                      

 

 2017            DAIJA HAWK MD, Ot            Z87.891       
     PERSONAL HISTORY OF NICOTINE DEPENDENCE                     

 

 2017            DAIJA HAWK MD, Ot            Z95.5         
   PRESENCE OF CORONARY ANGIOPLASTY IMPLANT                     

 

 2017            DAIJA HAWK MD, Ot            K57.92        
    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                     

 

 2017            DAIJA HAWK MD, Ot            Z01.818       
     ENCOUNTER FOR OTHER PREPROCEDURAL EXAMIN                     

 

 2017            DAIJA HAWK MD, Ot            E66.9         
   OBESITY, UNSPECIFIED                     

 

 2017            DAIJA HAWK MD, Ot            E78.00        
    PURE HYPERCHOLESTEROLEMIA, UNSPECIFIED                     

 

 2017            DAIJA HAWK MD, Ot            F32.9         
   MAJOR DEPRESSIVE DISORDER, SINGLE EPISOD                     

 

 2017            DAIJA HAWK MD, Ot            F41.9         
   ANXIETY DISORDER, UNSPECIFIED                     

 

 2017            DAIJA HAWK MD, Ot            G47.33        
    OBSTRUCTIVE SLEEP APNEA (ADULT) (PEDIATR                     

 

 2017            DAIJA HAWK MD            Ot            I10            
ESSENTIAL (PRIMARY) HYPERTENSION                     

 

 2017            DAIJA HAWK MD, Ot            I25.10        
    ATHSCL HEART DISEASE OF NATIVE CORONARY                      

 

 2017            DAIJA HAWK MD, Ot            K21.9         
   GASTRO-ESOPHAGEAL REFLUX DISEASE WITHOUT                     

 

 2017            DAIJA HAWK MD, Ot            M10.9         
   GOUT, UNSPECIFIED                     

 

 2017            DAIJA HAWK MD, Ot            M19.91        
    PRIMARY OSTEOARTHRITIS, UNSPECIFIED SITE                     

 

 2017            DAIJA HAWK MD, Ot            N28.9         
   DISORDER OF KIDNEY AND URETER, UNSPECIFI                     

 

 2017            DAIJA HAWK MD, Ot            N40.0         
   BENIGN PROSTATIC HYPERPLASIA WITHOUT LOW                     

 

 2017            DAIJA HAWK MD, Ot            R42            
DIZZINESS AND GIDDINESS                     

 

 2017            DAIJA HAWK MD, Ot            Z43.2         
   ENCOUNTER FOR ATTENTION TO ILEOSTOMY                     

 

 2017            DAIJA AHWK MD, Ot            Z68.31        
    BODY MASS INDEX (BMI) 31.0-31.9, ADULT                     

 

 2017            DAIJA HAWK MD, Ot            Z80.0         
   FAMILY HISTORY OF MALIGNANT NEOPLASM OF                      

 

 2017            DAIJA HAWK MD, Ot            Z87.19        
    PERSONAL HISTORY OF OTHER DISEASES OF TH                     

 

 2017            DAIJA HAWK MD, Ot            Z87.891       
     PERSONAL HISTORY OF NICOTINE DEPENDENCE                     

 

 2017            DAIJA HAWK MD, Ot            Z95.5         
   PRESENCE OF CORONARY ANGIOPLASTY IMPLANT                     

 

 2017            DAIJA HAWK MD, Ot            E66.9         
   OBESITY, UNSPECIFIED                     

 

 2017            DAIJA HAWK MD, Ot            E78.00        
    PURE HYPERCHOLESTEROLEMIA, UNSPECIFIED                     

 

 2017            DAIJA HAWK MD, Ot            E78.5         
   HYPERLIPIDEMIA, UNSPECIFIED                     

 

 2017            DAIJA HAWK MD, Ot            E87.6         
   HYPOKALEMIA                     

 

 2017            DAIJA HAWK MD, Ot            F32.9         
   MAJOR DEPRESSIVE DISORDER, SINGLE EPISOD                     

 

 2017            DAIJA HAWK MD, Ot            F41.9         
   ANXIETY DISORDER, UNSPECIFIED                     

 

 2017            DAIJA HAWK MD, Ot            G47.33        
    OBSTRUCTIVE SLEEP APNEA (ADULT) (PEDIATR                     

 

 2017            DAIJA HAWK MD, Ot            I10            
ESSENTIAL (PRIMARY) HYPERTENSION                     

 

 2017            DAIJA HAWK MD, Ot            I25.10        
    ATHSCL HEART DISEASE OF NATIVE CORONARY                      

 

 2017            DAIJA HAWK MD, Ot            K21.9         
   GASTRO-ESOPHAGEAL REFLUX DISEASE WITHOUT                     

 

 2017            DAIJA HAWK MD, Ot            K56.7         
   ILEUS, UNSPECIFIED                     

 

 2017            DAIJA HAWK MD, Ot            M10.9         
   GOUT, UNSPECIFIED                     

 

 2017            DAIJA HAWK MD, Ot            M19.91        
    PRIMARY OSTEOARTHRITIS, UNSPECIFIED SITE                     

 

 2017            DAIJA HAWK MD, Ot            N28.9         
   DISORDER OF KIDNEY AND URETER, UNSPECIFI                     

 

 2017            DAIJA HAWK MD, Ot            N40.0         
   BENIGN PROSTATIC HYPERPLASIA WITHOUT LOW                     

 

 2017            DAIJA HAWK MD, Ot            R09.02        
    HYPOXEMIA                     

 

 2017            DAIJA HAWK MD, Ot            R42            
DIZZINESS AND GIDDINESS                     

 

 2017            DAIJA HAWK MD, Ot            R73.9         
   HYPERGLYCEMIA, UNSPECIFIED                     

 

 2017            DAIJA HAWK MD, Ot            Z43.2         
   ENCOUNTER FOR ATTENTION TO ILEOSTOMY                     

 

 2017            DAIJA HAWK MD, Ot            Z68.31        
    BODY MASS INDEX (BMI) 31.0-31.9, ADULT                     

 

 2017            DAIJA HAWK MD, Ot            Z80.0         
   FAMILY HISTORY OF MALIGNANT NEOPLASM OF                      

 

 2017            DAIJA HAWK MD, Ot            Z87.19        
    PERSONAL HISTORY OF OTHER DISEASES OF TH                     

 

 2017            DAIJA HAWK MD, Ot            Z87.891       
     PERSONAL HISTORY OF NICOTINE DEPENDENCE                     

 

 2017            DAIJA HAWK MD, Ot            Z95.5         
   PRESENCE OF CORONARY ANGIOPLASTY IMPLANT                     

 

 2017            THEO ALMEIDA MD            Ot            E78.5       
     HYPERLIPIDEMIA, UNSPECIFIED                     

 

 2017            THEO ALMEIDA MD            Ot            I10         
   ESSENTIAL (PRIMARY) HYPERTENSION                     

 

 2017            THEO ALMEIDA MD            Ot            I25.10      
      ATHSCL HEART DISEASE OF NATIVE CORONARY                      

 

 2017            THEO ALMEIDA MD            Ot            I49.5       
     SICK SINUS SYNDROME                     

 

 2017            THEO ALMEIDA MD            Ot            E78.5       
     HYPERLIPIDEMIA, UNSPECIFIED                     

 

 2017            THEO ALMEIDA MD            Ot            I10         
   ESSENTIAL (PRIMARY) HYPERTENSION                     

 

 2017            THEO ALMEIDA MD            Ot            I25.10      
      ATHSCL HEART DISEASE OF NATIVE CORONARY                      

 

 2017            THEO ALMEIDA MD            Ot            I49.5       
     SICK SINUS SYNDROME                     



                                                                               
                                                                               
                                                                               
                                                                               
                                                                               
                                                                               
        



Procedures

      





 Code            Description            Performed By            Performed On   
     

 

             37.22                                                             
        2015        

 

             88.45                                                             
        2015        

 

             88.53                                                             
        2015        

 

             88.56                                                             
        2015        

 

             [id number]                                  BYPASS ILEUM TO CUTANEOUS
, OPEN APPROACH                                   2017        

 

             8OFQ6SF                                  EXCISION OF DESCENDING 
COLON, PERC ENDO                                    2017        

 

             2DVJ3AA                                  EXCISION OF RECTUM, 
PERCUTANEOUS ENDOSCO                                   2017        

 

             7YWP1IN                                  RESECTION OF SIGMOID COLON
, PERCUTANEOUS                                   2017        

 

             4EHR1DO                                  REPAIR ABDOMINAL WALL, 
OPEN APPROACH                                   2017        

 

             4IBN6YB                                  EXCISION OF ILEUM, OPEN 
APPROACH                                   2017        



                                    



Results

      





 Test            Result            Range        









 Complete blood count (CBC) with automated white blood cell (WBC) differential 
- 16 10:44         









 Blood leukocytes automated count (number/volume)            6.6 10*3/uL       
     4.3-11.0        

 

 Blood erythrocytes automated count (number/volume)            4.21 10*6/uL    
        4.35-5.85        

 

 Venous blood hemoglobin measurement (mass/volume)            13.7 g/dL        
    13.3-17.7        

 

 Blood hematocrit (volume fraction)            39 %            40-54        

 

 Automated erythrocyte mean corpuscular volume            93 [foz_us]          
  80-99        

 

 Automated erythrocyte mean corpuscular hemoglobin (mass per erythrocyte)      
      33 pg            25-34        

 

 Automated erythrocyte mean corpuscular hemoglobin concentration measurement (
mass/volume)            35 g/dL            32-36        

 

 Automated erythrocyte distribution width ratio            13.1 %            
10.0-14.5        

 

 Automated blood platelet count (count/volume)            221 10*3/uL          
  130-400        

 

 Automated blood platelet mean volume measurement            10.6 [foz_us]     
       7.4-10.4        

 

 Automated blood neutrophils/100 leukocytes            57 %            42-75   
     

 

 Automated blood lymphocytes/100 leukocytes            28 %            12-44   
     

 

 Blood monocytes/100 leukocytes            10 %            0-12        

 

 Automated blood eosinophils/100 leukocytes            5 %            0-10     
   

 

 Automated blood basophils/100 leukocytes            1 %            0-10        

 

 Blood neutrophils automated count (number/volume)            3.7 10*3         
   1.8-7.8        

 

 Blood lymphocytes automated count (number/volume)            1.8 10*3         
   1.0-4.0        

 

 Blood monocytes automated count (number/volume)            0.7 10*3            
0.0-1.0        

 

 Automated eosinophil count            0.3 10*3/uL            0.0-0.3        

 

 Automated blood basophil count (count/volume)            0.0 10*3/uL          
  0.0-0.1        









 Erythrocyte sedimentation rate by westergren method - 16 10:44         









 Erythrocyte sedimentation rate by westergren method            5 mm            
0-30        









 Comprehensive metabolic panel - 16 10:44         









 Serum or plasma sodium measurement (moles/volume)            140 mmol/L       
     135-145        

 

 Serum or plasma potassium measurement (moles/volume)            4.1 mmol/L    
        3.6-5.0        

 

 Serum or plasma chloride measurement (moles/volume)            107 mmol/L     
               

 

 Carbon dioxide            24 mmol/L            21-32        

 

 Serum or plasma anion gap determination (moles/volume)            9 mmol/L    
        5-14        

 

 Serum or plasma urea nitrogen measurement (mass/volume)            31 mg/dL   
         7-18        

 

 Serum or plasma creatinine measurement (mass/volume)            1.84 mg/dL    
        0.60-1.30        

 

 Serum or plasma urea nitrogen/creatinine mass ratio            17             
NRG        

 

 Serum or plasma creatinine measurement with calculation of estimated 
glomerular filtration rate            36             NRG        

 

 Serum or plasma glucose measurement (mass/volume)            112 mg/dL        
            

 

 Serum or plasma calcium measurement (mass/volume)            9.5 mg/dL        
    8.5-10.1        

 

 Serum or plasma total bilirubin measurement (mass/volume)            0.4 mg/dL
            0.1-1.0        

 

 Serum or plasma alkaline phosphatase measurement (enzymatic activity/volume)  
          47 U/L                    

 

 Serum or plasma aspartate aminotransferase measurement (enzymatic activity/
volume)            32 U/L            5-34        

 

 Serum or plasma alanine aminotransferase measurement (enzymatic activity/volume
)            39 U/L            0-55        

 

 Serum or plasma protein measurement (mass/volume)            6.5 g/dL         
   6.4-8.2        

 

 Serum or plasma albumin measurement (mass/volume)            4.4 g/dL         
   3.2-4.5        









 Complete blood count (CBC) with automated white blood cell (WBC) differential 
- 17 12:35         









 Blood leukocytes automated count (number/volume)            6.5 10*3/uL       
     4.3-11.0        

 

 Blood erythrocytes automated count (number/volume)            4.26 10*6/uL    
        4.35-5.85        

 

 Venous blood hemoglobin measurement (mass/volume)            13.6 g/dL        
    13.3-17.7        

 

 Blood hematocrit (volume fraction)            39 %            40-54        

 

 Automated erythrocyte mean corpuscular volume            91 [foz_us]          
  80-99        

 

 Automated erythrocyte mean corpuscular hemoglobin (mass per erythrocyte)      
      32 pg            25-34        

 

 Automated erythrocyte mean corpuscular hemoglobin concentration measurement (
mass/volume)            35 g/dL            32-36        

 

 Automated erythrocyte distribution width ratio            13.6 %            
10.0-14.5        

 

 Automated blood platelet count (count/volume)            199 10*3/uL          
  130-400        

 

 Automated blood platelet mean volume measurement            11.0 [foz_us]     
       7.4-10.4        

 

 Automated blood neutrophils/100 leukocytes            56 %            42-75   
     

 

 Automated blood lymphocytes/100 leukocytes            30 %            12-44   
     

 

 Blood monocytes/100 leukocytes            9 %            0-12        

 

 Automated blood eosinophils/100 leukocytes            4 %            0-10     
   

 

 Automated blood basophils/100 leukocytes            1 %            0-10        

 

 Blood neutrophils automated count (number/volume)            3.7 10*3         
   1.8-7.8        

 

 Blood lymphocytes automated count (number/volume)            1.9 10*3         
   1.0-4.0        

 

 Blood monocytes automated count (number/volume)            0.6 10*3            
0.0-1.0        

 

 Automated eosinophil count            0.3 10*3/uL            0.0-0.3        

 

 Automated blood basophil count (count/volume)            0.0 10*3/uL          
  0.0-0.1        









 Whole blood basic metabolic panel - 17 12:35         









 Serum or plasma sodium measurement (moles/volume)            140 mmol/L       
     135-145        

 

 Serum or plasma potassium measurement (moles/volume)            4.1 mmol/L    
        3.6-5.0        

 

 Serum or plasma chloride measurement (moles/volume)            106 mmol/L     
               

 

 Carbon dioxide            23 mmol/L            21-32        

 

 Serum or plasma anion gap determination (moles/volume)            11 mmol/L   
         5-14        

 

 Serum or plasma urea nitrogen measurement (mass/volume)            27 mg/dL   
         7-18        

 

 Serum or plasma creatinine measurement (mass/volume)            1.46 mg/dL    
        0.60-1.30        

 

 Serum or plasma urea nitrogen/creatinine mass ratio            18             
NRG        

 

 Serum or plasma creatinine measurement with calculation of estimated 
glomerular filtration rate            47             NRG        

 

 Serum or plasma glucose measurement (mass/volume)            86 mg/dL         
           

 

 Serum or plasma calcium measurement (mass/volume)            9.2 mg/dL        
    8.5-10.1        









 Blood type T Indirect antibody screen panel - 17 12:35         









 ABO+Rh group            BP             NRG        

 

 Transfusion band number            [id number]             NRG        

 

 Blood group antibody screen            NEGATIVE             NRG        









 Blood type T Indirect antibody screen panel - 17 06:31         









 ABO+Rh group            BP             NRG        

 

 Transfusion band number            [id number]             NRG        

 

 Blood group antibody screen            NEGATIVE             NRG        









 Automated blood complete blood count (hemogram) panel - 17 04:05         









 Blood leukocytes automated count (number/volume)            10.8 10*3/uL      
      4.3-11.0        

 

 Blood erythrocytes automated count (number/volume)            3.75 10*6/uL    
        4.35-5.85        

 

 Venous blood hemoglobin measurement (mass/volume)            12.0 g/dL        
    13.3-17.7        

 

 Blood hematocrit (volume fraction)            35 %            40-54        

 

 Automated erythrocyte mean corpuscular volume            93 [foz_us]          
  80-99        

 

 Automated erythrocyte mean corpuscular hemoglobin (mass per erythrocyte)      
      32 pg            25-34        

 

 Automated erythrocyte mean corpuscular hemoglobin concentration measurement (
mass/volume)            35 g/dL            32-36        

 

 Automated erythrocyte distribution width ratio            13.4 %            
10.0-14.5        

 

 Automated blood platelet count (count/volume)            170 10*3/uL          
  130-400        

 

 Automated blood platelet mean volume measurement            11.0 [foz_us]     
       7.4-10.4        









 Whole blood basic metabolic panel - 17 04:05         









 Serum or plasma sodium measurement (moles/volume)            138 mmol/L       
     135-145        

 

 Serum or plasma potassium measurement (moles/volume)            4.6 mmol/L    
        3.6-5.0        

 

 Serum or plasma chloride measurement (moles/volume)            102 mmol/L     
               

 

 Carbon dioxide            26 mmol/L            21-32        

 

 Serum or plasma anion gap determination (moles/volume)            10 mmol/L   
         5-14        

 

 Serum or plasma urea nitrogen measurement (mass/volume)            21 mg/dL   
         7-18        

 

 Serum or plasma creatinine measurement (mass/volume)            1.49 mg/dL    
        0.60-1.30        

 

 Serum or plasma urea nitrogen/creatinine mass ratio            14             
NRG        

 

 Serum or plasma creatinine measurement with calculation of estimated 
glomerular filtration rate            46             NRG        

 

 Serum or plasma glucose measurement (mass/volume)            117 mg/dL        
            

 

 Serum or plasma calcium measurement (mass/volume)            8.8 mg/dL        
    8.5-10.1        









 Complete blood count (CBC) with automated white blood cell (WBC) differential 
- 17 05:37         









 Blood leukocytes automated count (number/volume)            8.0 10*3/uL       
     4.3-11.0        

 

 Blood erythrocytes automated count (number/volume)            3.48 10*6/uL    
        4.35-5.85        

 

 Venous blood hemoglobin measurement (mass/volume)            11.1 g/dL        
    13.3-17.7        

 

 Blood hematocrit (volume fraction)            33 %            40-54        

 

 Automated erythrocyte mean corpuscular volume            95 [foz_us]          
  80-99        

 

 Automated erythrocyte mean corpuscular hemoglobin (mass per erythrocyte)      
      32 pg            25-34        

 

 Automated erythrocyte mean corpuscular hemoglobin concentration measurement (
mass/volume)            34 g/dL            32-36        

 

 Automated erythrocyte distribution width ratio            13.7 %            
10.0-14.5        

 

 Automated blood platelet count (count/volume)            138 10*3/uL          
  130-400        

 

 Automated blood platelet mean volume measurement            10.8 [foz_us]     
       7.4-10.4        

 

 Automated blood neutrophils/100 leukocytes            73 %            42-75   
     

 

 Automated blood lymphocytes/100 leukocytes            17 %            12-44   
     

 

 Blood monocytes/100 leukocytes            9 %            0-12        

 

 Automated blood eosinophils/100 leukocytes            1 %            0-10     
   

 

 Automated blood basophils/100 leukocytes            0 %            0-10        

 

 Blood neutrophils automated count (number/volume)            5.8 10*3         
   1.8-7.8        

 

 Blood lymphocytes automated count (number/volume)            1.4 10*3         
   1.0-4.0        

 

 Blood monocytes automated count (number/volume)            0.7 10*3            
0.0-1.0        

 

 Automated eosinophil count            0.1 10*3/uL            0.0-0.3        

 

 Automated blood basophil count (count/volume)            0.0 10*3/uL          
  0.0-0.1        









 Comprehensive metabolic panel - 17 05:37         









 Serum or plasma sodium measurement (moles/volume)            139 mmol/L       
     135-145        

 

 Serum or plasma potassium measurement (moles/volume)            4.2 mmol/L    
        3.6-5.0        

 

 Serum or plasma chloride measurement (moles/volume)            104 mmol/L     
               

 

 Carbon dioxide            25 mmol/L            21-32        

 

 Serum or plasma anion gap determination (moles/volume)            10 mmol/L   
         5-14        

 

 Serum or plasma urea nitrogen measurement (mass/volume)            18 mg/dL   
         7-18        

 

 Serum or plasma creatinine measurement (mass/volume)            1.38 mg/dL    
        0.60-1.30        

 

 Serum or plasma urea nitrogen/creatinine mass ratio            13             
NRG        

 

 Serum or plasma creatinine measurement with calculation of estimated 
glomerular filtration rate            50             NRG        

 

 Serum or plasma glucose measurement (mass/volume)            91 mg/dL         
           

 

 Serum or plasma calcium measurement (mass/volume)            8.7 mg/dL        
    8.5-10.1        

 

 Serum or plasma total bilirubin measurement (mass/volume)            0.7 mg/dL
            0.1-1.0        

 

 Serum or plasma alkaline phosphatase measurement (enzymatic activity/volume)  
          42 U/L                    

 

 Serum or plasma aspartate aminotransferase measurement (enzymatic activity/
volume)            26 U/L            5-34        

 

 Serum or plasma alanine aminotransferase measurement (enzymatic activity/volume
)            26 U/L            0-55        

 

 Serum or plasma protein measurement (mass/volume)            5.5 g/dL         
   6.4-8.2        

 

 Serum or plasma albumin measurement (mass/volume)            3.4 g/dL         
   3.2-4.5        









 Complete urinalysis with reflex to culture - 17 16:05         









 Urine color determination            YELLOW             NRG        

 

 Urine clarity determination            CLEAR             NRG        

 

 Urine pH measurement by test strip            7             5-9        

 

 Specific gravity of urine by test strip            1.005             1.016-
1.022        

 

 Urine protein assay by test strip, semi-quantitative            NEGATIVE      
       NEGATIVE        

 

 Urine glucose detection by automated test strip            NEGATIVE           
  NEGATIVE        

 

 Erythrocytes detection in urine sediment by light microscopy            
NEGATIVE             NEGATIVE        

 

 Urine ketones detection by automated test strip            NEGATIVE           
  NEGATIVE        

 

 Urine nitrite detection by test strip            NEGATIVE             NEGATIVE
        

 

 Urine total bilirubin detection by test strip            NEGATIVE             
NEGATIVE        

 

 Urine urobilinogen measurement by automated test strip (mass/volume)          
  NORMAL             NORMAL        

 

 Urine leukocyte esterase detection by dipstick            NEGATIVE             
NEGATIVE        

 

 Automated urine sediment erythrocyte count by microscopy (number/high power 
field)            RARE             NRG        

 

 Automated urine sediment leukocyte count by microscopy (number/high power field
)            RARE             NRG        

 

 Bacteria detection in urine sediment by light microscopy            NONE      
       NRG        

 

 Crystals detection in urine sediment by light microscopy            NONE      
       NRG        

 

 Casts detection in urine sediment by light microscopy            NONE         
    NRG        

 

 Mucus detection in urine sediment by light microscopy            NEGATIVE     
        NRG        

 

 Complete urinalysis with reflex to culture            NO             NRG      
  









 Serum or plasma troponin i.cardiac measurement (mass/volume) - 17 01:55 
        









 Serum or plasma troponin i.cardiac measurement (mass/volume)            < ng/
mL            <0.30        









 Serum or plasma troponin i.cardiac measurement (mass/volume) - 17 08:20 
        









 Serum or plasma troponin i.cardiac measurement (mass/volume)            < ng/
mL            <0.30        









 Complete blood count (CBC) with automated white blood cell (WBC) differential 
- 17 14:30         









 Blood leukocytes automated count (number/volume)            6.5 10*3/uL       
     4.3-11.0        

 

 Blood erythrocytes automated count (number/volume)            4.02 10*6/uL    
        4.35-5.85        

 

 Venous blood hemoglobin measurement (mass/volume)            12.6 g/dL        
    13.3-17.7        

 

 Blood hematocrit (volume fraction)            36 %            40-54        

 

 Automated erythrocyte mean corpuscular volume            90 [foz_us]          
  80-99        

 

 Automated erythrocyte mean corpuscular hemoglobin (mass per erythrocyte)      
      31 pg            25-34        

 

 Automated erythrocyte mean corpuscular hemoglobin concentration measurement (
mass/volume)            35 g/dL            32-36        

 

 Automated erythrocyte distribution width ratio            13.7 %            
10.0-14.5        

 

 Automated blood platelet count (count/volume)            217 10*3/uL          
  130-400        

 

 Automated blood platelet mean volume measurement            11.1 [foz_us]     
       7.4-10.4        

 

 Automated blood neutrophils/100 leukocytes            65 %            42-75   
     

 

 Automated blood lymphocytes/100 leukocytes            22 %            12-44   
     

 

 Blood monocytes/100 leukocytes            7 %            0-12        

 

 Automated blood eosinophils/100 leukocytes            5 %            0-10     
   

 

 Automated blood basophils/100 leukocytes            0 %            0-10        

 

 Blood neutrophils automated count (number/volume)            4.2 10*3         
   1.8-7.8        

 

 Blood lymphocytes automated count (number/volume)            1.5 10*3         
   1.0-4.0        

 

 Blood monocytes automated count (number/volume)            0.5 10*3            
0.0-1.0        

 

 Automated eosinophil count            0.3 10*3/uL            0.0-0.3        

 

 Automated blood basophil count (count/volume)            0.0 10*3/uL          
  0.0-0.1        









 Whole blood basic metabolic panel - 17 14:30         









 Serum or plasma sodium measurement (moles/volume)            139 mmol/L       
     135-145        

 

 Serum or plasma potassium measurement (moles/volume)            4.3 mmol/L    
        3.6-5.0        

 

 Serum or plasma chloride measurement (moles/volume)            107 mmol/L     
               

 

 Carbon dioxide            22 mmol/L            21-32        

 

 Serum or plasma anion gap determination (moles/volume)            10 mmol/L   
         5-14        

 

 Serum or plasma urea nitrogen measurement (mass/volume)            22 mg/dL   
         7-18        

 

 Serum or plasma creatinine measurement (mass/volume)            1.48 mg/dL    
        0.60-1.30        

 

 Serum or plasma urea nitrogen/creatinine mass ratio            15             
NRG        

 

 Serum or plasma creatinine measurement with calculation of estimated 
glomerular filtration rate            46             NRG        

 

 Serum or plasma glucose measurement (mass/volume)            120 mg/dL        
            

 

 Serum or plasma calcium measurement (mass/volume)            9.4 mg/dL        
    8.5-10.1        









 Methicillin resistant Staphylococcus aureus (MRSA) screening culture -  14:30         









 Methicillin resistant Staphylococcus aureus (MRSA) screening culture          
  NEG             NRG        









 Automated blood complete blood count (hemogram) panel - 17 05:58         









 Blood leukocytes automated count (number/volume)            12.0 10*3/uL      
      4.3-11.0        

 

 Blood erythrocytes automated count (number/volume)            3.64 10*6/uL    
        4.35-5.85        

 

 Venous blood hemoglobin measurement (mass/volume)            11.3 g/dL        
    13.3-17.7        

 

 Blood hematocrit (volume fraction)            34 %            40-54        

 

 Automated erythrocyte mean corpuscular volume            93 [foz_us]          
  80-99        

 

 Automated erythrocyte mean corpuscular hemoglobin (mass per erythrocyte)      
      31 pg            25-34        

 

 Automated erythrocyte mean corpuscular hemoglobin concentration measurement (
mass/volume)            34 g/dL            32-36        

 

 Automated erythrocyte distribution width ratio            14.1 %            
10.0-14.5        

 

 Automated blood platelet count (count/volume)            192 10*3/uL          
  130-400        

 

 Automated blood platelet mean volume measurement            11.5 [foz_us]     
       7.4-10.4        









 Whole blood basic metabolic panel - 17 05:58         









 Serum or plasma sodium measurement (moles/volume)            138 mmol/L       
     135-145        

 

 Serum or plasma potassium measurement (moles/volume)            5.2 mmol/L    
        3.6-5.0        

 

 Serum or plasma chloride measurement (moles/volume)            107 mmol/L     
               

 

 Carbon dioxide            21 mmol/L            21-32        

 

 Serum or plasma anion gap determination (moles/volume)            10 mmol/L   
         5-14        

 

 Serum or plasma urea nitrogen measurement (mass/volume)            29 mg/dL   
         7-18        

 

 Serum or plasma creatinine measurement (mass/volume)            1.56 mg/dL    
        0.60-1.30        

 

 Serum or plasma urea nitrogen/creatinine mass ratio            19             
NRG        

 

 Serum or plasma creatinine measurement with calculation of estimated 
glomerular filtration rate            44             NRG        

 

 Serum or plasma glucose measurement (mass/volume)            114 mg/dL        
            

 

 Serum or plasma calcium measurement (mass/volume)            8.9 mg/dL        
    8.5-10.1        









 Automated blood complete blood count (hemogram) panel - 17 04:26         









 Blood leukocytes automated count (number/volume)            6.7 10*3/uL       
     4.3-11.0        

 

 Blood erythrocytes automated count (number/volume)            3.28 10*6/uL    
        4.35-5.85        

 

 Venous blood hemoglobin measurement (mass/volume)            10.2 g/dL        
    13.3-17.7        

 

 Blood hematocrit (volume fraction)            31 %            40-54        

 

 Automated erythrocyte mean corpuscular volume            93 [foz_us]          
  80-99        

 

 Automated erythrocyte mean corpuscular hemoglobin (mass per erythrocyte)      
      31 pg            25-34        

 

 Automated erythrocyte mean corpuscular hemoglobin concentration measurement (
mass/volume)            33 g/dL            32-36        

 

 Automated erythrocyte distribution width ratio            14.2 %            
10.0-14.5        

 

 Automated blood platelet count (count/volume)            154 10*3/uL          
  130-400        

 

 Automated blood platelet mean volume measurement            11.1 [foz_us]     
       7.4-10.4        









 Comprehensive metabolic panel - 17 04:26         









 Serum or plasma sodium measurement (moles/volume)            137 mmol/L       
     135-145        

 

 Serum or plasma potassium measurement (moles/volume)            4.2 mmol/L    
        3.6-5.0        

 

 Serum or plasma chloride measurement (moles/volume)            104 mmol/L     
               

 

 Carbon dioxide            23 mmol/L            21-32        

 

 Serum or plasma anion gap determination (moles/volume)            10 mmol/L   
         5-14        

 

 Serum or plasma urea nitrogen measurement (mass/volume)            15 mg/dL   
         7-18        

 

 Serum or plasma creatinine measurement (mass/volume)            1.23 mg/dL    
        0.60-1.30        

 

 Serum or plasma urea nitrogen/creatinine mass ratio            12             
NRG        

 

 Serum or plasma creatinine measurement with calculation of estimated 
glomerular filtration rate            57             NRG        

 

 Serum or plasma glucose measurement (mass/volume)            120 mg/dL        
            

 

 Serum or plasma calcium measurement (mass/volume)            8.9 mg/dL        
    8.5-10.1        

 

 Serum or plasma total bilirubin measurement (mass/volume)            0.5 mg/dL
            0.1-1.0        

 

 Serum or plasma alkaline phosphatase measurement (enzymatic activity/volume)  
          48 U/L                    

 

 Serum or plasma aspartate aminotransferase measurement (enzymatic activity/
volume)            20 U/L            5-34        

 

 Serum or plasma alanine aminotransferase measurement (enzymatic activity/volume
)            25 U/L            0-55        

 

 Serum or plasma protein measurement (mass/volume)            5.8 g/dL         
   6.4-8.2        

 

 Serum or plasma albumin measurement (mass/volume)            3.6 g/dL         
   3.2-4.5        









 Automated blood complete blood count (hemogram) panel - 17 05:44         









 Blood leukocytes automated count (number/volume)            8.3 10*3/uL       
     4.3-11.0        

 

 Blood erythrocytes automated count (number/volume)            3.15 10*6/uL    
        4.35-5.85        

 

 Venous blood hemoglobin measurement (mass/volume)            9.8 g/dL         
   13.3-17.7        

 

 Blood hematocrit (volume fraction)            29 %            40-54        

 

 Automated erythrocyte mean corpuscular volume            93 [foz_us]          
  80-99        

 

 Automated erythrocyte mean corpuscular hemoglobin (mass per erythrocyte)      
      31 pg            25-34        

 

 Automated erythrocyte mean corpuscular hemoglobin concentration measurement (
mass/volume)            33 g/dL            32-36        

 

 Automated erythrocyte distribution width ratio            14.2 %            
10.0-14.5        

 

 Automated blood platelet count (count/volume)            156 10*3/uL          
  130-400        

 

 Automated blood platelet mean volume measurement            10.8 [foz_us]     
       7.4-10.4        









 Whole blood basic metabolic panel - 17 05:44         









 Serum or plasma sodium measurement (moles/volume)            138 mmol/L       
     135-145        

 

 Serum or plasma potassium measurement (moles/volume)            3.9 mmol/L    
        3.6-5.0        

 

 Serum or plasma chloride measurement (moles/volume)            101 mmol/L     
               

 

 Carbon dioxide            27 mmol/L            21-32        

 

 Serum or plasma anion gap determination (moles/volume)            10 mmol/L   
         5-14        

 

 Serum or plasma urea nitrogen measurement (mass/volume)            12 mg/dL   
         7-18        

 

 Serum or plasma creatinine measurement (mass/volume)            1.07 mg/dL    
        0.60-1.30        

 

 Serum or plasma urea nitrogen/creatinine mass ratio            11             
NRG        

 

 Serum or plasma creatinine measurement with calculation of estimated 
glomerular filtration rate            >             NRG        

 

 Serum or plasma glucose measurement (mass/volume)            108 mg/dL        
            

 

 Serum or plasma calcium measurement (mass/volume)            8.7 mg/dL        
    8.5-10.1        









 Complete urinalysis with reflex to culture - 17 13:47         









 Urine color determination            YELLOW             NRG        

 

 Urine clarity determination            SLIGHTLY CLOUDY             NRG        

 

 Urine pH measurement by test strip            5             5-9        

 

 Specific gravity of urine by test strip            1.015             1.016-
1.022        

 

 Urine protein assay by test strip, semi-quantitative            1+             
NEGATIVE        

 

 Urine glucose detection by automated test strip            NEGATIVE           
  NEGATIVE        

 

 Erythrocytes detection in urine sediment by light microscopy            
NEGATIVE             NEGATIVE        

 

 Urine ketones detection by automated test strip            1+             
NEGATIVE        

 

 Urine nitrite detection by test strip            NEGATIVE             NEGATIVE
        

 

 Urine total bilirubin detection by test strip            NEGATIVE             
NEGATIVE        

 

 Urine urobilinogen measurement by automated test strip (mass/volume)          
  NORMAL             NORMAL        

 

 Urine leukocyte esterase detection by dipstick            1+             
NEGATIVE        

 

 Automated urine sediment erythrocyte count by microscopy (number/high power 
field)            NONE             NRG        

 

 Automated urine sediment leukocyte count by microscopy (number/high power field
)             [HPF]            NRG        

 

 Bacteria detection in urine sediment by light microscopy            NEGATIVE  
           NRG        

 

 Squamous epithelial cells detection in urine sediment by light microscopy     
       NONE             NRG        

 

 Crystals detection in urine sediment by light microscopy            NONE      
       NRG        

 

 Casts detection in urine sediment by light microscopy            NONE         
    NRG        

 

 Mucus detection in urine sediment by light microscopy            LARGE        
     NRG        

 

 Complete urinalysis with reflex to culture            NO             NRG      
  









 Complete blood count (CBC) with automated white blood cell (WBC) differential 
- 17 09:44         









 Blood leukocytes automated count (number/volume)            7.7 10*3/uL       
     4.3-11.0        

 

 Blood erythrocytes automated count (number/volume)            3.44 10*6/uL    
        4.35-5.85        

 

 Venous blood hemoglobin measurement (mass/volume)            10.6 g/dL        
    13.3-17.7        

 

 Blood hematocrit (volume fraction)            32 %            40-54        

 

 Automated erythrocyte mean corpuscular volume            93 [foz_us]          
  80-99        

 

 Automated erythrocyte mean corpuscular hemoglobin (mass per erythrocyte)      
      31 pg            25-34        

 

 Automated erythrocyte mean corpuscular hemoglobin concentration measurement (
mass/volume)            33 g/dL            32-36        

 

 Automated erythrocyte distribution width ratio            14.6 %            
10.0-14.5        

 

 Automated blood platelet count (count/volume)            204 10*3/uL          
  130-400        

 

 Automated blood platelet mean volume measurement            10.6 [foz_us]     
       7.4-10.4        

 

 Automated blood neutrophils/100 leukocytes            77 %            42-75   
     

 

 Automated blood lymphocytes/100 leukocytes            10 %            12-44   
     

 

 Blood monocytes/100 leukocytes            8 %            0-12        

 

 Automated blood eosinophils/100 leukocytes            5 %            0-10     
   

 

 Automated blood basophils/100 leukocytes            0 %            0-10        

 

 Blood neutrophils automated count (number/volume)            5.9 10*3         
   1.8-7.8        

 

 Blood lymphocytes automated count (number/volume)            0.8 10*3         
   1.0-4.0        

 

 Blood monocytes automated count (number/volume)            0.6 10*3            
0.0-1.0        

 

 Automated eosinophil count            0.4 10*3/uL            0.0-0.3        

 

 Automated blood basophil count (count/volume)            0.0 10*3/uL          
  0.0-0.1        









 Comprehensive metabolic panel - 17 09:44         









 Serum or plasma sodium measurement (moles/volume)            139 mmol/L       
     135-145        

 

 Serum or plasma potassium measurement (moles/volume)            3.1 mmol/L    
        3.6-5.0        

 

 Serum or plasma chloride measurement (moles/volume)            101 mmol/L     
               

 

 Carbon dioxide            27 mmol/L            21-32        

 

 Serum or plasma anion gap determination (moles/volume)            11 mmol/L   
         5-14        

 

 Serum or plasma urea nitrogen measurement (mass/volume)            14 mg/dL   
         7-18        

 

 Serum or plasma creatinine measurement (mass/volume)            1.12 mg/dL    
        0.60-1.30        

 

 Serum or plasma urea nitrogen/creatinine mass ratio            13             
NRG        

 

 Serum or plasma creatinine measurement with calculation of estimated 
glomerular filtration rate            >             NRG        

 

 Serum or plasma glucose measurement (mass/volume)            154 mg/dL        
            

 

 Serum or plasma calcium measurement (mass/volume)            9.0 mg/dL        
    8.5-10.1        

 

 Serum or plasma total bilirubin measurement (mass/volume)            0.6 mg/dL
            0.1-1.0        

 

 Serum or plasma alkaline phosphatase measurement (enzymatic activity/volume)  
          54 U/L                    

 

 Serum or plasma aspartate aminotransferase measurement (enzymatic activity/
volume)            19 U/L            5-34        

 

 Serum or plasma alanine aminotransferase measurement (enzymatic activity/volume
)            20 U/L            0-55        

 

 Serum or plasma protein measurement (mass/volume)            6.1 g/dL         
   6.4-8.2        

 

 Serum or plasma albumin measurement (mass/volume)            3.6 g/dL         
   3.2-4.5        



                                                                  



Encounters

      





 ACCT No.            Visit Date/Time            Discharge            Status    
        Pt. Type            Provider            Facility            Loc./Unit  
          Complaint        

 

 [id number]            2017 07:23:00            2017 23:59:59    
        CLS            Outpatient            THEO ALMEIDA MD            [facility]            CARD            CAD I25.10,HTN I10     
   

 

 [id number]            2017 11:50:00            2017 12:30:00    
        DIS            Inpatient            DAIJA HAWK MD            [facility]            4TH            REVERSAL ILEOSTOMY      
  

 

 [id number]            2017 13:58:00            2017 14:35:00    
        DIS            Outpatient            DAIJA HAWK MD            [facility]            PREOP            SEVERE RECURENT 
DIVERTICULITIS        

 

 [id number]            2017 06:22:00            2017 16:00:00    
        DIS            Inpatient            DAIJA HAWK MD            [facility]            4TH            RECURRENT DIVERTICULITIS
        

 

 [id number]            2017 10:00:00            2017 14:00:00    
        DIS            Outpatient            DAIJA HAWK MD            [facility]            PREOP            RECURRENT 
DIVERTICULITIS        

 

 [id number]            2017 08:51:00            2017 12:10:00    
        DIS            Outpatient            DAIJA HAWK MD            [facility]            ENDO            HX DIVERTICULITIS      
  

 

 [id number]            2017 05:43:00            2017 13:09:00    
        DIS            Outpatient            DAIJA HAWK MD            [facility]            PREOP            HX DIVERTICULITIS     
   

 

 [id number]            2016 10:19:00            2016 23:59:59    
        CLS            Outpatient            DUNBAR DO, MICHOACANO            [facility]            RAD            DIVERTICULITIS OF SMALL 
INTESTINE W/PERFORATION        

 

 [id number]            2016 16:55:00            2016 10:50:00    
        DIS            Inpatient            DUNBAR DO, MICHOACANO            [facility]            CSD            BRADYCARDIA,VERTIGO     
   

 

 [id number]            2015 20:10:00            2015 15:10:00    
        DIS            Inpatient            DUNBAR DO, MICHOACANO            [facility]            ICU            CHEST PAIN        

 

 [id number]            09/15/2014 09:46:00            09/15/2014 23:59:59    
        CLS            Outpatient            DUNBAR DO, MICHOACANO            [facility]            RAD            PVD        

 

 [id number]            2014 11:11:00            2014 23:59:59    
        CLS            Outpatient            THEO ALMEIDA MD            [facility]            CARD            CAD,DIZZINESS,HTN,HLP  
      

 

 [id number]            2013 16:39:00            2013 14:26:00    
        DIS            Inpatient            DUNBAR DO MICHOACANO            [facility]            4TH            DIVERTICULITIS        

 

 [id number]            2013 18:51:00            2013 23:59:59    
        CLS            Inpatient            THEO ALMEIDA MD            [facility]            ICU            CHEST PAIN        

 

 [id number]            2013 09:08:00            2013 13:50:00    
        DIS            Outpatient            DAIJA HAWK MD            [facility]            SDC            DIARRHEA        

 

 [id number]            2013 07:23:00            2013 23:59:59    
        CLS            Outpatient            DAIJA HAWK MD            [facility]            PREOP            DIARRHEA        

 

 [id number]            2018 15:36:00                         ACT       
     Emergency            DAI VARGAS DO            [facility]            ER            PANCREATITIS        

 

 [id number]            2013 11:34:00                                   
   Document Registration                                                       
     

 

 [id number]            10/19/2012 22:43:00                                   
   Document Registration                                                       
     

 

 [id number]            2012 15:06:00                                   
   Document Registration                                                       
     

 

 [id number]            2012 11:21:00                                   
   Document Registration                                                       
     

 

 [id number]            2011 12:05:00                                   
   Document Registration                                                       
     

 

 [id number]            10/19/2011 12:12:00                                   
   Document Registration

## 2018-02-05 NOTE — XMS REPORT
Encounter Summary

 Created on: 2018



Moshe Lopez

External Reference #: [id number]

: 1940

Sex: Male



Demographics







 Address  [address] [place] [address]  [address]

 

 Home Phone  [phone]

 

 Preferred Language  English

 

 Marital Status  Unknown

 

 [employer] Affiliation  NON

 

 Race  White

 

 Ethnic Group  Not  or 





Author







 Author  [facility]

 

 Organization  [facility]

 

 Address  Unknown

 

 Phone  Unavailable







Support







 Name  Relationship  Address  Phone

 

 Latisha Lopez  ECON  Unknown  [phone]







Care Team Providers







 Care Team Member Name  Role  Phone

 

 Jaelyn Velez DO  PCP  [phone]

 

 Jaelyn Velez DO  Unavailable  [phone]







Encounter Details







    



  Date   Type   Department   Care Team   Description

 

    



  11/10/2017   Procedure Pass   Medicine Telemetry  



    [address]  



    [place] [address] [phone]  







Social History







    



  Tobacco Use   Types   Packs/Day   Years Used   Date

 

    



  Former Smoker   Cigarettes, Pipe, Cigars   1   15   Quit: 1975

 

    



  Smokeless Tobacco: Never   



  Used   









   



  Alcohol Use   Drinks/Week   oz/Week   Comments

 

   



  Yes   1 Cans of   0.6   1 beer every 2 weeks



   beer  









 



  Sex Assigned at Birth   Date Recorded

 

 



  Not on file 



as of this encounter



Functional Status







  



  Functional Status   Response   Date of Assessment

 

  



  Does the patient have a hearing impairment:   No   2017

 

  



  Does the patient have a visual impairment:   Yes   10/12/2017

 

  



  Does the patient have impaired ambulation:   Yes   10/12/2017

 

  



  Does the patient have an activity of daily living   No   10/12/2017



  (ADL) impairment:  

 

  



  Does the patient have an instrumental activity of   No   10/12/2017



  daily living (IADL) impairment:  









  



  Cognitive Status   Response   Date of Assessment

 

  



  Does the patient have a cognitive impairment:   No   10/12/2017



as of this encounter



Plan of Treatment







    



  Date   Type   Specialty   Care Team   Description

 

    



  2018   Procedure Pass   Infectious Diseases  

 

    



  2018   Surgery    Fan Tidwell MD   ESOPHAGOGASTRODUODENOSCOP



     [address]



     [address] 



     [place] [address] 



     [phone] (Fax) 

 

    



  2018   Procedure Pass   

 

    



  2018   Hospital    Fan Tidwell MD   Pancreatic necrosis



   Encounter    [address] 



     [address] 



     [place] [address] 



     [phone] (Fax) 



as of this encounter



Visit Diagnoses

Not on filein this encounter

## 2018-02-05 NOTE — XMS REPORT
Encounter Summary

 Created on: 2018



Moshe Lopez

External Reference #: [id number]

: 1940

Sex: Male



Demographics







 Address  [address] [place]  [address]

 

 Home Phone  [phone]

 

 Preferred Language  English

 

 Marital Status  Unknown

 

 Latter day Affiliation  NON

 

 Race  White

 

 Ethnic Group  Not  or 





Author







 Author  [facility]

 

 Organization  [facility]

 

 Address  Unknown

 

 Phone  Unavailable







Support







 Name  Relationship  Address  Phone

 

 Latisha Lopez  [employer]  Unknown  [phone]







Care Team Providers







 Care Team Member Name  Role  Phone

 

 Agustin Jaelyn CHO  PCP  [phone]

 

 Jaelyn Velez DO  Unavailable  [phone]







Encounter Details







    



  Date   Type   Department   Care Team   Description

 

    



  2017   Telephone   Case Management - Brook Briones 



    Work  



    [address]  



    [place] [address]  







Social History







    



  Tobacco Use   Types   Packs/Day   Years Used   Date

 

    



  Former Smoker   Cigarettes, Pipe, Cigars   1   15   Quit: 1975

 

    



  Smokeless Tobacco: Never   



  Used   









   



  Alcohol Use   Drinks/Week   oz/Week   Comments

 

   



  Yes   1 Cans of   0.6   1 beer every 2 weeks



   beer  









 



  Sex Assigned at Birth   Date Recorded

 

 



  Not on file 



as of this encounter



Functional Status







  



  Functional Status   Response   Date of Assessment

 

  



  Does the patient have a hearing impairment:   No   2017

 

  



  Does the patient have a visual impairment:   Yes   2017

 

  



  Does the patient have impaired ambulation:   No   2017

 

  



  Does the patient have an activity of daily living   No   2017



  (ADL) impairment:  

 

  



  Does the patient have an instrumental activity of   No   2017



  daily living (IADL) impairment:  









  



  Cognitive Status   Response   Date of Assessment

 

  



  Does the patient have a cognitive impairment:   No   2017



as of this encounter



Miscellaneous Notes

* Telephone Encounter - Brook Velez - 2017  1:35 PM CST



[facility] Case Management Note



[facility] CM attempted to contact patient by phone for 30 day post 
discharge follow up. CM left a message requesting a return call. 



Brook Velez LMSW

p.[phone]



in this encounter



Plan of Treatment







    



  Date   Type   Specialty   Care Team   Description

 

    



  2018   Procedure Pass   Infectious Diseases  

 

    



  2018   Surgery    Fan Tidwell MD   ESOPHAGOGASTRODUODENOSCOP



     [address]



     [address] 



     [place] [address] [phone] (Fax) 

 

    



  2018   Procedure Pass   

 

    



  2018   [facility]    Fan Tidwell MD   Pancreatic necrosis



   Encounter    [address] 



     [address] 



     [place] [address] [phone] (Fax) 



as of this encounter



Visit Diagnoses

Not on filein this encounter

## 2018-02-05 NOTE — XMS REPORT
Encounter Summary

 Created on: 2018



Moshe Lopez

External Reference #: [id number]

: 1940

Sex: Male



Demographics







 Address  [address] [place]  [address]

 

 Home Phone  [phone]

 

 Preferred Language  English

 

 Marital Status  Unknown

 

 Pentecostal Affiliation  NON

 

 Race  White

 

 Ethnic Group  Not  or 





Author







 Author  [facility]

 

 Organization  [facility]

 

 Address  Unknown

 

 Phone  Unavailable







Support







 Name  Relationship  Address  Phone

 

 Latisha Lopez  ECON  Unknown  [phone]







Care Team Providers







 Care Team Member Name  Role  Phone

 

 Agustin Jaelyn CHO  PCP  [phone]

 

 Jaelyn Velez DO  Unavailable  [phone]







Reason for Referral

* Consult, Test & Treat





     



  Status   Reason   Specialty   Diagnoses /   Referred By   Referred To



     Procedures   Contact   Contact

 

     



  New Request   Specialty    Diagnoses   Aby Bah MD 



   Services    Infective   [address] 



   Required    necrosis of   BLVD 



     pancreas   [address] 



      [place] 



      [address] 



      Phone: 



      [phone] 



      Fax: 



      [phone] 









 Scheduling Instructions

 

 



No scheduling necessary.











Reason for Visit

* 





 



  Reason   Comments

 

 



  Infection 









Encounter Details







    



  Date   Type   Department   Care Team   Description

 

    



  2017   Office Visit   [facility]   Aby Bah MD   
Infective necrosis of



    Physicians - Internal   [address]   pancreas (Primary Dx)



    Medicine   [address] 



    [address] [place] [address] 



    [address]   [phone] 



    OFFICE BLDG   [phone] (Fax) 



    [place]  



    [address] [phone]  







Social History







    



  Tobacco Use   Types   Packs/Day   Years Used   Date

 

    



  Former Smoker   Cigarettes, Pipe, Cigars   1   15   Quit: 1975

 

    



  Smokeless Tobacco: Never   



  Used   









   



  Alcohol Use   Drinks/Week   oz/Week   Comments

 

   



  Yes   1 Cans of   0.6   1 beer every 2 weeks



   beer  









 



  Sex Assigned at Birth   Date Recorded

 

 



  Not on file 



as of this encounter



Last Filed Vital Signs







  



  Vital Sign   Reading   Time Taken

 

  



  Blood Pressure   128/64   2017  9:55 AM CST

 

  



  Pulse   60   2017  9:55 AM CST

 

  



  Temperature   36.6   C (97.9   F)   2017  9:55 AM CST

 

  



  Respiratory Rate   -   -

 

  



  Oxygen Saturation   -   -

 

  



  Inhaled Oxygen   -   -



  Concentration  

 

  



  Weight   73.8 kg (162 lb 12.8 oz)   2017  9:55 AM CST

 

  



  Height   170.2 cm (5' 7")   2017  9:55 AM CST

 

  



  Body Mass Index   25.5   2017  9:55 AM CST



in this encounter



Functional Status







  



  Functional Status   Response   Date of Assessment

 

  



  Does the patient have a hearing impairment:   No   2017

 

  



  Does the patient have a visual impairment:   Yes   2017

 

  



  Does the patient have impaired ambulation:   No   2017

 

  



  Does the patient have an activity of daily living   No   2017



  (ADL) impairment:  

 

  



  Does the patient have an instrumental activity of   No   2017



  daily living (IADL) impairment:  









  



  Cognitive Status   Response   Date of Assessment

 

  



  Does the patient have a cognitive impairment:   No   2017



as of this encounter



Instructions

* Patient Instructions - Aby Bah MD - 2017  8:30 AM CST



Our clinic nurse is Edie. Her phone number is [phone]



We will contact you by phone or 'JAZD [employer]' with any test results when they are 
available. 

 It is important to me that you have all of your questions answered.

 If you have any questions or concerns, please contact our nurses at [phone]
[phone]

 Please let the nurses know if anything is unclear, and I will call you 
personally to discuss further.



Your time is important and if you had to wait at all today, I apologize. My 
goal is to run exactly on time; however, on occasion, I get behind in clinic 
due to unexpected patient issues.



Your satisfaction with the care you received today is important to me. Please 
contact us directly via phone or [facility] if you have any concerns. 



It was nice to see you today. 



Dr Bah



in this encounter



Progress Notes

* Whit Brock, RN - 2017  8:30 AM CST



Orders per Dr. Bah:



1. Stop ertapenem after dose on  is received. 

2. Start flagyl 500 mg PO TID and levofloxacin 750 mg PO daily on .

3. Keep PICC in place for 1 week after starting PO abx, flush daily w/ 10 mL 
NS. 

4. EKG in 1 week after starting PO abx and labs--CBC w/ diff and CMP in 2 weeks 
after starting PO abx.



Orders sent to [facility] for ertapenem and reviewed w/ nurse Nelly.

Scripts already sent for PO abx per Dr. Bah.

Script for NS flushes sent to [facility] pharmacy, verified that they would 
have in stock by tomorrow. 

* Jan, Montez'Ed, MBBS - 2017  8:30 AM CST



Formatting of this note may be different from the original.

Date of Service: 2017



Subjective:        

 

Moshe Lopez is a 77 y.o. male.



History of Present Illness

Moshe Lopez is a 77 y.o.  history of CAD s/p PCI with 9 stents, MATHEW 
wears CPAP at home, HTN, HLD, sigmoid diverticulitis s/p Hernandez's procedure
in 2017 with colostomy reversal in 2017, who was admitted to [facility] originally on 17 for ongoing management of pancreatitis 
with ileus. 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 
and left abdominal wall. 



He has a very complex history as outlined in previous notes by Dr Chamberlain. He 
has been on/off atbx since July due to infected pancreatic pseudocyst which 
tracked to left abd wall and right retroperitoneal space. He has undergone 
multiple necrosectomy and drainage endoscopically by GI. They have been able to 
clean out left side of fluid collection but has persistent complex network of 
small fluid collection, the most dominant one is on the right retroperitoneal 
space. 



He was most recently readmitted to  on  with fever and confusion. He was 
on oral Augmentin at the time. There was concern for persistent/recurrent abd 
abscess.  He had a CXR which showed slight increased opacity in the left lower 
lobe, and on CT of the abdomen showed overall decreased size of a complex 
interconnecting fluid and gas collection.  He was treated with Zosyn and 
Diflucan. He had negative blood cultures, UA, negative RVP, his procalcitonin 
was 0.21.   He was followed by Dr. Chamberlain.  He was felt to most likely have 
community-acquired pneumonia as a cause of his presentation. Given the 
decreased abd fluid collection on CT there was less concern of ongoing infected 
pancreatic fluid collection .  He was transitioned to levofloxacin and 
discharged on .  His wife reports that he did well initially.  He 
completed his antibiotics on .  Over the next few days he felt well he was 
eating well.  On  he developed abrupt onset of nausea vomiting and some 
mild left mid epigastric area.



He presented to surgery clinic on  as per a previously scheduled appt.  
They felt that he was dehydrated and looked poorly.  He was admitted to the 
hospital.  His white count was 21K.  His kidney function was stable, liver 
function tests were normal.  Procalcitonin was 0.12.  He had 2 sets of blood 
cultures which have been no growth.  He underwent an repeat abdominal CT.  That 
showed that there was an overall decrease in size of the complex network of 
peripancreatic gas and fluid collection throughout the anterior pararenal 
space. There was a dominant right 

retroperitoneal component measuring 5.6 x 2.4 cm compared with 9.0 x 4.1 cm on 
previous examination  He had persistent cystogastrostomy tube.  He had minimal 
amount of ascites.  He had unchanged intrahepatic biliary ductal dilatation 
with narrowing and wall thickening of  the common bile duct. colonic 
diverticulosis without acute diverticulitis, probable cholelithiasis and 
gallbladder sludge without evidence of cholecystitis and moderate prostate 
enlargement.



On  he underwent IR guided aspiration of the right-sided fluid collection.
  This was described as viscous loculated fluid which was difficult to aspirate 
and was not amenable to a drain.  Only 2 mL's of brown bloody fluid were able 
to be aspirated.  gram stain GPC, culture: Prevotella. The patient was 
discharged on  on ertapenem. 



Returns today for follow up. Remains on ertapenem through a PICC line, taking 
that in infusion room. Tolerated that well with no nausea, vomiting or 
significant allergic reaction. He took last on Tuesday. He reports weight gain 
and good appetite. Denies fever, chills, or night sweat. Seen in GI clinic 
yesterday  with a plan to do ERCP in 2018. Repeat CT abdomen/pelvis  showed slight improvement in complex fluid collections related to prior 
pancreatitis with no new fluid collections are seen. General surgery will 
revaluate after ERCP for possible cholecystectomy. 

 

Antimicrobial Start date End date 

Zosyn  

Ertapenem   Active  

Prior Atbx     

Augmentin  10/25 11/8 

zosyn  

diflucan 10/21 11/13 

Levoflox  

Ertapenem 10/19 10/25 

Diflucan 9/3 10/5 

Ertapenem  

Doxy    

zosyn -; -;    

levaquin  

vanco  



Social:



Review of Systems

A comprehensive 14 point review of systems was negative aside from those issues 
noted above and:

Itchy skin rash anterior abdominal wall  (resolved 2 days ago), ringing ears. 



Objective:       

 acetaminophen (TYLENOL) 325 mg tablet Take 2 tablets by mouth every 4 hours 
as needed. 

 aspirin EC 81 mg tablet Take 81 mg by mouth daily.   

 citalopram (CELEXA) 20 mg tablet Take 20 mg by mouth daily. 

 docusate (COLACE) 100 mg capsule Take 1 capsule by mouth twice daily. 

 ertapenem (INVANZ) 1 g/10 mL 1 g in sodium chloride 0.9% (NS) 0.9 % 100 mL 
IVPB ([id number]) Administer 1 g through vein every 24 hours for 17 days. 

 famotidine (PEPCID) 20 mg tablet Take 20 mg by mouth twice daily. 

 fenofibrate(+) (TRIGLIDE) 160 mg tablet Take 160 mg by mouth daily. Take 
with food. 

 ferrous sulfate (FEOSOL, FEROSUL) 325 mg (65 mg iron) tablet Take 1 tablet 
by mouth three times daily with meals. Take on an empty stomach at least 1 hour 
before or 2 hours after food. 

 finasteride (PROSCAR) 5 mg tablet Take 5 mg by mouth daily. 

 fish oil- omega 3-DHA//1,000 mg capsule Take 1 Cap by mouth twice 
daily. 

 lisinopril (PRINIVIL; ZESTRIL) 20 mg tablet Take 20 mg by mouth daily. 

 loratadine (CLARITIN) 10 mg tablet Take 10 mg by mouth every morning. 

 melatonin 3 mg tab Take 1 tablet by mouth at bedtime as needed (insomnia). 

 MULTIVITAMINS WITH FLUORIDE (MULTI-VITAMIN PO) Take 1 Tab by mouth daily. 

 omeprazole DR(+) (PRILOSEC) 20 mg capsule Take 2 capsules by mouth daily 
before breakfast. 

 ondansetron (ZOFRAN ODT) 4 mg rapid dissolve tablet Dissolve 4 mg by mouth 
every 8 hours. Place on tongue to disolve.  

 oxyCODONE (ROXICODONE, OXY-IR) 5 mg tablet Take 1 tablet by mouth every 4 
hours as needed 

 polyethylene glycol 3350 (MIRALAX) 17 g packet Take 1 packet by mouth 
daily. 

 scopolamine (TRANSDERM-SCOP) 1.5 mg 3 day patch Apply 1 patch to top of 
skin as directed every 72 hours. 

 senna/docusate (SENOKOT-S) 8.6/50 mg tablet Take 1 tablet by mouth daily as 
needed. 

 simethicone (MYLICON) 80 mg chew tablet Chew 1 tablet by mouth every 6 
hours as needed for Flatulence. 

 tamsulosin (FLOMAX) 0.4 mg capsule Take 0.4 mg by mouth daily. Do not crush
, chew or open capsules. Take 30 minutes following the same meal each day. 

 vitamins, B complex tab Take 1 Tab by mouth daily. 



There were no vitals filed for this visit.

There is no height or weight on file to calculate BMI. 



BMI Weight Status 

Below 18.5 Underweight 

18.5  24.9 Normal 

25.0  29.9 Overweight 

30.0 and Above Obese 

40.0 and Above Morbidly obese 

Reviewed/discussed patient's BMI. The BMI was reviewed; specialist visit only, 
patient will follow-up with primary care doctor.



Physical Exam

Gen:  A&0x3, NAD

HEENT:  MMM, no OP lesions, exudate or thrush

NECK:  Supple, no lymphadenopathy

CHEST:  no rales/rhonchi/wheezing

CV:  Regular rhythm, nml rate, no murmur, rub or nikia

ABD:  soft, tenderness to deep palpation LUQ, upper abdomen is distended, 
hyperactive bowel sounds 

EXT:  No edema

SKIN:  No rashes, lesions 



Sodium 

Date/Time Value Ref Range Status 

2017 04:15  137 - 147 MMOL/L Final 

2017 04:37  137 - 147 MMOL/L Final 



Potassium 

Date/Time Value Ref Range Status 

2017 4.0  Final 

2017 04:15 AM 4.1 3.5 - 5.1 MMOL/L Final 



Blood Urea Nitrogen 

Date/Time Value Ref Range Status 

2017 24  Final 

2017 04:15 AM 11 7 - 25 MG/DL Final 



Creatinine 

Date/Time Value Ref Range Status 

2017 0.77  Final 

2017 04:15 AM 0.72 0.4 - 1.24 MG/DL Final 



AST (SGOT) 

Date/Time Value Ref Range Status 

2017 39  Final 

2017 05:26 AM 18 7 - 40 U/L Final 



ALT (SGPT) 

Date/Time Value Ref Range Status 

2017 29  Final 

2017 05:26 AM 13 7 - 56 U/L Final 



Alk Phosphatase 

Date/Time Value Ref Range Status 

2017 63  Final 

2017 05:26 AM 68 25 - 110 U/L Final 



Total Bilirubin 

Date/Time Value Ref Range Status 

2017 05:26 AM 0.3 0.3 - 1.2 MG/DL Final 

2017 05:51 AM 0.4 0.3 - 1.2 MG/DL Final 



White Blood Cells 

Date/Time Value Ref Range Status 

2017 7.46  Final 

2017 04:15 AM 9.1 4.5 - 11.0 K/UL Final 



RBC 

Date/Time Value Ref Range Status 

2017 04:15 AM 4.13 (L) 4.4 - 5.5 M/UL Final 

2017 04:37 AM 3.89 (L) 4.4 - 5.5 M/UL Final 



Hemoglobin 

Date/Time Value Ref Range Status 

2017 10.2  Final 

2017 04:15 AM 10.7 (L) 13.5 - 16.5 GM/DL Final 



Platelet Count 

Date/Time Value Ref Range Status 

2017 231  Final 

2017 04:15  150 - 400 K/UL Final 



 

Assessment and Plan:



Persistent infected peripancreatic fluid collection

-Recent admission  to  for fever/confusion- responded to atbx- thought 
possibly to have PNA

-11/3- levoquin

-- recurrent acute n/v

-- Leukocytosis, dehydration

-: CT: Continued overall decrease in size of complex network of 
peripancreatic gas/fluid collections throughout the anterior pararenal space 
and retroperitoneum , with stable position of cystogastrostomy tube. dominant 
right retroperitoneal component measures 5.6 x 2.4 cm compared with 9.0 x 4.1 cm

- IR aspiration fluid collection- 2cc thick viscous fluid- gram stain GPC, 
culture -->Prevotella

- discharged on ertapenem.

- CT abdomen/pelvis showed slight improvement in complex fluid collections 
related to prior pancreatitis with no new fluid collections are seen



Fever/confusion- responded to atbx- thought possibly LLL PNA

- CXR 17: Sl increased opacity in the left lower lung, possibly pneumonia 
or atelectasis.

- CT A/P 11/3/17: Overall decrease in size complex interconnecting fluid and 
gas containing collections within the anterior pararenal/ space with a cyst 
gastrostomy tube in place

- BCx : NGTD

- UA neg; Urine legionella & s. Pneumo: negative

- RVP: negative

- PCT:0.21

- CXR 11/10: no changes, continued L basilar opacity



Elevated AST - resolved

- 56 on admission

- Has been elevated in the past

- Other LFTs & ALP WNL; Lipase WNL

- LFTs improved



H/o pancreatitis

- symptom onset 7/15/17

- no hx of alcohol abuse, no gallstones

- serial CTs with progression since 7/15/17 of peripancreatic fluid collections 
with fat stranding, forming phelgmonous collection

-  Abd wall/peritoneal fluid- MSSA, Candida glabrata (both from broth)

- . EUS w necrosectomy and drainage- purulent fluid (no cultures)

- - necrosectomy w drainage

- : CT decrease size of complex network of gas/fluid in pararenal space. 
Loculated ascites w some gas on left, persistnet thickening of splenic flexure 
of colon sec pancreatitis, stenosis superior mesenteric vein.

- IR drain in abd fluid collection- 1) Right pelvic fluid collection- neg 2) 
Left fluid collection- staph on GS- culture neg

9/3- necrosectomy w drainage

- necrosectomy w drainage

 - Lipase WNL

CT a/p as above



H/o Left lower abdominal wall cellulitis with subcutaneous fluid collection 
related to infected abd ascitic fluid (see below)-improved

- 3 cm collection noted on initial CT abdomen/pelvis scan 7/15 and persistent 
on FU CT abd 

- IR guided drainage  "superficial left abdominal wall fluid collection, 
which appear to communicate with underlying ascitic fluid, with underlying 
loculated ascites."

-  Abd wall/peritoneal fluid- MSSA, Candida glabrata (both from broth)



H/o MSSA bacteremia

- rpt BC  No growth

- (No BC prior to transfer to  from [facility] per verbal from lab)

- TTE- nl valves



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 
reversal in 2017 ([place], [place])

CAD s/p PCI with 9 prior stents

MATHEW - CPAP

HTN 



Recommendations: 



1. Check CBC and CMP today 

2. Continue ertapenem given persistent (slightly improved) fluid collection in 
CT abdomen . 

3. Plan to continue ertapenem through 17 (total of 4 weeks) then switch 
to oral regimen after that (levofloxacin 750 mg daily and Falgyl 500 mg po q 8) 
until ERCP

4. Plan for ERCP to evaluate for pancreatic duct leak in January

5. Will try to schedule coordinating appointment after ERCP

6. Cont weekly labs while on IV and can change to labs q 2 wks while on oral

7. Will request PCP to obtain EKG approx 5-7 days after starting levo to access 
QT

 

Patient was discussed with Dr. Niurka Montoya, SHALONDA

Infectious Diseases Fellow

Pager [phone]



ATTESTATION



I have seen and examined the patient today. I discussed the case with Dr. Montoya, 
the Infectious Diseases fellow. I concur with his findings documented in this 
note. I personally reviewed the history, laboratory and microbiology data as 
well as imaging studies today. The content of this consult was modified by me 
where necessary.  Complexity of medical decision making is high b/c of the multi
-system nature of the infectious disease process and concerns about the 
complexity of the patient illness including the identification and sensitivity 
of the organisms being treated, the potential for drug toxicity which requires 
monitoring of CBC, chemistry and potential drug levels between visits, 
potential drug interactions which could lead to life threatening outcome, 
concerns about immunologic function, and interplay of other issues.



Mr. Lopez is here for follow-up.  He looks very good.  He has good color, his 
wife thinks he is doing quite well, he is gaining weight.  He is receiving his 
ertapenem at [facility] daily.  It is slightly inconvenient but he is 
tolerating it well and his wife is pleased with his progress.  He did not have 
labs this week but those will be repeated today.



He took his last dose of ertapenem on Tuesday.  He traveled to [place] for 
his appointments yesterday and missed a dose.  We did call infusion clinic here 
at  and is agreeable to going by infusion clinic today to have a dose prior 
to heading back home.



I reviewed his CT.  He still has a 5 cm fluid collection in the right abdominal 
area.  It slightly smaller than his most recent CT.  The extension into the 
psoas is improved.



He saw biliary service yesterday.  They can plan to do an ERCP in January to 
evaluate for pancreatic duct leak as well as removal that stent if possible.  
He also saw general surgery today.  They plan an elective cholecystectomy once 
his pancreatic fluid collections have resolved.



Patient his wife and I had a long conversation about antibiotics.  I think is 
made great progress on the IV.  I would like to continue this through  which will be 4 weeks.  At that time I had like to step him down to oral 
antibiotics.  We will plan to use levofloxacin 750 mg p.o. daily and 
metronidazole 500 mg p.o. 3 times daily. Although cultures would suggest 
Augmentin might be reasonable oral regimen- he relapsed w confusion/fever etc 
while on this is recent past. If levoflox is cost prohibitive could use cipro. 
He should have labs in 2 weeks after starting oral atbx. In addition, he should 
have EKG approx 1 wk after starting quinolone to check cQT interval

I did discuss potential side effects including nausea vomiting diarrhea.  I 
counseled him that he should avoid all alcohol while on the metronidazole.  We 
will plan to continue these until he returns in January for his ERCP- we will 
try to schedule coordinating appointment.



Aby Bah MD Date: 2017 

Pager:  [phone]  



 



 



in this encounter



Miscellaneous Notes

* Addendum Note - Whit Brock RN - 2017  4:03 PM CST



 Addended by: WHIT BROCK on: 2017 04:03 PM



  Modules accepted: Orders



  

* Addendum Note - Aby Bah MD - 2017  5:47 PM CST



 Addended by: ABY BAH on: 2017 05:47 PM



  Modules accepted: Orders



  

in this encounter



Plan of Treatment







    



  Date   Type   Specialty   Care Team   Description

 

    



  2018   Procedure Pass   Infectious Diseases  

 

    



  2018   Surgery    Fan Tidwell MD   ESOPHAGOGASTRODUODENOSCOP



     [address]



     [address] 



     [place] [address] [phone] (Fax) 

 

    



  2018   Procedure Pass   

 

    



  2018   Hospital    Fan Tidwell MD   Pancreatic necrosis



   Encounter    [address] 



     [address] 



     [place] [address] [phone] (Fax) 









   



  Name   Priority   Associated Diagnoses   Order Schedule

 

   



  AMB REFERRAL TO HOME CARE   Routine   Infective necrosis of   Ordered: 2017



    pancreas 



as of this encounter



Visit Diagnoses











  Diagnosis

 





  Infective necrosis of pancreas - Primary

 





  Acute pancreatitis

## 2018-02-05 NOTE — XMS REPORT
Encounter Summary

 Created on: 2018



Moshe Lopez

External Reference #: [id number]

: 1940

Sex: Male



Demographics







 Address  [address] [place] [address]  [address]

 

 Home Phone  [phone]

 

 Preferred Language  English

 

 Marital Status  Unknown

 

 Hoahaoism Affiliation  NON

 

 Race  White

 

 Ethnic Group  Not  or 





Author







 Author  [facility]

 

 Organization  [facility]

 

 Address  Unknown

 

 Phone  Unavailable







Support







 Name  Relationship  Address  Phone

 

 Latisha Lopez  ECON  Unknown  [phone]







Care Team Providers







 Care Team Member Name  Role  Phone

 

 Jaelyn Velez   PCP  [phone]

 

 VelezJaelyn DO  Unavailable  [phone]







Reason for Visit

* Auth/Cert





     



  Status   Reason   Specialty   Diagnoses /   Referred By   Referred To



     Procedures   Contact   Contact

 

     



     Diagnoses

  



     

  



     

  



     D  



     EHYDRATION  



     Dehydration  



     History of  



     pancreatitis  



     Vomiting  











Encounter Details







    



  Date   Type   Department   Care Team   Description

 

    



  2017   Hospital   Gen/Vasc/Plst/Trm   David Stuart MD   Dehydration



  -   Encounter   [address]   [address] 



  2017    [place] [address]   [place] [address] 



    [phone] (Fax) 







Social History







    



  Tobacco Use   Types   Packs/Day   Years Used   Date

 

    



  Former Smoker   Cigarettes, Pipe, Cigars   1   15   Quit: 1975

 

    



  Smokeless Tobacco: Never   



  Used   









   



  Alcohol Use   Drinks/Week   oz/Week   Comments

 

   



  Yes   1 Cans of   0.6   1 beer every 2 weeks



   beer  









 



  Sex Assigned at Birth   Date Recorded

 

 



  Not on file 



as of this encounter



Last Filed Vital Signs







  



  Vital Sign   Reading   Time Taken

 

  



  Blood Pressure   117/62   2017 11:52 AM CST

 

  



  Pulse   74   2017 11:52 AM CST

 

  



  Temperature   36.7   C (98.1   F)   2017 11:52 AM CST

 

  



  Respiratory Rate   -   -

 

  



  Oxygen Saturation   93%   2017 11:52 AM CST

 

  



  Inhaled Oxygen   -   -



  Concentration  

 

  



  Weight   68 kg (150 lb)   2017  4:02 PM CST

 

  



  Height   170.2 cm (5' 7")   2017  4:02 PM CST

 

  



  Body Mass Index   23.49   2017  4:02 PM CST



in this encounter



Functional Status







  



  Functional Status   Response   Date of Assessment

 

  



  Does the patient have a hearing impairment:   No   2017

 

  



  Does the patient have a visual impairment:   Yes   10/12/2017

 

  



  Does the patient have impaired ambulation:   Yes   10/12/2017

 

  



  Does the patient have an activity of daily living   No   10/12/2017



  (ADL) impairment:  

 

  



  Does the patient have an instrumental activity of   No   10/12/2017



  daily living (IADL) impairment:  









  



  Cognitive Status   Response   Date of Assessment

 

  



  Does the patient have a cognitive impairment:   No   10/12/2017



as of this encounter



Discharge Summaries

* Carline Mathis MD - 2017  2:06 PM CST



Formatting of this note may be different from the original.



Physician Discharge Summary



Name: Moshe Lopez

Medical Record Number: [id number]        Account Number:  [id number]

YOB: 1940                         Age:  77 years 

Admit date:  2017                     Discharge date: 2017



Attending Physician: Dr. David Stuart MD               Service: Surgery-Acute
/Inpatient



Physician Summary completed by: Carline Mathis MD



Reason for hospitalization: DEHYDRATION; Dehydration; History of pancreatitis; 
Vomiting



Significant PMH: 

Past Medical History: 

Diagnosis Date 

 CAD (coronary artery disease)  

 Coronary artery disease  

 s/p 9 CABGs 

 Diverticulitis  

 Diverticulosis  

 s/p LAR in 2017 

 HTN (hypertension)  

 Hyperlipidemia  

 Hypertension  

 Low testosterone  

 Pancreatitis  

 Ruptured lumbar disc  

 Sleep apnea  

 



Allergies: Niacin and Ativan [lorazepam]



Admission Physical Exam notable for:  

Constitutional: He is oriented to person, place, and time. He appears well-
developedand well-nourished. 

Mildly ill-appearing

HENT: 

Head: Normocephalicand atraumatic. 

Eyes: EOMare normal. 

Neck: Normal range of motion. 

Cardiovascular: Normal rate. 

Pulmonary/Chest: Effort normal. 

Abdominal: 

Soft, nondistended, tender to palpation - predominantly epigastric and LUQ

Musculoskeletal: 

Unsteady gait without cane

Neurological: He is alertand oriented to person, place, and time. 

Skin: Skin is warmand dry. 

Psychiatric: He has a normal mood and affect. His behavior is normal. Judgment
and thought contentnormal. 



Admission Lab/Radiology studies notable for: 

Results for orders placed or performed during the hospital encounter of  (from the past 24 hour(s)) 

MAGNESIUM 

Result Value Ref Range 

 Magnesium 2.0 1.6 - 2.6 mg/dL 

PHOSPHORUS 

Result Value Ref Range 

 Phosphorus 3.3 2.0 - 4.0 MG/DL 

LIPASE 

Result Value Ref Range 

 Lipase 33 11 - 82 U/L 

CBC 

Result Value Ref Range 

 White Blood Cells 9.1 4.5 - 11.0 K/UL 

 RBC 4.13 (L) 4.4 - 5.5 M/UL 

 Hemoglobin 10.7 (L) 13.5 - 16.5 GM/DL 

 Hematocrit 32.6 (L) 40 - 50 % 

 MCV 79.0 (L) 80 - 100 FL 

 MCH 26.0 26 - 34 PG 

 MCHC 32.8 32.0 - 36.0 G/DL 

 RDW 19.2 (H) 11 - 15 % 

 Platelet Count 316 150 - 400 K/UL 

 MPV 8.6 7 - 11 FL 

BASIC METABOLIC PANEL 

Result Value Ref Range 

 Sodium 139 137 - 147 MMOL/L 

 Potassium 4.1 3.5 - 5.1 MMOL/L 

 Chloride 106 98 - 110 MMOL/L 

 CO2 25 21 - 30 MMOL/L 

 Anion Gap 8 3 - 12 

 Glucose 105 (H) 70 - 100 MG/DL 

 Blood Urea Nitrogen 11 7 - 25 MG/DL 

 Creatinine 0.72 0.4 - 1.24 MG/DL 

 Calcium 9.9 8.5 - 10.6 MG/DL 

 eGFR Non African American >60 >60 mL/min 

 eGFR African American >60 >60 mL/min 



Brief Hospital Course:  The patient was admitted and the following issues were 
addressed during this hospitalization: (with pertinent details). Patient was 
admitted from clinic with 1-day history of nausea, vomiting, abdominal pain, 
and intolerance of oral intake. He has been followed by ACS service for history 
of pancreatitis with necrosectomy and drainage for pancreatic pseudocyst. A CT 
was obtained prior to pulling the patient's J-vac which showed interval 
decrease in the size of fluid collections. He developed leukocytosis, however, 
and was started on IV antibiotics. Infectious disease was consulted and 
recommended outpatient IV antibiotics. The patient had a PICC line placed and 
will receive antibiotic infusions at his local hospital. He will follow up in 
ACS clinic with a repeat CT scan. At discharge, the patient was tolerating a 
regular diet, ambulating well, had good pain control, and was voiding 
appropriately.



Condition at Discharge: Stable



Discharge Diagnoses:  

Hospital Problems  

 

 Active Problems 

 Dehydration 

 Vomiting 

 History of pancreatitis 

 Iron deficiency anemia 

 



Active Problems:

  Dehydration

  Vomiting

  History of pancreatitis

  Iron deficiency anemia



Surgical Procedures: 

 



Significant Diagnostic Studies and Procedures: noted in brief hospital course



Consults:  

CONSULT INFECTIOUS DISEASES PHYSICIAN



Patient Disposition: Home   



Patient instructions/medications: 



Activity as Tolerated 

It is important to keep increasing your activity level after you leave the 
hospital.  Moving around can help prevent blood clots, lung infection (pneumonia
) and other problems.  Gradually increasing the number of times you are up 
moving around will help you return to your normal activity level more quickly.  
Continue to increase the number of times you are up to the chair and walking 
daily to return to your normal activity level. Begin to work towards your 
normal activity level at discharge. 



Discharge Signs/Symptoms 

Standard Please contact your doctor if you have any of the following symptoms: 
temperature higher than 100 degrees F, uncontrolled pain, persistent nausea and/
or vomiting, difficulty breathing, chest pain, severe abdominal pain, headache, 
unable to urinate, unable to have bowel movement or drainage with a foul odor 



Questions About Your Stay 

Standard  For questions or concerns regarding your hospital stay:



-DURING BUSINESS HOUR (8:00 AM - 4:30 PM):

Call [phone] and ask to be transferred to your discharge attending 
physician.



-AFTER BUSINESS HOURS (4:30 PM - 8:00 AM, on weekends, or holidays):

Call [phone] and ask the  to page the on-call doctor for the 
discharge attending physician. 

Discharging attending physician: THANG RINALDI [[id number]]  



Regular Diet 

You have no dietary restriction. Please continue with a healthy balanced diet. 



 

Current Discharge Medication List 

 

 START taking these medications 

 Details 

acetaminophen (TYLENOL) 325 mg tablet Take 2 tablets by mouth every 4 hours as 
needed.

Refills: 0 

 PRESCRIPTION TYPE:  OTC 

 

ertapenem (INVANZ) 1 g/10 mL 1 g in sodium chloride 0.9% (NS) 0.9 % 100 mL IVPB 
([id number]) Administer 1 g through vein every 24 hours for 17 days.

Qty: 14 g, Refills: 0 

 PRESCRIPTION TYPE:  Print 

 

ferrous sulfate (FEOSOL, FEROSUL) 325 mg (65 mg iron) tablet Take 1 tablet by 
mouth three times daily with meals. Take on an empty stomach at least 1 hour 
before or 2 hours after food.

Qty: 90 tablet, Refills: 3 

 PRESCRIPTION TYPE:  Normal 

 

 

 CONTINUE these medications which have been CHANGED or REFILLED 

 Details 

omeprazole DR(+) (PRILOSEC) 20 mg capsule Take 2 capsules by mouth daily before 
breakfast.

Qty: 90 capsule, Refills: 3 

 PRESCRIPTION TYPE:  Normal 

 

oxyCODONE (ROXICODONE, OXY-IR) 5 mg tablet Take 1 tablet by mouth every 4 hours 
as needed

Qty: 40 tablet, Refills: 0 

 PRESCRIPTION TYPE:  Print 

 

 

 CONTINUE these medications which have NOT CHANGED 

 Details 

aspirin EC 81 mg tablet Take 81 mg by mouth daily.   

 PRESCRIPTION TYPE:  Historical Med 

 

citalopram (CELEXA) 20 mg tablet Take 20 mg by mouth daily. 

 PRESCRIPTION TYPE:  Historical Med 

 

docusate (COLACE) 100 mg capsule Take 1 capsule by mouth twice daily.

Qty: 180 capsule, Refills: 3 

 PRESCRIPTION TYPE:  Print 

 

famotidine (PEPCID) 20 mg tablet Take 20 mg by mouth twice daily. 

 PRESCRIPTION TYPE:  Historical Med 

 

fenofibrate(+) (TRIGLIDE) 160 mg tablet Take 160 mg by mouth daily. Take with 
food. 

 PRESCRIPTION TYPE:  Historical Med 

 

finasteride (PROSCAR) 5 mg tablet Take 5 mg by mouth daily. 

 PRESCRIPTION TYPE:  Historical Med 

 

fish oil- omega 3-DHA//1,000 mg capsule Take 1 Cap by mouth twice daily. 

 PRESCRIPTION TYPE:  Historical Med 

 

lisinopril (PRINIVIL; ZESTRIL) 20 mg tablet Take 20 mg by mouth daily. 

 PRESCRIPTION TYPE:  Historical Med 

 

loratadine (CLARITIN) 10 mg tablet Take 10 mg by mouth every morning. 

 PRESCRIPTION TYPE:  Historical Med 

 

melatonin 3 mg tab Take 1 tablet by mouth at bedtime as needed (insomnia). 

 PRESCRIPTION TYPE:  OTC 

 

MULTIVITAMINS WITH FLUORIDE (MULTI-VITAMIN PO) Take 1 Tab by mouth daily. 

 PRESCRIPTION TYPE:  Historical Med 

 

ondansetron (ZOFRAN ODT) 4 mg rapid dissolve tablet Dissolve 4 mg by mouth 
every 8 hours. Place on tongue to disolve.  

 PRESCRIPTION TYPE:  Historical Med 

 

polyethylene glycol 3350 (MIRALAX) 17 g packet Take 1 packet by mouth daily.

Qty: 12 each, Refills: 5 

 PRESCRIPTION TYPE:  Print 

 

scopolamine (TRANSDERM-SCOP) 1.5 mg 3 day patch Apply 1 patch to top of skin as 
directed every 72 hours. 

 PRESCRIPTION TYPE:  Historical Med 

 

senna/docusate (SENOKOT-S) 8.6/50 mg tablet Take 1 tablet by mouth daily as 
needed. 

 PRESCRIPTION TYPE:  No Print 

 

simethicone (MYLICON) 80 mg chew tablet Chew 1 tablet by mouth every 6 hours as 
needed for Flatulence.

Qty: 30 tablet, Refills: 0 

 PRESCRIPTION TYPE:  Print 

 

tamsulosin (FLOMAX) 0.4 mg capsule Take 0.4 mg by mouth daily. Do not crush, 
chew or open capsules. Take 30 minutes following the same meal each day. 

 PRESCRIPTION TYPE:  Historical Med 

 

vitamins, B complex tab Take 1 Tab by mouth daily. 

 PRESCRIPTION TYPE:  Historical Med 

 

 

 

Future Appointments

Date Time Provider Department Center 

2017 8:00 AM [facility] 

2017 1:00 PM Fan Tidwell MD [facility] 

2017 8:30 AM SURGERY [facility] 



Pending items needing follow up: none



Signed:

Carline Mathis MD

2017  



cc:

Primary Care Physician:  Jaelyn Velez   Verified

Referring physicians:   

Additional provider(s): 

 









Associated attestation - Thang Rinaldi MD - 2017  2:16 PM CST



Formatting of this note may be different from the original.

ATTESTATION



I personally performed the key portions of the E/M visit, discussed case with 
Dr. Mathis and the surgery team on 17 and concur with the documentation of 
history, physical exam, assessment, and treatment plan unless otherwise noted; 
however, due to clinical responsibilities and volume, the computer 
documentation is being completed in a delayed fashion. 



Thang Rinaldi MD

Trauma/Critical Care/General Surgery

[phone]



  





in this encounter



Discharge Instructions

* Discharge Instr - Case Management - Luis Merritt RN - 2017  1:32 PM 
CST



Discharge today with [facility] ([phone]/ fax [phone]) for 
IV infusion. Please call hospital for IV infusion time.

* Discharge Instr - Appointments - Carline Mathis MD - 2017  1:28 PM CST



Please call to confirm your follow up appointment:  [phone] 

* Patient Instructions - Gopal Ling RN - 2017  3:28 PM CST





ABSCESS/FLUID COLLECTION DRAINAGE

An abscess is a localized collection of infected fluid that usually occurs in 
the abdomen, pelvis, or chest. In some cases, a fluid collection may form 
that is not infected but that may need to be drained to alleviate pressure or 
pain on surrounding tissues. During abscess or fluid collection drainage, the 
Interventional Radiologist inserts a small, flexible tube into the abscess or 
fluid collection using x-ray or CT guidance. The tube, which may also be 
referred to as a drain or catheter, is then connected to a drainage bag, 
suction bulb or other drainage reservoir (often referred to as a j-vac) 
which assists in removing the abscess drainage or fluid collection from your 
body.

POST-PROCEDURE ACTIVITY:

 A responsible adult must drive you home. If you receive sedation or 
anesthesia, do not drive, operate heavy machinery or do anything that requires 
concentration for at least 24 hours.

 It is recommended that a responsible adult be with you until morning.

 Avoid any strenuous activity that may affect the drain. Do not lift more 
than 10 lbs. and avoid pushing, pulling or straining while the drain is in 
place.

 Avoid any activity that causes tension or pulling on the drain and avoid 
bending or crimping the tube. Never use scissors, pins or other sharp objects 
near the tube.

POST-PROCEDURE SITE CARE:

 Wash your hands thoroughly before handling the tube or touching near the 
tube site.

 Remove the bandage and clean around the tube every 2 days or more often if 
it becomes wet or soiled. 

1. Clean around the tube with mild soap and clean gauze and rinse with saline 
or water.

2. When cleaning, start by cleaning in a circular motion around the tube site 
and work outward for 3-4 inches.

3. Rinse and gently dry.

4. Place a new clean bandage around the tube and tape to secure.

 Keep the bandage dry at all times. You may shower but you must cover the 
tube site with a waterproof covering such as plastic wrap. If the bandage 
becomes wet or soiled, it should be changed immediately.

 Do not submerge the tube or the tube site underwater (no tub bath, swimming, 
hot tub, etc.)

 Do not use ointments, creams or powders around the tube unless specifically 
ordered by the doctor.

 Wash your hands thoroughly beforehandling the tube or touching near the 
tube site.

FLUSHING THE DRAIN:

 You will need to flush the drain with 5 mL of sterile saline every 8 hours 
or as ordered by the doctor. Flushing the drain will help keep the tube 
functioning properly. Always wash your hands thoroughly prior to this step.

 To flush the drain:

1. Turn the stopcock off to the drainage bag.

2. Clean the flushing port with an alcohol wipe for 30 seconds.

3. Attach the flush syringe.

4. Gently inject the saline and remove the syringe.

5. Turn the stopcock off to the flushing port and open to the drainage bag or 
reservoir.

 Write downthe daily output of your drain. Subtract the total amount of 
saline used for flushing. When the amount of drainage drops to less than 10-
15 mL per day, it will be time to have the drain reassessed. Be sure to 
notify your doctor when this happens.

EMPTYING THE DRAIN:

 Empty the drain when flushing or as needed if the drain reservoir appears to 
be full.

 To empty the j-vac:

1. Turn the stopcock off to the body.

2. Open the drainage container orreservoir by removing the cap.

3. Empty the fluidandmeasure the amount of drainage. Discard the fluid 
into the toilet.

4. When emptied, compress the j-vac with both hands as illustrated on the 
drainagereservoir.

5. After hearing a click, replace the cap. Re-open the stopcock.

6. Activate the j-vac by bending the bottom of the drain up, until you hear 
another click.

7. Write down the amount of drainage on your drain log.

DIET/MEDICATIONS:

 You may resume your previous diet after the procedure.

 If you receive sedation or anesthesia, avoid any foods or beverages 
containing alcohol for at least 24 hours.

 Please see the Medication Reconciliation sheet for instructions regarding 
resuming your home medications.

CALL THE DOCTOR IF:

 Bright red blood soaks the bandage around the drain site or meagan blood is 
seen in the drain tube or reservoir. You may noticea small amount of blood 
in the drainage for 1-2 days.

 The tube has become dislodged or pulled out.

 No drainage is seen coming out of the drain or you are unable to flush the 
drain tube.

 Drainage is leaking around the tube site onto the skin.

 You have new or worsened pain. Some soreness is to be expected.

 You have new or worsened signs of infection such as redness around tube site 
or fever greater than 101F.

You oryour caregiver should call 911 for any severe bleeding,dizziness, 
shortness of breath or loss of consciousness.

For any of the above problems or other concerns related to the procedure, call 
[phone] from 7am-5pm, Monday-Friday.

After-hours and weekends, call [phone] and ask for the Interventional 
Radiology Resident on-call. 

For procedures performed at the [facility], please callthe Radiology 
dept. Monday-Friday 8-5, [phone].





in this encounter



Medications at Time of Discharge







     



  Medication   Sig.   Disp.   Refills   Start Date   End Date

 

     



  acetaminophen (TYLENOL)   Take 2 tablets by mouth    0   2017 



  325 mg tablet   every 4 hours as needed.    

 

     



  aspirin EC 81 mg tablet   Take 81 mg by mouth    



   daily.    

 

     



  citalopram (CELEXA) 20 mg   Take 20 mg by mouth    



  tablet   daily.    

 

     



  docusate (COLACE) 100 mg   Take 1 capsule by mouth   180 capsule   3   2017 



  capsule   twice daily.    

 

     



  famotidine (PEPCID) 20 mg   Take 20 mg by mouth twice    



  tablet   daily.    

 

     



  fenofibrate(+) (TRIGLIDE)   Take 160 mg by mouth    



  160 mg tablet   daily. Take with food.    

 

     



  finasteride (PROSCAR) 5   Take 5 mg by mouth daily.    



  mg tablet     

 

     



  fish oil- omega 3-DHA/EPA   Take 1 Cap by mouth twice    



  300/1,000 mg capsule   daily.    

 

     



  lisinopril (PRINIVIL;   Take 20 mg by mouth    



  ZESTRIL) 20 mg tablet   daily.    

 

     



  loratadine (CLARITIN) 10   Take 10 mg by mouth every    



  mg tablet   morning.    

 

     



  melatonin 3 mg tab   Take 1 tablet by mouth at     2017 



   bedtime as needed    



   (insomnia).    

 

     



  MULTIVITAMINS WITH   Take 1 Tab by mouth    



  FLUORIDE (MULTI-VITAMIN   daily.    



  PO)     

 

     



  omeprazole DR(+)   Take 2 capsules by mouth   90 capsule   3   2017 



  (PRILOSEC) 20 mg capsule   daily before breakfast.    

 

     



  oxyCODONE (ROXICODONE,   Take 1 tablet by mouth   40 tablet   0   2017 



  OXY-IR) 5 mg tablet   every 4 hours as needed    

 

     



  polyethylene glycol 3350   Take 1 packet by mouth   12 each   5   2017 



  (MIRALAX) 17 g packet   daily.    

 

     



  simethicone (MYLICON) 80   Chew 1 tablet by mouth   30 tablet   0   2017 



  mg chew tablet   every 6 hours as needed    



   for Flatulence.    

 

     



  tamsulosin (FLOMAX) 0.4   Take 0.4 mg by mouth    



  mg capsule   daily. Do not crush, chew    



   or open capsules. Take 30    



   minutes following the    



   same meal each day.    

 

     



  vitamins, B complex tab   Take 1 Tab by mouth    



   daily.    

 

     



  ertapenem (INVANZ) 1 g/10   Administer 1 g through   14 g   0   2017



  mL 1 g in sodium chloride   vein every 24 hours for    



  0.9% (NS) 0.9 % 100 mL   17 days.    



  IVPB ([id number])     

 

     



  ferrous sulfate (FEOSOL,   Take 1 tablet by mouth   90 tablet   3   2018



  FEROSUL) 325 mg (65 mg   three times daily with    



  iron) tablet   meals. Take on an empty    



   stomach at least 1 hour    



   before or 2 hours after    



   food.    

 

     



  ondansetron (ZOFRAN ODT)   Dissolve 4 mg by mouth      2018



  4 mg rapid dissolve   every 8 hours. Place on    



  tablet   tongue to disolve.    

 

     



  scopolamine   Apply 1 patch to top of      2018



  (TRANSDERM-SCOP) 1.5 mg 3   skin as directed every 72    



  day patch   hours.    

 

     



  senna/docusate   Take 1 tablet by mouth     2017



  (SENOKOT-S) 8.6/50 mg   daily as needed.    



  tablet     



as of this encounter



Progress Notes

* Gabrielle Fernando RN - 2017  2:01 PM CST



Moshe Lopez discharged on 2017.

 .

Discharge instructions reviewed with patient.

Valuables returned: 

Personal Items / Valuables: Eyeglasses/Contacts, Clothing

Where Are Valuables Stored?: with pt.

Home medications: 

 .

Functional assessment at discharge complete: Yes .



P/t d/c home, all education addressed and questions answered. 





* Kingsley Becker RN - 2017  8:20 AM CST



Central Venous Catheter Temporary/Tunneled 

1. Vital signs q 15 minutes x 2 then may resume prior order

2. Activity as tolerated. No strenuous activity.

3. Patient may shower 24 hours after procedure

4. Do not submerge catheter

5. May use central venous catheter immediately



* Kingsley Becker RN - 2017  8:08 AM CST



Sedation physician present in room.  Recent vitals and patient condition 
reviewed between sedating physician and nurse.  Reassessment completed.  
Determination made to proceed with planned sedation.

* Percy Lopez RN - 2017  7:08 AM CST



Vital signs q 15 minutes x 2 then may resume prior order 

Activity as tolerated. No strenuous activity. 

Patient may shower 24 hours after procedure 

Do not submerge catheter 

May use central venous catheter immediately



* Carline Mathis MD - 2017  5:49 PM CST



Formatting of this note may be different from the original.

                                      Acute Care Surgery Progress Note



Moshe Lopez 

________________________________________________________________________

Assessment & Plan: 77M with necrotizing pancreatitis, s/p serial endoscopic 
necrosectomies, admitted with nausea, abdominal pain, leukocytosis, resolving



-Protein malnutrition

-Iron deficiency anemia

-Infected necrotizing pancreatitis, chronic



- Diet/FEN: Continue Regular diet. 

- Pain control: PO w/ IV breakthrough

- GI/Nutrition: Protonix BID. 

Protein malnutrition: Pre-albumin: 11, continue Boost protein supplementation 
5x daily

Iron studies showing iron deficiency anemia, start oral Iron supplementation.

- Resp: IS, RT

- CV: ASA

- ID: culture with Prevotella melaninogenica, beta lactamase positive. Blood cx 
NGTD x5. Per ID, continue on 2 weeks of ertapenem on discharge. Getting PICC 
tomorrow AM.

- PPx: PPI; OOB/ambulate; lovenox; SCDs

- Dispo: continue inpatient care for PICC placement tomorrow. Has follow up 
arranged with GI next month. Will schedule follow up CT scan and appointments 
with ACS and ID. 



Carline Mathis MD 

Team pager [phone]



Subjective:  TWYLA overnight, afebrile, vital signs stable. Denies N/V. Reports 
adequate pain control.



Objective:

                 Vital Signs: Last Filed                Vital Signs: 24 Hour 
Range 

BP: 165/80 (1515)

Temp: 36.7 C (98.1 F) (1515)

Pulse: 72 (1515)

Respirations: 16 PER MINUTE (1515)

SpO2: 97 % (1515)

O2 Delivery: None (Room Air) (1515)  BP: (140-165)/(68-80) 

Temp:  [36.7 C (98.1 F)-37.2 C (98.9 F)] 

Pulse:  [66-80] 

Respirations:  [16 PER MINUTE-18 PER MINUTE] 

SpO2:  [94 %-97 %] 

O2 Delivery: None (Room Air) 

Intensity Pain Scale 0-10 (Pain 1): (not recorded)  



Intake/Output:



Date 17 - 17 0717 - 17 07 

Shift 1821-4209 9787-1193 24 Hour Total 9367-5658 6577-8309 24 Hour Total 

I

N

T

A

K

E

 P.O. 850 0 850    

 I.V.

(mL/kg/hr)  100

(0.1) 100

(0.1)    

 Shift Total

(mL/kg) 850

(12.5) 100

(1.5) 950

(14)    

O

U

T

P

U

T

 Urine

(mL/kg/hr) 850

(1) 600

(0.7) 1450

(0.9) 1000  1000 

   Urine  1000  1000 

 Shift Total

(mL/kg) 850

(12.5) 600

(8.8) 1450

(21.3) 1000

(14.7)  1000

(14.7) 

NET 0 -500 -500 -1000  -1000 

Weight (kg) 68 68 68 68 68 68 



    

Physical Exam:  

Gen: A&Ox3, NAD

Pulm: nonlabored, equal chest rise b/l

CV: Regular rate and rhythm

Abdomen: soft, nondistended, minimal TTP in left abdominal, nonperitoneal

Extremities: No C/C/E



Labs:



Hemogram 

Lab Results 

Component Value Date/Time 

 WBC 6.8 2017 04:37 AM 

 RBC 3.89 (L) 2017 04:37 AM 

 HGB 9.8 (L) 2017 04:37 AM 

 HCT 30.8 (L) 2017 04:37 AM 

 MCV 79.2 (L) 2017 04:37 AM 

 MCH 25.2 (L) 2017 04:37 AM 

 MCHC 31.8 (L) 2017 04:37 AM 

 RDW 18.3 (H) 2017 04:37 AM 

 PLTCT 252 2017 04:37 AM 

 MPV 8.5 2017 04:37 AM 

 



Basic Metabolic Profile  

Lab Results 

Component Value Date/Time 

  2017 04:37 AM 

 K 4.2 2017 04:37 AM 

 CA 9.3 2017 04:37 AM 

  2017 04:37 AM 

 CO2 26 2017 04:37 AM 

 GAP 6 2017 04:37 AM 

 Lab Results 

Component Value Date/Time 

 BUN 12 2017 04:37 AM 

 CR 0.71 2017 04:37 AM 

  (H) 2017 04:37 AM 

 



Radiology:



reviewed









Associated attestation - Thang Rinaldi MD - 2017  3:37 PM CST



Formatting of this note may be different from the original.

ATTESTATION



I personally performed the key portions of the E/M visit, discussed case with Dr Gonzalez Mathis and the surgery team on 17 and concur with the documentation of 
history, physical exam, assessment, and treatment plan unless otherwise noted; 
however, due to clinical responsibilities and volume, the computer 
documentation is being completed in a delayed fashion. 



Protein malnutrition - continue supplements

Iron deficiency anemia - continue iron supplementation and monitor

Necrotizing pancreatitis, infected - continue IV antibiotics and will need 
outpatient IV antibiotics, so will get single lumen PICC.



Thang Rinaldi MD

Trauma/Critical Care/General Surgery

[phone]



  





* Aby Bah MD - 2017  8:52 AM CST



Formatting of this note may be different from the original.

Infectious Disease Progress Note



Name:  Moshe Lopez 

Today's Date:  2017



Assessment: 

Persistent infected peripancreatic fluid collection

-Recent admission  to  for fever/confusion- responded to atbx- thought 
possibly to have PNA

-11/3- levoquin

-.20- recurrent acute n/v

-- Leukocytosis, dehydration

-: CT: Continued overall decrease in size of complex network of 
peripancreatic gas/fluid collections throughout the anterior pararenal space 
and retroperitoneum , with stable position of cystogastrostomy tube. dominant 
right retroperitoneal component measures 5.6 x 2.4 cm compared with 9.0 x 4.1 cm

- IR aspiration fluid collection- 2cc thick viscous fluid- gram stain GPC, 
culture -->Prevotella



Recent Fever/confusion- responded to atbx- thought possibly LLL PNA

- CXR 17: Sl increased opacity in the left lower lung, possibly pneumonia 
or atelectasis.

- CT A/P 11/3/17: Overall decrease in size complex interconnecting fluid and 
gas containing collections within the anterior pararenal/ space with a cyst 
gastrostomy tube in place

- BCx : NGTD

- UA neg; Urine legionella & s. Pneumo: negative

- RVP: negative

- PCT:0.21

- CXR 11/10: no changes, continued L basilar opacity



Elevated AST - resolved

- 56 on admission

- Has been elevated in the past

- Other LFTs & ALP WNL; Lipase WNL

- LFTs improved



H/o pancreatitis

- symptom onset 7/15/17

- no hx of alcohol abuse, no gallstones

- serial CTs with progression since 7/15/17 of peripancreatic fluid collections 
with fat stranding, forming phelgmonous collection

-  Abd wall/peritoneal fluid- MSSA, Candida glabrata (both from broth)

- . EUS w necrosectomy and drainage- purulent fluid (no cultures)

- - necrosectomy w drainage

- : CT decrease size of complex network of gas/fluid in pararenal space. 
Loculated ascites w some gas on left, persistnet thickening of splenic flexure 
of colon sec pancreatitis, stenosis superior mesenteric vein.

- IR drain in abd fluid collection- 1) Right pelvic fluid collection- neg 2) 
Left fluid collection- staph on GS- culture neg

9/3- necrosectomy w drainage

- necrosectomy w drainage

 - Lipase WNL

CT a/p as above



H/o Left lower abdominal wall cellulitis with subcutaneous fluid collection 
related to infected abd ascitic fluid (see below)-improved

- 3 cm collection noted on initial CT abdomen/pelvis scan 7/15 and persistent 
on FU CT abd 

- IR guided drainage  "superficial left abdominal wall fluid collection, 
which appear to communicate with underlying ascitic fluid, with underlying 
loculated ascites."

-  Abd wall/peritoneal fluid- MSSA, Candida glabrata (both from broth)



H/o MSSA bacteremia

- rpt BC  No growth

- (No BC prior to transfer to  from [facility] per verbal from lab)

- TTE- nl valves



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 
reversal in 2017 ([place], [place])

CAD s/p PCI with 9 prior stents

MATHEW - CPAP

HTN 



Recommendations: 

Overall this is a complex case. Mr Lopez presents again w s/s of infection 
and aspiration of peripancreatic fluid shows GPC on gram stain. I think this is 
supportive of persistent abscess. The difficulty lies in that it appears this 
is not amenable to IR drain, endoscopic drain or surgical procedure currently. 
He does appear to suppress rather quickly w re-initiation of atbx which is 
reassuring. I think he needs prolonged atbx until this fluid collection resolves
- encouragingly it has been decreasing in size. Given that he has had recurrent 
symptoms on oral atbx in the past, I would favor a course of IV atbx at this 
point- although we are awaiting finalization of cultures. The predominant 
pathogen in the past has been MSSA and candida but the most recent gram stains 
support persistent Staph by gram stain, although the cultures have been 
negative. 



1. Change zosyn to Ertapenem

2.  I would favor continuing Ertapenem for several weeks and then transitioning 
down to oral regimen given he has had recurrent symptoms on orals and we are 
not able to adequately drain fluid currently- if we have better drainage orals 
could be considered. I discussed the option of outpt IV atbx w pt and wife and 
they would consider HH or daily infusion at facility close to home. Please have 
case management eval 

3. Will need ongoing discussion w GI and surgery re best approach for residual 
fluid- at last admission Dr Tidwell was considering repeat necronectomy but 
this was deferred given clinical improvement- we would appreciate their ongoing 
input

4. Monitor for atbx related toxicities

5. Monitor cough- Sputum culture

6. Dr Rinaldi/Gen surgery page today to discuss ongoing plan



Complexity of medical decision making is high b/c of the multi-system nature of 
the infectious disease process and concerns about the complexity of the patient 
illness including the sensitivity of the organisms being treated, the potential 
for drug toxicity and interactions, concerns about immunologic function, and 
interplay of other issues.



_____________________________________________________________________________

Interval History

Afebrile, VSS

Denies n/v or abdominal pain. He ate well over the weekend. Reporting some cough
- minimally productive for 2 days. No sob or CP

ROS otherwise neg on 14 pt review

Labs- stable

Micro- fluid- prevotella



Antimicrobial Start date End date 

Zosyn  active 

   

Prior Atbx   

Augmentin  10/25 11/8 

zosyn  

diflucan 10/21 11/13 

Levoflox  

Ertapenem 10/19 10/25 

Diflucan 9/3 10/5 

Ertapenem  

Doxy   

zosyn -; -;   

levaquin  

vanco  

Estimated Creatinine Clearance: 80.6 mL/min (based on Cr of 0.75).



Medications

Scheduled Meds:

aspirin EC tablet 81 mg 81 mg Oral QDAY 

citalopram (CELEXA) tablet 20 mg 20 mg Oral QDAY 

enoxaparin (LOVENOX) syringe 40 mg 40 mg Subcutaneous QDAY() 

ferrous sulfate (FEOSOL, FEROSUL) tablet 325 mg 325 mg Oral TID w/ meals 

finasteride (PROSCAR) tablet 5 mg 5 mg Oral QDAY 

lisinopril (PRINIVIL; ZESTRIL) tablet 20 mg 20 mg Oral QDAY 

loratadine (CLARITIN) tablet 10 mg 10 mg Oral QDAY 

pantoprazole DR (PROTONIX) tablet 40 mg 40 mg Oral BID() 

piperacillin/tazobactam  (ZOSYN) 3.375 g/50 mL iso-osmotic IVPB 3.375 g 
Intravenous Q6H* 

potassium phosphate 20 mmol in dextrose 5% (D5W) 500 mL IVPB (std) 20 mmol 
Intravenous ONCE 

tamsulosin (FLOMAX) capsule 0.4 mg 0.4 mg Oral QDAY 

Continuous Infusions: 

PRN and Respiratory Meds:acetaminophen Q4H PRN, benzocaine/menthol Q2H PRN, 
fentaNYL citrate PF Q1H PRN, hydrALAZINE Q6H PRN, melatonin QHS PRN, oxyCODONE 
Q4H PRN, phenol PRN



Physical Examination 



                     Vital Signs: Last                  Vital Signs: 24 Hour 
Range 

BP: 150/68 (833)

Temp: 36.8 C (98.3 F) (833)

Pulse: 75 (833)

Respirations: 18 PER MINUTE (833)

SpO2: 96 % (833)

O2 Delivery: None (Room Air) (833) BP: (132-185)/(56-96) 

Temp:  [36.6 C (97.9 F)-37.2 C (98.9 F)] 

Pulse:  [66-80] 

Respirations:  [18 PER MINUTE-20 PER MINUTE] 

SpO2:  [94 %-97 %] 

O2 Delivery: None (Room Air) 



General appearance: alert, oriented, NAD

HENT: mucus membranes moist, no oral lesions/thrush

Eyes: PERRL, EOM grossly intact, Conj nl

Neck: supple, 

Lungs: no wheezing, rhonchi, rales appreciated- sl decreased left base

Heart: Regular rhythm, reg rate, with no murmur, rub, gallop

Abdomen: soft, mild tenderness in epigastric area, non-distended, normoactive 
bowel sounds, no hepatosplenomegaly, no masses

Ext:  No clubbing, cyanosis or edema

Skin: no rashes/lesions

Lymph: no cervical, axillary or inguinal adenopathy



Lines: PIV

Laboratory 

Hematology

Recent Labs 

   17

 0539  17 

WBC  6.9  6.1  6.8 

HGB  9.8*  9.4*  9.8* 

HCT  30.2*  29.8*  30.8* 

PLTCT  269  246  252 



Chemistry

Recent Labs 

   17

 0539  11/26/17

 0441  11/27/17

 0437 

NA  144  141  138 

K  3.8  3.3*  4.2 

CL  111*  108  106 

CO2  29  26  26 

BUN  13  10  12 

CR  0.69  0.73  0.71 

GFR  >60  >60  >60 

GLU  109*  109*  108* 

CA  8.9  8.8  9.3 

PO4  2.5  2.7  2.6 



Microbiology, Radiology and other Diagnostics Review 

Microbiology reviewed.



Pertinent radiology viewed.

Impression:



Aby Bah MD 

Pager [phone]

Infectious Diseases Faculty



* Kareem Vick MD - 2017  8:20 AM CST



Formatting of this note may be different from the original.

                                      Acute Care Surgery Progress Note



Moshe HAYNES Keltonleón 

________________________________________________________________________

Assessment & Plan: 77M with necrotizing pancreatitis, s/p serial endoscopic 
necrosectomies, admitted with nausea, abdominal pain, leukocytosis, resolving



-Protein malnutrition

-Iron deficiency anemia

-Infected necrotizing pancreatitis, chronic



- Diet/FEN: Continue Regular diet. 

- Pain control: PO w/ IV breakthrough

- GI/Nutrition: Protonix BID. 

                    Protein malnutrition: Pre-albumin: 11, continue Boost 
protein supplementation 5x daily

                    Iron studies showing iron deficiency anemia, start oral 
Iron supplementation.

- Resp: IS, RT

- CV: ASA

- ID: WBC 6.9 (7.9). Continue Zosyn, responding.

    - R peripancreatic fluid collection - S/p IR aspiration, culture with 
Prevotella melaninogenica, beta lactamase positive. Blood cx NGTD x4

    - Infectious disease consulted, will discuss culture results with ID team 
to determine abx plan; appreciate assistance

- PPx: PPI; OOB/ambulate; lovenox; SCDs

- Dispo: continue inpatient care for culture sensitivities, antibiotics



Kareem Vick MD

Team pager [phone]



Subjective:  TWYLA overnight, afebrile, vital signs stable. Patient denies nausea/
vomiting, tolerating regular diet. Pain well controlled on current regimen. 
Persistent sore throat, requesting PTA loratadine.



Objective:

                 Vital Signs: Last Filed                Vital Signs: 24 Hour 
Range 

BP: 165/75 (805)

Temp: 36.6 C (97.8 F) (805)

Pulse: 68 (805)

Respirations: 20 PER MINUTE (805)

SpO2: 98 % (805)

O2 Delivery: None (Room Air) (805)  BP: (147-165)/(73-86) 

Temp:  [36.6 C (97.8 F)-36.9 C (98.4 F)] 

Pulse:  [59-73] 

Respirations:  [18 PER MINUTE-20 PER MINUTE] 

SpO2:  [96 %-98 %] 

O2 Delivery: None (Room Air) 

Intensity Pain Scale 0-10 (Pain 1): (not recorded)  



Intake/Output:



Date 17 - 17 0717 - 17 07 

Shift 7057-0447 3887-7884 24 Hour Total 5916-7189 1581-4230 24 Hour Total 

I

N

T

A

K

E

 P.O. 700 0 700    

 I.V.

(mL/kg/hr)  200

(0.2) 200

(0.1)    

 Shift Total

(mL/kg) 700

(10.3) 200

(2.9) 900

(13.2)    

O

U

T

P

U

T

 Urine

(mL/kg/hr) 1450

(1.8) 950

(1.2) 2400

(1.5) 150  150 

   Urine 4693 199 8933 150  150 

 Shift Total

(mL/kg) 1450

(21.3) 950

(14) 2400

(35.3) 150

(2.2)  150

(2.2) 

NET -750 -750 -1500 -150  -150 

Weight (kg) 68 68 68 68 68 68 



    

Physical Exam:  

Gen: A&Ox3, NAD

Pulm: nonlabored, equal chest rise b/l

CV: Regular rate and rhythm

Abdomen: soft, nondistended, minimal TTP, nonperitoneal

Extremities: No C/C/E



Labs:



Hemogram 

Lab Results 

Component Value Date/Time 

 WBC 6.1 2017 04:41 AM 

 RBC 3.75 (L) 2017 04:41 AM 

 HGB 9.4 (L) 2017 04:41 AM 

 HCT 29.8 (L) 2017 04:41 AM 

 MCV 79.3 (L) 2017 04:41 AM 

 MCH 25.1 (L) 2017 04:41 AM 

 MCHC 31.6 (L) 2017 04:41 AM 

 RDW 18.2 (H) 2017 04:41 AM 

 PLTCT 246 2017 04:41 AM 

 MPV 8.7 2017 04:41 AM 

 



Basic Metabolic Profile  

Lab Results 

Component Value Date/Time 

  2017 04:41 AM 

 K 3.3 (L) 2017 04:41 AM 

 CA 8.8 2017 04:41 AM 

  2017 04:41 AM 

 CO2 26 2017 04:41 AM 

 GAP 7 2017 04:41 AM 

 Lab Results 

Component Value Date/Time 

 BUN 10 2017 04:41 AM 

 CR 0.73 2017 04:41 AM 

  (H) 2017 04:41 AM 

 



Radiology:



reviewed









Associated attestation - Scott Colon MD - 2017  3:48 PM CST



Formatting of this note may be different from the original.

ATTESTATION



I personally observed the resident performing the E/M, discussed case with 
resident, and concur with resident documentation of history, physical 
assessment and treatment plan unless otherwise noted.



Staff name:  Scott Colon MD Date:  2017 



Pancreatitis - improving

F/u cx - cont abx, will d/w ID

* Ricardo Musa DO - 2017  7:26 AM CST



Formatting of this note may be different from the original.

                                      Acute Care Surgery Progress Note



Moshe Lopez 

________________________________________________________________________

Assessment & Plan: 77M with necrotizing pancreatitis, s/p serial endoscopic 
necrosectomies, admitted with nausea, abdominal pain, leukocytosis, resolving



-Protein malnutrition

-Iron deficiency anemia

-Infected necrotizing pancreatitis, chronic



- Diet/FEN: Continue Regular diet. 

- Pain control: PO w/ IV breakthrough

- GI/Nutrition: Protonix BID. 

                    Protein malnutrition: Pre-albumin: 11, continue Boost 
protein supplementation 5x daily

                    Iron studies showing iron deficiency anemia, start oral 
Iron supplementation.

- Resp: IS, RT

- CV: ASA

- ID: WBC 6.9 (7.9). Continue Zosyn, responding.

    - R peripancreatic fluid collection - S/p IR aspiration, await fluid 
cultures (incl fungal). 

      Gram stain positive for GPC, await speciation . Blood cx NGTD x4

    -Infectious disease consulted, appreciate assistance, outpatient 
antibiotics plan pending culture      speciation

- PPx: PPI; OOB/ambulate; lovenox; SCDs

- Dispo: continue inpatient care for culture speciation, antibiotics



Ricardo Musa DO

Team pager [phone]



Subjective:  TWYLA overnight, afebrile, vital signs stable. Patient denies nausea/
vomiting, tolerating regular diet. Pain well controlled on current regimen. 



Objective:

                 Vital Signs: Last Filed                Vital Signs: 24 Hour 
Range 

BP: 154/70 (254)

Temp: 36.6 C (97.8 F) (254)

Pulse: 61 (254)

Respirations: 16 PER MINUTE (254)

SpO2: 95 % (254)

O2 Delivery: None (Room Air) (254)  BP: (144-154)/(66-74) 

Temp:  [36.6 C (97.8 F)-37.2 C (98.9 F)] 

Pulse:  [59-67] 

Respirations:  [16 PER MINUTE-18 PER MINUTE] 

SpO2:  [93 %-96 %] 

O2 Delivery: None (Room Air) 

Intensity Pain Scale 0-10 (Pain 1): (not recorded)  



Intake/Output:



Date 17 - 17 - 17 07 

Shift 6043-5608 0967-8366 24 Hour Total 1901-0700 24 Hour Total 

I

N

T

A

K

E

 P.O. 600  600    

 I.V.

(mL/kg/hr)  200

(0.2) 200

(0.1)    

 Shift Total

(mL/kg) 600

(8.8) 200

(2.9) 800

(11.8)    

O

U

T

P

U

T

 Urine

(mL/kg/hr) 500

(0.6)  500

(0.3) 700  700 

   Urine 500  500 700  700 

 Shift Total

(mL/kg) 500

(7.3)  500

(7.3) 700

(10.3)  700

(10.3) 

 200 300 -700  -700 

Weight (kg) 68 68 68 68 68 68 



    

Physical Exam:  

Gen: A&Ox3, NAD

Pulm: nonlabored, equal chest rise b/l

CV: Regular rate and rhythm

Abdomen: soft, nondistended, minimal TTP, nonperitoneal

Extremities: No C/C/E



Labs:



Hemogram 

Lab Results 

Component Value Date/Time 

 WBC 6.9 2017 05:39 AM 

 RBC 3.82 (L) 2017 05:39 AM 

 HGB 9.8 (L) 2017 05:39 AM 

 HCT 30.2 (L) 2017 05:39 AM 

 MCV 79.0 (L) 2017 05:39 AM 

 MCH 25.7 (L) 2017 05:39 AM 

 MCHC 32.6 2017 05:39 AM 

 RDW 18.6 (H) 2017 05:39 AM 

 PLTCT 269 2017 05:39 AM 

 MPV 8.7 2017 05:39 AM 

 



Basic Metabolic Profile  

Lab Results 

Component Value Date/Time 

  2017 05:39 AM 

 K 3.8 2017 05:39 AM 

 CA 8.9 2017 05:39 AM 

  (H) 2017 05:39 AM 

 CO2 29 2017 05:39 AM 

 GAP 4 2017 05:39 AM 

 Lab Results 

Component Value Date/Time 

 BUN 13 2017 05:39 AM 

 CR 0.69 2017 05:39 AM 

  (H) 2017 05:39 AM 

 



Radiology:



reviewed









Associated attestation - Scott Colon MD - 2017  3:57 PM CST



Formatting of this note may be different from the original.

ATTESTATION



I personally observed the resident performing the E/M, discussed case with 
resident, and concur with resident documentation of history, physical 
assessment and treatment plan unless otherwise noted.



Staff name:  Scott Colon MD Date:  2017 

Anemia - cont to monitor

Pancreatitis - abscess - cont IV abx - f/u cs



* Rahul Vann, PT - 2017  2:45 PM CST



PHYSICAL THERAPY

ASSESSMENT/ DISCHARGE  



MOBILITY:

Mobility

Progressive Mobility Level: Walk laps

Distance Walked (feet): 1064 ft

Level of Assistance: Independent

Assistive Device: None

Time Tolerated: 11-30 minutes

Activity Limited By: No limitations



SUBJECTIVE:

Subjective

Significant hospital events: 77M with necrotizing pancreatitis, s/p serial 
endoscopic necrosectomies, admitted with nausea, abdominal pain, leukocytosis, 
resolving

Mental / Cognitive Status: Alert;Oriented;Cooperative

Persons Present: Spouse

Pain: Patient has no complaint of pain

Ambulation Assist: Independent Mobility in Community without Device

Patient Owned Equipment: Single Point Cane;Roller Walker

Home Situation: Lives with Family (spouse)

Type of Home: House

Entry Stairs: No Stairs

In-Home Stairs: No Stairs



ROM:

ROM

ROM Method: Active

LE ROM: WFL



STRENGTH:

Strength

Overall Strength: WFL



POSTURE/NEURO:

Posture / Neurological

Head Control: Independent

Posture: No Postural Deviations

Overall Tone: Normal



BED MOBILITY/TRANSFERS:

Bed Mobility/Transfers

Bed Mobility: Supine to Sit: Modified Independent;Verbal Cues;Bed Flat;Use of 
Rail

Transfer Type: Sit to/from Stand

Transfer: Assistance Level: From;Bed;To;Bed Side Chair;Independent

Transfer: Assistive Device: None

End Of Activity Status: Up in Chair;Nursing Notified;Instructed Patient to Use 
Call Light



BALANCE:

Balance

Sitting Balance: Static Sitting Balance;Dynamic Sitting Balance;No UE Support;
Independent

Standing Balance: Static Standing Balance;Independent;Dynamic Standing Balance;
Standby Assist

4 Item Dynamic Gait Index: Assessed

Gait Level Surface: 3 - Normal: Walks 20', no assistive devices, good speed, no 
evidence for imbalance, normal gait pattern

Change in Gait Speed: 3 - Normal: Able to smoothly change walking speed without 
loss of balance or gait deviation. Shows a significant difference in walking 
speeds between normal, fast and slow speeds.

Gait with Horizontal Head Turns: 3 - Normal: Performs head turns smoothly with 
no change in gait.

Gait with Vertical Head Turns: 3 - Normal: Performs head turns smoothly with no 
change in gait.

4 Dynamic Gait Index Total : 12 out of 12

4 Dynamic Gait Index Fall Risk: Less than 10 - Increased Risk for Falls



GAIT:

Gait

Gait Distance: 1064 feet

Gait: Assistance Level: Independent

Gait: Assistive Device: None

Gait: Descriptors: Pace: Normal;Swing-Through Gait;No balance loss



EDUCATION:

Education

Persons Educated: Patient/Family

Patient Barriers To Learning: None Noted

Teaching Methods: Verbal Instruction

Patient Response: Verbalized Understanding;Return Demonstration

Topics: Plan/Goals of PT Interventions;Mobility Progression;Importance of 
Increasing Activity;Ambulate with Family



ASSESSMENT/PROGRESS:

Assessment/Progress

Assessment/Progress: Patient current status and level of safety suggests 
progression of activity can be achieved with nursing and/or family and does not 
require physical therapist intervention;Discontinue PT

AM-PAC 6 Clicks Basic Mobility Inpatient

Turning from your back to your side while in a flat bed without using bed rails
: None

Moving from lying on your back to sitting on the side of a flatbed without 
using bedrails : None

Moving to and from a bed to a chair (including a wheelchair): None

Standing up from a chair using your arms (e.g. wheelchair, or bedside chair): 
None

To walk in hospital room: None

Climbing 3-5 steps with a railing: None

Raw Score: 24

Standardized (T-scale) Score: 57.68

Basic Mobility CMS 0-100%: 0

CMS G Code Modifier for Basic Mobility: CH



PLAN:

Plan 

Plan Frequency: 1x Visit



RECOMMENDATIONS:

PT Discharge Recommendations

PT Discharge Recommendations: Home with Assistance

Equipment Recommendations: Patient owns necessary equipment;Roller Walker;Cane



Therapist: Rahul Vann PT, DPT

Date: 2017





* Mary Weeks OT - 2017 10:12 AM CST



OCCUPATIONAL THERAPY

 NOTE 

Patient denies changes from baseline ADLs and functional mobility.  Patient 
has not had a procedure or surgery that would make getting dressed difficult, 
including putting on socks and shoes.  Patient has not demonstrated or reported 
new difficulties with vision when completing functional tasks.  Patient 
endorses no concerns with functional skills at home.  Currently, the patient is 
ambulating on the unit using a cane without difficulty.



Encouraged patient to continue to perform functional skills/ADLs while in 
the hospital and discussed the ability for the patient to complete their ADL
s with the bedside nursing staff.  Occupational therapy services will be 
discontinued at this time, please re-consult if the patient has a change in 
functional status.  



G-Codes: Self-care

 Current Status:  0% Impairment

 Goal Status:  0% Impairment

 Discharge Status:  0% Impairment



Based on above evaluation and clinical judgment.



Therapist: ZORAIDA Powers/L [id number]

Date: 2017

* Ricardo Musa DO - 2017  8:39 AM CST



Formatting of this note may be different from the original.

                                      Acute Care Surgery Progress Note



Moshe DALLAS Lopez 

________________________________________________________________________

Assessment & Plan: 77M with necrotizing pancreatitis, s/p serial endoscopic 
necrosectomies, admitted with nausea, abdominal pain, leukocytosis, resolving



- Diet/FEN: Continue Regular diet. 

- Pain control: PO w/ IV breakthrough

- GI/Nutrition: Protonix BID. Pre-albumin, iron studies pending

- Resp: IS, RT

- CV: ASA

- ID: WBC 7.9 (11.9). Continue Zosyn, responding.

    - R peripancreatic fluid collection - S/p IR aspiration, await fluid 
cultures (incl fungal). Gram stain       positive for GPC. Will consult 
infectious disease today for outpatient antibiotic      recommendations. 

    - Blood cx NGTD

- PPx: PPI; OOB/ambulate; lovenox; SCDs

- Dispo: continue inpatient care for culture speciation, antibiotics



Ricardo Musa DO

Team pager [phone]



Subjective:  TWYLA overnight. Patient denies nausea/vomiting, tolerating regular 
diet. Pain well controlled on current regimen. Patient denies fever/chills.



Objective:

                 Vital Signs: Last Filed                Vital Signs: 24 Hour 
Range 

BP: 144/73 (801)

Temp: 36.6 C (97.9 F) (801)

Pulse: 60 (801)

Respirations: 18 PER MINUTE (801)

SpO2: 93 % (11/24 0801)

O2 Delivery: None (Room Air) (801)  BP: (131-144)/(63-73) 

Temp:  [36.6 C (97.8 F)-36.9 C (98.4 F)] 

Pulse:  [59-71] 

Respirations:  [16 PER MINUTE-20 PER MINUTE] 

SpO2:  [92 %-95 %] 

O2 Delivery: None (Room Air) 

Intensity Pain Scale 0-10 (Pain 1): (not recorded)  



Intake/Output:



Date 17 - 17 - 17 

Shift 0416-0543 2705-6295 24 Hour Total 2960-2466 1356-6668 24 Hour Total 

I

N

T

A

K

E

 P.O.  0 0 100  100 

 Shift Total

(mL/kg)  0

(0) 0

(0) 100

(1.5)  100

(1.5) 

O

U

T

P

U

T

 Urine

(mL/kg/hr) 200

(0.2) 250

(0.3) 450

(0.3)    

   Urine 200 250 450    

 Other  0 0    

   Stool (ml)  0 0    

 Shift Total

(mL/kg) 200

(2.9) 250

(3.7) 450

(6.6)    

NET -200 -250 -450 100  100 

Weight (kg) 68 68 68 68 68 68 



    

Physical Exam:  

Gen: A&Ox3, NAD

Pulm: nonlabored, equal chest rise b/l

CV: Regular rate and rhythm

Abdomen: soft, nondistended, minimal TTP, nonperitoneal

Extremities: No C/C/E



Labs:



Hemogram 

Lab Results 

Component Value Date/Time 

 WBC 7.9 2017 05:26 AM 

 RBC 3.61 (L) 2017 05:26 AM 

 HGB 9.2 (L) 2017 05:26 AM 

 HCT 28.4 (L) 2017 05:26 AM 

 MCV 78.8 (L) 2017 05:26 AM 

 MCH 25.6 (L) 2017 05:26 AM 

 MCHC 32.5 2017 05:26 AM 

 RDW 18.8 (H) 2017 05:26 AM 

 PLTCT 258 2017 05:26 AM 

 MPV 8.8 2017 05:26 AM 

 



Basic Metabolic Profile  

Lab Results 

Component Value Date/Time 

  2017 05:26 AM 

 K 3.3 (L) 2017 05:26 AM 

 CA 8.9 2017 05:26 AM 

  2017 05:26 AM 

 CO2 27 2017 05:26 AM 

 GAP 7 2017 05:26 AM 

 Lab Results 

Component Value Date/Time 

 BUN 14 2017 05:26 AM 

 CR 0.75 2017 05:26 AM 

  (H) 2017 05:26 AM 

 



Radiology:



reviewed









Associated attestation - Scott Colon MD - 2017 12:48 PM CST



Formatting of this note may be different from the original.

ATTESTATION



I personally observed the resident performing the E/M, discussed case with 
resident, and concur with resident documentation of history, physical 
assessment and treatment plan unless otherwise noted.



Staff name:  Scott Colon MD Date:  2017 



Pancreatitis - cont abx - ID eval

acute blood loss anemia - cont to monitor for s/s ongoing blood loss and need 
for transfusion

Hypokalemia - replete



* Ricardo Musa DO - 2017 10:36 AM CST



Formatting of this note may be different from the original.

                                      Acute Care Surgery Progress Note



Moshe Lopez 

________________________________________________________________________

Assessment & Plan:



- Diet/FEN: Stop IV fluids. Start Regular diet. 

- Pain control: PO w/ IV breakthrough

- GI: Protonix BID

- Resp: encr IS use

- CV: ASA

- ID: WBC 11.9 (21.3). Continue Zosyn; 

    - R peripancreatic fluid collection - S/p IR aspiration, await fluid 
cultures (incl fungal). Gram stain       positive for GPC.

    - Blood cx NGTD

- PPx: PPI; OOB/ambulate; lovenox; SCDs

- Dispo: continue inpatient care



Ricardo Musa DO

Team pager [phone]



Subjective:  TWYLA overnight. Patient denies nausea/vomiting. Pain well 
controlled on current regimen. Patient denies fever/chills.



Objective:

                 Vital Signs: Last Filed                Vital Signs: 24 Hour 
Range 

BP: 136/64 (1518)

Temp: 36.8 C (98.2 F) (1518)

Pulse: 63 ( 0800)

Respirations: 16 PER MINUTE ( 0214)

SpO2: 92 % (1518)

O2 Delivery: None (Room Air) (1518)

SpO2 Pulse: 82 ( 181)  BP: ()/(50-89) 

Temp:  [36.4 C (97.6 F)-36.9 C (98.4 F)] 

Pulse:  [60-85] 

Respirations:  [11 PER MINUTE-20 PER MINUTE] 

SpO2:  [92 %-99 %] 

O2 Delivery: None (Room Air) 

Intensity Pain Scale 0-10 (Pain 1): (not recorded)  



Intake/Output:



Date 17 07 - 17 0717 07 - 17 07 

Shift 9734-3004 0573-5517 24 Hour Total 4106-3597 1917-9639 24 Hour Total 

I

N

T

A

K

E

 P.O.  0 0    

 I.V.

(mL/kg/hr) 1269

(1.6)  1269

(0.8)    

 Shift Total

(mL/kg) 1269

(18.7) 0

(0) 1269

(18.7)    

O

U

T

P

U

T

 Urine

(mL/kg/hr) 200

(0.2) 250

(0.3) 450

(0.3)    

   Urine 200 250 450    

 Other 2  2    

   Total Fluid Removed 2  2    

 Shift Total

(mL/kg) 202

(3) 250

(3.7) 452

(6.6)    

NET 1067 -250 817    

Weight (kg) 68 68 68 68 68 68 



    

Physical Exam:  

Gen: A&Ox3, NAD

Pulm: nonlabored, equal chest rise b/l

CV: Regular rate and rhythm

Abdomen: soft, nondistended, minimal TTP, nonperitoneal

Extremities: No C/C/E



Labs:



Hemogram 

Lab Results 

Component Value Date/Time 

 WBC 11.9 (H) 2017 05:51 AM 

 RBC 3.73 (L) 2017 05:51 AM 

 HGB 9.5 (L) 2017 05:51 AM 

 HCT 29.7 (L) 2017 05:51 AM 

 MCV 79.5 (L) 2017 05:51 AM 

 MCH 25.4 (L) 2017 05:51 AM 

 MCHC 32.0 2017 05:51 AM 

 RDW 18.9 (H) 2017 05:51 AM 

 PLTCT 269 2017 05:51 AM 

 MPV 9.4 2017 05:51 AM 

 



Basic Metabolic Profile  

Lab Results 

Component Value Date/Time 

  2017 05:51 AM 

 K 3.8 2017 05:51 AM 

 CA 9.4 2017 05:51 AM 

  2017 05:51 AM 

 CO2 26 2017 05:51 AM 

 GAP 9 2017 05:51 AM 

 Lab Results 

Component Value Date/Time 

 BUN 20 2017 05:51 AM 

 CR 0.86 2017 05:51 AM 

 GLU 76 2017 05:51 AM 

 



Radiology:



reviewed









Associated attestation - Scott Colon MD - 2017 12:02 PM CST



Formatting of this note may be different from the original.

ATTESTATION



I personally observed the resident performing the E/M, discussed case with 
resident, and concur with resident documentation of history, physical 
assessment and treatment plan unless otherwise noted.



Staff name:  Scott Colon MD Date:  2017 



Pancreatitis - IR drain, abx

Leukocytosis - cont abx - depending on cx may need ID 

Hypokalemia - replete

acute blood loss anemia - cont to monitor for s/s ongoing blood loss and need 
for transfusion





* Ana Laura Pascual, RN - 2017  5:58 PM CST



Sedation physician present in room.  Recent vitals and patient condition 
reviewed between sedating physician and nurse.  Reassessment completed.  
Determination made to proceed with planned sedation.

* Mary Weeks OT - 2017  2:55 PM CST



OCCUPATIONAL THERAPY

 NOTE 



Attempted to see patient to initiate occupational therapy evaluation. Patient 
currently off the unit for a procedure. Will follow up as able to complete 
occupational therapy evaluation and provide intervention as indicated. 



Therapist: ZORAIDA Powers/L [id number]

Date: 2017

* Kareem Vick MD - 2017  1:11 PM CST



Formatting of this note may be different from the original.

                                      Acute Care Surgery Progress Note



Moshe Lopez 

________________________________________________________________________

Assessment & Plan:



- Diet/FEN: NPO, mIVF

- Pain control: PO w/ IV breakthrough

- GI: Protonix BID

- Resp: encr IS use

- CV: ASA

- Heme/ID: WBC elevated to 21.3 - on Zosyn; Hgb 10.0

    - R peripancreatic fluid collection - IR drain (large caliber), fluid 
cultures (incl fungal)

    - Blood cx NGTD

- PPx: PPI; OOB/ambulate; lovenox; SCDs

- Dispo: continue inpatient care



**Patient discussed with attending physician Dr. Narciso Vick MD

Team pager [phone]



Subjective:  TWYLA overnight. Patient denies nausea/vomiting. Pain well 
controlled on current regimen. Patient denies fever/chills. No additional 
complaints. 



Objective:

                 Vital Signs: Last Filed                Vital Signs: 24 Hour 
Range 

BP: 102/48 ( 101)

Temp: 36.8 C (98.3 F) ( 101)

Pulse: 64 ( 1135)

Respirations: 16 PER MINUTE ( 101)

SpO2: 93 % ( 1015)

O2 Delivery: None (Room Air) (1015)

Height: 170.2 cm (67") ( 1602)  BP: (102-136)/(48-68) 

Temp:  [36.7 C (98 F)-37.3 C (99.1 F)] 

Pulse:  [64-80] 

Respirations:  [16 PER MINUTE-18 PER MINUTE] 

SpO2:  [92 %-98 %] 

O2 Delivery: None (Room Air) 

Intensity Pain Scale 0-10 (Pain 1): (not recorded)  



Intake/Output:



Date 17 07 - 17 0700 17 0701 - 17 0700 

Shift 2060-1652 0290-2211 24 Hour Total 4398-6396 3049-5541 24 Hour Total 

I

N

T

A

K

E

 I.V.

(mL/kg/hr)    1188  1188 

 Shift Total

(mL/kg)    1188

(17.5)  1188

(17.5) 

O

U

T

P

U

T

 Urine

(mL/kg/hr)  400

(0.5) 400

(0.2)    

   Urine  400 400    

 Shift Total

(mL/kg)  400

(5.9) 400

(5.9)    

NET  -400 -400 1188  1188 

Weight (kg) 68 68 68 68 68 68 



    

Physical Exam:  

Gen: A&Ox3, NAD

Pulm: nonlabored, equal chest rise b/l

CV: Regular rate and rhythm

Abdomen: soft, nondistended, LUQ TTP, nonperitoneal

Extremities: No C/C/E



Labs:



Hemogram 

Lab Results 

Component Value Date/Time 

 WBC 21.3 (H) 2017 12:15 AM 

 RBC 3.95 (L) 2017 12:15 AM 

 HGB 10.0 (L) 2017 12:15 AM 

 HCT 31.0 (L) 2017 12:15 AM 

 MCV 78.4 (L) 2017 12:15 AM 

 MCH 25.4 (L) 2017 12:15 AM 

 MCHC 32.4 2017 12:15 AM 

 RDW 19.6 (H) 2017 12:15 AM 

 PLTCT 371 2017 12:15 AM 

 MPV 8.6 2017 12:15 AM 

 



Basic Metabolic Profile  

Lab Results 

Component Value Date/Time 

  2017 12:15 AM 

 K 4.0 2017 12:15 AM 

 CA 9.7 2017 12:15 AM 

  2017 12:15 AM 

 CO2 27 2017 12:15 AM 

 GAP 7 2017 12:15 AM 

 Lab Results 

Component Value Date/Time 

 BUN 19 2017 12:15 AM 

 CR 0.71 2017 12:15 AM 

  (H) 2017 12:15 AM 

 



Radiology:



reviewed









Associated attestation - David Stuart MD - 2017  3:39 PM CST



Patient seen and examined with the resident team. Laboratories reviewed and 
pertinent radiography evaluated as required. I agree with the assessment and 
plan stated in the resident note.

ASHLY Stuart M.D., FACS

Associate Professor of Surgery

* Marcus Davison, RT - 2017  8:22 PM CST



Formatting of this note may be different from the original.

RESPIRATORY THERAPY

ADULT PROTOCOL EVALUATION



RESPIRATORY PROTOCOL PLAN



Medications

 Criteria Not Met



Note: If indicated by protocol, medication orders will be placed by therapist.



Procedures

 



 

_____________________________________________________________



PATIENT EVALUATION RESULTS



Chart Review

* Pulmonary Hx: Smoker in home OR smoking cessation > 8 weeks (former smoker)



* Surgical Hx: General surgery (cough & sigh not affected)



* Chest X-Ray: Clear OR not available (no recent xray)



* PFT/Oxygenation: FEV1, PEFR > 80% predicted OR physically unable to perform 
OR Pa02 >80 RA OR Sp02 >95% RA



Patient Assessment

* Respiratory Pattern: Regular pattern and rate OR good chest excursion with 
deep breathing



* Breath Sounds: Clear and able to auscultate bases posteriorly



* Cough / Sputum: Strong, effective cough OR nonproductive



* Mental Status: Alert, oriented, cooperative



* Activity Level: Ambulatory with assistance



Priority Index

Total Points: 3 Points

* Priority Index: Criteria not met



PRIORITY INDEX GUIDELINES*

Priority Points 

1 0-9 points 

2 9-18 points 

3 > 18 points 

+ Pulm Dx or Home Rx 

*Higher points indicate higher acuity.



Therapist: Marcus Davison, 

Date: 2017



Key

AC=Airway clearance

AM=Aerosolized medication

BA=Loving aerosol

DB&C=Deep breathe & cough

FEV1=Forced expiratory volume in first second)

IC=Inspiratory capacity

LE=Lung expansion

MDI=Metered dose inhaler

Neb=Nebulizer

O2=Oxygen

Oxim=Oximetry

PEFR=Peak expiratory flow rate

RRT=Rapid Response Team





* Flower Martini, RN - 2017  6:09 PM CST





IVT at bedside, labs drawn with PIV placement. Labeled and given to RN.

* Joceline Diaz RN - 2017  3:54 PM CST



Patient arrived on unit via wheelchair accompanied by transport. Patient 
transferred to the bed with assistance.  Assessment completed, refer to 
flowsheet for details. Orders released, reviewed, and implemented as 
appropriate. Oriented to surroundings, call light within reach. Plan of care 
reviewed.  Will continue to monitor and assess.



in this encounter



H&P Notes

* Carline Rodríguez, SERENA,APRN - 2017  7:23 AM CST



Formatting of this note may be different from the original.

Pre Procedure History and Physical/Sedation Plan



Procedure Date:  2017



Planned Procedure(s): Tunneled noncuffed CVC placement

________________________________________________________________

Chief Complaint:   Bacteremia



Previous Anesthetic/Sedation History:  Patient denies adverse event. 



Allergies:  Niacin and Ativan [lorazepam]

Medications:

Scheduled Meds:

aspirin EC tablet 81 mg 81 mg Oral QDAY 

citalopram (CELEXA) tablet 20 mg 20 mg Oral QDAY 

enoxaparin (LOVENOX) syringe 40 mg 40 mg Subcutaneous QDAY() 

ertapenem (INVANZ) IVP 1 g 1 g Intravenous Q24H* 

fentaNYL citrate PF (SUBLIMAZE) injection 50 mcg 50 mcg Intravenous ONCE 

ferrous sulfate (FEOSOL, FEROSUL) tablet 325 mg 325 mg Oral TID w/ meals 

finasteride (PROSCAR) tablet 5 mg 5 mg Oral QDAY 

lisinopril (PRINIVIL; ZESTRIL) tablet 20 mg 20 mg Oral QDAY 

loratadine (CLARITIN) tablet 10 mg 10 mg Oral QDAY 

midazolam (VERSED) injection 1 mg 1 mg Intravenous ONCE 

pantoprazole DR (PROTONIX) tablet 40 mg 40 mg Oral BID() 

tamsulosin (FLOMAX) capsule 0.4 mg 0.4 mg Oral QDAY 

Continuous Infusions: 

PRN and Respiratory Meds:acetaminophen Q4H PRN, benzocaine/menthol Q2H PRN, 
fentaNYL citrate PF Q1H PRN, hydrALAZINE Q6H PRN, melatonin QHS PRN, 
ondansetron (ZOFRAN) IV Q6H PRN, oxyCODONE Q4H PRN, phenol PRN



 Vital Signs:  Last Filed Vital Signs: 24 Hour Range 

BP: 138/76 (709)

Temp: 36.6 C (97.9 F) (709)

Pulse: 66 (709)

Respirations: 20 PER MINUTE (709)

SpO2: 94 % (709)

O2 Delivery: None (Room Air) (709) BP: (133-165)/(68-86) 

Temp:  [36.3 C (97.3 F)-36.8 C (98.3 F)] 

Pulse:  [66-95] 

Respirations:  [16 PER MINUTE-20 PER MINUTE] 

SpO2:  [93 %-97 %] 

O2 Delivery: None (Room Air) 



Sedation/Medication Plan: Fentanyl and Midazolam

Personal history of sedation complications: Denies adverse event. 

Family history of sedation complications: Denies adverse event. 

Medications for Reversal: Naloxone and Flumazenil

Discussion/Reviews:  Physician has discussed risks and alternatives of this 
type of sedation and above planned procedures with patient



NPO Status: Acceptable

Airway:  airway assessment performed  Mallampati III (soft palate, base of 
uvula visible)

Head and Neck: no abnormalities noted

Mouth: no abnormalities noted 

Anesthesia Classification:  ASA III (A patient with a severe systemic disease 
that limits activity, but is not incapacitating)

Pregnancy Status: Not Pregnant



Lab/Radiology/Other Diagnostic Tests

Labs:  Relevant labs reviewed



I have examined the patient, and there are no significant changes in their 
condition, from the previous H&P performed on 17.



Carline Rodríguez, MSN,APRN

Pager [phone]



* Aby Mei APRN - 2017  3:13 PM CST



Formatting of this note may be different from the original.

Pre Procedure History and Physical/Sedation Plan



Procedure Date:  2017



Planned Procedure(s): CT peripancreatic fluid collection drain placement 

________________________________________________________________

Chief Complaint:   Abdominal fluid collection 



Previous Anesthetic/Sedation History:  Denies adverse events 



Allergies:  Niacin and Ativan [lorazepam]

Medications:

Scheduled Meds:

aspirin EC tablet 81 mg 81 mg Oral QDAY 

citalopram (CELEXA) tablet 20 mg 20 mg Oral QDAY 

enoxaparin (LOVENOX) syringe 40 mg 40 mg Subcutaneous QDAY() 

finasteride (PROSCAR) tablet 5 mg 5 mg Oral QDAY 

pantoprazole (PROTONIX) injection 40 mg 40 mg Intravenous BID() 

piperacillin/tazobactam  (ZOSYN) 3.375 g/50 mL iso-osmotic IVPB 3.375 g 
Intravenous Q6H* 

tamsulosin (FLOMAX) capsule 0.4 mg 0.4 mg Oral QDAY 

Continuous Infusions:

 lactated ringers infusion 75 mL/hr at 17 0808 



PRN and Respiratory Meds:acetaminophen Q4H PRN, fentaNYL citrate PF Q1H PRN, 
melatonin QHS PRN, oxyCODONE Q4H PRN



 Vital Signs:  Last Filed Vital Signs: 24 Hour Range 

BP: 102/48 ( 1015)

Temp: 36.8 C (98.3 F) ( 1015)

Pulse: 64 ( 1135)

Respirations: 16 PER MINUTE ( 1015)

SpO2: 93 % ( 1015)

O2 Delivery: None (Room Air) ( 1015)

Height: 170.2 cm (67") ( 1602) BP: (102-136)/(48-68) 

Temp:  [36.7 C (98 F)-37.3 C (99.1 F)] 

Pulse:  [64-80] 

Respirations:  [16 PER MINUTE-18 PER MINUTE] 

SpO2:  [92 %-98 %] 

O2 Delivery: None (Room Air) 



Sedation/Medication Plan: Fentanyl and Midazolam

Personal history of sedation complications: Denies adverse event. 

Family history of sedation complications: Denies adverse event. 

Medications for Reversal: Naloxone and Flumazenil

Discussion/Reviews:  Physician has discussed risks and alternatives of this 
type of sedation and above planned procedures with patient



NPO Status: Acceptable

Airway:  airway assessment performed  Mallampati III (soft palate, base of 
uvula visible)

Head and Neck: no abnormalities noted

Mouth: no abnormalities noted 

Anesthesia Classification:  ASA III (A patient with a severe systemic disease 
that limits activity, but is not incapacitating)

Pregnancy Status: N/A



Lab/Radiology/Other Diagnostic Tests

Labs:  Relevant labs reviewed



I have examined the patient, and there are no significant changes in their 
condition, from the previous H&P performed on 17.



Aby Mei, APRN

Pager [phone]



* Kareem Vick MD - 2017 12:42 PM CST



Formatting of this note may be different from the original.

Subjective:        

 

Moshe Lopez is a 77 y.o. male who presents for follow up.



History of Present Illness

Moshe Lopezis a 77 y.o.malewith CAD s/p 8 stents, HLD, HTN, and 
diverticulitiss/p Hernandez's procedurein 2017 with colostomy 
reversal in 2017 transferred to  for ileus secondary to pancreatitis, 
admitted from -. Patient underwent EUS with necrosectomy and drainage 
several times for pancreatic pseudocyst as well as IR placement of 2 drains. 
His drains have been subsequently removed. He has a cystgastrostomy stent in 
place currently, but CT scan from 11/3 shows areas of undrained peripancreatic 
fluid collection. 



On  he was admitted to internal medicine for fever, nausea, vomiting, and 
altered mental status. He improved on IV antibiotics and was discharged on a 
course of Levaquin. Since discharge last week he has been doing well and 
reports good PO intake, however last night and this morning he developed nausea
, vomiting along with intolerance of PO intake. No fevers, chills, diarrhea, 
constipation, hematemesis, or hematochezia. 





   

Past Medical History: 

Diagnosis Date 

 CAD (coronary artery disease)  

 Coronary artery disease  

 s/p 9 CABGs 

 Diverticulitis  

 Diverticulosis  

 s/p LAR in 2017 

 HTN (hypertension)  

 Hyperlipidemia  

 Hypertension  

 Low testosterone  

 Pancreatitis  

 Ruptured lumbar disc  

 Sleep apnea  





    

Past Surgical History: 

Procedure Laterality Date 

 SC ESOPHAGOGASTRODUODENOSCOPY US SCOPE W/ADJ STRXRS N/A 2017 

 ESOPHAGOGASTRODUODENOSCOPY ENDOSCOPIC ULTRASOUND performed by Fan Tidwell MD at [facility] 

 UPPER GASTROINTESTINAL ENDOSCOPY N/A 2017 

 ESOPHAGOGASTRODUODENOSCOPY performed by Fan Tidwell MD at [facility] 

 UPPER GASTROINTESTINAL ENDOSCOPY N/A 9/3/2017 

 ESOPHAGOGASTRODUODENOSCOPY performed by Fan Tidwell MD at [facility] 

 SC ESOPHAGOGASTRODUODENOSCOPY TRANSORAL DIAGNOSTIC N/A 2017 

 ESOPHAGOGASTRODUODENOSCOPY performed by Fan Tidwell MD at [facility] 

 UPPER GASTROINTESTINAL ENDOSCOPY N/A 10/19/2017 

 ESOPHAGOGASTRODUODENOSCOPY with NECROSECTOMY performed by Bean Dean MD 
at [facility] 

 UPPER GASTROINTESTINAL ENDOSCOPY N/A 10/23/2017 

 ESOPHAGOGASTRODUODENOSCOPY performed by Bean Dean MD at [facility] 

 HX CARPAL TUNNEL RELEASE   

 HX COLOSTOMY   

 colostomy reversal in 2017 

 HX HEART CATHETERIZATION   

 HX ROTATOR CUFF REPAIR   

 left and right 

 HX ROTATOR CUFF REPAIR Bilateral  

 HX SEPTOPLASTY   

 SIGMOIDECTOMY   





    

Family History 

Problem Relation Age of Onset 

 Heart Failure Mother  

 Heart Attack Mother  

 Cancer Father  

 Heart Disease Maternal Aunt  

 Heart Disease Maternal Uncle  





 CurrentMedications 

      

Current Outpatient Prescriptions 

Medication Sig Dispense Refill 

 aspirin EC 81 mg tablet Take 81 mg by mouth daily.     

 citalopram (CELEXA) 20 mg tablet Take 20 mg by mouth daily.   

 docusate (COLACE) 100 mg capsule Take 1 capsule by mouth twice daily. 180 
capsule 3 

 famotidine (PEPCID) 20 mg tablet Take 20 mg by mouth twice daily.   

 fenofibrate(+) (TRIGLIDE) 160 mg tablet Take 160 mg by mouth daily. Take 
with food.   

 finasteride (PROSCAR) 5 mg tablet Take 5 mg by mouth daily.   

 fish oil- omega 3-DHA//1,000 mg capsule Take 1 Cap by mouth twice 
daily.   

 lisinopril (PRINIVIL; ZESTRIL) 20 mg tablet Take 20 mg by mouth daily.  
 

 loratadine (CLARITIN) 10 mg tablet Take 10 mg by mouth every morning.   

 melatonin 3 mg tab Take 1 tablet by mouth at bedtime as needed (insomnia). 
  

 MULTIVITAMINS WITH FLUORIDE (MULTI-VITAMIN PO) Take 1 Tab by mouth daily. 
  

 omeprazole DR(+) (PRILOSEC) 20 mg capsule Take 20 mg by mouth daily before 
breakfast.   

 ondansetron (ZOFRAN ODT) 4 mg rapid dissolve tablet Dissolve 4 mg by mouth 
every 8 hours. Place on tongue to disolve.    

 oxyCODONE (ROXICODONE, OXY-IR) 5 mg tablet Take 1 tablet by mouth every 4 
hours as needed Earliest Fill Date: 17 40 tablet 0 

 polyethylene glycol 3350 (MIRALAX) 17 g packet Take 1 packet by mouth 
daily. 12 each 5 

 scopolamine (TRANSDERM-SCOP) 1.5 mg 3 day patch Apply 1 patch to top of 
skin as directed every 72 hours.   

 senna/docusate (SENOKOT-S) 8.6/50 mg tablet Take 1 tablet by mouth daily as 
needed.   

 simethicone (MYLICON) 80 mg chew tablet Chew 1 tablet by mouth every 6 
hours as needed for Flatulence. 30 tablet 0 

 tamsulosin (FLOMAX) 0.4 mg capsule Take 0.4 mg by mouth daily. Do not crush
, chew or open capsules. Take 30 minutes following the same meal each day.  
 

 vitamins, B complex tab Take 1 Tab by mouth daily.   



No current facility-administered medications for this visit.  

 





    

Allergies 

Allergen Reactions 

 Niacin RASH 

  Extreme itching per pt 

 Ativan [Lorazepam] AGITATION 

  Severe agitation and confusion  





Social History 



Social History 

 Marital status:  

  Spouse name: N/A 

 Number of children: N/A 

 Years of education: N/A 



  

Occupational History 

 Not on file. 



      

Social History Main Topics 

 Smoking status: Former Smoker 

  Packs/day: 1.00 

  Years: 15.00 

  Types: Cigarettes, Pipe, Cigars 

  Quit date: 1975 

 Smokeless tobacco: Never Used 

 Alcohol use 0.6 oz/week  

  1 Cans of beer per week  

   Comment: 1 beer every 2 weeks 

 Drug use: No 

 Sexual activity: Not on file 



   

Other Topics Concern 

 Not on file 



  

Social History Narrative 

 ** Merged History Encounter ** 

  







Review of Systems 

Constitutional: Positive for activity change and fatigue. Negative for chills 
and fever. 

Gastrointestinal: Positive for abdominal pain, nausea and vomiting. Negative 
for blood in stool, constipation and diarrhea. 

Genitourinary: Negative for dysuria and hematuria. 





Objective:       

 aspirin EC 81 mg tablet Take 81 mg by mouth daily.   

 citalopram (CELEXA) 20 mg tablet Take 20 mg by mouth daily. 

 docusate (COLACE) 100 mg capsule Take 1 capsule by mouth twice daily. 

 famotidine (PEPCID) 20 mg tablet Take 20 mg by mouth twice daily. 

 fenofibrate(+) (TRIGLIDE) 160 mg tablet Take 160 mg by mouth daily. Take 
with food. 

 finasteride (PROSCAR) 5 mg tablet Take 5 mg by mouth daily. 

 fish oil- omega 3-DHA//1,000 mg capsule Take 1 Cap by mouth twice 
daily. 

 lisinopril (PRINIVIL; ZESTRIL) 20 mg tablet Take 20 mg by mouth daily. 

 loratadine (CLARITIN) 10 mg tablet Take 10 mg by mouth every morning. 

 melatonin 3 mg tab Take 1 tablet by mouth at bedtime as needed (insomnia). 

 MULTIVITAMINS WITH FLUORIDE (MULTI-VITAMIN PO) Take 1 Tab by mouth daily. 

 omeprazole DR(+) (PRILOSEC) 20 mg capsule Take 20 mg by mouth daily before 
breakfast. 

 ondansetron (ZOFRAN ODT) 4 mg rapid dissolve tablet Dissolve 4 mg by mouth 
every 8 hours. Place on tongue to disolve.  

 oxyCODONE (ROXICODONE, OXY-IR) 5 mg tablet Take 1 tablet by mouth every 4 
hours as needed Earliest Fill Date: 17 

 polyethylene glycol 3350 (MIRALAX) 17 g packet Take 1 packet by mouth 
daily. 

 scopolamine (TRANSDERM-SCOP) 1.5 mg 3 day patch Apply 1 patch to top of 
skin as directed every 72 hours. 

 senna/docusate (SENOKOT-S) 8.6/50 mg tablet Take 1 tablet by mouth daily as 
needed. 

 simethicone (MYLICON) 80 mg chew tablet Chew 1 tablet by mouth every 6 
hours as needed for Flatulence. 

 tamsulosin (FLOMAX) 0.4 mg capsule Take 0.4 mg by mouth daily. Do not crush
, chew or open capsules. Take 30 minutes following the same meal each day. 

 vitamins, B complex tab Take 1 Tab by mouth daily. 



  

Vitals: 

 17 1051 

BP: 131/70 

Pulse: 88 

Resp: 18 

Temp: 36.3 C (97.4 F) 

TempSrc: Oral 

Weight: 68.5 kg (151 lb) 

Height: 170.2 cm (67") 



Body mass index is 23.65 kg/(m^2). 





Physical Exam 

Constitutional: He is oriented to person, place, and time. He appears well-
developed and well-nourished. 

Mildly ill-appearing 

HENT: 

Head: Normocephalic and atraumatic. 

Eyes: EOM are normal. 

Neck: Normal range of motion. 

Cardiovascular: Normal rate.  

Pulmonary/Chest: Effort normal. 

Abdominal: 

Soft, nondistended, tender to palpation - predominantly epigastric and LUQ 

Musculoskeletal: 

Unsteady gait without cane 

Neurological: He is alert and oriented to person, place, and time. 

Skin: Skin is warm and dry. 

Psychiatric: He has a normal mood and affect. His behavior is normal. Judgment 
and thought content normal. 





 

Assessment and Plan:

77 y.o.malewith CAD s/p 8 stents, HLD, HTN, and diverticulitiss/p Hernandez'
s procedurein 2017 with colostomy reversal in 2017 transferred 
to  for ileus secondary to pancreatitis, admitted from -. Patient 
underwent EUS with necrosectomy and drainage several times for pancreatic 
pseudocyst as well as IR placement of 2 drains. His drains have been 
subsequently removed. He has a cystgastrostomy stent in place currently, but CT 
scan from 11/3 shows areas of undrained peripancreatic fluid collection. 
Presents today with one day history of nausea, vomiting, and intolerance of PO 
intake along with abdominal pain.





Plan:

- Admit for rehydration, evaluation with CT

- CT abd/pelv with contrast

- CBC, CMP, procalcitonin, lactate

- IVF

 

 



Patient seen and discussed with Dr. Stuart, who directed plan of care.



Kareem Vick MD

PGY1

Pager [phone]







Associated attestation - David Stuart MD - 2017  2:49 PM CST



The patient was seen and examined with the surgical house staff in the surgical 
clinic.  His history is well known to the acute care surgery service.  The 
patient complains of intermittent emesis and loss of appetite.  There is 
evidence of dehydration.  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 
pancreatic necrosis.  He will be admitted to the hospital, managed n.p.o. with 
intravenous hydration and be evaluated by CT scan of the abdomen.  I agree with 
the assessment and plan as stated in the resident note.

in this encounter



Procedure Notes

* Eldon Walker RN - 2017  2:47 PM CST



PICC Line Insertion Procedure Note



NAME:Moshe Lopez                                             MRN: [id number] 
                :1940          AGE: 77 y.o.

ADMISSION DATE: 2017             DAYS ADMITTED: LOS: 6 days  



IVT consulted for PICC placement, upon assessment patient's veins were noted to 
be inadequate in size to support a PICC. All veins in the bilateral upper 
extremities were between 1.3-1.7mm. Recommend patient go to IR for a Tunneled 
Non-Cuffed CVC, physician notified at this time. 



* Fahrbach, Thomas, MD - 2017  6:18 PM CST



Immediate Post Procedure Note



Date:  2017                                     



Attending Physician:   Thomas M. Fahrbach, M.D.



Procedure(s):  Retroperitoneal abscess aspiration

Pre/Post Diagnosis:  Retroperitoneal abscess

Description/Findings:  Scant fluid aspirated following wire fenestration.  2mL 
brown, bloody fluid sent for analysis.  Unable to place drain given small size 
of fluid collection.  Please see separately dictated radiology report for 
complete details of procedure.. 

Anesthesia:

2% lidocaine

Versed  mg IV, fentanyl  mcg IV



Time out performed: Consent obtained, correct patient verified, correct 
procedure verified, correct site verified, patient marked as necessary.

Estimated Blood Loss:  None/Negligible

Specimen(s) Removed/Disposition:  None

Complications: None



Thomas Fahrbach, MD





in this encounter



Consult Notes

* Aby Bah MD - 2017 11:45 AM CST



Associated Order(s): CONSULT INFECTIOUS DISEASES PHYSICIAN



Formatting of this note may be different from the original.

Infectious Diseases Initial Consult



Today's Date:  2017

Admission Date: 2017



Reason for this consultation: Antibiotic recommendations for recurrent 
abdominal fluid collections



Assessment: 

Persistent infected peripancreatic fluid collection

-Recent admission  to  for fever/confusion- responded to atbx- thought 
possibly to have PNA

-11/3- levoquin

-- recurrent acute n/v

-- Leukocytosis, dehydration

-: CT: Continued overall decrease in size of complex network of 
peripancreatic gas/fluid collections throughout the anterior pararenal space 
and retroperitoneum , with stable position of cystogastrostomy tube. dominant 
right retroperitoneal component measures 5.6 x 2.4 cm compared with 9.0 x 4.1 cm

- IR aspiration fluid collection- 2cc thick viscous fluid- gram stain GPC, 
culture neg to date



Recent Fever/confusion- responded to atbx- thought possibly LLL PNA

- CXR 17: Sl increased opacity in the left lower lung, possibly pneumonia 
or atelectasis.

- CT A/P 11/3/17:  Overall decrease in size complex interconnecting fluid and 
gas containing collections within the anterior pararenal/ space with a cyst 
gastrostomy tube in place

- BCx : NGTD

- UA neg; Urine legionella & s. Pneumo: negative

- RVP: negative

- PCT: 0.21

- CXR 11/10: no changes, continued L basilar opacity



Elevated AST - resolved

- 55 on admission

- Has been elevated in the past

- Other LFTs & ALP WNL; Lipase WNL

- LFTs improved



H/o pancreatitis

- symptom onset 7/15/17

- no hx of alcohol abuse, no gallstones

- serial CTs with progression since 7/15/17 of peripancreatic fluid collections 
with fat stranding, forming phelgmonous collection

-  Abd wall/peritoneal fluid- MSSA, Candida glabrata (both from broth)

-  EUS w necrosectomy and drainage- purulent fluid (no cultures)

- - necrosectomy w drainage

- : CT decrease size of complex network of gas/fluid in pararenal space. 
Loculated ascites w some gas on left, persistnet thickening of splenic flexure 
of colon sec pancreatitis, stenosis superior mesenteric vein.

- IR drain in abd fluid collection- 1) Right pelvic fluid collection- neg 2) 
Left fluid collection- staph on GS- culture neg

9/3- necrosectomy w drainage

- necrosectomy w drainage

 - Lipase WNL

CT a/p as above



H/o Left lower abdominal wall cellulitis with subcutaneous fluid collection 
related to infected abd ascitic fluid (see below)-improved

- 3 cm collection noted on initial CT abdomen/pelvis scan 7/15 and persistent 
on FU CT abd 

- IR guided drainage  "superficial left abdominal wall fluid collection, 
which appear to communicate with underlying ascitic fluid, with underlying 
loculated ascites."

-  Abd wall/peritoneal fluid- MSSA, Candida glabrata (both from broth)



H/o MSSA bacteremia

- rpt BC  No growth

- (No BC prior to transfer to  from [facility] per verbal from lab)

- TTE- nl valves



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 
reversal in 2017 ([place], [place])

CAD s/p PCI with 9 prior stents

MATHEW - CPAP

HTN 





Recommendations: 



Overall this is a complex case. Mr Lopez presents again w s/s of infection 
and aspiration of peripancreatic fluid shows GPC on gram stain. I think this is 
supportive of persistent abscess. The difficulty lies in that it appears this 
is not amenable to IR drain, endoscopic drain or surgical procedure currently. 
He does appear to suppress rather quickly w re-initiation of atbx which is 
reassuring. I think he needs prolonged atbx until this fluid collection resolves
- encouragingly it has been decreasing in size. Given that he has had recurrent 
symptoms on oral atbx in the past, I would favor a course of IV atbx at this 
point- although we are awaiting finalization of cultures. The predominant 
pathogen in the past has been MSSA and candida but the most recent gram stains 
support persistent Staph by gram stain, although the cultures have been 
negative. 



1. Continue Zosyn for now

2. Await ID and sensitivity of cultures- will need to follow for several more 
days before determining that they are negative

3. If cultures + will direct atbx to culture

4. If cultures remain neg- I would favor continuing Ertapenem for several weeks 
and then transitioning down to Augmentin. The alternative would be to just tx w 
oral Augmentin instead of up front IV.  I discussed the option of outpt IV atbx 
w pt and wife and they would consider HH or daily infusion at facility close to 
home. I would have case management eval possibilities. I will discuss w Dr Chamberlain further- who follows him in outpt and may have additional input 

5. Will need ongoing discussion w GI and surgery re best approach for residual 
fluid

6. Monitor for atbx related toxicities

7. I paged gen surgery to discuss above- currently in OR, left message to call 
w questions



Dr Bah will round again on Monday. Please call ID on call over the weekend 
if further questions arise



Complexity of medical decision making is high b/c of the multi-system nature of 
the infectious disease process and concerns about the complexity of the patient 
illness including the sensitivity of the organisms being treated, the potential 
for drug toxicity and interactions, concerns about immunologic function, and 
interplay of other issues.



History of Present Illness  

Moshe Lopez is a 77 y.o.   history of CAD s/p PCI with 9 stents, MATHEW wears 
CPAP at home, HTN, HLD, sigmoid diverticulitis s/p Hernandez's procedurein 
2017 with colostomy reversal in 2017, who was admitted to [facility] originally on 17 for ongoing management of pancreatitis 
with ileus. 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 
and left abdominal wall



He has a very complex history as outlined in previous notes by Dr Chamberlain. He 
has been on/off atbx since July due to infected pancreatic pseudocyst which 
tracked to left abd wall and right retroperitoneal space. He has undergone 
multiple necrosectomy and drainage endoscopically by GI. They have been able to 
clean out left side of fluid collection but has persistent complex network of 
small fluid collection, the most dominant one is on the right retroperitoneal 
space. 



He was most recently readmitted to  on  with fever and confusion. He was 
on oral Augmentin at the time.  There was concern for persistent/recurrent abd 
abscess.  He had a CXR which showed slight increased opacity in the left lower 
lobe, and on CT of the abdomen showed overall decreased size of a complex 
interconnecting fluid and gas collection.  He was treated with Zosyn and 
Diflucan. He had negative blood cultures, UA, negative RVP, his procalcitonin 
was 0.21.   He was followed by Dr. Chamberlain.  He was felt to most likely have 
community-acquired pneumonia as a cause of his presentation. Given the 
decreased abd fluid collection on CT there was less concern of ongoing infected 
pancreatic fluid collection .  He was transitioned to levofloxacin and 
discharged on .  His wife reports that he did well initially.  He 
completed his antibiotics on .  Over the weekend he felt well he was 
eating well.  On Monday he developed abrupt onset of nausea vomiting and some 
mild left mid epigastric area.



He presented to surgery clinic on  as per a previously scheduled appt.  
They felt that he was dehydrated and looked poorly.  He was admitted to the 
hospital.  His white count was 21K.  His kidney function was stable, liver 
function tests were normal.  Procalcitonin was 0.12.  He had 2 sets of blood 
cultures which have been no growth.  He underwent an repeat abdominal CT.  That 
showed that there was an overall decrease in size of the complex network of 
peripancreatic gas and fluid collection throughout the anterior pararenal 
space. There was a dominant right 

retroperitoneal component measuring 5.6 x 2.4 cm compared with 9.0 x 4.1 cm on 
previous examination  He had persistent cystogastrostomy tube.  He had minimal 
amount of ascites.  He had unchanged intrahepatic biliary ductal dilatation 
with narrowing and wall thickening of  the common bile duct. colonic 
diverticulosis without acute diverticulitis, probable cholelithiasis and 
gallbladder sludge without evidence of cholecystitis and moderate prostate 
enlargement.



On  he underwent IR guided aspiration of the right-sided fluid collection.
  This was described as viscous loculated fluid which was difficult to aspirate 
and was not amenable to a drain.  Only 2 mL's of brown bloody fluid were able 
to be aspirated.  The fluid was sent for culture.  The Gram stain stain showed 
few neutrophils and many gram-positive cocci.  The aerobic plate has no growth 
to date, anaerobic cultures are still in progress.



The patient has been maintained on Zosyn since admission.  His white count has 
decreased nicely to 11,000 yesterday and 7000 today.  He has been afebrile.  He 
is feeling well.  He is eating again.  Last night he did develop mild episode 
of nausea and some left-sided pain but that has resolved.



He denies HA, visual change, cough, sob, oral lesions, n/v, loose stool, joint 
pain. He had itching on foot last night but that has resolved. ROS otherwise 
neg on 14 pt



Antimicrobial Start date End date 

Zosyn   

   

Prior Atbx   

Augmentin  10/25 11/8 

zosyn  

diflucan 10/21 11/13 

Levoflox  

Ertapenem 10/19 10/25 

Diflucan 9/3 10/5 

Ertapenem  

Doxy   

zosyn -; -;   

levaquin  

vanco  

Estimated Creatinine Clearance: 80.6 mL/min (based on Cr of 0.75).



Past Medical History 



Past Medical History: 

Diagnosis Date 

 CAD (coronary artery disease)  

 Coronary artery disease  

 s/p 9 CABGs 

 Diverticulitis  

 Diverticulosis  

 s/p LAR in 2017 

 HTN (hypertension)  

 Hyperlipidemia  

 Hypertension  

 Low testosterone  

 Pancreatitis  

 Ruptured lumbar disc  

 Sleep apnea  



Past Surgical History 



Past Surgical History: 

Procedure Laterality Date 

 SC ESOPHAGOGASTRODUODENOSCOPY US SCOPE W/ADJ STRXRS N/A 2017 

 ESOPHAGOGASTRODUODENOSCOPY ENDOSCOPIC ULTRASOUND performed by Fan Tidwell MD 
at ENDO/GI 

 UPPER GASTROINTESTINAL ENDOSCOPY N/A 2017 

 ESOPHAGOGASTRODUODENOSCOPY performed by Fan Tidwell MD at ENDO/GI 

 UPPER GASTROINTESTINAL ENDOSCOPY N/A 9/3/2017 

 ESOPHAGOGASTRODUODENOSCOPY performed by Fan Tidwell MD at ENDO/GI 

 SC ESOPHAGOGASTRODUODENOSCOPY TRANSORAL DIAGNOSTIC N/A 2017 

 ESOPHAGOGASTRODUODENOSCOPY performed by Fan Tidwell MD at ENDO/GI 

 UPPER GASTROINTESTINAL ENDOSCOPY N/A 10/19/2017 

 ESOPHAGOGASTRODUODENOSCOPY with NECROSECTOMY performed by Bean Dean MD 
at ENDO/GI 

 UPPER GASTROINTESTINAL ENDOSCOPY N/A 10/23/2017 

 ESOPHAGOGASTRODUODENOSCOPY performed by Bean Dean MD at ENDO/GI 

 HX CARPAL TUNNEL RELEASE   

 HX COLOSTOMY   

 colostomy reversal in 2017 

 HX HEART CATHETERIZATION   

 HX ROTATOR CUFF REPAIR   

 left and right 

 HX ROTATOR CUFF REPAIR Bilateral  

 HX SEPTOPLASTY   

 SIGMOIDECTOMY   



Social History 

Marital status/area of residence: retired



Social History 

Substance Use Topics 

 Smoking status: Former Smoker 

  Packs/day: 1.00 

  Years: 15.00 

  Types: Cigarettes, Pipe, Cigars 

  Quit date: 1975 

 Smokeless tobacco: Never Used 

 Alcohol use 0.6 oz/week 

  1 Cans of beer per week 

   Comment: 1 beer every 2 weeks 



Family History 



Family History 

Problem Relation Age of Onset 

 Heart Failure Mother  

 Heart Attack Mother  

 Cancer Father  

 Heart Disease Maternal Aunt  

 Heart Disease Maternal Uncle  



Allergies 



Allergies 

Allergen Reactions 

 Niacin RASH 

  Extreme itching per pt 

 Ativan [Lorazepam] AGITATION 

  Severe agitation and confusion  



Review of Systems 

A comprehensive 14-point review of systems was negative with exception of: 
those above



Medications 

Scheduled Meds:

aspirin EC tablet 81 mg 81 mg Oral QDAY 

citalopram (CELEXA) tablet 20 mg 20 mg Oral QDAY 

enoxaparin (LOVENOX) syringe 40 mg 40 mg Subcutaneous QDAY() 

finasteride (PROSCAR) tablet 5 mg 5 mg Oral QDAY 

pantoprazole (PROTONIX) injection 40 mg 40 mg Intravenous BID() 

piperacillin/tazobactam  (ZOSYN) 3.375 g/50 mL iso-osmotic IVPB 3.375 g 
Intravenous Q6H* 

tamsulosin (FLOMAX) capsule 0.4 mg 0.4 mg Oral QDAY 

Continuous Infusions: 

PRN and Respiratory Meds:acetaminophen Q4H PRN, fentaNYL citrate PF Q1H PRN, 
melatonin QHS PRN, oxyCODONE Q4H PRN



Physical Examination 



                     Vital Signs: Last                  Vital Signs: 24 Hour 
Range 

BP: 144/73 (801)

Temp: 36.6 C (97.9 F) (801)

Pulse: 60 (801)

Respirations: 18 PER MINUTE (801)

SpO2: 93 % (801)

O2 Delivery: None (Room Air) (801) BP: (133-144)/(63-73) 

Temp:  [36.6 C (97.8 F)-36.9 C (98.4 F)] 

Pulse:  [59-71] 

Respirations:  [16 PER MINUTE-20 PER MINUTE] 

SpO2:  [92 %-95 %] 

O2 Delivery: None (Room Air) 



General appearance: alert, oriented, NAD

HENT: mucus membranes moist, no oral lesions/thrush

Eyes: PERRL, EOM grossly intact, Conj nl

Neck: supple, no lymphadenopathy, thyroid not palpable

Lungs: no wheezing, rhonchi, rales appreciated

Heart: Regular rhythm, reg rate, with no murmur, rub, gallop

Abdomen: soft, mild tenderness in epigastric area, non-distended, normoactive 
bowel sounds, no hepatosplenomegaly, no masses

Ext:  No clubbing, cyanosis or edema

Skin: no rashes/lesions

Lymph: no cervical, axillary or inguinal adenopathy



Lines: PIV



Lab Review 

Hematology

Recent Labs 

   17

 0015  17

 0551  17

 0526 

WBC  21.3*  11.9*  7.9 

HGB  10.0*  9.5*  9.2* 

HCT  31.0*  29.7*  28.4* 

PLTCT  371  269  258 



Chemistry

Recent Labs 

   17

 0015  17

 0551  17

 0526 

NA  142  145  144 

K  4.0  3.8  3.3* 

CL  108  110  110 

CO2  27  26  27 

BUN  19  20  14 

CR  0.71  0.86  0.75 

GFR  >60  >60  >60 

GLU  138*  76  112* 

CA  9.7  9.4  8.9 

PO4  3.6  3.5  2.7 

ALBUMIN  3.4*  3.2*  3.0* 

ALKPHOS  84  77  68 

AST  27  20  18 

ALT  20  16  13 

TOTBILI  0.4  0.4  0.3 



Microbiology, Radiology and other Diagnostics Review 

Microbiology data reviewed.



Pertinent radiology images viewed.

Impression: 

 

Aby Bah MD 

Pager [phone]

Infectious Diseases Faculty 



in this encounter



Miscellaneous Notes

* Case Mgmt DC Plan - Luis Merritt RN - 2017  2:06 PM CST



Case Management Progress Note

NAME:Moshe Lopez                          MRN: [id number]              :          AGE: 77 y.o.

ADMISSION DATE: 2017             DAYS ADMITTED: LOS: 7 days  



Todays Date: 2017



Plan:Pt is a possible DC home today with [facility] infusion for IV 
antibiotics infusion.



Interventions

? Support



? Info or Referral



? Discharge Planning

 [facility] faxed AVS and home infusion IV order to [facility] and to Pt's 
PCP Dr. Jaelyn Velez.

 [facility] contacted Pt's PCP RN, spoke to Dixie to determine if they had received 
Pt's discharge summary and IV abx order. Confirmed that they had received 
everything they needed and would contact Pt in am for infusion later that day.

? Medication Needs



? Financial



? Legal



? Other



Disposition

? Discharge Preparation

When ready for discharge, who will be responsible for transporting?: inpt

Type of Residence: Private residence

Patient expects to be discharged to: Private residence

Was the patient receiving home care services?: No



? Expected Discharge

Expected Discharge Date: 17



? Discharge Disposition

Disposition: Home with Services (DME, HH, Hospice, other)

Services: Hospice, Other

Other Services: [facility] ([phone]/ fax [phone])

? Next Level Care



Luis Merritt, RN, BSN, MSN

Integrative Nurse 

Pager [phone]



* Care Plan - Gabrielle Fernando RN - 2017 11:09 AM CST



Problem: Discharge Planning

Goal: Participation in plan of care

Outcome: Goal Achieved Date Met:  17

Pt actively participates in plan of care 

Goal: Knowledge regarding plan of care

Outcome: Goal Achieved Date Met:  17

Pt states understanding of plan of care 

Goal: Prepared for discharge

Outcome: Goal Achieved Date Met:  17

Pt ready for d/c at this time



Problem: Falls, High Risk of

Goal: Absence of falls-Adult Patient

Outcome: Goal Achieved Date Met:  17

Pt absent of falls this stay 



Problem: Pain

Goal: Management of pain

Outcome: Goal Achieved Date Met:  17

Pt pain managed per MAR orders 

Goal: Knowledge of pain management

Outcome: Goal Achieved Date Met:  17

Pt states understanding of pain regimen 



Problem: Mobility/Activity Intolerance

Goal: Maximize functional ADLs and mobility outcomes

Outcome: Goal Achieved Date Met:  17

Pt utilizing maximum ADLs at this time 



Problem: Infection, Risk of, Central Venous Catheter-Associated Bloodstream 
Infection

Goal: Absence of CVC Associated Bloodstream infection

Outcome: Goal Achieved Date Met:  17

Pt absent of CVC associated bloodstream infection 





* Procedures (Immed Post or Bedside) - Kali Howard MD - 2017  8:24 AM 
CST



Immediate Post Procedure Note



Date:  2017                                     



Attending Physician:   David Stevens MD

Performing Provider:  Kali Howard MD



Consent:  Consent obtained from patient.

Time out performed: Consent obtained, correct patient verified, correct 
procedure verified, correct site verified, patient marked as necessary.

Pre/Post Procedure Diagnosis:  Bacteremia 

Indications:  CVC access



Anesthesia: Local 10 mL 1% lidocaine without epinephrine with IV sedation 
Fentanyl and Versed

Procedure(s):  RIJ tunneled CVC placement

Findings:  Open RIJ via US. CVC tip at upper RA.



Estimated Blood Loss:  None/Negligible

Specimen(s) Removed/Disposition:  None

Complications: None

Patient Tolerated Procedure: Well

Post-Procedure Condition:  stable



Kali Howard MD

Pager [phone]



Please see separately dictated radiology report for complete details of 
procedure.





* Case Mgmt DC Plan - Luis Merritt RN - 2017 11:41 AM CST



Case Management Progress Note

NAME:Moshe Lopez                          MRN: [id number]              :          AGE: 77 y.o.

ADMISSION DATE: 2017             DAYS ADMITTED: LOS: 6 days  



Todays Date: 2017



Plan: Anticipate Pt will dc home today with [facility] infusion for IV abx

ID recommendations ofr Ertapenem, PT/OT rec's home with assistance.



Interventions

? Support



? Info or Referral



? Discharge Planning

 NCM reviewed EMR for POC and discussed Pt with the team at huddle meeting.

 Pt will dc home with IV abx.

 NC went to see Pt and his wife and they would like to drive to utilize 
[facility] infusion clinic every day to have IV abx infused. [facility] ([phone]/ fax [phone])

 [facility] would like [facility] to fax order to Pt's PCP in order for PCP to 
f/u on abx once recommendations from ID are complete.

 [facility] [facility] contacted PCP, spoke to Yue, per [facility] request and notified 
to fax ID recommendations to PCP ([phone]) and they would determine time 
for IV abx infusion at the hospital.

 [facility] faxed home health order and IV infusion to Pt's and discharge summary to 
Pt's PCP (fax [phone]) and called and spoke to Dixie at the doctor Jaelyn Velez's office.

? Medication Needs



? Financial



? Legal



? Other



Disposition

? Discharge Preparation

When ready for discharge, who will be responsible for transporting?: latisha  wife 

Type of Residence: Private residence

Patient expects to be discharged to: Private residence

Was the patient receiving home care services?: No



? Expected Discharge

Expected Discharge Date: 17



? Discharge Disposition

?  Next Level Care



Luis Merritt RN, BSN, MSN

Integrative Nurse 

Pager [phone]



* Care Plan - Gabrielle Fernando RN - 2017  9:45 AM CST



Problem: Discharge Planning

Goal: Participation in plan of care

Outcome: Goal Ongoing

Pt actively participates in plan of care 

Goal: Knowledge regarding plan of care

Outcome: Goal Ongoing

Pt states understanding of plan of care 

Goal: Prepared for discharge

Outcome: Goal Ongoing

Pt has no plans for d/c at this time, pt updated on d/c plan 



Problem: Pain

Goal: Management of pain

Outcome: Goal Ongoing

Pt pain managed per MAR orders 

Goal: Knowledge of pain management

Outcome: Goal Ongoing

Pt states understanding of pain regimen 



Problem: Mobility/Activity Intolerance

Goal: Maximize functional ADLs and mobility outcomes

Outcome: Goal Ongoing

Pt utilizing maximum ADLs at this time 





* Care Plan - Delgado Urena RN - 2017  1:03 PM CST



Problem: Discharge Planning

Goal: Knowledge regarding plan of care

Outcome: Goal Ongoing

Pt educated on plan of care and verbalizes understanding. Pt has no questions 
at this time.

  

Goal: Prepared for discharge

Outcome: Goal Ongoing

Pt participating in interventions to prepare for discharge



Problem: Pain

Goal: Management of pain

Outcome: Goal Ongoing

Pt verbalizes pain well managed with pain management interventions

Goal: Knowledge of pain management

Outcome: Goal Ongoing

Pt educated on pain management interventions and verbalizes understanding.



Problem: Mobility/Activity Intolerance

Goal: Maximize functional ADLs and mobility outcomes

Outcome: Goal Ongoing

Pt working towards maximal functional ADLs and mobility outcomes





* Care Plan - Delgado Urena RN - 2017  7:48 AM CST



Problem: Falls, High Risk of

Goal: Absence of falls-Adult Patient

Outcome: Goal Achieved Date Met:  17

Pt walking laps in halls. Pt not considered a fall risk





* Care Plan - Shawnee Doshi RN - 2017  2:35 PM CST



Problem: Discharge Planning

Goal: Participation in plan of care

Outcome: Goal Ongoing

Pt involved in plan of care. Plan to discharge home once medically stable. 

Goal: Knowledge regarding plan of care

Outcome: Goal Ongoing

Plan to discharge home once medically stable. 

Goal: Prepared for discharge

Outcome: Goal Ongoing

Plan to discharge home once medically stable. 



Problem: Falls, High Risk of

Goal: Absence of falls-Adult Patient

Outcome: Goal Ongoing

No falls during hospital stay. Pt walking with therapy and family. 



Problem: Pain

Goal: Management of pain

Outcome: Goal Ongoing

Pt reports no pain.

Goal: Knowledge of pain management

Outcome: Goal Ongoing

Pt reports no pain. 





* Care Plan - Joceline Diaz RN - 2017  6:20 PM CST



Problem: Discharge Planning

Goal: Participation in plan of care

Outcome: Goal Ongoing

Pt actively participated in the plan of care. 

Goal: Knowledge regarding plan of care

Outcome: Goal Ongoing

Pt was knowledgeable regarding the plan of care. 

Goal: Prepared for discharge

Outcome: Goal Ongoing

Pt was not prepared for discharge today. Pt went to IR for drain placement. 



Problem: Falls, High Risk of

Goal: Absence of falls-Adult Patient

Outcome: Goal Ongoing

Pt was absent of falls during this RN's shift. 



Problem: Pain

Goal: Management of pain

Outcome: Goal Ongoing

Pt reported 0/10 pain for this RN during her shift. 

Goal: Knowledge of pain management

Outcome: Goal Ongoing

Pt was knowledgeable regarding pain management techniques.





* Case Mgmt DC Plan - Luis Merritt RN - 2017 11:40 AM CST



Formatting of this note may be different from the original.

Case Management Admission Assessment



NAME:Moshe Lopez                          MRN: [id number]             :          AGE: 77 y.o.

ADMISSION DATE: 2017             DAYS ADMITTED: LOS: 1 day  

Todays Date: 2017



Source of Information: Patient's spouse John Good..   



Plan: DC Planning for home ongoing 

- Pt is a 78 y/o M. Who is a readmit. On  he was admitted to internal 
medicine for fever, N/V, and altered mental status. He improved on IV 
antibiotics and was discharged on a course of Levaquin. Since discharge last 
week he has been doing well and reports good PO intake, however last night and 
this morning he developed N/V along with intolerance of PO intake. No fevers, 
chills, diarrhea, constipation, hematemesis, or hematochezia.

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
3 shows areas of undrained peripancreatic fluid collection. Awaiting for PT/OT 
recommendations.



Plan: CM Assessment 

 NCM reviewed EMR for POC and discussed Pt with the team at huddle meeting.

 NCM met Pt and his wife Latisha in his room and introduced NCM role.

 Pt asleep for the most part of the assessment.

 No anticipated needs at this time.

 NCM to f/u with dc planning as needed post PT/OT recommendations.

 Pt and spouse encouraged to call with questions or concerns.



Emergency Contact   

Extended Emergency Contact Information

Primary Emergency Contact: JohnLatisha

 [facility]

Home Phone: [phone]

Mobile Phone: [phone]

Relation: Spouse



DPOA 

None.



Transportation

Does the patient need discharge transport arranged?: No

Transportation Name, Phone and Availability #1: Jonh Good.

Transportation Name, Phone and Availability #2: [phone].

Does the patient use Medicaid Transportation?: No



Expected Discharge 

Expected Discharge Date: 17



Living Situation Prior to Admission

? Living Arrangements 

Type of Residence: Home, independent

Living Arrangements: Spouse/significant other

Bathroom Shower / Tub: Walk-in Shower

Bathroom Toilet: Standard

Bathroom Equipment: Built-in Seat in Shower

How many levels in the residence?: 1 (Pt lives [place] 1 level house with a Ramp.)

Can patient live on one level if needed?: Yes

Support Systems: Spouse/Partner, Other family (John Good is the primary 
support person and Pt's children. who live close by.)



? Level of Function 

Prior level of function: Independent (Prior to admission Pt was independent 
with ADLs , but spouse driving as needed.)



? Cognitive Abilities 

Cognitive Abilities: Alert and Oriented, Participates in decision making, 
Recognizes impact of health condition on lifestyle, Engages in problem solving 
and planning



Financial Resources

? Coverage 

Primary Insurance: Medicare

Secondary Insurance: Commercial insurance ([employer]/ EXO[employer])
  



? Source of Income 

Source Of Income: Other [employer] income



? Financial Assistance Needed?

None.



Current/Previous Services

? PCP

Jaelyn Velez



? DME 

DME at home: Walker, Single Point Cane (Pt uses a cane for balanace and has a 
built in bench.)



? Home Health 

Receiving home health: In the past

Agency name: [facility].

Would patient use this agency again?: Yes



? HD or PD

Undergoing hemodialysis or peritoneal dialysis: No



? Tube/Enteral Feeds 

Receive tube/enteral feeds: No



? Infusion 

Receive infusions: No



? Private Duty

 Private duty help used: No



? HCBS 

Home and community based services: No



? Vaibhav White

 Vaibhav White: No



? Hospice 

Hospice: No



? Outpatient Therapy 

PT: In the past

When did patient receive care?: [facility] PT/OT

OT: No



? SNF/NH 

SNF: In the past

When did patient receive care?: [facility] swing bed



? IPR 

IPR: No



? LTACH 

LTACH: No



? Acute Hospital Stay 

Acute Hospital Stay: Yes

Was patient's stay within the last 30 days?: Yes

When did patient receive care?: 2017

Name of hospital: [facility]



Psychosocial Needs

? Mental Health 

Mental Health History: No



? Substance History 

History 

Smoking Status 

 Former Smoker 

 Packs/day: 1.00 

 Years: 15.00 

 Types: Cigarettes, Pipe, Cigars 

 Quit date: 1975 

Smokeless Tobacco 

 Never Used 



History 

Alcohol Use 

 0.6 oz/week 

 1 Cans of beer per week 

  Comment: 1 beer every 2 weeks 



History 

Drug Use No 



? Abuse/Sexual Assault

Are You Alone With The Patient?: Yes

Have You Ever Been Hit, Hurt Or Threatened In Any Way In The Past 5 Years?: No

Nurse Suspected Abuse?: No



Luis Merritt, RN, BSN, MSN

Integrative Nurse 

Pager [phone]



* Care Plan - Joceline Diaz RN - 2017  7:15 PM CST



Problem: Discharge Planning

Goal: Participation in plan of care

Outcome: Goal Ongoing

Pt actively participated in the plan of care. 

Goal: Knowledge regarding plan of care

Outcome: Goal Ongoing

Pt was knowledgeable regarding the plan of care. 

Goal: Prepared for discharge

Outcome: Goal Ongoing

Pt was not ready for discharge today. 



Problem: Falls, High Risk of

Goal: Absence of falls-Adult Patient

Outcome: Goal Ongoing

Pt was absent of falls during this RN's shift. 



Problem: Pain

Goal: Management of pain

Outcome: Goal Ongoing

Pt reported minimal pain for this RN. Pt did not want any pain med during this 
RN's shift. 

Goal: Knowledge of pain management

Outcome: Goal Ongoing

Pt was knowledgeable regarding pain management techniques. 





in this encounter



Plan of Treatment







    



  Date   Type   Specialty   Care Team   Description

 

    



  2018   Procedure Pass   Infectious Diseases  

 

    



  2018   Surgery    Fan Tidwell MD   ESOPHAGOGASTRODUODENOSCOP



     [address]



     [address] 



     [place] [address] 



     [phone] (Fax) 

 

    



  2018   Procedure Pass   

 

    



  2018   Hospital    Fan Tidwell MD   Pancreatic necrosis



   Encounter    [address] 



     [address] 



     [place] [address] 



     [phone] (Fax) 



as of this encounter



Procedures







    



  Procedure Name   Priority   Date/Time   Associated Diagnosis   Comments

 

    



  TELEMETRY STRIPS-SCAN    2017    Results for this



    5:07 PM CST    procedure are in the



      results section.

 

    



  CONSULT IV THERAPY TEAM   Routine   2017  



    12:40 PM CST  

 

    



  CONSULT IV THERAPY TEAM   Routine   2017  



    5:28 PM CST  



in this encounter



Results

* TELEMETRY STRIPS-SCAN (2017  5:07 PM)





 Narrative

 

 



Ordered by an unspecified provider.





* IR CENTRAL VENOUS CATHETER (2017  8:10 AM)





 



  Specimen   Performing Laboratory

 

 



   [facility] RAD RESULTS









 Impressions

 

 



 



Successfulimage-guided placement of a non-cuffed tunneled central venous 
catheter as described above.



 



 



 



 



 Approved by Kali Howard M.D. on 2017 9:00 AM



 



By my electronic signature, I attest that I have personally reviewed the images 
for this examination and formulated the interpretations and opinions expressed 
in this report



 



 



 Finalized by Jake Stevens M.D. on 2017 9:50 AM. Dictated by Kali Howard M.D. on 2017 8:58 AM.



 









 Narrative

 

 



Tunneled Non-cuffed Central Venous Catheter Placement Under Ultrasound and 
Fluoro Guidance 



 



CLINICAL INDICATION: Bacteremia



 



OPERATING PHYSICIANS: David stevens M.D.



 



MEDICATIONS:I was personally responsible for the administration of moderate 
sedation services during the procedure performed and I confirm requirements 
described in CPT section on moderate sedation were followed, including the use 
of an independent trained observer who had no other duties during the procedure.
The total supervised sedation time was 11 minutes.See nursing log for 
complete details; the drugs utilized were:



1. Versed 2 mg IV



2. Fentanyl 100 mcg IV 



 



ACCESS:Right internal jugular vein



 



CONTRAST: None



 



COMPLICATIONS: None



 



CATHETER: 4 French single lumen tunneled 21 cm PICC



 



FLUOROSCOPY DOSE: 9 mGy



 



TECHNIQUE:



I, David Stevens M.D, the attending radiologist, was present for the 
critical and key portions of the procedure with a midlevel, resident, and/or 
fellow participating.Overlapping portions were non key and I was 
immediately available.I interpret the critical and key portion of this 
procedure to have been needle access.



 



Informed, written consent was obtained from the patient after explaining the 
risks and benefits of the procedure.With the patient in the supine position
, the right neck and upper chest were prepped and draped in the usual sterile 
fashion. The skin and subcutaneous tissues were infiltrated with 2% Lidocaine 
without epinephrine. Under direct ultrasound guidance, the right internal 
jugular vein was successfully cannulated using a micropuncture needle and an 
image was stored to PACS.An 0.018 wire was advanced through the needle into 
the vein and a 5.5 French peel-away sheath was advanced centrally over wire.



 



Attention was turned to the creation of a subcutaneous tunnel.2% Lidocaine 
with epinephrine were infiltrated along a 10-12 cm tract caudal and lateral to 
the initial venotomy site.A second small dermatotomy was made.The 
tunneling device was used to create a subcutaneous tunnel, and the catheter was 
advanced through to the initial venotomy site. The catheter was cut to length. 



 



The introducer of the 5.5 French sheath was removed and the tunneled non-cuffed 
catheter was advanced through the sheath and positioned centrally under 
fluoroscopic guidance. The venotomy site was closed with Dermabond. The 
catheter was secured to the skin with 2-0 Ethilon per hospital policy.The 
patient tolerated the procedure well and remained in stable condition 
throughout the stay in the angiography suite.The catheter was flushed, 
heparinized, and a sterile dressing was applied.



 



FINDINGS:



1. Patent right internal jugular vein by ultrasound. Needle entry was documented
, and an image was stored to PACS.



2. Tip of the catheter terminates in the right atrium.



 









 Procedure Note

 

 



Interface, Radiant Results - 2017  9:53 AM CST



Tunneled Non-cuffed Central Venous Catheter Placement Under Ultrasound and 
Fluoro Guidance 



CLINICAL INDICATION: Bacteremia



OPERATING PHYSICIANS: David stevens M.D.



MEDICATIONS:  I was personally responsible for the administration of moderate 
sedation services during the procedure performed and I confirm requirements 
described in CPT section on moderate sedation were followed, including the use 
of an independent trained observer who had no other duties during the 
procedure.  The total supervised sedation time was 11 minutes.  See nursing log 
for complete details; the drugs utilized were:  

 1. Versed 2 mg IV

 2. Fentanyl 100 mcg IV 



ACCESS:  Right internal jugular vein



CONTRAST: None



COMPLICATIONS: None



CATHETER: 4 French single lumen tunneled 21 cm PICC



FLUOROSCOPY DOSE: 9 mGy



TECHNIQUE:  

I, David Stevens M.D, the attending radiologist, was present for the 
critical and key portions of the procedure with a midlevel, resident, and/or 
fellow participating.  Overlapping portions were non key and I was immediately 
available.  I interpret the critical and key portion of this procedure to have 
been needle access.



Informed, written consent was obtained from the patient after explaining the 
risks and benefits of the procedure.  With the patient in the supine position, 
the right neck and upper chest were prepped and draped in the usual sterile 
fashion. The skin and subcutaneous tissues were infiltrated with 2% Lidocaine 
without epinephrine. Under direct ultrasound guidance, the right internal 
jugular vein was successfully cannulated using a micropuncture needle and an 
image was stored to PACS.  An 0.018 wire was advanced through the needle into 
the vein and a 5.5 French peel-away sheath was advanced centrally over wire.



Attention was turned to the creation of a subcutaneous tunnel.  2% Lidocaine 
with epinephrine were infiltrated along a 10-12 cm tract caudal and lateral to 
the initial venotomy site.  A second small dermatotomy was made.  The tunneling 
device was used to create a subcutaneous tunnel, and the catheter was advanced 
through to the initial venotomy site. The catheter was cut to length. 



The introducer of the 5.5 French sheath was removed and the tunneled non-cuffed 
catheter was advanced through the sheath and positioned centrally under 
fluoroscopic guidance. The venotomy site was closed with Dermabond. The 
catheter was secured to the skin with 2-0 Ethilon per hospital policy.  The 
patient tolerated the procedure well and remained in stable condition 
throughout the stay in the angiography suite.  The catheter was flushed, 
heparinized, and a sterile dressing was applied.



FINDINGS:

 1. Patent right internal jugular vein by ultrasound. Needle entry was 
documented, and an image was stored to PACS.

 2. Tip of the catheter terminates in the right atrium.



IMPRESSION



Successful  image-guided placement of a non-cuffed tunneled central venous 
catheter as described above.









 Approved by Kali Howard M.D. on 2017 9:00 AM



By my electronic signature, I attest that I have personally reviewed the images 
for this examination and formulated the interpretations and opinions expressed 
in this report





 Finalized by Jake Stevens M.D. on 2017 9:50 AM. Dictated by Kali Howard M.D. on 2017 8:58 AM.







* BASIC METABOLIC PANEL (2017  4:15 AM)





  



  Component   Value   Ref Range

 

  



  Sodium   139   137 - 147 MMOL/L

 

  



  Potassium   4.1   3.5 - 5.1 MMOL/L

 

  



  Chloride   106   98 - 110 MMOL/L

 

  



  CO2   25   21 - 30 MMOL/L

 

  



  Anion Gap   8   3 - 12

 

  



  Glucose   105 (H)   70 - 100 MG/DL

 

  



  Blood Urea Nitrogen   11   7 - 25 MG/DL

 

  



  Creatinine   0.72   0.4 - 1.24 MG/DL

 

  



  Calcium   9.9   8.5 - 10.6 MG/DL

 

  



  eGFR Non African American   >60   >60 mL/min



   Comment: 



   The eGFR is not validated for use in drug dosing 



   adjustments.    Continue to use 



   estimated creatinine clearance per dosing 



   reference text.    Please contact the 



   Clinical Pharmacist for questions. 

 

  



  eGFR    >60   >60 mL/min



   Comment: 



   The eGFR is not validated for use in drug dosing 



   adjustments.    Continue to use 



   estimated creatinine clearance per dosing 



   reference text.    Please contact the 



   Clinical Pharmacist for questions. 









 



  Specimen   Performing Laboratory

 

 



  Blood    MAIN LAB



   [address] [place] [address]





* CBC (2017  4:15 AM)





  



  Component   Value   Ref Range

 

  



  White Blood Cells   9.1   4.5 - 11.0 K/UL

 

  



  RBC   4.13 (L)   4.4 - 5.5 M/UL

 

  



  Hemoglobin   10.7 (L)   13.5 - 16.5 GM/DL

 

  



  Hematocrit   32.6 (L)   40 - 50 %

 

  



  MCV   79.0 (L)   80 - 100 FL

 

  



  MCH   26.0   26 - 34 PG

 

  



  MCHC   32.8   32.0 - 36.0 G/DL

 

  



  RDW   19.2 (H)   11 - 15 %

 

  



  Platelet Count   316   150 - 400 K/UL

 

  



  MPV   8.6   7 - 11 FL









 



  Specimen   Performing Laboratory

 

 



  Blood    MAIN LAB



   [address] [place], [place] [id number]





* LIPASE (2017  4:15 AM)





  



  Component   Value   Ref Range

 

  



  Lipase   33   11 - 82 U/L









 



  Specimen   Performing Laboratory

 

 



  Blood    MAIN LAB



   [address] [place], [place] [id number]





* PHOSPHORUS (2017  4:15 AM)





  



  Component   Value   Ref Range

 

  



  Phosphorus   3.3   2.0 - 4.0 MG/DL









 



  Specimen   Performing Laboratory

 

 



  Blood    MAIN LAB



   [address] [place], [place] [id number]





* MAGNESIUM (2017  4:15 AM)





  



  Component   Value   Ref Range

 

  



  Magnesium   2.0   1.6 - 2.6 mg/dL









 



  Specimen   Performing Laboratory

 

 



  Blood    MAIN LAB



   [address] [place], [place] [id number]





* BASIC METABOLIC PANEL (2017  4:37 AM)





  



  Component   Value   Ref Range

 

  



  Sodium   138   137 - 147 MMOL/L

 

  



  Potassium   4.2   3.5 - 5.1 MMOL/L

 

  



  Chloride   106   98 - 110 MMOL/L

 

  



  CO2   26   21 - 30 MMOL/L

 

  



  Anion Gap   6   3 - 12

 

  



  Glucose   108 (H)   70 - 100 MG/DL

 

  



  Blood Urea Nitrogen   12   7 - 25 MG/DL

 

  



  Creatinine   0.71   0.4 - 1.24 MG/DL

 

  



  Calcium   9.3   8.5 - 10.6 MG/DL

 

  



  eGFR Non African American   >60   >60 mL/min



   Comment: 



   The eGFR is not validated for use in drug dosing 



   adjustments.    Continue to use 



   estimated creatinine clearance per dosing 



   reference text.    Please contact the 



   Clinical Pharmacist for questions. 

 

  



  eGFR    >60   >60 mL/min



   Comment: 



   The eGFR is not validated for use in drug dosing 



   adjustments.    Continue to use 



   estimated creatinine clearance per dosing 



   reference text.    Please contact the 



   Clinical Pharmacist for questions. 









 



  Specimen   Performing Laboratory

 

 



  Blood    MAIN LAB



   [address] [place], [place] [id number]





* CBC (2017  4:37 AM)





  



  Component   Value   Ref Range

 

  



  White Blood Cells   6.8   4.5 - 11.0 K/UL

 

  



  RBC   3.89 (L)   4.4 - 5.5 M/UL

 

  



  Hemoglobin   9.8 (L)   13.5 - 16.5 GM/DL

 

  



  Hematocrit   30.8 (L)   40 - 50 %

 

  



  MCV   79.2 (L)   80 - 100 FL

 

  



  MCH   25.2 (L)   26 - 34 PG

 

  



  MCHC   31.8 (L)   32.0 - 36.0 G/DL

 

  



  RDW   18.3 (H)   11 - 15 %

 

  



  Platelet Count   252   150 - 400 K/UL

 

  



  MPV   8.5   7 - 11 FL









 



  Specimen   Performing Laboratory

 

 



  Blood    MAIN LAB



   [address] [place] [address]





* LIPASE (2017  4:37 AM)





  



  Component   Value   Ref Range

 

  



  Lipase   31   11 - 82 U/L









 



  Specimen   Performing Laboratory

 

 



  Blood    MAIN LAB



   [address] [place] [address]





* PHOSPHORUS (2017  4:37 AM)





  



  Component   Value   Ref Range

 

  



  Phosphorus   2.6   2.0 - 4.0 MG/DL









 



  Specimen   Performing Laboratory

 

 



  Blood    MAIN LAB



   [address] [place] [address]





* MAGNESIUM (2017  4:37 AM)





  



  Component   Value   Ref Range

 

  



  Magnesium   2.2   1.6 - 2.6 mg/dL









 



  Specimen   Performing Laboratory

 

 



  Blood    MAIN LAB



   [address] [place] [address]





* BASIC METABOLIC PANEL (2017  4:41 AM)





  



  Component   Value   Ref Range

 

  



  Sodium   141   137 - 147 MMOL/L

 

  



  Potassium   3.3 (L)   3.5 - 5.1 MMOL/L

 

  



  Chloride   108   98 - 110 MMOL/L

 

  



  CO2   26   21 - 30 MMOL/L

 

  



  Anion Gap   7   3 - 12

 

  



  Glucose   109 (H)   70 - 100 MG/DL

 

  



  Blood Urea Nitrogen   10   7 - 25 MG/DL

 

  



  Creatinine   0.73   0.4 - 1.24 MG/DL

 

  



  Calcium   8.8   8.5 - 10.6 MG/DL

 

  



  eGFR Non African American   >60   >60 mL/min



   Comment: 



   The eGFR is not validated for use in drug dosing 



   adjustments.    Continue to use 



   estimated creatinine clearance per dosing 



   reference text.    Please contact the 



   Clinical Pharmacist for questions. 

 

  



  eGFR    >60   >60 mL/min



   Comment: 



   The eGFR is not validated for use in drug dosing 



   adjustments.    Continue to use 



   estimated creatinine clearance per dosing 



   reference text.    Please contact the 



   Clinical Pharmacist for questions. 









 



  Specimen   Performing Laboratory

 

 



  Blood    MAIN LAB



   [address] [place], [place] [id number]





* CBC (2017  4:41 AM)





  



  Component   Value   Ref Range

 

  



  White Blood Cells   6.1   4.5 - 11.0 K/UL

 

  



  RBC   3.75 (L)   4.4 - 5.5 M/UL

 

  



  Hemoglobin   9.4 (L)   13.5 - 16.5 GM/DL

 

  



  Hematocrit   29.8 (L)   40 - 50 %

 

  



  MCV   79.3 (L)   80 - 100 FL

 

  



  MCH   25.1 (L)   26 - 34 PG

 

  



  MCHC   31.6 (L)   32.0 - 36.0 G/DL

 

  



  RDW   18.2 (H)   11 - 15 %

 

  



  Platelet Count   246   150 - 400 K/UL

 

  



  MPV   8.7   7 - 11 FL









 



  Specimen   Performing Laboratory

 

 



  Blood    MAIN LAB



   [address] [place], [place] [id number]





* LIPASE (2017  4:41 AM)





  



  Component   Value   Ref Range

 

  



  Lipase   23   11 - 82 U/L









 



  Specimen   Performing Laboratory

 

 



  Blood    MAIN LAB



   [address] [place], [place] [id number]





* PHOSPHORUS (2017  4:41 AM)





  



  Component   Value   Ref Range

 

  



  Phosphorus   2.7   2.0 - 4.0 MG/DL









 



  Specimen   Performing Laboratory

 

 



  Blood    MAIN LAB



   [address] [place], [place] [id number]





* MAGNESIUM (2017  4:41 AM)





  



  Component   Value   Ref Range

 

  



  Magnesium   1.7   1.6 - 2.6 mg/dL









 



  Specimen   Performing Laboratory

 

 



  Blood    MAIN LAB



   [address] [place] [address]





* BASIC METABOLIC PANEL (2017  5:39 AM)





  



  Component   Value   Ref Range

 

  



  Sodium   144   137 - 147 MMOL/L

 

  



  Potassium   3.8   3.5 - 5.1 MMOL/L

 

  



  Chloride   111 (H)   98 - 110 MMOL/L

 

  



  CO2   29   21 - 30 MMOL/L

 

  



  Anion Gap   4   3 - 12

 

  



  Glucose   109 (H)   70 - 100 MG/DL

 

  



  Blood Urea Nitrogen   13   7 - 25 MG/DL

 

  



  Creatinine   0.69   0.4 - 1.24 MG/DL

 

  



  Calcium   8.9   8.5 - 10.6 MG/DL

 

  



  eGFR Non African American   >60   >60 mL/min



   Comment: 



   The eGFR is not validated for use in drug dosing 



   adjustments.    Continue to use 



   estimated creatinine clearance per dosing 



   reference text.    Please contact the 



   Clinical Pharmacist for questions. 

 

  



  eGFR    >60   >60 mL/min



   Comment: 



   The eGFR is not validated for use in drug dosing 



   adjustments.    Continue to use 



   estimated creatinine clearance per dosing 



   reference text.    Please contact the 



   Clinical Pharmacist for questions. 









 



  Specimen   Performing Laboratory

 

 



  Blood    MAIN LAB



   [address] [place] [address]





* CBC (2017  5:39 AM)





  



  Component   Value   Ref Range

 

  



  White Blood Cells   6.9   4.5 - 11.0 K/UL

 

  



  RBC   3.82 (L)   4.4 - 5.5 M/UL

 

  



  Hemoglobin   9.8 (L)   13.5 - 16.5 GM/DL

 

  



  Hematocrit   30.2 (L)   40 - 50 %

 

  



  MCV   79.0 (L)   80 - 100 FL

 

  



  MCH   25.7 (L)   26 - 34 PG

 

  



  MCHC   32.6   32.0 - 36.0 G/DL

 

  



  RDW   18.6 (H)   11 - 15 %

 

  



  Platelet Count   269   150 - 400 K/UL

 

  



  MPV   8.7   7 - 11 FL









 



  Specimen   Performing Laboratory

 

 



  Blood    MAIN LAB



   [address] [place] [address]





* LIPASE (2017  5:39 AM)





  



  Component   Value   Ref Range

 

  



  Lipase   22   11 - 82 U/L









 



  Specimen   Performing Laboratory

 

 



  Blood    MAIN LAB



   [address] [place] [address]





* PHOSPHORUS (2017  5:39 AM)





  



  Component   Value   Ref Range

 

  



  Phosphorus   2.5   2.0 - 4.0 MG/DL









 



  Specimen   Performing Laboratory

 

 



  Blood    MAIN LAB



   [address] [place] [address]





* MAGNESIUM (2017  5:39 AM)





  



  Component   Value   Ref Range

 

  



  Magnesium   1.9   1.6 - 2.6 mg/dL









 



  Specimen   Performing Laboratory

 

 



  Blood    MAIN LAB



   [address] [place] [address]





* IRON + BINDING CAPACITY + %SAT+ FERRITIN (2017  9:27 AM)





  



  Component   Value   Ref Range

 

  



  Iron   29 (L)   50 - 185 MCG/DL

 

  



  Iron Binding-TIBC   356   270 - 380 MCG/DL

 

  



  % Saturation   8 (L)   28 - 42 %

 

  



  Ferritin   188   30 - 300 NG/ML









 



  Specimen   Performing Laboratory

 

 



  Blood    MAIN LAB



   [address] [place] [address]





* TRANSFERRIN (2017  9:27 AM)





  



  Component   Value   Ref Range

 

  



  Transferrin   239   185 - 336 MG/DL









 



  Specimen   Performing Laboratory

 

 



  Blood    MAIN LAB



   [address] [place] [address]





* PREALBUMIN (2017  9:27 AM)





  



  Component   Value   Ref Range

 

  



  Prealbumin   11.0 (L)   17 - 34 MG/DL









 



  Specimen   Performing Laboratory

 

 



  Blood    MAIN LAB



   [address] [place] [address]





* CBC (2017  5:26 AM)





  



  Component   Value   Ref Range

 

  



  White Blood Cells   7.9   4.5 - 11.0 K/UL

 

  



  RBC   3.61 (L)   4.4 - 5.5 M/UL

 

  



  Hemoglobin   9.2 (L)   13.5 - 16.5 GM/DL

 

  



  Hematocrit   28.4 (L)   40 - 50 %

 

  



  MCV   78.8 (L)   80 - 100 FL

 

  



  MCH   25.6 (L)   26 - 34 PG

 

  



  MCHC   32.5   32.0 - 36.0 G/DL

 

  



  RDW   18.8 (H)   11 - 15 %

 

  



  Platelet Count   258   150 - 400 K/UL

 

  



  MPV   8.8   7 - 11 FL









 



  Specimen   Performing Laboratory

 

 



  Blood    MAIN LAB



   [address] [place], [place] [id number]





* COMPREHENSIVE METABOLIC PANEL (2017  5:26 AM)





  



  Component   Value   Ref Range

 

  



  Sodium   144   137 - 147 MMOL/L

 

  



  Potassium   3.3 (L)   3.5 - 5.1 MMOL/L

 

  



  Chloride   110   98 - 110 MMOL/L

 

  



  Glucose   112 (H)   70 - 100 MG/DL

 

  



  Blood Urea Nitrogen   14   7 - 25 MG/DL

 

  



  Creatinine   0.75   0.4 - 1.24 MG/DL

 

  



  Calcium   8.9   8.5 - 10.6 MG/DL

 

  



  Total Protein   6.3   6.0 - 8.0 G/DL

 

  



  Total Bilirubin   0.3   0.3 - 1.2 MG/DL

 

  



  Albumin   3.0 (L)   3.5 - 5.0 G/DL

 

  



  Alk Phosphatase   68   25 - 110 U/L

 

  



  AST (SGOT)   18   7 - 40 U/L

 

  



  CO2   27   21 - 30 MMOL/L

 

  



  ALT (SGPT)   13   7 - 56 U/L

 

  



  Anion Gap   7   3 - 12

 

  



  eGFR Non African American   >60   >60 mL/min



   Comment: 



   The eGFR is not validated for use in drug dosing 



   adjustments.    Continue to use 



   estimated creatinine clearance per dosing 



   reference text.    Please contact the 



   Clinical Pharmacist for questions. 

 

  



  eGFR    >60   >60 mL/min



   Comment: 



   The eGFR is not validated for use in drug dosing 



   adjustments.    Continue to use 



   estimated creatinine clearance per dosing 



   reference text.    Please contact the 



   Clinical Pharmacist for questions. 









 



  Specimen   Performing Laboratory

 

 



  Blood    MAIN LAB



   [address] [place], [place] [id number]





* LIPASE (2017  5:26 AM)





  



  Component   Value   Ref Range

 

  



  Lipase   19   11 - 82 U/L









 



  Specimen   Performing Laboratory

 

 



  Blood    MAIN LAB



   [address] [place] [address]





* PHOSPHORUS (2017  5:26 AM)





  



  Component   Value   Ref Range

 

  



  Phosphorus   2.7   2.0 - 4.0 MG/DL









 



  Specimen   Performing Laboratory

 

 



  Blood    MAIN LAB



   [address] [place] [address]





* MAGNESIUM (2017  5:26 AM)





  



  Component   Value   Ref Range

 

  



  Magnesium   1.8   1.6 - 2.6 mg/dL









 



  Specimen   Performing Laboratory

 

 



  Blood    MAIN LAB



   [address] [place], [place] [id number]





* CBC (2017  5:51 AM)





  



  Component   Value   Ref Range

 

  



  White Blood Cells   11.9 (H)   4.5 - 11.0 K/UL

 

  



  RBC   3.73 (L)   4.4 - 5.5 M/UL

 

  



  Hemoglobin   9.5 (L)   13.5 - 16.5 GM/DL

 

  



  Hematocrit   29.7 (L)   40 - 50 %

 

  



  MCV   79.5 (L)   80 - 100 FL

 

  



  MCH   25.4 (L)   26 - 34 PG

 

  



  MCHC   32.0   32.0 - 36.0 G/DL

 

  



  RDW   18.9 (H)   11 - 15 %

 

  



  Platelet Count   269   150 - 400 K/UL

 

  



  MPV   9.4   7 - 11 FL









 



  Specimen   Performing Laboratory

 

 



    MAIN LAB



   [address] [place] [address]





* COMPREHENSIVE METABOLIC PANEL (2017  5:51 AM)





  



  Component   Value   Ref Range

 

  



  Sodium   145   137 - 147 MMOL/L

 

  



  Potassium   3.8   3.5 - 5.1 MMOL/L

 

  



  Chloride   110   98 - 110 MMOL/L

 

  



  Glucose   76   70 - 100 MG/DL

 

  



  Blood Urea Nitrogen   20   7 - 25 MG/DL

 

  



  Creatinine   0.86   0.4 - 1.24 MG/DL

 

  



  Calcium   9.4   8.5 - 10.6 MG/DL

 

  



  Total Protein   6.7   6.0 - 8.0 G/DL

 

  



  Total Bilirubin   0.4   0.3 - 1.2 MG/DL

 

  



  Albumin   3.2 (L)   3.5 - 5.0 G/DL

 

  



  Alk Phosphatase   77   25 - 110 U/L

 

  



  AST (SGOT)   20   7 - 40 U/L

 

  



  CO2   26   21 - 30 MMOL/L

 

  



  ALT (SGPT)   16   7 - 56 U/L

 

  



  Anion Gap   9   3 - 12

 

  



  eGFR Non African American   >60   >60 mL/min



   Comment: 



   The eGFR is not validated for use in drug dosing 



   adjustments.    Continue to use 



   estimated creatinine clearance per dosing 



   reference text.    Please contact the 



   Clinical Pharmacist for questions. 

 

  



  eGFR    >60   >60 mL/min



   Comment: 



   The eGFR is not validated for use in drug dosing 



   adjustments.    Continue to use 



   estimated creatinine clearance per dosing 



   reference text.    Please contact the 



   Clinical Pharmacist for questions. 









 



  Specimen   Performing Laboratory

 

 



    MAIN LAB



   [address] [place] [address]





* LIPASE (2017  5:51 AM)





  



  Component   Value   Ref Range

 

  



  Lipase   14   11 - 82 U/L









 



  Specimen   Performing Laboratory

 

 



  Blood    MAIN LAB



   [address] [place] [address]





* PHOSPHORUS (2017  5:51 AM)





  



  Component   Value   Ref Range

 

  



  Phosphorus   3.5   2.0 - 4.0 MG/DL









 



  Specimen   Performing Laboratory

 

 



  Blood    MAIN LAB



   [address] [place] [address]





* MAGNESIUM (2017  5:51 AM)





  



  Component   Value   Ref Range

 

  



  Magnesium   1.9   1.6 - 2.6 mg/dL









 



  Specimen   Performing Laboratory

 

 



  Blood    MAIN LAB



   [address] [place] [address] [place] [address]





* CT GUIDE ABCESS DRAIN W CATH (2017  6:28 PM)





 



  Specimen   Performing Laboratory

 

 



   [facility] RESULTS









 Impressions

 

 



 



1. CT-guided aspiration of retroperitoneal fluid collection as described.



 



I, Thomas M. Fahrbach, M.D., the attending radiologist, was present for the 
procedure, personally reviewed the images, and formulated the interpretations 
and opinions expressed in this report. 



 



@TT



 



 



 Finalized by Thomas Fahrbach, M.D. on 2017 6:40 PM. Dictated by Thomas Fahrbach, M.D. on 2017 6:36 PM.



 









 Narrative

 

 



 



CT-guided aspiration:



 



Clinical Indication:



 



Retroperitoneal abscess



 



Technique and Findings:



 



The nature of the procedure, risks, benefits, and expected outcomes were 
discussed with the patient who then provided informed written and verbal 
consent.



 



The patient was placed in a prone position on the CT scanner. Initial scans 
through the retroperitoneum demonstrated a small retroperitonealfluid 
collection. The patient was prepped and draped in the usual sterile manner and 
the superficial tissues were anesthetized with 2% lidocaine solution. An 18 
gauge Seldinger needle was advanced into the fluid collection under CT guidance 
no significant fluid was able to be aspirated. Using an Amplatz wire, the 
viscous, loculated fluid was fenestrated to facilitate aspiration and drain 
placement. Unfortunately, only 2 mL of brown, bloody fluid was able to be 
aspirated given the small size, and no drain was able to be placed. The patient 
tolerated the procedure well and left the department in unchanged condition.



 



I was personally responsible for the administration of moderate sedation 
services during the procedure performed and I confirm requirements described in 
CPT section on moderate sedation were followed, including the use of an 
independent trained observer who had no other duties during the procedure.
The total supervised sedation time was 17 minutes.See nursing log for 
complete details; the drugs utilized were:IV medication: 3 mg Versed, 200 
mcg fentanyl.



 









 Procedure Note

 

 



Interface, Radiant Results - 2017  6:43 PM CST





CT-guided aspiration:



Clinical Indication:



Retroperitoneal abscess



Technique and Findings:



The nature of the procedure, risks, benefits, and expected outcomes were 
discussed with the patient who then provided informed written and verbal 
consent.



The patient was placed in a prone position on the CT scanner. Initial scans 
through the retroperitoneum demonstrated a small retroperitoneal  fluid 
collection. The patient was prepped and draped in the usual sterile manner and 
the superficial tissues were anesthetized with 2% lidocaine solution. An 18 
gauge Seldinger needle was advanced into the fluid collection under CT guidance 
no significant fluid was able to be aspirated. Using an Amplatz wire, the 
viscous, loculated fluid was fenestrated to facilitate aspiration and drain 
placement. Unfortunately, only 2 mL of brown, bloody fluid was able to be 
aspirated given the small size, and no drain was able to be placed. The patient 
tolerated the procedure well and left the department in unchanged condition.



I was personally responsible for the administration of moderate sedation 
services during the procedure performed and I confirm requirements described in 
CPT section on moderate sedation were followed, including the use of an 
independent trained observer who had no other duties during the procedure.  The 
total supervised sedation time was 17 minutes.  See nursing log for complete 
details; the drugs utilized were:  IV medication: 3 mg Versed, 200 mcg fentanyl.



IMPRESSION



 1. CT-guided aspiration of retroperitoneal fluid collection as described.



I, Thomas M. Fahrbach, M.D., the attending radiologist, was present for the 
procedure, personally reviewed the images, and formulated the interpretations 
and opinions expressed in this report. 



@





 Finalized by Thomas Fahrbach, M.D. on 2017 6:40 PM. Dictated by Thomas Fahrbach, M.D. on 2017 6:36 PM.







* GRAM STAIN (2017  6:15 PM)





  



  Component   Value   Ref Range

 

  



  Battery Name   GRAM STAIN 

 

  



  Specimen Description   MISC FLUID 



   PERIPANCREATIC 



   ASPIRATE 

 

  



  Special Requests   NONE 

 

  



  Gram Stain   FEW 



   NEUTROPHILS 



   FEW 



   GRAM POSITIVE COCCI 

 

  



  Report Status   FINAL 



   2017 









 



  Specimen   Performing Laboratory

 

 



  Misc Fluid    MAIN LAB



   [address] [place] [address]





* CULTURE-FUNGAL,OTHER (2017  6:15 PM)





  



  Component   Value   Ref Range

 

  



  Battery Name   FUNGUS CULTURE 

 

  



  Specimen Description   MISC FLUID 



   PERIPANCREATIC 



   ASPIRATE 

 

  



  Special Requests   NONE 

 

  



  Culture   NO GROWTH OF FUNGUS AT 4 WEEKS 

 

  



  Report Status   FINAL 



   2017 









 



  Specimen   Performing Laboratory

 

 



  Fluid - Other (Specify)    MAIN LAB



   [address] [place] [address]





* CULTURE-WOUND/TISSUE/FLUID(AEROBIC ONLY)W/SENSITIVITY (2017  6:15 PM)





  



  Component   Value   Ref Range

 

  



  Battery Name   ROUTINE CULTURE 

 

  



  Specimen Description   MISC FLUID 



   PERIPANCREATIC 



   ASPIRATE 

 

  



  Special Requests   NONE 

 

  



  Direct Gram Stain   FEW 



   NEUTROPHILS 



   FEW 



   GRAM POSITIVE COCCI 

 

  



  Culture   NO GROWTH 5 DAYS 

 

  



  Report Status   FINAL 



   2017 









 



  Specimen   Performing Laboratory

 

 



  Fluid - Other (Specify)    MAIN LAB



   [address] [place] [id number]





* CULTURE-ANAEROBIC (2017  6:15 PM)





  



  Component   Value   Ref Range

 

  



  Battery Name   ANAEROBE CULTURE 

 

  



  Specimen Description   MISC FLUID 



   PERIPANCREATIC 



   ASPIRATE 

 

  



  Special Requests   NONE 

 

  



  Culture   Light growth 



   PREVOTELLA MELANINOGENICA 



   beta lactamase positive 



   Light growth 



   PREVOTELLA DENTICOLA 



   beta lactamase positive 

 

  



  Report Status   FINAL 



   2017 









 



  Specimen   Performing Laboratory

 

 



  Fluid - Other (Specify)    MAIN LAB



   [address] [place], [place] [id number]





* PHOSPHORUS (2017 12:15 AM)





  



  Component   Value   Ref Range

 

  



  Phosphorus   3.6   2.0 - 4.0 MG/DL









 



  Specimen   Performing Laboratory

 

 



    MAIN LAB



   [address] [place], [place] [id number]





* MAGNESIUM (2017 12:15 AM)





  



  Component   Value   Ref Range

 

  



  Magnesium   1.9   1.6 - 2.6 mg/dL









 



  Specimen   Performing Laboratory

 

 



    MAIN LAB



   [address] [place], [place] [id number]





* LIPASE (2017 12:15 AM)





  



  Component   Value   Ref Range

 

  



  Lipase   23   11 - 82 U/L









 



  Specimen   Performing Laboratory

 

 



    MAIN LAB



   [address] [place] [address]





* COMPREHENSIVE METABOLIC PANEL (2017 12:15 AM)





  



  Component   Value   Ref Range

 

  



  Sodium   142   137 - 147 MMOL/L

 

  



  Potassium   4.0   3.5 - 5.1 MMOL/L

 

  



  Chloride   108   98 - 110 MMOL/L

 

  



  Glucose   138 (H)   70 - 100 MG/DL

 

  



  Blood Urea Nitrogen   19   7 - 25 MG/DL

 

  



  Creatinine   0.71   0.4 - 1.24 MG/DL

 

  



  Calcium   9.7   8.5 - 10.6 MG/DL

 

  



  Total Protein   7.2   6.0 - 8.0 G/DL

 

  



  Total Bilirubin   0.4   0.3 - 1.2 MG/DL

 

  



  Albumin   3.4 (L)   3.5 - 5.0 G/DL

 

  



  Alk Phosphatase   84   25 - 110 U/L

 

  



  AST (SGOT)   27   7 - 40 U/L

 

  



  CO2   27   21 - 30 MMOL/L

 

  



  ALT (SGPT)   20   7 - 56 U/L

 

  



  Anion Gap   7   3 - 12

 

  



  eGFR Non African American   >60   >60 mL/min



   Comment: 



   The eGFR is not validated for use in drug dosing 



   adjustments.    Continue to use 



   estimated creatinine clearance per dosing 



   reference text.    Please contact the 



   Clinical Pharmacist for questions. 

 

  



  eGFR    >60   >60 mL/min



   Comment: 



   The eGFR is not validated for use in drug dosing 



   adjustments.    Continue to use 



   estimated creatinine clearance per dosing 



   reference text.    Please contact the 



   Clinical Pharmacist for questions. 









 



  Specimen   Performing Laboratory

 

 



  Blood   [facility]



   [address] [place] [address]





* CBC (2017 12:15 AM)





  



  Component   Value   Ref Range

 

  



  White Blood Cells   21.3 (H)   4.5 - 11.0 K/UL

 

  



  RBC   3.95 (L)   4.4 - 5.5 M/UL

 

  



  Hemoglobin   10.0 (L)   13.5 - 16.5 GM/DL

 

  



  Hematocrit   31.0 (L)   40 - 50 %

 

  



  MCV   78.4 (L)   80 - 100 FL

 

  



  MCH   25.4 (L)   26 - 34 PG

 

  



  MCHC   32.4   32.0 - 36.0 G/DL

 

  



  RDW   19.6 (H)   11 - 15 %

 

  



  Platelet Count   371   150 - 400 K/UL

 

  



  MPV   8.6   7 - 11 FL









 



  Specimen   Performing Laboratory

 

 



  Blood   [facility]



   [address] [place] [address]





* CT ABD/PELV W CONTRAST (2017  7:59 PM)





 



  Specimen   Performing Laboratory

 

 



   [facility] RESULTS









 Impressions

 

 



 



 



1. Continued overall decrease in size of complex network of peripancreatic gas/
fluid collections throughout the anterior pararenal space and retroperitoneum , 
with stable position of cystogastrostomy tube.



 



2. 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 
system. Some minimal ascites, mesenteric fluid and mild mesenteric venous 
congestion is noted.



 



3. Unchanged intrahepatic biliary ductal dilation with narrowing and wall 
thickening of the common bile duct.



 



4. Nonobstructing left nephrolithiasis with resolution of right hydronephrosis.



 



5. Persistent focal narrowing and circumferential wall thickening at the 
rectosigmoid junction. This is nonspecific, though sigmoidoscopy is recommended 
to rule out tumor, if not recently evaluated.



 



6. Colonic diverticulosis without acute diverticulitis or bowel obstruction. 
Prior small bowel and colonic anastomoses are noted.



 



7. Unchanged appearance of the L1 vertebral body with a mixed lytic and 
sclerotic appearance and slight bony expansion, most suggestive of Paget's 
disease. Stable mild L2 superior endplate compression deformity.



 



8. Probable cholelithiasis and/or gallbladder sludge, with no evidence of acute 
cholecystitis.



 



9. Moderate prostate enlargement.



 



By my electronic signature, I attest that I have personally reviewed the images 
for this examination and formulated the interpretations and opinions expressed 
in this report



 



 



 Finalized by Avelino Lyons M.D. on 2017 9:00 PM. Dictated by Ten Burroughs M.D. on 2017 8:12 PM.



 









 Narrative

 

 



CT ABDOMEN AND PELVIS



 



Clinical Indication:Male, 77 years old. Nausea, vomiting, necrotizing 
pancreatitis 



 



Technique:Multiple contiguous axial images were obtained through the 
abdomen and pelvis following the administration of IV contrast material. Post 
processing coronal and sagittal reconstruction images were made from the axial 
images.



 



Comparison: CT abdomen/pelvis 11/3/2017 and 2017



 



FINDINGS:



 



Lower Thorax: Heart size is normal without pericardial effusion. Coronary 
artery disease is noted with partial visualization of coronary stents. Interval 
progression of mild bilateral lower lobe atelectasis, greatest dependently. 
Calcified granulomas noted. Redemonstration of pleural-based right lower lobe 
nodule (series 2, image 6), likely representing additional scarring or 
granuloma.



 



Liver and Biliary system: Redemonstration of upper limits of normal size liver 
with diffuse intrahepatic biliary ductal dilation. High attenuation noted 
within the gallbladder once again, likely sludge or poorly calcified 
cholelithiasis rather than vicarious excretion of contrast. Gallbladder is 
normal in size without wall thickening or pericholecystic fluid. Persistent 
wall thickening of the common bile duct with unchanged segmental narrowing.



 



Spleen: Interval resolution of splenomegaly.



 



Adrenal Glands and Kidneys: Redemonstration of tiny low-density renal lesions, 
too small to characterize, likely representing cysts. Interval resolution of 
hydronephrosis. Punctate nonobstructing left nephrolithiasis again noted. 
Adrenal glands are unremarkable.



 



Pancreas and Retroperitoneum: Redemonstration of cystogastrostomy tube without 
significant change in position. There is continued overall decrease in size of 
multiloculated thick-walled fluid collections within the anterior pararenal 
space bilaterally, right greater than left, as well as small peripancreatic 
thick-walled fluid collections. The dominant right retroperitoneal component 
measures 5.6 x 2.4 cm (series 2, image 43), compared with 9.0 x 4.1 cm on 
previous examination. No discrete retroperitoneal lymphadenopathy.



 



Aorta and Major Vessels: Redemonstration of normal caliber abdominal aorta with 
marked mixed plaque formation and focal chronic dissection of the infrarenal 
abdominal aorta (series 2, image 41). Persistent marked narrowing of the 
confluence of the superior mesenteric vein and portal venous system. 



 



Bowel, Mesentery and Peritoneal space: Prior right lower quadrant small bowel 
anastomosis and left lower quadrant colonic anastomosis are noted (consistent 
with prior colostomy and reversal), with normal caliber remaining small and 
large bowel. Moderate colonic diverticulosis again noted, without acute 
diverticulitis. There is persistent circumferential thickening and narrowing/
collapse. At the rectosigmoid junction (series 2, image 66). Some minimal 
mesenteric fluid and. Mild ascites is noted, as well as minimal mesenteric 
venous congestion.



 



Pelvis: Unopacified urinary bladder is unremarkable. Moderate prostate gland 
enlargement with internal dystrophic calcifications. No pelvic lymphadenopathy.



 



Abdominal wall and Osseous Structures: Persistent small left spigelian hernia 
and umbilical fat-containing hernias. Redemonstration of mixed sclerosis/
lucency within the L1 vertebral body extending into the posterior elements with 
mild bony expansion. Mild superior endplate compression fracture deformity of 
L2 is again noted. No new osseous lesion or acute fracture.



 









 Procedure Note

 

 



Interface, Radiant Results - 2017  9:03 PM CST



CT ABDOMEN AND PELVIS



Clinical Indication:  Male, 77 years old. Nausea, vomiting, necrotizing 
pancreatitis   



Technique:  Multiple contiguous axial images were obtained through the abdomen 
and pelvis following the administration of IV contrast material. Post 
processing coronal and sagittal reconstruction images were made from the axial 
images.



Comparison: CT abdomen/pelvis 11/3/2017 and 2017



FINDINGS:



Lower Thorax: Heart size is normal without pericardial effusion. Coronary 
artery disease is noted with partial visualization of coronary stents. Interval 
progression of mild bilateral lower lobe atelectasis, greatest dependently. 
Calcified granulomas noted. Redemonstration of pleural-based right lower lobe 
nodule (series 2, image 6), likely representing additional scarring or 
granuloma.



Liver and Biliary system: Redemonstration of upper limits of normal size liver 
with diffuse intrahepatic biliary ductal dilation. High attenuation noted 
within the gallbladder once again, likely sludge or poorly calcified 
cholelithiasis rather than vicarious excretion of contrast. Gallbladder is 
normal in size without wall thickening or pericholecystic fluid. Persistent 
wall thickening of the common bile duct with unchanged segmental narrowing.



Spleen: Interval resolution of splenomegaly.



Adrenal Glands and Kidneys: Redemonstration of tiny low-density renal lesions, 
too small to characterize, likely representing cysts. Interval resolution of 
hydronephrosis. Punctate nonobstructing left nephrolithiasis again noted. 
Adrenal glands are unremarkable.



Pancreas and Retroperitoneum: Redemonstration of cystogastrostomy tube without 
significant change in position. There is continued overall decrease in size of 
multiloculated thick-walled fluid collections within the anterior pararenal 
space bilaterally, right greater than left, as well as small peripancreatic 
thick-walled fluid collections. The dominant right retroperitoneal component 
measures 5.6 x 2.4 cm (series 2, image 43), compared with 9.0 x 4.1 cm on 
previous examination. No discrete retroperitoneal lymphadenopathy.



Aorta and Major Vessels: Redemonstration of normal caliber abdominal aorta with 
marked mixed plaque formation and focal chronic dissection of the infrarenal 
abdominal aorta (series 2, image 41). Persistent marked narrowing of the 
confluence of the superior mesenteric vein and portal venous system. 



Bowel, Mesentery and Peritoneal space: Prior right lower quadrant small bowel 
anastomosis and left lower quadrant colonic anastomosis are noted (consistent 
with prior colostomy and reversal), with normal caliber remaining small and 
large bowel. Moderate colonic diverticulosis again noted, without acute 
diverticulitis. There is persistent circumferential thickening and narrowing/
collapse. At the rectosigmoid junction (series 2, image 66). Some minimal 
mesenteric fluid and. Mild ascites is noted, as well as minimal mesenteric 
venous congestion.



Pelvis: Unopacified urinary bladder is unremarkable. Moderate prostate gland 
enlargement with internal dystrophic calcifications. No pelvic lymphadenopathy.



Abdominal wall and Osseous Structures: Persistent small left spigelian hernia 
and umbilical fat-containing hernias. Redemonstration of mixed sclerosis/
lucency within the L1 vertebral body extending into the posterior elements with 
mild bony expansion. Mild superior endplate compression fracture deformity of 
L2 is again noted. No new osseous lesion or acute fracture.



IMPRESSION





 1. Continued overall decrease in size of complex network of peripancreatic gas/
fluid collections throughout the anterior pararenal space and retroperitoneum , 
with stable position of cystogastrostomy tube.  



 2. 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 
system. Some minimal ascites, mesenteric fluid and mild mesenteric venous 
congestion is noted.



 3. Unchanged intrahepatic biliary ductal dilation with narrowing and wall 
thickening of the common bile duct.



 4. Nonobstructing left nephrolithiasis with resolution of right hydronephrosis.



 5. Persistent focal narrowing and circumferential wall thickening at the 
rectosigmoid junction. This is nonspecific, though sigmoidoscopy is recommended 
to rule out tumor, if not recently evaluated.



 6. Colonic diverticulosis without acute diverticulitis or bowel obstruction. 
Prior small bowel and colonic anastomoses are noted.



 7. Unchanged appearance of the L1 vertebral body with a mixed lytic and 
sclerotic appearance and slight bony expansion, most suggestive of Paget's 
disease. Stable mild L2 superior endplate compression deformity.



 8. Probable cholelithiasis and/or gallbladder sludge, with no evidence of 
acute cholecystitis.



 9. Moderate prostate enlargement.



By my electronic signature, I attest that I have personally reviewed the images 
for this examination and formulated the interpretations and opinions expressed 
in this report





 Finalized by Avelino Lyons M.D. on 2017 9:00 PM. Dictated by Ten Burroughs M.D. on 2017 8:12 PM.







* PROCALCITONIN (2017  6:07 PM)





  



  Component   Value   Ref Range

 

  



  Procalcitonin   0.12 (H)   <0.10 NG/ML









 



  Specimen   Performing Laboratory

 

 



  Blood    MAIN LAB



   [address] [place], [place] [id number]





* LIPASE (2017  6:07 PM)





  



  Component   Value   Ref Range

 

  



  Lipase   20   11 - 82 U/L









 



  Specimen   Performing Laboratory

 

 



  Blood    MAIN LAB



   [address] [place] [id number]





* CULTURE-BLOOD W/SENSITIVITY (2017  6:07 PM)





  



  Component   Value   Ref Range

 

  



  Battery Name   BLOOD CULTURE 

 

  



  Specimen Description   BLOOD 



   LEFT 



   FA 

 

  



  Special Requests   NONE 

 

  



  Culture   NO GROWTH 5 DAYS 

 

  



  Report Status   FINAL 



   2017 









 



  Specimen   Performing Laboratory

 

 



  Blood    MAIN LAB



   [address] [place] [id number]





* PHOSPHORUS (2017  6:07 PM)





  



  Component   Value   Ref Range

 

  



  Phosphorus   3.9   2.0 - 4.0 MG/DL









 



  Specimen   Performing Laboratory

 

 



  Blood    MAIN LAB



   [address] [place] [id number]





* MAGNESIUM (2017  6:07 PM)





  



  Component   Value   Ref Range

 

  



  Magnesium   2.0   1.6 - 2.6 mg/dL









 



  Specimen   Performing Laboratory

 

 



  Blood    MAIN LAB



   [address] [place] [address]





* COMPREHENSIVE METABOLIC PANEL (2017  6:07 PM)





  



  Component   Value   Ref Range

 

  



  Sodium   144   137 - 147 MMOL/L

 

  



  Potassium   4.5   3.5 - 5.1 MMOL/L

 

  



  Chloride   109   98 - 110 MMOL/L

 

  



  Glucose   131 (H)   70 - 100 MG/DL

 

  



  Blood Urea Nitrogen   23   7 - 25 MG/DL

 

  



  Creatinine   0.65   0.4 - 1.24 MG/DL

 

  



  Calcium   10.0   8.5 - 10.6 MG/DL

 

  



  Total Protein   7.9   6.0 - 8.0 G/DL

 

  



  Total Bilirubin   0.4   0.3 - 1.2 MG/DL

 

  



  Albumin   3.6   3.5 - 5.0 G/DL

 

  



  Alk Phosphatase   89   25 - 110 U/L

 

  



  AST (SGOT)   38   7 - 40 U/L

 

  



  CO2   27   21 - 30 MMOL/L

 

  



  ALT (SGPT)   26   7 - 56 U/L

 

  



  Anion Gap   8   3 - 12

 

  



  eGFR Non African American   >60   >60 mL/min



   Comment: 



   The eGFR is not validated for use in drug dosing 



   adjustments.    Continue to use 



   estimated creatinine clearance per dosing 



   reference text.    Please contact the 



   Clinical Pharmacist for questions. 

 

  



  eGFR    >60   >60 mL/min



   Comment: 



   The eGFR is not validated for use in drug dosing 



   adjustments.    Continue to use 



   estimated creatinine clearance per dosing 



   reference text.    Please contact the 



   Clinical Pharmacist for questions. 









 



  Specimen   Performing Laboratory

 

 



  Blood   [facility]



   [address] [place] [address]





* CBC AND DIFF (2017  6:07 PM)





  



  Component   Value   Ref Range

 

  



  White Blood Cells   19.2 (H)   4.5 - 11.0 K/UL

 

  



  RBC   4.18 (L)   4.4 - 5.5 M/UL

 

  



  Hemoglobin   10.6 (L)   13.5 - 16.5 GM/DL

 

  



  Hematocrit   33.0 (L)   40 - 50 %

 

  



  MCV   78.8 (L)   80 - 100 FL

 

  



  MCH   25.4 (L)   26 - 34 PG

 

  



  MCHC   32.3   32.0 - 36.0 G/DL

 

  



  RDW   19.3 (H)   11 - 15 %

 

  



  Platelet Count   438 (H)   150 - 400 K/UL

 

  



  MPV   8.8   7 - 11 FL

 

  



  Neutrophils   91 (H)   41 - 77 %

 

  



  Lymphocytes   5 (L)   24 - 44 %

 

  



  Monocytes   3 (L)   4 - 12 %

 

  



  Eosinophils   1   0 - 5 %

 

  



  Basophils   0   0 - 2 %

 

  



  Absolute Neutrophil Count   17.40 (H)   1.8 - 7.0 K/UL

 

  



  Absolute Lymph Count   1.00   1.0 - 4.8 K/UL

 

  



  Absolute Monocyte Count   0.60   0 - 0.80 K/UL

 

  



  Absolute Eosinophil Count   0.10   0 - 0.45 K/UL

 

  



  Absolute Basophil Count   0.00   0 - 0.20 K/UL









 



  Specimen   Performing Laboratory

 

 



  Blood   [facility]



   [address] [place] [address]





* CULTURE-BLOOD W/SENSITIVITY (2017  5:18 PM)





  



  Component   Value   Ref Range

 

  



  Battery Name   BLOOD CULTURE 

 

  



  Specimen Description   BLOOD 



   RIGHT 



   ANTECUBITAL 

 

  



  Special Requests   NONE 

 

  



  Culture   NO GROWTH 5 DAYS 

 

  



  Report Status   FINAL 



   2017 









 



  Specimen   Performing Laboratory

 

 



  Blood   [facility]



   [address] [place] [address]





in this encounter



Visit Diagnoses











  Diagnosis

 





  Dehydration

 





  History of pancreatitis

 





  Personal history of other diseases of digestive system

 





  Non-intractable cyclical vomiting, presence of nausea not specified

 





  Chronic pancreatitis, unspecified pancreatitis type (HCC)

 





  Hypokalemia

 





  Hypopotassemia

 





  Acute blood loss anemia

 





  Acute posthemorrhagic anemia

 





  Iron deficiency anemia, unspecified iron deficiency anemia type

 





  Acute biliary pancreatitis with infected necrosis

 





  Protein-calorie malnutrition, unspecified severity (HCC)

 





  Vomiting

 





  Vomiting alone







Admitting Diagnoses











  Diagnosis

 





  DEHYDRATION

 





  Dehydration

 





  History of pancreatitis

 





  Vomiting







Administered Medications







     



  Medication Order   MAR Action   Action Date   Dose   Rate   Site

 

     



  aspirin EC tablet 81 mg   Given   2017   81 mg  



  81 mg, Oral, DAILY, First dose on Tue    07:55 CST   



  17 at 1615, Until Discontinued     









    



  Given   2017   81 mg  



   08:02 CST   

 

    



  Given   2017   81 mg  



   09:11 CST   









  

 

     



  benzocaine/menthol (CHLORASEPTIC)   Given   2017   1 lozenge  



  lozenge 1 lozenge    09:47 CST   



  1 lozenge, Oral, EVERY  2 HOURS PRN,     



  Starting Sat 17 at 0641, Until 17 at 1617, Mouth/Throat Pain     









    



  Given   2017   1 lozenge  



   17:08 CST   









  

 

     



  citalopram (CELEXA) tablet 20 mg   Given   2017   20 mg  



  20 mg, Oral, DAILY, First dose on Tue    07:55 CST   



  17 at 1615, Until Discontinued     









    



  Given   2017   20 mg  



   08:01 CST   

 

    



  Given   2017   20 mg  



   09:11 CST   









  

 

     



  enoxaparin (LOVENOX) syringe 40 mg   Given   2017   40 mg    Abdomen:LLQ



  40 mg, Subcutaneous, DAILY, First dose    21:02 CST   



  on 17 at 2100, Until     



  Discontinued, For patients undergoing     



  surgery: Consult physician in advance --     



  enoxaparin is an anticoagulant and may     



  need to be held for 12hr prior to     



  surgery or invasive procedures. NOTE:     



  This is a HIGH ALERT Medication.     









    



  Given   2017   40 mg    Abdomen:LLQ



   20:25 CST   

 

    



  Given   2017   40 mg    Abdominal



   20:08 CST     Tissue









  

 

     



  ertapenem (INVANZ) IVP 1 g   Given   2017   1 g  



  1 g, Intravenous, 10 mL, Administer over    11:09 CST   



  5 Minutes, EVERY 24 HOURS, First dose on     



  17 at 1145, Until     



  Discontinued, Reconstitute each 1 g vial     



  with 10 mL sterile water for injection,     



  administer IV-Push over 5 minutes     









    



  Given   2017   1 g  



   11:20 CST   









  

 

     



  fentaNYL citrate PF (SUBLIMAZE)   Given   2017   50 mcg  



  injection    17:59 CST   



  INTRA-PROCEDURE MED, Starting 17 at 1759, Until Discontinued     









    



  Given   2017   50 mcg  



   18:10 CST   









  

 

     



  fentaNYL citrate PF (SUBLIMAZE)   Given   2017   50 mcg  



  injection    08:12 CST   



  INTRA-PROCEDURE MED, Starting 17 at 0800, Until Discontinued     

 

  

 

     



  fentaNYL citrate PF (SUBLIMAZE)   Given   2017   50 mcg  



  injection 25-50 mcg    17:42 CST   



  25-50 mcg, Intravenous, ONCE, 1 dose,     



  17 at 1530     

 

  

 

     



  fentaNYL citrate PF (SUBLIMAZE)   Given   2017   50 mcg  



  injection 50 mcg    08:00 CST   



  50 mcg, Intravenous, ONCE, 1 dose, 17 at 0730     

 

  

 

     



  ferrous sulfate (FEOSOL, FEROSUL) tablet   Given   2017   325 mg  



  325 mg    18:35 CST   



  325 mg, Oral, THREE TIMES DAILY WITH     



  MEALS, First dose on Sat 17 at     



  0830, Until Discontinued, Each 325mg     



  ferrous sulfate delivers 65mg elemental     



  iron.     









    



  Given   2017   325 mg  



   08:02 CST   

 

    



  Given   2017   325 mg  



   11:08 CST   









  

 

     



  finasteride (PROSCAR) tablet 5 mg   Given   2017   5 mg  



  5 mg, Oral, DAILY, First dose on Tue    11:38 CST   



  17 at 1615, Until Discontinued,     



  NOTE:  If pregnant or wanting to become     



  pregnant, do not handle broken tablets     



  without gloves due to risk of birth     



  defects.     









    



  Given   2017   5 mg  



   11:08 CST   

 

    



  Given   2017   5 mg  



   11:20 CST   









  

 

     



  hydrALAZINE (APRESOLINE) injection 10 mg   Given   2017   10 mg  



  10 mg, Intravenous, ONCE, 1 dose, Sun    11:55 CST   



  17 at 1200     

 

  

 

     



  iopamidol 370 (ISOVUE-370) injection 100   Given   2017   100 mL  



  mL    20:15 CST   



  100 mL, Intravenous, ONCE, 1 dose, Tue     



  17 at 2015, NOTE: This is a HIGH     



  ALERT Medication.     

 

  

 

     



  lactated ringers infusion   Given - New   2017    75 mL/hr 



  1,000 mL, Intravenous, at 75 mL/hr,   Bag   18:14 CST   



  CONTINUOUS, Starting Tue 17 at     



  1615, Until Thu 17 at 1741     









    



  Given - New Bag   2017    75 mL/hr 



   08:08 CST   

 

    



  Infusion Restarted   2017    75 mL/hr 



   15:14 CST   









  

 

     



  lisinopril (PRINIVIL; ZESTRIL) tablet 20   Given   2017   20 mg  



  mg    15:28 CST   



  20 mg, Oral, DAILY, First dose on Sun     



  17 at 1300, Until Discontinued     









    



  Given   2017   20 mg  



   08:02 CST   

 

    



  Given   2017   20 mg  



   09:11 CST   









  

 

     



  loratadine (CLARITIN) tablet 10 mg   Given   2017   10 mg  



  10 mg, Oral, DAILY, First dose on Sun    11:38 CST   



  17 at 0930, Until Discontinued     









    



  Given   2017   10 mg  



   08:01 CST   

 

    



  Given   2017   10 mg  



   09:10 CST   









  

 

     



  magnesium sulfate   1 g/D5W 100 mL IVPB   Given - New   2017   1 g   
100 mL/hr 



  1 g, Intravenous, 100 mL, Administer   Bag   06:32 CST   



  over 1 Hours, EVERY  1 HOUR FOR 2 DOSES,     



  2 doses, First dose on Sun 17 at     



  0600, Last dose on Sun 17 at 0700,     



  Each 1gm delivers 8.1 mEq Magnesium.     









    



  Given - New Bag   2017   1 g   100 mL/hr 



   08:50 CST   









  

 

     



  melatonin tablet 3 mg   Given   2017   3 mg  



  3 mg, Oral, AT BEDTIME PRN, Starting Tue    21:52 CST   



  17 at 1601, Until 17 at     



  1617, insomnia     

 

  

 

     



  midazolam (VERSED) injection 1 mg   Given   2017   1 mg  



  1 mg, Intravenous, ONCE, 1 dose, Wed    17:41 CST   



  17 at 1530, Anxiolytic prior to     



  procedure     

 

  

 

     



  midazolam (VERSED) injection 1 mg   Given   2017   1 mg  



  1 mg, Intravenous, ONCE, 1 dose, Tue    07:57 CST   



  17 at 0730     

 

  

 

     



  midazolam (VERSED) injection   Given   2017   1 mg  



  INTRA-PROCEDURE MED, Starting Wed    15:58 CST   



  17 at 1558, Until Discontinued     









    



  Given   2017   1 mg  



   18:03 CST   









  

 

     



  midazolam (VERSED) injection   Given   2017   1 mg  



  INTRA-PROCEDURE MED, Starting Tue    08:09 CST   



  17 at 0809, Until Discontinued     

 

  

 

     



  ondansetron (ZOFRAN) injection 4 mg   Given   2017   4 mg  



  4 mg, Intravenous, EVERY  6 HOURS PRN,    17:20 CST   



  Starting 17 at 1711, Until 17 at 1617, Nausea/Vomiting     



  Injectable     

 

  

 

     



  pantoprazole (PROTONIX) injection 40 mg   Given   2017   40 mg  



  40 mg, Intravenous, TWICE DAILY, First    20:18 CST   



  dose on 17 at 2100, Until     



  Discontinued     









    



  Given   2017   40 mg  



   21:49 CST   

 

    



  Given   2017   40 mg  



   11:44 CST   









  

 

     



  pantoprazole (PROTONIX) injection 40 mg   Given   2017   40 mg  



  40 mg, Intravenous, TWICE DAILY, First    20:01 CST   



  dose on 17 at 2100, Until     



  Discontinued     









    



  Given   2017   40 mg  



   11:55 CST   









  

 

     



  pantoprazole DR (PROTONIX) tablet 40 mg   Given   2017   40 mg  



  40 mg, Oral, TWICE DAILY, First dose on    11:08 CST   



  Sat 17 at 2100, Until     



  Discontinued, Do not crush or chew     



  tablet.     









    



  Given   2017   40 mg  



   20:08 CST   

 

    



  Given   2017   40 mg  



   11:20 CST   









  

 

     



  phenol (CLORASEPTIC; PHENASEPTIC) spray   Given   2017   2 sprays  



  2 spray    08:53 CST   



  2 spray, Mouth/Throat, AS NEEDED,     



  Starting Sat 17 at 0740, Until Tu17 at 1617, Mouth/Throat Pain     

 

  

 

     



  piperacillin/tazobactam  (ZOSYN) 3.375   Given - New   2017   3.375 g   
100 mL/hr 



  g/50 mL iso-osmotic IVPB   Bag   20:21 CST   



  3.375 g, Intravenous, at 100 mL/hr,     



  EVERY  6 HOURS, First dose on 17 at 0700, Until Discontinued     









    



  Given - New Bag   2017   3.375 g   100 mL/hr 



   02:27 CST   

 

    



  Given - New Bag   2017   3.375 g   100 mL/hr 



   08:02 CST   









  

 

     



  potassium chloride IVPB 10 mEq   Given - New   2017   10 mEq   50 mL/hr 



  10 mEq, Intravenous, 50 mL, Administer   Bag   06:38 CST   



  over 60 Minutes, EVERY  1 HOUR FOR 3     



  DOSES, 3 doses, First dose on Sun     



  11/26/17 at 0600, Last dose on Sun     



  11/26/17 at 0800, NOTE: This is a HIGH     



  ALERT Medication.     









    



  Given - New Bag   2017   10 mEq   50 mL/hr 



   08:50 CST   

 

    



  Given - New Bag   2017   10 mEq   50 mL/hr 



   10:30 CST   









  

 

     



  potassium phosphate 20 mmol in dextrose   Given - New   2017   20 mmol 
  83.3 mL/hr 



  5% (D5W) 500 mL IVPB (std)   Bag   09:12 CST   



  20 mmol, Intravenous, 500 mL, Administer     



  over 6 Hours, ONCE, 1 dose, Mon 17     



  at 0645, Each 10mM K Phos delivers     



  14.7meq K+ NOTE: This is a HIGH ALERT     



  Medication.     

 

  

 

     



  potassium phosphate 24 mmol in dextrose   Given - New   2017   24 mmol 
  83.3 mL/hr 



  5% (D5W) 500 mL IVPB (std)   Bag   09:41 CST   



  24 mmol, Intravenous, 500 mL, Administer     



  over 6 Hours, ONCE, 1 dose, Sat 17     



  at 0630, Each 10mM K Phos delivers     



  14.7meq K+ NOTE: This is a HIGH ALERT     



  Medication.     

 

  

 

     



  SODIUM CHLORIDE 0.9 % IV SOLP (Cabinet   Given - New   2017   250 mL  



  Override)   Bag   08:30 CST   



  NOW, 1 dose, 17 at 0830,     



  Created by cabinet override     

 

  

 

     



  SODIUM CHLORIDE 0.9 % IV SOLP (Cabinet   Given - New   2017   250 mL  



  Override)   Bag   19:55 CST   



  NOW, 1 dose, Fri 17 at 2000,     



  Created by cabinet dinaide     

 

  

 

     



  SODIUM CHLORIDE 0.9 % IV SOLP (Cabinet   Given - New   2017   500 mL  



  Override)   Bag   11:39 CST   



  NOW, 1 dose, Sun 17 at 1030,     



  Created by cabinet dinaide     

 

  

 

     



  sodium chloride PF 0.9% injection 50 mL   Given   2017   50 mL  



  50 mL, Intravenous, ONCE, 1 dose, Tue    20:15 CST   



  17 at 2015, Intra-procedure (IR)     

 

  

 

     



  tamsulosin (FLOMAX) capsule 0.4 mg   Given   2017   0.4 mg  



  0.4 mg, Oral, DAILY, First dose on Tue    21:01 CST   



  17 at 1615, Until Discontinued,     



  NURSING: Please educate patient and     



  document: Give 1/2 hour following same     



  meal everyday. Do not crush, chew or     



  open the capsule.     









    



  Given   2017   0.4 mg  



   20:22 CST   

 

    



  Given   2017   0.4 mg  



   20:08 CST   









  



in this encounter

## 2018-02-05 NOTE — XMS REPORT
Encounter Summary

 Created on: 2018



Moshe Lopez

External Reference #: [id number]

: 1940

Sex: Male



Demographics







 Address  [address] [place]  [address]

 

 Home Phone  [phone]

 

 Preferred Language  English

 

 Marital Status  Unknown

 

 Mandaeism Affiliation  NON

 

 Race  White

 

 Ethnic Group  Not  or 





Author







 Author  [facility]

 

 Organization  [facility]

 

 Address  Unknown

 

 Phone  Unavailable







Support







 Name  Relationship  Address  Phone

 

 Latisha Lopez  ECON  Unknown  [phone]







Care Team Providers







 Care Team Member Name  Role  Phone

 

 Jaelyn Velez   PCP  [phone]

 

 VelezJaelyn DO  Unavailable  [phone]







Reason for Referral

* Consult, Test & Treat (Routine)





     



  Status   Reason   Specialty   Diagnoses /   Referred By   Referred To



     Procedures   Contact   Contact

 

     



  New Request   Specialty    Diagnoses   Aby Bah MD 



   Services    Peripancreatic   [address] 



   Required    fluid collection   [address] 



     Infected fluid   [address] 



     collection   [place], [place] 



     without fistula   [address] 



      Phone: 



      [phone] 



      Fax: 



      [phone] 









 Scheduling Instructions

 

 



No action needed. OPAT RN to address.











Encounter Details







    



  Date   Type   Department   Care Team   Description

 

    



  2017   Outpt.   [facility]   Aby Bah MD 



   Antibiotic   Physicians - Internal   [address] 



   Therapy   Medicine   [address] 



    [address] [place] [address] 



    [address] MED   [phone] 



    OFFICE BLDG   [phone] (Fax) 



    [place]  



    [address] [phone]  







Social History







    



  Tobacco Use   Types   Packs/Day   Years Used   Date

 

    



  Former Smoker   Cigarettes, Pipe, Cigars   1   15   Quit: 1975

 

    



  Smokeless Tobacco: Never   



  Used   









   



  Alcohol Use   Drinks/Week   oz/Week   Comments

 

   



  Yes   1 Cans of   0.6   1 beer every 2 weeks



   beer  









 



  Sex Assigned at Birth   Date Recorded

 

 



  Not on file 



as of this encounter



Functional Status







  



  Functional Status   Response   Date of Assessment

 

  



  Does the patient have a hearing impairment:   No   2017

 

  



  Does the patient have a visual impairment:   Yes   10/12/2017

 

  



  Does the patient have impaired ambulation:   Yes   10/12/2017

 

  



  Does the patient have an activity of daily living   No   10/12/2017



  (ADL) impairment:  

 

  



  Does the patient have an instrumental activity of   No   10/12/2017



  daily living (IADL) impairment:  









  



  Cognitive Status   Response   Date of Assessment

 

  



  Does the patient have a cognitive impairment:   No   10/12/2017



as of this encounter



Progress Notes

* Deisy Magana RN - 2017  3:41 PM CST



Following orders faxed to [facility] and to Dr Velez. Confirmed orders 
received with Codie MINA. 



Infectious Diseases Outpatient Orders:



Today's Date: 17



Per Dr. Niurka Velez pt's PCP to co-sign orders for [facility]. 



1. Pt to continue Invanz 1g IV QD until further notice by [facility].



2. Draw the following Weekly Labs Every Monday Starting on 17. CBC w/Diff 
and CMP.



3. Please Fax All Lab Results to Dr Bah at [phone].



4. Weekly Picc Care per protocol.



5. Please call ID RN to confirm removal of Picc at the end of treatment. Call 
ID RN to confirm date and time line was removed.   





Please Address Questions, Abnormal or Critical Lab Results:



Monday thru Friday 8am - 4pm: Call Whit ID RN at [phone]. 



After Hours (4pm), On the Weekends, or on a Holiday: Page the ID Fellow On Call 
at [phone]. 



Thank You

* Deisy Magana RN - 2017  3:41 PM CST



Reconciliation: Active OPAT- Date:17

Pt Discharged:17



ID Staff: Dr. Bah

Next ID Appt: pending

DX: Infected Peripancreatic Fluid Collection

 

[facility] Outpt Infusion

Phone: [phone]

Fax: [phone]

Contact: Regina MINA confirmed IV abx orders and states they do not have any lab 
orders. Per Regina all orders need to be co-signed by pt's PCP Dr Velez [phone]
[phone].  

Will discuss with DR Bha and send lab orders if needed and confirm end date. 
Regina states her orders now are only for 2 wks.   



ABX: Invanz 1g IV QD               

Start: 17      End: ?



Provided contact info for ID RN Whit Phone # [phone] and ID fax # [phone]. 

Also provided ID Fellow on Call pager # [phone] in case of further 
questions or concerns after 4pm, the Weekends, or Holidays. 

 





in this encounter



Miscellaneous Notes

* Addendum Note - Deisy Magana RN - 2017  1:05 PM CST



 Addended by: DEISY MAGANA on: 2017 01:05 PM



  Modules accepted: Orders



  

in this encounter



Plan of Treatment







    



  Date   Type   Specialty   Care Team   Description

 

    



  2018   Procedure Pass   Infectious Diseases  

 

    



  2018   Surgery    Fan Tidwell MD   ESOPHAGOGASTRODUODENOSCOP



     [address]   [address]



     [address] 



     [place] [address] 



     [phone] (Fax) 

 

    



  2018   Procedure Pass   

 

    



  2018   Hospital    Fan Tidwell MD   Pancreatic necrosis



   Encounter    [address] 



     [address] 



     [place] [address] [phone] (Fax) 









   



  Name   Priority   Associated Diagnoses   Order Schedule

 

   



  AMB REFERRAL TO EXTERNAL INFUSION CLINIC   Routine   Peripancreatic fluid   
Ordered: 2017



    collection 



    Infected fluid collection 



    without fistula 



as of this encounter



Visit Diagnoses











  Diagnosis

 





  Peripancreatic fluid collection - Primary

 





  Other specified disease of pancreas

 





  Infected fluid collection without fistula

 





  Unspecified infectious and parasitic diseases

## 2018-02-05 NOTE — XMS REPORT
Encounter Summary

 Created on: 2018



Moshe Lopez

External Reference #: [id number]

: 1940

Sex: Male



Demographics







 Address  [address] [place] [address]  [address]

 

 Home Phone  [phone]

 

 Preferred Language  English

 

 Marital Status  Unknown

 

 Buddhist Affiliation  NON

 

 Race  White

 

 Ethnic Group  Not  or 





Author







 Author  [facility]

 

 Organization  [facility]

 

 Address  Unknown

 

 Phone  Unavailable







Support







 Name  Relationship  Address  Phone

 

 Latisha Lopez  Unknown  [phone]







Care Team Providers







 Care Team Member Name  Role  Phone

 

 Agustin Jaelyn CHO  PCP  [phone]

 

 Jaelyn Velez DO  Unavailable  [phone]







Reason for Referral

* Radiology Services





     



  Status   Reason   Specialty   Diagnoses /   Referred By   Referred To



     Procedures   Contact   Contact

 

     



  Closed    Radiology   Diagnoses   [address]



     Other acute   MD   [address]



     pancreatitis   [address]   MED OFFICE [address]LDG



     with uninfected   [address]   2ND FLOOR



     necrosis

   MS    [place], [place]



     [place]   [address]



     [place]   [address]   Phone:



     CT ABD/PELV W   Phone:   [phone]



     CONTRAST   [phone]   Fax: [phone]



      Fax: 



      [phone] 







* Radiology Services





     



  Status   Reason   Specialty   Diagnoses /   Referred By   Referred To



     Procedures   Contact   Contact

 

     



  Closed    Radiology   Diagnoses   [address]



     Other acute   MD   [address]



     pancreatitis   [address]   MED OFFICE [address]LDG



     with uninfected   [address]   2ND FLO[address]



     necrosis

   MS    [place]



     [place]   [address]



     [place]   [address]   Phone:



     CT ABD/PELV W   Phone:   [phone]



     CONTRAST   [phone]   Fax: [phone]



      Fax: 



      [phone] 











Reason for Visit

* Radiology Services





     



  Status   Reason   Specialty   Diagnoses /   Referred By   Referred To



     Procedures   Contact   Contact

 

     



  Closed    Radiology   Diagnoses   [address]



     Other acute   [address]



     pancreatitis   [address]   MED OFFICE [address]LDG



     with uninfected   [address]   2ND FLOOR



     necrosis

   MS    [place]



     [place]   [address]



     [address]   [address]   Phone:



     CT ABD/PELV W   Phone:   [phone]



     CONTRAST   [phone]   Fax: [phone]



      Fax: 



      [phone] 











Encounter Details







    



  Date   Type   Department   Care Team   Description

 

    



  2017   Hospital   [facility]   Akua Rinaldi MD 



   Encounter   Hospital Radiology   [address] 



    [address]   [address] 



    [address] [place] [address] 



    [place] [address]   [phone] (Fax) 







Social History







    



  Tobacco Use   Types   Packs/Day   Years Used   Date

 

    



  Former Smoker   Cigarettes, Pipe, Cigars   1   15   Quit: 1975

 

    



  Smokeless Tobacco: Never   



  Used   









   



  Alcohol Use   Drinks/Week   oz/Week   Comments

 

   



  Yes   1 Cans of   0.6   1 beer every 2 weeks



   beer  









 



  Sex Assigned at Birth   Date Recorded

 

 



  Not on file 



as of this encounter



Functional Status







  



  Functional Status   Response   Date of Assessment

 

  



  Does the patient have a hearing impairment:   No   2017

 

  



  Does the patient have a visual impairment:   Yes   2017

 

  



  Does the patient have impaired ambulation:   Yes   2017

 

  



  Does the patient have an activity of daily living   No   2017



  (ADL) impairment:  

 

  



  Does the patient have an instrumental activity of   No   2017



  daily living (IADL) impairment:  









  



  Cognitive Status   Response   Date of Assessment

 

  



  Does the patient have a cognitive impairment:   No   2017



as of this encounter



Medications at Time of Discharge







     



  Medication   Sig.   Disp.   Refills   Start Date   End Date

 

     



  acetaminophen (TYLENOL)   Take 2 tablets by mouth    0   2017 



  325 mg tablet   every 4 hours as needed.    

 

     



  aspirin EC 81 mg tablet   Take 81 mg by mouth    



   daily.    

 

     



  citalopram (CELEXA) 20 mg   Take 20 mg by mouth    



  tablet   daily.    

 

     



  docusate (COLACE) 100 mg   Take 1 capsule by mouth   180 capsule   3   2017 



  capsule   twice daily.    

 

     



  famotidine (PEPCID) 20 mg   Take 20 mg by mouth twice    



  tablet   daily.    

 

     



  fenofibrate(+) (TRIGLIDE)   Take 160 mg by mouth    



  160 mg tablet   daily. Take with food.    

 

     



  finasteride (PROSCAR) 5   Take 5 mg by mouth daily.    



  mg tablet     

 

     



  fish oil- omega 3-DHA/EPA   Take 1 Cap by mouth twice    



  300/1,000 mg capsule   daily.    

 

     



  lisinopril (PRINIVIL;   Take 20 mg by mouth    



  ZESTRIL) 20 mg tablet   daily.    

 

     



  loratadine (CLARITIN) 10   Take 10 mg by mouth every    



  mg tablet   morning.    

 

     



  melatonin 3 mg tab   Take 1 tablet by mouth at     2017 



   bedtime as needed    



   (insomnia).    

 

     



  MULTIVITAMINS WITH   Take 1 Tab by mouth    



  FLUORIDE (MULTI-VITAMIN   daily.    



  PO)     

 

     



  omeprazole DR(+)   Take 2 capsules by mouth   90 capsule   3   2017 



  (PRILOSEC) 20 mg capsule   daily before breakfast.    

 

     



  oxyCODONE (ROXICODONE,   Take 1 tablet by mouth   40 tablet   0   2017 



  OXY-IR) 5 mg tablet   every 4 hours as needed    

 

     



  polyethylene glycol 3350   Take 1 packet by mouth   12 each   5   2017 



  (MIRALAX) 17 g packet   daily.    

 

     



  simethicone (MYLICON) 80   Chew 1 tablet by mouth   30 tablet   0   2017 



  mg chew tablet   every 6 hours as needed    



   for Flatulence.    

 

     



  tamsulosin (FLOMAX) 0.4   Take 0.4 mg by mouth    



  mg capsule   daily. Do not crush, chew    



   or open capsules. Take 30    



   minutes following the    



   same meal each day.    

 

     



  vitamins, B complex tab   Take 1 Tab by mouth    



   daily.    

 

     



  ertapenem (INVANZ) 1 g/10   Administer 1 g through   14 g   0   2017



  mL 1 g in sodium chloride   vein every 24 hours for    



  0.9% (NS) 0.9 % 100 mL   17 days.    



  IVPB ([id number])     

 

     



  ferrous sulfate (FEOSOL,   Take 1 tablet by mouth   90 tablet   3   2018



  FEROSUL) 325 mg (65 mg   three times daily with    



  iron) tablet   meals. Take on an empty    



   stomach at least 1 hour    



   before or 2 hours after    



   food.    

 

     



  ondansetron (ZOFRAN ODT)   Dissolve 4 mg by mouth      2018



  4 mg rapid dissolve   every 8 hours. Place on    



  tablet   tongue to disolve.    

 

     



  scopolamine   Apply 1 patch to top of      2018



  (TRANSDERM-SCOP) 1.5 mg 3   skin as directed every 72    



  day patch   hours.    

 

     



  senna/docusate   Take 1 tablet by mouth     2017



  (SENOKOT-S) 8.6/50 mg   daily as needed.    



  tablet     



as of this encounter



Plan of Treatment







    



  Date   Type   Specialty   Care Team   Description

 

    



  2018   Procedure Pass   Infectious Diseases  

 

    



  2018   Surgery    Fan Tidwell MD   ESOPHAGOGASTRODUODENOSCOP



     [address]   [address]



     [address] 



     [place], [place] [address] [phone] (Fax) 

 

    



  2018   Procedure Pass   

 

    



  2018   Hospital    Fan Tidwell MD   Pancreatic necrosis



   Encounter    [address] 



     [address] 



     [place] [address] 



     [phone] (Fax) 



as of this encounter



Results

* CT ABD/PELV W CONTRAST (2017  7:59 AM)





 



  Specimen   Performing Laboratory

 

 



   [facility] RESULTS









 Impressions

 

 



 



1. Further slight improvement in complex fluid collections related to prior 
pancreatitis. No new fluid collections are seen.



2. Continued narrowing at the portal confluence and mild ascites.



3. Continued mild biliary ductal dilatation. Renal thickening of the gastric 
antrum is evident likely related to pancreatitis versus associated gastritis.



 



 



 Finalized by Dameon Yates M.D. on 2017 8:52 AM. Dictated by Dameon Yates M.D. on 2017 8:38 AM.



 









 Narrative

 

 



CT ABDOMEN AND PELVIS



 



Clinical Indication:Male, 77 years old. Pancreatitis. 



 



Technique:Multiple contiguous axial images were obtained through the 
abdomen and pelvis following the uneventful administration of IV contrast 
material. Portal venous phase of postcontrast imaging was obtained. Post 
processing coronal and sagittal reconstruction images were made from the axial 
images.



 



IV contrast: 100 mL Isovue 370.



Bowel contrast:Water



 



Comparison: 2017



 



FINDINGS:



 



Lower Thorax: Linear scarring is noted within the left lung base. Calcification 
the coronary vessels is evident.



 



Liver and Biliary system: Small amount persistent perihepatic fluid is evident. 
There is mild prominence of the intra and extrahepatic biliary ducts which is 
minimally improved when compared to prior exam. Portal vein opacifies normally. 
The superior mesenteric vein is markedly narrowed secondary to pancreatitis. 
Gallbladder is unchanged. 



 



Spleen: Unremarkable.



 



Adrenal Glands and Kidneys: Adrenal glands are normal. Kidneys are unremarkable 
with exception of a small punctate nonobstructive lower pole left renal 
calculus. Small renal hypodensities are too small to characterize and unchanged.



 



Pancreas and Retroperitoneum: Cystogastrostomy tube is in place without change 
in position. Peripancreatic fluid collections straight further improvement. 
Right thick-walled retroperitoneal fluid measured previously now measures 5.3 x 
2.3 cm compared to 5.6 x 2.4 cm. Adjacent extension into the inferior right 
retroperitoneum adjacent to the right psoas is also improved in size measuring 
2.8 compared to 3.4 cm image 46 series 3. No new pancreatic fluid collections 
are seen. There is mild ectasia and prominence of the pancreatic duct in the 
pancreatic tail.



 



Aorta and Major Vessels: Calcification of the abdominal aorta with small distal 
dissection is unchanged narrowing at the confluence of the portal veins. 
Mesenteric vein persists unchanged from prior exam. 



 



Bowel, Mesentery and Peritoneal space: There is mural thickening of the gastric 
antrum likely reflecting sequela of pancreatitis and/or associated gastritis. 
Small bowel is normal in caliber. Small volume abdominal ascites is evident. 
Prior colonic anastomosis is noted with minimal colonic diverticulosis but no 
diverticulitis.



 



Pelvis: Prostate is enlarged. No pelvic adenopathy is seen.



 



Abdominal wall and Osseous Structures: Unchanged from prior exam. L2 
compression fracture and sclerotic appearance to L1 is unchanged.



 









 Procedure Note

 

 



Interface, Radiant Results - 2017  8:55 AM CST



CT ABDOMEN AND PELVIS



Clinical Indication:  Male, 77 years old. Pancreatitis.   



Technique:  Multiple contiguous axial images were obtained through the abdomen 
and pelvis following the uneventful administration of IV contrast material. 
Portal venous phase of postcontrast imaging was obtained. Post processing 
coronal and sagittal reconstruction images were made from the axial images.



IV contrast: 100 mL Isovue 370.

Bowel contrast:  Water



Comparison: 2017



FINDINGS:



Lower Thorax: Linear scarring is noted within the left lung base. Calcification 
the coronary vessels is evident.



Liver and Biliary system: Small amount persistent perihepatic fluid is evident. 
There is mild prominence of the intra and extrahepatic biliary ducts which is 
minimally improved when compared to prior exam. Portal vein opacifies normally. 
The superior mesenteric vein is markedly narrowed secondary to pancreatitis. 
Gallbladder is unchanged. 



Spleen: Unremarkable.



Adrenal Glands and Kidneys: Adrenal glands are normal. Kidneys are unremarkable 
with exception of a small punctate nonobstructive lower pole left renal 
calculus. Small renal hypodensities are too small to characterize and unchanged.



Pancreas and Retroperitoneum: Cystogastrostomy tube is in place without change 
in position. Peripancreatic fluid collections straight further improvement. 
Right thick-walled retroperitoneal fluid measured previously now measures 5.3 x 
2.3 cm compared to 5.6 x 2.4 cm. Adjacent extension into the inferior right 
retroperitoneum adjacent to the right psoas is also improved in size measuring 
2.8 compared to 3.4 cm image 46 series 3. No new pancreatic fluid collections 
are seen. There is mild ectasia and prominence of the pancreatic duct in the 
pancreatic tail.



Aorta and Major Vessels: Calcification of the abdominal aorta with small distal 
dissection is unchanged narrowing at the confluence of the portal veins. 
Mesenteric vein persists unchanged from prior exam. 



Bowel, Mesentery and Peritoneal space: There is mural thickening of the gastric 
antrum likely reflecting sequela of pancreatitis and/or associated gastritis. 
Small bowel is normal in caliber. Small volume abdominal ascites is evident. 
Prior colonic anastomosis is noted with minimal colonic diverticulosis but no 
diverticulitis.



Pelvis: Prostate is enlarged. No pelvic adenopathy is seen.



Abdominal wall and Osseous Structures: Unchanged from prior exam. L2 
compression fracture and sclerotic appearance to L1 is unchanged.



IMPRESSION



 1. Further slight improvement in complex fluid collections related to prior 
pancreatitis. No new fluid collections are seen.  

 2. Continued narrowing at the portal confluence and mild ascites.

 3. Continued mild biliary ductal dilatation. Renal thickening of the gastric 
antrum is evident likely related to pancreatitis versus associated gastritis.





 Finalized by Dameon Yates M.D. on 2017 8:52 AM. Dictated by Dameon Yates M.D. on 2017 8:38 AM.







in this encounter



Visit Diagnoses











  Diagnosis

 





  Other acute pancreatitis with uninfected necrosis







Administered Medications







     



  Medication Order   MAR Action   Action Date   Dose   Rate   Site

 

     



  iopamidol 370 (ISOVUE-370) injection 100   Given   2017   100 mL  



  mL    08:15 CST   



  100 mL, Intravenous, ONCE, 1 dose, Wed     



  17 at 0815, NOTE: This is a HIGH     



  ALERT Medication.     

 

  

 

     



  sodium chloride PF 0.9% injection 50 mL   Given   2017   50 mL  



  50 mL, Intravenous, ONCE, 1 dose, Wed    08:15 CST   



  17 at 0815, Intra-procedure (IR)     

 

  



in this encounter

## 2018-02-05 NOTE — XMS REPORT
Encounter Summary

 Created on: 2018



Moshe Lopez

External Reference #: [id number]

: 1940

Sex: Male



Demographics







 Address  [address] [place]  [address]

 

 Home Phone  [phone]

 

 Preferred Language  English

 

 Marital Status  Unknown

 

 Voodoo Affiliation  NON

 

 Race  White

 

 Ethnic Group  Not  or 





Author







 Author  [facility]

 

 Organization  [facility]

 

 Address  Unknown

 

 Phone  Unavailable







Support







 Name  Relationship  Address  Phone

 

 Latisha Lopez  ECON  Unknown  [phone]







Care Team Providers







 Care Team Member Name  Role  Phone

 

 VelezJaelyn beck   PCP  [phone]

 

 Agustin Jaelyn CHO  Unavailable  [phone]







Reason for Referral

* Consult, Test & Treat





     



  Status   Reason   Specialty   Diagnoses /   Referred By   Referred To



     Procedures   Contact   Contact

 

     



  New Request   Specialty    Diagnoses   Aby Bah MD 



   Services    Encounter for   [address] 



   Required    long-term   [address] 



     (current) use of   [address] 



     [place] 



      [address] 



      Phone: 



      [phone] 



      Fax: 



      [phone] 









 Scheduling Instructions

 

 



No scheduling necessary.











Encounter Details







    



  Date   Type   Department   Care Team   Description

 

    



  2017   Outpt.   [facility]   Aby Bah MD 



   Antibiotic   Physicians - Internal   [address] 



   Therapy   Medicine   [address] 



    [address] [place] [address] 



    [address]   [phone] 



    OFFICE BLDG   [phone] (Fax) 



    [place]  



    [address] [phone]  







Social History







    



  Tobacco Use   Types   Packs/Day   Years Used   Date

 

    



  Former Smoker   Cigarettes, Pipe, Cigars   1   15   Quit: 1975

 

    



  Smokeless Tobacco: Never   



  Used   









   



  Alcohol Use   Drinks/Week   oz/Week   Comments

 

   



  Yes   1 Cans of   0.6   1 beer every 2 weeks



   beer  









 



  Sex Assigned at Birth   Date Recorded

 

 



  Not on file 



as of this encounter



Functional Status







  



  Functional Status   Response   Date of Assessment

 

  



  Does the patient have a hearing impairment:   No   2017

 

  



  Does the patient have a visual impairment:   Yes   2017

 

  



  Does the patient have impaired ambulation:   No   2017

 

  



  Does the patient have an activity of daily living   No   2017



  (ADL) impairment:  

 

  



  Does the patient have an instrumental activity of   No   2017



  daily living (IADL) impairment:  









  



  Cognitive Status   Response   Date of Assessment

 

  



  Does the patient have a cognitive impairment:   No   2017



as of this encounter



Progress Notes

* Whit Madera, RN - 2017 11:59 PM CST



Late entry from :



Orders to [facility] infusion to stop ertapenem after  dose per Dr. Bah.
  Pt can keep PICC line for 1 week after starting PO abx, continue to flush 
daily w/ NS 10 mL.

Arrangments made through PCP for pt's PICC line to be flushed daily at [facility] 
as NS flushes were too expensive at [facility].  

Pt will have EKG at [facility] on , PCP provided order.  



Addendum :



Spoke to pt's wife, she reports that he is doing really well.  Informed that ID 
RN would send order for PICC removal on  to [facility]. 

in this encounter



Plan of Treatment







    



  Date   Type   Specialty   Care Team   Description

 

    



  2018   Procedure Pass   Infectious Diseases  

 

    



  2018   Surgery    Fan Tidwell MD   ESOPHAGOGASTRODUODENOSCOP



     [address]   [address]



     [address] 



     [place] [address] 



     [phone] (Fax) 

 

    



  2018   Procedure Pass   

 

    



  2018   Hospital    Fan Tidwell MD   Pancreatic necrosis



   Encounter    [address] 



     [address] 



     [place] [address] 



     [phone] (Fax) 









   



  Name   Priority   Associated Diagnoses   Order Schedule

 

   



  AMB REFERRAL TO HOME CARE   Routine   Encounter for long-term   Ordered: 



    (current) use of 



    antibiotics 



as of this encounter



Visit Diagnoses











  Diagnosis

 





  Encounter for long-term (current) use of antibiotics - Primary

## 2018-02-05 NOTE — XMS REPORT
Encounter Summary

 Created on: 2018



Moshe Lopez

External Reference #: [id number]

: 1940

Sex: Male



Demographics







 Address  [address] [place] [address]  [address]

 

 Home Phone  [phone]

 

 Preferred Language  English

 

 Marital Status  Unknown

 

 Yazidism Affiliation  NON

 

 Race  White

 

 Ethnic Group  Not  or 





Author







 Author  [facility]

 

 Organization  [facility]

 

 Address  Unknown

 

 Phone  Unavailable







Support







 Name  Relationship  Address  Phone

 

 Latisha Lopez  ECON  Unknown  [phone]







Care Team Providers







 Care Team Member Name  Role  Phone

 

 Jaelyn Velez DO  PCP  [phone]

 

 Jaelyn Velez DO  Unavailable  [phone]







Reason for Visit

* 





 



  Reason   Comments

 

 



  Post Operative Visit 









Encounter Details







    



  Date   Type   Department   Care Team   Description

 

    



  2017   Office Visit   [facility]   Scott Colon MD   
Infective necrosis of



    Physicians - Surgery   [address]   pancreas (Primary Dx);



    [address]   [address]   Hx of pancreatitis



    [address] [place] [address] 



    [place]   [phone] [address] [phone] (Fax) 



    [phone]  







Social History







    



  Tobacco Use   Types   Packs/Day   Years Used   Date

 

    



  Former Smoker   Cigarettes, Pipe, Cigars   1   15   Quit: 1975

 

    



  Smokeless Tobacco: Never   



  Used   









   



  Alcohol Use   Drinks/Week   oz/Week   Comments

 

   



  Yes   1 Cans of   0.6   1 beer every 2 weeks



   beer  









 



  Sex Assigned at Birth   Date Recorded

 

 



  Not on file 



as of this encounter



Last Filed Vital Signs







  



  Vital Sign   Reading   Time Taken

 

  



  Blood Pressure   150/61   2017  8:05 AM CST

 

  



  Pulse   65   2017  8:05 AM CST

 

  



  Temperature   36.5   C (97.7   F)   2017  8:05 AM CST

 

  



  Respiratory Rate   14   2017  8:05 AM CST

 

  



  Oxygen Saturation   -   -

 

  



  Inhaled Oxygen   -   -



  Concentration  

 

  



  Weight   74.3 kg (163 lb 12.8 oz)   2017  8:05 AM CST

 

  



  Height   170.2 cm (5' 7.01")   2017  8:05 AM CST

 

  



  Body Mass Index   25.65   2017  8:05 AM CST



in this encounter



Functional Status







  



  Functional Status   Response   Date of Assessment

 

  



  Does the patient have a hearing impairment:   No   2017

 

  



  Does the patient have a visual impairment:   Yes   2017

 

  



  Does the patient have impaired ambulation:   Yes   2017

 

  



  Does the patient have an activity of daily living   No   2017



  (ADL) impairment:  

 

  



  Does the patient have an instrumental activity of   No   2017



  daily living (IADL) impairment:  









  



  Cognitive Status   Response   Date of Assessment

 

  



  Does the patient have a cognitive impairment:   No   2017



as of this encounter



Progress Notes

* Scott Colon MD - 2017  8:30 AM CST



Formatting of this note may be different from the original.

Date of Service: 2017 



Subjective:        

 

History of Present Illness

Moshe Lopez is a 77 y.o. male who presents for follow up of recent 
hospitalization for necrotizing pancreatitis managed with a endoscopic 
necrosectomy. He was discharged . Infectious disease and gastroenterology 
have been following and co-managing his condition. He is scheduled to see 
infectious diease today to determine the length of his antibiotic therapy 
course he is still receiving meropenem QD through his PICC. He has done well 
since discharge, tolerating regular diet, normal bowel function, no fevers, 
chills, abdominal pain or signs and symptoms of recurrent pancreatitis. He saw 
GI 17 with a CT scan which showed significant improvement in his 
pancreatic fluid collections and he is being scheduled for repeat ERCP with 
possible stent exchange. 



 



Review of Systems 

Constitutional: Negative for activity change, appetite change, chills, 
diaphoresis, fatigue and fever. 

Respiratory: Negative for choking, chest tightness and shortness of breath.  

Cardiovascular: Negative for chest pain. 

Gastrointestinal: Negative for abdominal distention, abdominal pain, blood in 
stool, constipation, diarrhea and nausea. 

Genitourinary: Negative for difficulty urinating and flank pain. 

Musculoskeletal: Negative for back pain. 

Skin: Negative for color change, pallor, rash and wound. 

Neurological: Negative for dizziness, seizures, light-headedness, numbness and 
headaches. 

Psychiatric/Behavioral: Negative for behavioral problems. The patient is not 
nervous/anxious.  



Objective:       

 acetaminophen (TYLENOL) 325 mg tablet Take 2 tablets by mouth every 4 hours 
as needed. 

 aspirin EC 81 mg tablet Take 81 mg by mouth daily.   

 citalopram (CELEXA) 20 mg tablet Take 20 mg by mouth daily. 

 docusate (COLACE) 100 mg capsule Take 1 capsule by mouth twice daily. 

 ertapenem (INVANZ) 1 g/10 mL 1 g in sodium chloride 0.9% (NS) 0.9 % 100 mL 
IVPB ([id number]) Administer 1 g through vein every 24 hours for 17 days. 

 famotidine (PEPCID) 20 mg tablet Take 20 mg by mouth twice daily. 

 fenofibrate(+) (TRIGLIDE) 160 mg tablet Take 160 mg by mouth daily. Take 
with food. 

 ferrous sulfate (FEOSOL, FEROSUL) 325 mg (65 mg iron) tablet Take 1 tablet 
by mouth three times daily with meals. Take on an empty stomach at least 1 hour 
before or 2 hours after food. 

 finasteride (PROSCAR) 5 mg tablet Take 5 mg by mouth daily. 

 fish oil- omega 3-DHA//1,000 mg capsule Take 1 Cap by mouth twice 
daily. 

 lisinopril (PRINIVIL; ZESTRIL) 20 mg tablet Take 20 mg by mouth daily. 

 loratadine (CLARITIN) 10 mg tablet Take 10 mg by mouth every morning. 

 melatonin 3 mg tab Take 1 tablet by mouth at bedtime as needed (insomnia). 

 MULTIVITAMINS WITH FLUORIDE (MULTI-VITAMIN PO) Take 1 Tab by mouth daily. 

 omeprazole DR(+) (PRILOSEC) 20 mg capsule Take 2 capsules by mouth daily 
before breakfast. 

 ondansetron (ZOFRAN ODT) 4 mg rapid dissolve tablet Dissolve 4 mg by mouth 
every 8 hours. Place on tongue to disolve.  

 oxyCODONE (ROXICODONE, OXY-IR) 5 mg tablet Take 1 tablet by mouth every 4 
hours as needed 

 polyethylene glycol 3350 (MIRALAX) 17 g packet Take 1 packet by mouth 
daily. 

 scopolamine (TRANSDERM-SCOP) 1.5 mg 3 day patch Apply 1 patch to top of 
skin as directed every 72 hours. 

 senna/docusate (SENOKOT-S) 8.6/50 mg tablet Take 1 tablet by mouth daily as 
needed. 

 simethicone (MYLICON) 80 mg chew tablet Chew 1 tablet by mouth every 6 
hours as needed for Flatulence. 

 tamsulosin (FLOMAX) 0.4 mg capsule Take 0.4 mg by mouth daily. Do not crush
, chew or open capsules. Take 30 minutes following the same meal each day. 

 vitamins, B complex tab Take 1 Tab by mouth daily. 



Vitals: 

 17 0805 

BP: 150/61 

Pulse: 65 

Resp: 14 

Temp: 36.5 C (97.7 F) 

TempSrc: Oral 

Weight: 74.3 kg (163 lb 12.8 oz) 

Height: 170.2 cm (67.01") 



Body mass index is 25.65 kg/(m^2). 



Physical Exam 

Constitutional: He is oriented to person, place, and time. He appears well-
developed and well-nourished. No distress. 

HENT: 

Head: Normocephalic and atraumatic. 

Eyes: EOM are normal. Right eye exhibits no discharge. Left eye exhibits no 
discharge. 

Neck: Normal range of motion. No JVD present. 

Cardiovascular: Normal rate and regular rhythm.  

Pulmonary/Chest: Effort normal. No stridor. No respiratory distress. He has no 
wheezes. 

Abdominal: Soft. He exhibits no distension. There is no tenderness. 

Abdomen soft, non-tender, non-distended, well healed drain site 

Musculoskeletal: Normal range of motion. He exhibits no edema. 

Neurological: He is alert and oriented to person, place, and time. Coordination 
normal. 

Skin: Skin is warm. He is not diaphoretic. No erythema. No pallor. 

Psychiatric: He has a normal mood and affect. His behavior is normal. Judgment 
and thought content normal. 



 

Assessment and Plan:

 

 



77 year old male with resolving necrotizing pancreatitis currently on 
antibiotic therapy, will need cholecystectomy at later date for definitive 
treatment/prevention of recurrence. Will schedule for return appointment after 
ERCP to be scheduled by GI.



Plan

- Return to clinic after ERCP for discussion for cholecystectomy

- Patient to see ID today for management of continued antibiotic therapy



Patient seen and discussed with Dr. oClon, who directed plan of care.



Eliecer Walker MD

PGY1



 



  ATTESTATION



I personally observed the resident performing the E/M, discussed case with 
resident, and concur with resident documentation of history, physical 
assessment and treatment plan unless otherwise noted.



Staff name:  Scott Colon MD Date:  2017 



RTC after ERCP for eval for possible cholecystectomy

in this encounter



Plan of Treatment







    



  Date   Type   Specialty   Care Team   Description

 

    



  2018   Procedure Pass   Infectious Diseases  

 

    



  2018   Surgery    Fan Tidwell MD   ESOPHAGOGASTRODUODENOSCOP



     [address]   [address]



     [address] 



     [place] [address] [phone] (Fax) 

 

    



  2018   Procedure Pass   

 

    



  2018   [facility]    Fan Tidwell MD   Pancreatic necrosis



   Encounter    [address] 



     [address] 



     [place] [address] [phone] (Fax) 



as of this encounter



Visit Diagnoses











  Diagnosis

 





  Infective necrosis of pancreas - Primary

 





  Acute pancreatitis

 





  Hx of pancreatitis

 





  Personal history of other diseases of digestive system

## 2018-02-05 NOTE — XMS REPORT
Encounter Summary

 Created on: 2018



Moshe Lopez

External Reference #: [id number]

: 1940

Sex: Male



Demographics







 Address  [address] [place] [address]  [address]

 

 Home Phone  [phone]

 

 Preferred Language  English

 

 Marital Status  Unknown

 

 Spiritism Affiliation  NON

 

 Race  White

 

 Ethnic Group  Not  or 





Author







 Author  [facility]

 

 Organization  [facility]

 

 Address  Unknown

 

 Phone  Unavailable







Support







 Name  Relationship  Address  Phone

 

 Latisha Lopez  ECON  Unknown  [phone]







Care Team Providers







 Care Team Member Name  Role  Phone

 

 VelezJaelyn beck   PCP  [phone]

 

 AgustinJaelyn DO  Unavailable  [phone]







Encounter Details







    



  Date   Type   Department   Care Team   Description

 

    



  2017   Prep for Case   [facility]   Fan Tidwell MD 



    Physicians - Internal   [address] [facility] 



    Medicine   [address] 



    [address] [place] [address] 



    [address] [facility] MED   [phone] 



    OFFICE BLDG   [phone] (Fax) 



    [place]  



    [address] [phone]  







Social History







    



  Tobacco Use   Types   Packs/Day   Years Used   Date

 

    



  Former Smoker   Cigarettes, Pipe, Cigars   1   15   Quit: 1975

 

    



  Smokeless Tobacco: Never   



  Used   









   



  Alcohol Use   Drinks/Week   oz/Week   Comments

 

   



  Yes   1 Cans of   0.6   1 beer every 2 weeks



   beer  









 



  Sex Assigned at Birth   Date Recorded

 

 



  Not on file 



as of this encounter



Functional Status







  



  Functional Status   Response   Date of Assessment

 

  



  Does the patient have a hearing impairment:   No   2017

 

  



  Does the patient have a visual impairment:   Yes   2017

 

  



  Does the patient have impaired ambulation:   Yes   2017

 

  



  Does the patient have an activity of daily living   No   2017



  (ADL) impairment:  

 

  



  Does the patient have an instrumental activity of   No   2017



  daily living (IADL) impairment:  









  



  Cognitive Status   Response   Date of Assessment

 

  



  Does the patient have a cognitive impairment:   No   2017



as of this encounter



Plan of Treatment







    



  Date   Type   Specialty   Care Team   Description

 

    



  2018   Procedure Pass   Infectious Diseases  

 

    



  2018   Surgery    Fan Tidwell MD   ESOPHAGOGASTRODUODENOSCOP



     [address] [facility]   [address]



     [address] 



     [place] [address] 



     [phone] (Fax) 

 

    



  2018   Procedure Pass   

 

    



  2018   [facility]    Fan Tidwell MD   Pancreatic necrosis



   Encounter    [address] 



     [address] 



     [place] [address] 



     [phone] (Fax) 



as of this encounter



Visit Diagnoses

Not on filein this encounter

## 2018-02-05 NOTE — XMS REPORT
Encounter Summary

 Created on: 2018



Moshe Lopez

External Reference #: [id number]

: 1940

Sex: Male



Demographics







 Address  [address] [place]  [address]

 

 Home Phone  [phone]

 

 Preferred Language  English

 

 Marital Status  Unknown

 

 Mandaen Affiliation  NON

 

 Race  White

 

 Ethnic Group  Not  or 





Author







 Author  [facility]

 

 Organization  [facility]

 

 Address  Unknown

 

 Phone  Unavailable







Support







 Name  Relationship  Address  Phone

 

 Latisha Lopez  ECON  Unknown  [phone]







Care Team Providers







 Care Team Member Name  Role  Phone

 

 Velez Jaelyn   PCP  [phone]

 

 Jaelyn Velez DO  Unavailable  [phone]







Reason for Referral

* Radiology Services





     



  Status   Reason   Specialty   Diagnoses /   Referred By   Referred To



     Procedures   Contact   Contact

 

     



  Closed    Radiology   Diagnoses   [address]



     Other acute   MD   [address]



     pancreatitis   [address]   MED [address]



     with uninfected   [address]   [address]ND FLO[address]



     necrosis

   [address]   [place], [place]



     [place]   [address]



     rocedures   [address]   Phone:



     CT ABD/PELV W   Phone:   [phone]



     CONTRAST   [phone]   Fax: [phone]



      Fax: 



      [phone] 











Encounter Details







    



  Date   Type   Department   Care Team   Description

 

    



  2017   Orders Only   [facility]   Akua Rinaldi MD   Other 
acute pancreatitis



    Physicians - Surgery   [address]   with uninfected necrosis



    [facility]   [facility]    ([facility])



    [address] [place] [address] 



    [place]   [phone] 



    [address]   [phone] (Fax) 



    [phone]  







Social History







    



  Tobacco Use   Types   Packs/Day   Years Used   Date

 

    



  Former Smoker   Cigarettes, Pipe, Cigars   1   15   Quit: 1975

 

    



  Smokeless Tobacco: Never   



  Used   









   



  Alcohol Use   Drinks/Week   oz/Week   Comments

 

   



  Yes   1 Cans of   0.6   1 beer every 2 weeks



   beer  









 



  Sex Assigned at Birth   Date Recorded

 

 



  Not on file 



as of this encounter



Functional Status







  



  Functional Status   Response   Date of Assessment

 

  



  Does the patient have a hearing impairment:   No   2017

 

  



  Does the patient have a visual impairment:   Yes   10/12/2017

 

  



  Does the patient have impaired ambulation:   Yes   10/12/2017

 

  



  Does the patient have an activity of daily living   No   10/12/2017



  (ADL) impairment:  

 

  



  Does the patient have an instrumental activity of   No   10/12/2017



  daily living (IADL) impairment:  









  



  Cognitive Status   Response   Date of Assessment

 

  



  Does the patient have a cognitive impairment:   No   10/12/2017



as of this encounter



Plan of Treatment







    



  Date   Type   Specialty   Care Team   Description

 

    



  2018   Procedure Pass   Infectious Diseases  

 

    



  2018   Surgery    Fan Tidwell MD   ESOPHAGOGASTRODUODENOSCOP



     [address]   [address]



     [address] 



     [place] [address] [phone] (Fax) 

 

    



  2018   Procedure Pass   

 

    



  2018   Hospital    Fan Tidwell MD   Pancreatic necrosis



   Encounter    [address] 



     [address] 



     [place] [address] [phone] (Fax) 



as of this encounter



Results

* CT ABD/PELV W CONTRAST (2017  7:59 AM)





 



  Specimen   Performing Laboratory

 

 



   [facility] RAD RESULTS









 Impressions

 

 



 



1. Further slight improvement in complex fluid collections related to prior 
pancreatitis. No new fluid collections are seen.



2. Continued narrowing at the portal confluence and mild ascites.



3. Continued mild biliary ductal dilatation. Renal thickening of the gastric 
antrum is evident likely related to pancreatitis versus associated gastritis.



 



 



 Finalized by Dameon Yates M.D. on 2017 8:52 AM. Dictated by Dameon Yates M.D. on 2017 8:38 AM.



 









 Narrative

 

 



CT ABDOMEN AND PELVIS



 



Clinical Indication:Male, 77 years old. Pancreatitis. 



 



Technique:Multiple contiguous axial images were obtained through the 
abdomen and pelvis following the uneventful administration of IV contrast 
material. Portal venous phase of postcontrast imaging was obtained. Post 
processing coronal and sagittal reconstruction images were made from the axial 
images.



 



IV contrast: 100 mL Isovue 370.



Bowel contrast:Water



 



Comparison: 2017



 



FINDINGS:



 



Lower Thorax: Linear scarring is noted within the left lung base. Calcification 
the coronary vessels is evident.



 



Liver and Biliary system: Small amount persistent perihepatic fluid is evident. 
There is mild prominence of the intra and extrahepatic biliary ducts which is 
minimally improved when compared to prior exam. Portal vein opacifies normally. 
The superior mesenteric vein is markedly narrowed secondary to pancreatitis. 
Gallbladder is unchanged. 



 



Spleen: Unremarkable.



 



Adrenal Glands and Kidneys: Adrenal glands are normal. Kidneys are unremarkable 
with exception of a small punctate nonobstructive lower pole left renal 
calculus. Small renal hypodensities are too small to characterize and unchanged.



 



Pancreas and Retroperitoneum: Cystogastrostomy tube is in place without change 
in position. Peripancreatic fluid collections straight further improvement. 
Right thick-walled retroperitoneal fluid measured previously now measures 5.3 x 
2.3 cm compared to 5.6 x 2.4 cm. Adjacent extension into the inferior right 
retroperitoneum adjacent to the right psoas is also improved in size measuring 
2.8 compared to 3.4 cm image 46 series 3. No new pancreatic fluid collections 
are seen. There is mild ectasia and prominence of the pancreatic duct in the 
pancreatic tail.



 



Aorta and Major Vessels: Calcification of the abdominal aorta with small distal 
dissection is unchanged narrowing at the confluence of the portal veins. 
Mesenteric vein persists unchanged from prior exam. 



 



Bowel, Mesentery and Peritoneal space: There is mural thickening of the gastric 
antrum likely reflecting sequela of pancreatitis and/or associated gastritis. 
Small bowel is normal in caliber. Small volume abdominal ascites is evident. 
Prior colonic anastomosis is noted with minimal colonic diverticulosis but no 
diverticulitis.



 



Pelvis: Prostate is enlarged. No pelvic adenopathy is seen.



 



Abdominal wall and Osseous Structures: Unchanged from prior exam. L2 
compression fracture and sclerotic appearance to L1 is unchanged.



 









 Procedure Note

 

 



Interface, Radiant Results - 2017  8:55 AM CST



CT ABDOMEN AND PELVIS



Clinical Indication:  Male, 77 years old. Pancreatitis.   



Technique:  Multiple contiguous axial images were obtained through the abdomen 
and pelvis following the uneventful administration of IV contrast material. 
Portal venous phase of postcontrast imaging was obtained. Post processing 
coronal and sagittal reconstruction images were made from the axial images.



IV contrast: 100 mL Isovue 370.

Bowel contrast:  Water



Comparison: 2017



FINDINGS:



Lower Thorax: Linear scarring is noted within the left lung base. Calcification 
the coronary vessels is evident.



Liver and Biliary system: Small amount persistent perihepatic fluid is evident. 
There is mild prominence of the intra and extrahepatic biliary ducts which is 
minimally improved when compared to prior exam. Portal vein opacifies normally. 
The superior mesenteric vein is markedly narrowed secondary to pancreatitis. 
Gallbladder is unchanged. 



Spleen: Unremarkable.



Adrenal Glands and Kidneys: Adrenal glands are normal. Kidneys are unremarkable 
with exception of a small punctate nonobstructive lower pole left renal 
calculus. Small renal hypodensities are too small to characterize and unchanged.



Pancreas and Retroperitoneum: Cystogastrostomy tube is in place without change 
in position. Peripancreatic fluid collections straight further improvement. 
Right thick-walled retroperitoneal fluid measured previously now measures 5.3 x 
2.3 cm compared to 5.6 x 2.4 cm. Adjacent extension into the inferior right 
retroperitoneum adjacent to the right psoas is also improved in size measuring 
2.8 compared to 3.4 cm image 46 series 3. No new pancreatic fluid collections 
are seen. There is mild ectasia and prominence of the pancreatic duct in the 
pancreatic tail.



Aorta and Major Vessels: Calcification of the abdominal aorta with small distal 
dissection is unchanged narrowing at the confluence of the portal veins. 
Mesenteric vein persists unchanged from prior exam. 



Bowel, Mesentery and Peritoneal space: There is mural thickening of the gastric 
antrum likely reflecting sequela of pancreatitis and/or associated gastritis. 
Small bowel is normal in caliber. Small volume abdominal ascites is evident. 
Prior colonic anastomosis is noted with minimal colonic diverticulosis but no 
diverticulitis.



Pelvis: Prostate is enlarged. No pelvic adenopathy is seen.



Abdominal wall and Osseous Structures: Unchanged from prior exam. L2 
compression fracture and sclerotic appearance to L1 is unchanged.



IMPRESSION



 1. Further slight improvement in complex fluid collections related to prior 
pancreatitis. No new fluid collections are seen.  

 2. Continued narrowing at the portal confluence and mild ascites.

 3. Continued mild biliary ductal dilatation. Renal thickening of the gastric 
antrum is evident likely related to pancreatitis versus associated gastritis.





 Finalized by Dameon Yates M.D. on 2017 8:52 AM. Dictated by Dameon Yates M.D. on 2017 8:38 AM.







in this encounter



Visit Diagnoses











  Diagnosis

 





  Other acute pancreatitis with uninfected necrosis - Primary

## 2018-02-05 NOTE — XMS REPORT
Encounter Summary

 Created on: 2018



Moshe Lopez

External Reference #: [id number]

: 1940

Sex: Male



Demographics







 Address  [address] [place]  [address]

 

 Home Phone  [phone]

 

 Preferred Language  English

 

 Marital Status  Unknown

 

 Holiness Affiliation  NON

 

 Race  White

 

 Ethnic Group  Not  or 





Author







 Author  [facility]

 

 Organization  [facility]

 

 Address  Unknown

 

 Phone  Unavailable







Support







 Name  Relationship  Address  Phone

 

 Latisha Lopez  ECON  Unknown  [phone]







Care Team Providers







 Care Team Member Name  Role  Phone

 

 Jaelyn Velez DO  PCP  [phone]

 

 VelezJaelyn DO  Unavailable  [phone]







Encounter Details







    



  Date   Type   Department   Care Team   Description

 

    



  2017   Outpt.   [facility]   Aby Bah MD 



   Antibiotic   Physicians - Internal   [email or web address] [facility] 



   Therapy   Medicine   [address] 



    [address] [place] [address] 



    [address] [facility] MED   [phone] 



    OFFICE [address]LDG   [phone] (Fax) 



    [place]  



    [address] [phone]  







Social History







    



  Tobacco Use   Types   Packs/Day   Years Used   Date

 

    



  Former Smoker   Cigarettes, Pipe, Cigars   1   15   Quit: 1975

 

    



  Smokeless Tobacco: Never   



  Used   









   



  Alcohol Use   Drinks/Week   oz/Week   Comments

 

   



  Yes   1 Cans of   0.6   1 beer every 2 weeks



   beer  









 



  Sex Assigned at Birth   Date Recorded

 

 



  Not on file 



as of this encounter



Functional Status







  



  Functional Status   Response   Date of Assessment

 

  



  Does the patient have a hearing impairment:   No   2017

 

  



  Does the patient have a visual impairment:   Yes   2017

 

  



  Does the patient have impaired ambulation:   No   2017

 

  



  Does the patient have an activity of daily living   No   2017



  (ADL) impairment:  

 

  



  Does the patient have an instrumental activity of   No   2017



  daily living (IADL) impairment:  









  



  Cognitive Status   Response   Date of Assessment

 

  



  Does the patient have a cognitive impairment:   No   2017



as of this encounter



Plan of Treatment







    



  Date   Type   Specialty   Care Team   Description

 

    



  2018   Procedure Pass   Infectious Diseases  

 

    



  2018   Surgery    Fan Tidwell MD   ESOPHAGOGASTRODUODENOSCOP



     [address]   [address]



     [address] 



     [place] [address] 



     [phone] (Fax) 

 

    



  2018   Procedure Pass   

 

    



  2018   Hospital    Fan Tidwell MD   Pancreatic necrosis



   Encounter    [address] 



     [address] 



     [place] [address] 



     [phone] (Fax) 



as of this encounter



Results

* BUN (2017)





  



  Component   Value   Ref Range

 

  



  Blood Urea Nitrogen   14 









 



  Specimen   Performing Laboratory

 

 



  Blood   OTHER OUTSIDE LAB





* ALT (SGPT) (2017)





  



  Component   Value   Ref Range

 

  



  ALT (SGPT)   26 









 



  Specimen   Performing Laboratory

 

 



  Blood   OTHER OUTSIDE LAB





* AST (SGOT) (2017)





  



  Component   Value   Ref Range

 

  



  AST (SGOT)   34 









 



  Specimen   Performing Laboratory

 

 



  Blood   OTHER OUTSIDE LAB





* POTASSIUM (2017)





  



  Component   Value   Ref Range

 

  



  Potassium   3.6 









 



  Specimen   Performing Laboratory

 

 



  Blood   OTHER OUTSIDE LAB





* ALK PHOS TOTAL (2017)





  



  Component   Value   Ref Range

 

  



  Alk Phosphatase   58 









 



  Specimen   Performing Laboratory

 

 



  Blood   OTHER OUTSIDE LAB





* CREATININE (2017)





  



  Component   Value   Ref Range

 

  



  Creatinine   0.86 









 



  Specimen   Performing Laboratory

 

 



  Blood   OTHER OUTSIDE LAB





* PLATELET COUNT (2017)





  



  Component   Value   Ref Range

 

  



  Platelet Count   184 









 



  Specimen   Performing Laboratory

 

 



  Blood   OTHER OUTSIDE LAB





* CBC (2017)





  



  Component   Value   Ref Range

 

  



  White Blood Cells   4.98 









 



  Specimen   Performing Laboratory

 

 



  Blood   OTHER OUTSIDE LAB





* HEMOGLOBIN (2017)





  



  Component   Value   Ref Range

 

  



  Hemoglobin   10.5 









 



  Specimen   Performing Laboratory

 

 



  Blood   OTHER OUTSIDE LAB





in this encounter



Visit Diagnoses

Not on filein this encounter

## 2018-02-05 NOTE — XMS REPORT
Encounter Summary

 Created on: 2018



Moshe Lopez

External Reference #: [id number]

: 1940

Sex: Male



Demographics







 Address  [address] [place] [address]  [address]

 

 Home Phone  [phone]

 

 Preferred Language  English

 

 Marital Status  Unknown

 

 Muslim Affiliation  NON

 

 Race  White

 

 Ethnic Group  Not  or 





Author







 Author  [facility]

 

 Organization  [facility]

 

 Address  Unknown

 

 Phone  Unavailable







Support







 Name  Relationship  Address  Phone

 

 Latisha Lopez  Unknown  [phone]







Care Team Providers







 Care Team Member Name  Role  Phone

 

 Agustin Jaelyn   PCP  [phone]

 

 Jaelyn Velez DO  Unavailable  [phone]







Reason for Visit

* 





 



  Reason   Comments

 

 



  Post Operative Visit 









Encounter Details







    



  Date   Type   Department   Care Team   Description

 

    



  2017   Office Visit   [facility]   David Stuart MD   
Necrotizing pancreatitis



    Physicians - Surgery   [address]   (Primary Dx)



    [place] [address] 



    [address]   [phone] 



    [place]   [phone] (Fax) 



    [address] [phone]  







Social History







    



  Tobacco Use   Types   Packs/Day   Years Used   Date

 

    



  Former Smoker   Cigarettes, Pipe, Cigars   1   15   Quit: 1975

 

    



  Smokeless Tobacco: Never   



  Used   









   



  Alcohol Use   Drinks/Week   oz/Week   Comments

 

   



  Yes   1 Cans of   0.6   1 beer every 2 weeks



   beer  









 



  Sex Assigned at Birth   Date Recorded

 

 



  Not on file 



as of this encounter



Last Filed Vital Signs







  



  Vital Sign   Reading   Time Taken

 

  



  Blood Pressure   131/70   2017 10:51 AM CST

 

  



  Pulse   88   2017 10:51 AM CST

 

  



  Temperature   36.3   C (97.4   F)   2017 10:51 AM CST

 

  



  Respiratory Rate   18   2017 10:51 AM CST

 

  



  Oxygen Saturation   -   -

 

  



  Inhaled Oxygen   -   -



  Concentration  

 

  



  Weight   68.5 kg (151 lb)   2017 10:51 AM CST

 

  



  Height   170.2 cm (5' 7")   2017 10:51 AM CST

 

  



  Body Mass Index   23.65   2017 10:51 AM CST



in this encounter



Functional Status







  



  Functional Status   Response   Date of Assessment

 

  



  Does the patient have a hearing impairment:   No   2017

 

  



  Does the patient have a visual impairment:   Yes   10/12/2017

 

  



  Does the patient have impaired ambulation:   Yes   10/12/2017

 

  



  Does the patient have an activity of daily living   No   10/12/2017



  (ADL) impairment:  

 

  



  Does the patient have an instrumental activity of   No   10/12/2017



  daily living (IADL) impairment:  









  



  Cognitive Status   Response   Date of Assessment

 

  



  Does the patient have a cognitive impairment:   No   10/12/2017



as of this encounter



Progress Notes

* Kareem Vick MD - 2017 11:15 AM CST



Formatting of this note may be different from the original.

Date of Service: 2017 



Subjective:        

 

Moshe Lopez is a 77 y.o. male who presents for follow up.



History of Present Illness

Moshe Lopez is a 77 y.o.malewith CAD s/p 8 stents, HLD, HTN, and 
diverticulitiss/p Hernandez's procedurein 2017 with colostomy 
reversal in 2017 transferred to  for ileus secondary to pancreatitis, 
admitted from -. Patient underwent EUS with necrosectomy and drainage 
several times for pancreatic pseudocyst as well as IR placement of 2 drains. 
His drains have been subsequently removed. He has a cystgastrostomy stent in 
place currently, but CT scan from 11/3 shows areas of undrained peripancreatic 
fluid collection. 



On  he was admitted to internal medicine for fever, nausea, vomiting, and 
altered mental status. He improved on IV antibiotics and was discharged on a 
course of Levaquin. Since discharge last week he has been doing well and 
reports good PO intake, however last night and this morning he developed nausea
, vomiting along with intolerance of PO intake. No fevers, chills, diarrhea, 
constipation, hematemesis, or hematochezia. 



Past Medical History: 

Diagnosis Date 

 CAD (coronary artery disease)  

 Coronary artery disease  

 s/p 9 CABGs 

 Diverticulitis  

 Diverticulosis  

 s/p LAR in 2017 

 HTN (hypertension)  

 Hyperlipidemia  

 Hypertension  

 Low testosterone  

 Pancreatitis  

 Ruptured lumbar disc  

 Sleep apnea  



Past Surgical History: 

Procedure Laterality Date 

 NM ESOPHAGOGASTRODUODENOSCOPY US SCOPE W/ADJ STRXRS N/A 2017 

 ESOPHAGOGASTRODUODENOSCOPY ENDOSCOPIC ULTRASOUND performed by Fan Tidwell MD 
at ENDO/GI 

 UPPER GASTROINTESTINAL ENDOSCOPY N/A 2017 

 ESOPHAGOGASTRODUODENOSCOPY performed by Fan Tidwell MD at ENDO/GI 

 UPPER GASTROINTESTINAL ENDOSCOPY N/A 9/3/2017 

 ESOPHAGOGASTRODUODENOSCOPY performed by Fan Tidwell MD at ENDO/GI 

 NM ESOPHAGOGASTRODUODENOSCOPY TRANSORAL DIAGNOSTIC N/A 2017 

 ESOPHAGOGASTRODUODENOSCOPY performed by Fan Tidwell MD at ENDO/GI 

 UPPER GASTROINTESTINAL ENDOSCOPY N/A 10/19/2017 

 ESOPHAGOGASTRODUODENOSCOPY with NECROSECTOMY performed by Bean Dean MD 
at ENDO/GI 

 UPPER GASTROINTESTINAL ENDOSCOPY N/A 10/23/2017 

 ESOPHAGOGASTRODUODENOSCOPY performed by Bean Dean MD at ENDO/GI 

 HX CARPAL TUNNEL RELEASE   

 HX COLOSTOMY   

 colostomy reversal in 2017 

 HX HEART CATHETERIZATION   

 HX ROTATOR CUFF REPAIR   

 left and right 

 HX ROTATOR CUFF REPAIR Bilateral  

 HX SEPTOPLASTY   

 SIGMOIDECTOMY   



Family History 

Problem Relation Age of Onset 

 Heart Failure Mother  

 Heart Attack Mother  

 Cancer Father  

 Heart Disease Maternal Aunt  

 Heart Disease Maternal Uncle  



Current Outpatient Prescriptions 

Medication Sig Dispense Refill 

 aspirin EC 81 mg tablet Take 81 mg by mouth daily.     

 citalopram (CELEXA) 20 mg tablet Take 20 mg by mouth daily.   

 docusate (COLACE) 100 mg capsule Take 1 capsule by mouth twice daily. 180 
capsule 3 

 famotidine (PEPCID) 20 mg tablet Take 20 mg by mouth twice daily.   

 fenofibrate(+) (TRIGLIDE) 160 mg tablet Take 160 mg by mouth daily. Take 
with food.   

 finasteride (PROSCAR) 5 mg tablet Take 5 mg by mouth daily.   

 fish oil- omega 3-DHA//1,000 mg capsule Take 1 Cap by mouth twice 
daily.   

 lisinopril (PRINIVIL; ZESTRIL) 20 mg tablet Take 20 mg by mouth daily.   

 loratadine (CLARITIN) 10 mg tablet Take 10 mg by mouth every morning.   

 melatonin 3 mg tab Take 1 tablet by mouth at bedtime as needed (insomnia). 
  

 MULTIVITAMINS WITH FLUORIDE (MULTI-VITAMIN PO) Take 1 Tab by mouth daily.   

 omeprazole DR(+) (PRILOSEC) 20 mg capsule Take 20 mg by mouth daily before 
breakfast.   

 ondansetron (ZOFRAN ODT) 4 mg rapid dissolve tablet Dissolve 4 mg by mouth 
every 8 hours. Place on tongue to disolve.    

 oxyCODONE (ROXICODONE, OXY-IR) 5 mg tablet Take 1 tablet by mouth every 4 
hours as needed Earliest Fill Date: 17 40 tablet 0 

 polyethylene glycol 3350 (MIRALAX) 17 g packet Take 1 packet by mouth 
daily. 12 each 5 

 scopolamine (TRANSDERM-SCOP) 1.5 mg 3 day patch Apply 1 patch to top of 
skin as directed every 72 hours.   

 senna/docusate (SENOKOT-S) 8.6/50 mg tablet Take 1 tablet by mouth daily as 
needed.   

 simethicone (MYLICON) 80 mg chew tablet Chew 1 tablet by mouth every 6 
hours as needed for Flatulence. 30 tablet 0 

 tamsulosin (FLOMAX) 0.4 mg capsule Take 0.4 mg by mouth daily. Do not crush
, chew or open capsules. Take 30 minutes following the same meal each day.   

 vitamins, B complex tab Take 1 Tab by mouth daily.   



No current facility-administered medications for this visit.  



Allergies 

Allergen Reactions 

 Niacin RASH 

  Extreme itching per pt 

 Ativan [Lorazepam] AGITATION 

  Severe agitation and confusion  



Social History 



Social History 

 Marital status:  

  Spouse name: N/A 

 Number of children: N/A 

 Years of education: N/A 



Occupational History 

 Not on file. 



Social History Main Topics 

 Smoking status: Former Smoker 

  Packs/day: 1.00 

  Years: 15.00 

  Types: Cigarettes, Pipe, Cigars 

  Quit date: 1975 

 Smokeless tobacco: Never Used 

 Alcohol use 0.6 oz/week 

  1 Cans of beer per week 

   Comment: 1 beer every 2 weeks 

 Drug use: No 

 Sexual activity: Not on file 



Other Topics Concern 

 Not on file 



Social History Narrative 

 ** Merged History Encounter ** 

   



Review of Systems 

Constitutional: Positive for activity change and fatigue. Negative for chills 
and fever. 

Gastrointestinal: Positive for abdominal pain, nausea and vomiting. Negative 
for blood in stool, constipation and diarrhea. 

Genitourinary: Negative for dysuria and hematuria. 



Objective:       

 aspirin EC 81 mg tablet Take 81 mg by mouth daily.   

 citalopram (CELEXA) 20 mg tablet Take 20 mg by mouth daily. 

 docusate (COLACE) 100 mg capsule Take 1 capsule by mouth twice daily. 

 famotidine (PEPCID) 20 mg tablet Take 20 mg by mouth twice daily. 

 fenofibrate(+) (TRIGLIDE) 160 mg tablet Take 160 mg by mouth daily. Take 
with food. 

 finasteride (PROSCAR) 5 mg tablet Take 5 mg by mouth daily. 

 fish oil- omega 3-DHA//1,000 mg capsule Take 1 Cap by mouth twice 
daily. 

 lisinopril (PRINIVIL; ZESTRIL) 20 mg tablet Take 20 mg by mouth daily. 

 loratadine (CLARITIN) 10 mg tablet Take 10 mg by mouth every morning. 

 melatonin 3 mg tab Take 1 tablet by mouth at bedtime as needed (insomnia). 

 MULTIVITAMINS WITH FLUORIDE (MULTI-VITAMIN PO) Take 1 Tab by mouth daily. 

 omeprazole DR(+) (PRILOSEC) 20 mg capsule Take 20 mg by mouth daily before 
breakfast. 

 ondansetron (ZOFRAN ODT) 4 mg rapid dissolve tablet Dissolve 4 mg by mouth 
every 8 hours. Place on tongue to disolve.  

 oxyCODONE (ROXICODONE, OXY-IR) 5 mg tablet Take 1 tablet by mouth every 4 
hours as needed Earliest Fill Date: 17 

 polyethylene glycol 3350 (MIRALAX) 17 g packet Take 1 packet by mouth 
daily. 

 scopolamine (TRANSDERM-SCOP) 1.5 mg 3 day patch Apply 1 patch to top of 
skin as directed every 72 hours. 

 senna/docusate (SENOKOT-S) 8.6/50 mg tablet Take 1 tablet by mouth daily as 
needed. 

 simethicone (MYLICON) 80 mg chew tablet Chew 1 tablet by mouth every 6 
hours as needed for Flatulence. 

 tamsulosin (FLOMAX) 0.4 mg capsule Take 0.4 mg by mouth daily. Do not crush
, chew or open capsules. Take 30 minutes following the same meal each day. 

 vitamins, B complex tab Take 1 Tab by mouth daily. 



Vitals: 

 17 1051 

BP: 131/70 

Pulse: 88 

Resp: 18 

Temp: 36.3 C (97.4 F) 

TempSrc: Oral 

Weight: 68.5 kg (151 lb) 

Height: 170.2 cm (67") 



Body mass index is 23.65 kg/(m^2). 



Physical Exam 

Constitutional: He is oriented to person, place, and time. He appears well-
developed and well-nourished. 

Mildly ill-appearing 

HENT: 

Head: Normocephalic and atraumatic. 

Eyes: EOM are normal. 

Neck: Normal range of motion. 

Cardiovascular: Normal rate.  

Pulmonary/Chest: Effort normal. 

Abdominal: 

Soft, nondistended, tender to palpation - predominantly epigastric and LUQ 

Musculoskeletal: 

Unsteady gait without cane 

Neurological: He is alert and oriented to person, place, and time. 

Skin: Skin is warm and dry. 

Psychiatric: He has a normal mood and affect. His behavior is normal. Judgment 
and thought content normal. 



 

Assessment and Plan:

77 y.o.malewith CAD s/p 8 stents, HLD, HTN, and diverticulitiss/p Hernandez'
s procedurein 2017 with colostomy reversal in 2017 transferred 
to  for ileus secondary to pancreatitis, admitted from -. Patient 
underwent EUS with necrosectomy and drainage several times for pancreatic 
pseudocyst as well as IR placement of 2 drains. His drains have been 
subsequently removed. He has a cystgastrostomy stent in place currently, but CT 
scan from 11/3 shows areas of undrained peripancreatic fluid collection. 
Presents today with one day history of nausea, vomiting, and intolerance of PO 
intake along with abdominal pain.



Plan:

- Admit for rehydration, evaluation with CT

- CT abd/pelv with contrast

- CBC, CMP, procalcitonin, lactate

- IVF

 

 



Patient seen and discussed with Dr. Stuart, who directed plan of care.



Kareem Vick MD

PGY1

Pager [phone]



 



 

in this encounter



Plan of Treatment







    



  Date   Type   Specialty   Care Team   Description

 

    



  2018   Procedure Pass   Infectious Diseases  

 

    



  2018   Surgery    Fan Tidwell MD   ESOPHAGOGASTRODUODENOSCOP



     [address]   [address]



     [address] 



     [place] [address] 



     [phone] (Fax) 

 

    



  2018   Procedure Pass   

 

    



  2018   Hospital    Fan Tidwell MD   Pancreatic necrosis



   Encounter    [address] 



     [address] 



     [place], [place] [address] [phone] (Fax) 



as of this encounter



Visit Diagnoses











  Diagnosis

 





  Necrotizing pancreatitis - Primary

 





  Acute pancreatitis

## 2018-02-05 NOTE — XMS REPORT
Encounter Summary

 Created on: 2018



Moshe Lopez

External Reference #: [id number]

: 1940

Sex: Male



Demographics







 Address  [address] [place]  [address]

 

 Home Phone  [phone]

 

 Preferred Language  English

 

 Marital Status  Unknown

 

 Orthodox Affiliation  NON

 

 Race  White

 

 Ethnic Group  Not  or 





Author







 Author  [facility]

 

 Organization  [facility]

 

 Address  Unknown

 

 Phone  Unavailable







Support







 Name  Relationship  Address  Phone

 

 Latisha Lopez  ECON  Unknown  [phone]







Care Team Providers







 Care Team Member Name  Role  Phone

 

 Jaelyn Velez   PCP  [phone]

 

 VelezJaelyn DO  Unavailable  [phone]







Encounter Details







    



  Date   Type   Department   Care Team   Description

 

    



  2017   Outpt.   [facility]   Aby Bah MD 



   Antibiotic   Physicians - Internal   [address] [facility] 



   Therapy   Medicine   [address] 



    [address] [place] [address] 



    [address] [facility] [address]   [phone] 



    [address]   [phone] (Fax) 



    [place]  



    [address] [phone]  







Social History







    



  Tobacco Use   Types   Packs/Day   Years Used   Date

 

    



  Former Smoker   Cigarettes, Pipe, Cigars   1   15   Quit: 1975

 

    



  Smokeless Tobacco: Never   



  Used   









   



  Alcohol Use   Drinks/Week   oz/Week   Comments

 

   



  Yes   1 Cans of   0.6   1 beer every 2 weeks



   beer  









 



  Sex Assigned at Birth   Date Recorded

 

 



  Not on file 



as of this encounter



Functional Status







  



  Functional Status   Response   Date of Assessment

 

  



  Does the patient have a hearing impairment:   No   2017

 

  



  Does the patient have a visual impairment:   Yes   2017

 

  



  Does the patient have impaired ambulation:   No   2017

 

  



  Does the patient have an activity of daily living   No   2017



  (ADL) impairment:  

 

  



  Does the patient have an instrumental activity of   No   2017



  daily living (IADL) impairment:  









  



  Cognitive Status   Response   Date of Assessment

 

  



  Does the patient have a cognitive impairment:   No   2017



as of this encounter



Progress Notes

* Whit Madera RN - 2017 10:30 AM CST



Informed by Dr. Bah that pt needs to receive ertapenem infusion today at 
hospital as he missed dose yesterday d/t appt and will miss again today if not 
done at [facility].  Appt made in infusion room for ertapenem and labs as [facility] did 
not draw weekly labs as ordered.  



Spoke to Em at [facility] re: missed labs, Em stated that pt 
normally receives infusions/has labs drawn in ER, but they were busy on Monday 
and he received infusion on Med Surg floor and lab orders will missed, she 
followed up w/ nursing supervisor to ensure that future labs would not be 
missed. 

in this encounter



Plan of Treatment







    



  Date   Type   Specialty   Care Team   Description

 

    



  2018   Procedure Pass   Infectious Diseases  

 

    



  2018   Surgery    Fan Tidwell MD   ESOPHAGOGASTRODUODENOSCOP



     [address]   [address]



     [address] 



     [place] [address] 



     [phone] (Fax) 

 

    



  2018   Procedure Pass   

 

    



  2018   Hospital    Fan Tidwell MD   Pancreatic necrosis



   Encounter    [address] 



     [address] 



     [place] [address] 



     [phone] (Fax) 



as of this encounter



Visit Diagnoses

Not on filein this encounter

## 2018-02-05 NOTE — XMS REPORT
Encounter Summary

 Created on: 2018



Moshe Lopez

External Reference #: [id number]

: 1940

Sex: Male



Demographics







 Address  [address] [place] [address]  [address]

 

 Home Phone  [phone]

 

 Preferred Language  English

 

 Marital Status  Unknown

 

 Holiness Affiliation  NON

 

 Race  White

 

 Ethnic Group  Not  or 





Author







 Author  [facility]

 

 Organization  [facility]

 

 Address  Unknown

 

 Phone  Unavailable







Support







 Name  Relationship  Address  Phone

 

 Latisha Lopez  ECON  Unknown  [phone]







Care Team Providers







 Care Team Member Name  Role  Phone

 

 Jaelyn Velez   PCP  [phone]

 

 VelezJaelyn DO  Unavailable  [phone]







Encounter Details







    



  Date   Type   Department   Care Team   Description

 

    



  2018   Procedure Pass   Gastrointenstinal  



    Endoscopy  



    [address] [place] [address] [phone]  







Social History







    



  Tobacco Use   Types   Packs/Day   Years Used   Date

 

    



  Former Smoker   Cigarettes, Pipe, Cigars   1   15   Quit: 1975

 

    



  Smokeless Tobacco: Never   



  Used   









   



  Alcohol Use   Drinks/Week   oz/Week   Comments

 

   



  Yes   1 Cans of   0.6   1 beer every 2 weeks



   beer  









 



  Sex Assigned at Birth   Date Recorded

 

 



  Not on file 



as of this encounter



Functional Status







  



  Functional Status   Response   Date of Assessment

 

  



  Does the patient have a hearing impairment:   No   2017

 

  



  Does the patient have a visual impairment:   Yes   2017

 

  



  Does the patient have impaired ambulation:   No   2017

 

  



  Does the patient have an activity of daily living   No   2017



  (ADL) impairment:  

 

  



  Does the patient have an instrumental activity of   No   2017



  daily living (IADL) impairment:  









  



  Cognitive Status   Response   Date of Assessment

 

  



  Does the patient have a cognitive impairment:   No   2017



as of this encounter



Plan of Treatment







    



  Date   Type   Specialty   Care Team   Description

 

    



  2018   Procedure Pass   Infectious Diseases  

 

    



  2018   Surgery    Fan Tidwell MD   ESOPHAGOGASTRODUODENOSCOP



     [address]



     [address] 



     [place] [address] 



     [phone] (Fax) 

 

    



  2018   Procedure Pass   

 

    



  2018   Hospital    Fan Tidwell MD   Pancreatic necrosis



   Encounter    [address] 



     [address] 



     [place] [address] 



     [phone] (Fax) 



as of this encounter



Visit Diagnoses

Not on filein this encounter

## 2018-02-05 NOTE — XMS REPORT
Continuity of Care Document

 Created on: 2018



LADONNA KEBEDE

External Reference #: [id number]

: 1940

Sex: Male



Demographics







 Address  [address]  [address]

 

 Home Phone  [phone]

 

 Preferred Language  Unknown

 

 Marital Status  Unknown

 

 Rastafarian Affiliation  Unknown

 

 Race  Unknown

 

 Ethnic Group  Unknown





Author







 Author  [employer]

 

 Organization  [facility]

 

 Address  Unknown

 

 Phone  Unavailable







Care Team Providers







 Care Team Member Name  Role  Phone

 

 [employer]  Unavailable  Unavailable



                                    



Problems

                                                                



Medications

                                                                



Allergies, Adverse Reactions, Alerts

                                                        



Immunizations

                                                                



Results

                                                                



Vital Signs

                                                                                



Encounters

                    





 Location                          Location Details                          
Encounter Type                          Encounter Number                        
  Reason For Visit                          Attending Provider                 
         ADM Date                          DC Date                          
Status                          Source                    

 

                                                     O                          
[id number]                                                    [facility]ET[facility] GENTILE      
                     2012            
              Active                          [facility]                    

 

                                                     INPATIENT                 
         [id number]                                                    JENNIFER BARKER                           2017
                          Active                          [facility]                    

 

                                                     OUTPATIENT                
          [id number]                                                    THANG HANNA                           2017 
                         Active                          [facility]                    

 

                                                     OUTPATIENT                
          [id number]                                                    TY MARCELINO                           10/11/2017                          10/11/
2017                          Active                          [facility]                    

 

                                                     EMERGENCY                 
         [id number]                                                             
                   10/18/2017                                                  
  Active                          [facility]       
             

 

                                                     EMERGENCY                 
         [id number]                                                    [facility] ZEE                           2017 
                         Active                          [facility]                    

 

                                                     OUTPATIENT                
          [id number]                                                    TY MARCELINO                           2017                          Active                          [facility]                    

 

                                                     SPECIMEN                  
        [id number]                                                    KLAUS TRIMBLE                           2017
                          Active                          [facility]                    

 

                                                     SPECIMEN                  
        [id number]                                                    KLAUS TRIMBLE                           2017
                          Active                          [facility]                    

 

                                                     OUTPATIENT                
          [id number]                                                            
                    2017                                                 
   Active                          [facility]      
              

 

                                                     OP SURGERY                
          [id number]                                                    HARRIETT VILLAVICENCIO                           2017                                    
                Active                          [facility]                    

 

                                                     OUTPATIENT                
          [id number]                                                    THANG HANNA                           2017 
                         Active                          [facility]                    

 

                                                     OP SURGERY                
          [id number]                                                            
                    2017             
             Active                          [facility]                    

 

                                                     OUTPATIENT                
          [id number]                                                    DAI STREETER                           2018
                          Active                          [facility]                    

 

                                                     OP SURGERY                
          [id number]                                                    TY MARCELINO                           2018                          Active                          [facility]                    

 

                                                     O                         
                                                                               
  2018                                                    Active         
                 [facility]                    

 

                                                     OP SURGERY                
          [id number]                                                    TY MARCELINO                                                                        
       Active                          [facility]  
                  



                                                                               
                                                                               
                                                                               
                           



Procedures

                                                                



Plan of Care

                                                                



Social History

                                                                        



Assessment and Plan

                                                                



Family History

                                                                



Advance Directives

                                                                



Functional Status

## 2018-02-05 NOTE — XMS REPORT
Encounter Summary

 Created on: 2018



Moshe Lopez

External Reference #: [id number]

: 1940

Sex: Male



Demographics







 Address  [address] [place]  [address]

 

 Home Phone  [phone]

 

 Preferred Language  English

 

 Marital Status  Unknown

 

 Anabaptism Affiliation  NON

 

 Race  White

 

 Ethnic Group  Not  or 





Author







 Author  [facility]

 

 Organization  [facility]

 

 Address  Unknown

 

 Phone  Unavailable







Support







 Name  Relationship  Address  Phone

 

 Latisha Lopez  [employer]  Unknown  [phone]







Care Team Providers







 Care Team Member Name  Role  Phone

 

 Jaelyn Velez   PCP  [phone]

 

 VelezJaelyn DO  Unavailable  [phone]







Encounter Details







    



  Date   Type   Department   Care Team   Description

 

    



  2017   Outpt.   [facility]   Aby Bah MD 



   Antibiotic   Physicians - Internal   [address] [facility] 



   Therapy   Medicine   [address] 



    [address] [place] [address] 



    [address] [facility] [address]   [phone] 



    OFFICE BLDG   [phone] (Fax) 



    [place]  



    [address] [phone]  







Social History







    



  Tobacco Use   Types   Packs/Day   Years Used   Date

 

    



  Former Smoker   Cigarettes, Pipe, Cigars   1   15   Quit: 1975

 

    



  Smokeless Tobacco: Never   



  Used   









   



  Alcohol Use   Drinks/Week   oz/Week   Comments

 

   



  Yes   1 Cans of   0.6   1 beer every 2 weeks



   beer  









 



  Sex Assigned at Birth   Date Recorded

 

 



  Not on file 



as of this encounter



Functional Status







  



  Functional Status   Response   Date of Assessment

 

  



  Does the patient have a hearing impairment:   No   2017

 

  



  Does the patient have a visual impairment:   Yes   2017

 

  



  Does the patient have impaired ambulation:   No   2017

 

  



  Does the patient have an activity of daily living   No   2017



  (ADL) impairment:  

 

  



  Does the patient have an instrumental activity of   No   2017



  daily living (IADL) impairment:  









  



  Cognitive Status   Response   Date of Assessment

 

  



  Does the patient have a cognitive impairment:   No   2017



as of this encounter



Progress Notes

* Whit Madera RN - 2017  3:34 PM CST



Confirmed w/ [facility] that PICC line was removed today.  

Requested EKG from today, results faxed and scanned in.  Message sent to Dr. Chamberlain.

in this encounter



Plan of Treatment







    



  Date   Type   Specialty   Care Team   Description

 

    



  2018   Procedure Pass   Infectious Diseases  

 

    



  2018   Surgery    Fan Tidwell MD   ESOPHAGOGASTRODUODENOSCOP



     [address]



     [address] 



     [place] [address] [phone] (Fax) 

 

    



  2018   Procedure Pass   

 

    



  2018   Hospital    Fan Tidwell MD   Pancreatic necrosis



   Encounter    [address] 



     [address] 



     [place] [address] [phone] (Fax) 



as of this encounter



Results

* EKG TRACING INTERP AND READ (2017)





 



  Specimen   Performing Laboratory

 

 



   IN CLINIC





in this encounter



Visit Diagnoses

Not on filein this encounter

## 2018-02-05 NOTE — XMS REPORT
Encounter Summary

 Created on: 2018



Moshe Lopez

External Reference #: [id number]

: 1940

Sex: Male



Demographics







 Address  [address] [place]  [address]

 

 Home Phone  [phone]

 

 Preferred Language  English

 

 Marital Status  Unknown

 

 Islam Affiliation  NON

 

 Race  White

 

 Ethnic Group  Not  or 





Author







 Author  [facility]

 

 Organization  [facility]

 

 Address  Unknown

 

 Phone  Unavailable







Support







 Name  Relationship  Address  Phone

 

 Latisha Lopez  ECON  Unknown  [phone]







Care Team Providers







 Care Team Member Name  Role  Phone

 

 VelezJaelyn beck   PCP  [phone]

 

 Jaelyn Velez DO  Unavailable  [phone]







Reason for Visit

* 





 



  Reason   Comments

 

 



  Vomiting 









Encounter Details







    



  Date   Type   Department   Care Team   Description

 

    



  2017   Office Visit   [facility]   Fan Tidwell MD   
Pancreatic necrosis



    Physicians - Internal   [address] [facility]   (Primary Dx)



    Medicine   [phone] [place] [place] [address] 



    [place] [address] [phone] (Fax) 







Social History







    



  Tobacco Use   Types   Packs/Day   Years Used   Date

 

    



  Former Smoker   Cigarettes, Pipe, Cigars   1   15   Quit: 1975

 

    



  Smokeless Tobacco: Never   



  Used   









   



  Alcohol Use   Drinks/Week   oz/Week   Comments

 

   



  Yes   1 Cans of   0.6   1 beer every 2 weeks



   beer  









 



  Sex Assigned at Birth   Date Recorded

 

 



  Not on file 



as of this encounter



Last Filed Vital Signs







  



  Vital Sign   Reading   Time Taken

 

  



  Blood Pressure   124/62   2017  9:57 AM CST

 

  



  Pulse   108   2017  9:57 AM CST

 

  



  Temperature   37.8   C (100   F)   2017  9:57 AM CST

 

  



  Respiratory Rate   16   2017  9:57 AM CST

 

  



  Oxygen Saturation   -   -

 

  



  Inhaled Oxygen   -   -



  Concentration  

 

  



  Weight   70.8 kg (156 lb)   2017  9:57 AM CST

 

  



  Height   170.2 cm (5' 7")   2017  9:57 AM CST

 

  



  Body Mass Index   24.43   2017  9:57 AM CST



in this encounter



Functional Status







  



  Functional Status   Response   Date of Assessment

 

  



  Does the patient have a hearing impairment:   No   2017

 

  



  Does the patient have a visual impairment:   Yes   10/12/2017

 

  



  Does the patient have impaired ambulation:   Yes   10/12/2017

 

  



  Does the patient have an activity of daily living   No   10/12/2017



  (ADL) impairment:  

 

  



  Does the patient have an instrumental activity of   No   10/12/2017



  daily living (IADL) impairment:  









  



  Cognitive Status   Response   Date of Assessment

 

  



  Does the patient have a cognitive impairment:   No   10/12/2017



as of this encounter



Instructions

* Patient Instructions - Marla Moncada RN - 2017 10:00 AM CST



Please have labs drawn today. 



Please start taking ciprofloxacin 500mg twice daily for 14 days. 



Please call our office with an update of symptoms tomorrow. 



If you have any questions or concerns please contact Dr. Diann Gutiérrez 's 
nurse, at [phone].





in this encounter



Progress Notes

* Fan Tidwell MD - 2017 10:00 AM CST



Formatting of this note may be different from the original.

Date of Service: 2017



Subjective:        

 

Moshe Lopez is a 77 y.o. male.

  

History of Present Illness



This is a 77-year-old  male with history of acute necrotizing 
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 
that was treated with endoscopic cyst gastrostomy and placement of lumen 
opposing stent.  He need multiple debridements.  Most recently this was done 
about 3 weeks ago when he was admitted with abdominal pain and fever.  At 
present he has continued to do reasonably well until about 2 days ago when he 
developed some nausea, vomiting as well as mild fever.  He has been taking 
Augmentin.  Today his fever is a bit less.  Recent CT scan has shown 
significant improvement in the walled off pancreatic necrotic collection. there 
still remains multiloculated fluid collection in the right pararenal space.  
However this is not significantly large in size to cause any obstruction of the 
gastric outlet. He does complain of decreased appetite and some abdominal 
bloating.

  



Review of Systems 

Constitutional: Positive for fever. 

HENT: Negative.  

Eyes: Negative.  

Respiratory: Negative.  

Cardiovascular: Negative.  

Gastrointestinal: Positive for vomiting. 

Endocrine: Negative.  

Genitourinary: Negative.  

Musculoskeletal: Negative.  

Skin: Negative.  

Allergic/Immunologic: Negative.  

Neurological: Negative.  

Hematological: Negative.  

Psychiatric/Behavioral: Negative.  

Comprehensive 10 point ROS taken, and otherwise negative except as above.



Active Ambulatory Problems 

  Diagnosis Date Noted 

 CAD (coronary artery disease) 2012 

 HTN (hypertension) 2012 

 Low testosterone 2012 

 Sleep apnea  

 Ruptured lumbar disc  

 Diverticulitis  

 Hyperlipemia 2013 

 Ileus (HCC) 2017 

 Abdominal pain 2017 

 Acute hypernatremia 2017 

 Hx of pancreatitis 2017 

 CAD (coronary artery disease) 2017 

 Hypokalemia 2017 

 Malnutrition (HCC) 2017 

 Nondiabetic gastroparesis 2017 

 Bacteremia due to Staphylococcus aureus 2017 

 Pancreatitis, acute 2017 

 HCAP (healthcare-associated pneumonia) 2017 

 Pseudocyst of pancreas 2017 

 Infective necrosis of pancreas 2017 

 Necrotizing pancreatitis 2017 

 Pancreatitis 2017 

 Severe malnutrition (HCC) 2017 

 Necrosis, pancreas aseptic 10/12/2017 

 Pancreatic pseudocyst 10/18/2017 

 Fluid collection of pancreas 10/18/2017 

 Coronary artery disease  

 Sepsis (HCC) 2017 

 Nausea and vomiting 2017 

 Fever 2017 



Resolved Ambulatory Problems 

  Diagnosis Date Noted 

 No Resolved Ambulatory Problems 



Past Medical History: 

Diagnosis Date 

 CAD (coronary artery disease)  

 Coronary artery disease  

 Diverticulitis  

 Diverticulosis  

 HTN (hypertension)  

 Hyperlipidemia  

 Hypertension  

 Low testosterone  

 Pancreatitis  

 Ruptured lumbar disc  

 Sleep apnea  



Social History 



Social History 

 Marital status:  

  Spouse name: N/A 

 Number of children: N/A 

 Years of education: N/A 



Social History Main Topics 

 Smoking status: Former Smoker 

  Packs/day: 1.00 

  Years: 15.00 

  Types: Cigarettes, Pipe, Cigars 

  Quit date: 1975 

 Smokeless tobacco: Never Used 

 Alcohol use 0.6 oz/week 

  1 Cans of beer per week 

   Comment: 1 beer every 2 weeks 

 Drug use: No 

 Sexual activity: Not Asked 



Other Topics Concern 

 None 



Social History Narrative 

 ** Merged History Encounter ** 

   



Family History 

Problem Relation Age of Onset 

 Heart Failure Mother  

 Heart Attack Mother  

 Cancer Father  

 Heart Disease Maternal Aunt  

 Heart Disease Maternal Uncle  



Allergies 

Allergen Reactions 

 Niacin RASH 

  Extreme itching per pt 

 Ativan [Lorazepam] AGITATION 

  Severe agitation and confusion  



Patient Active Problem List 

 Diagnosis Date Noted 

 Sepsis (HCC) 2017 

 Nausea and vomiting 2017 

 Fever 2017 

 Coronary artery disease  

 Pancreatic pseudocyst 10/18/2017 

 Fluid collection of pancreas 10/18/2017 

 Necrosis, pancreas aseptic 10/12/2017 

 Severe malnutrition (HCC) 2017 

 HCAP (healthcare-associated pneumonia) 2017 

 Pancreatitis, acute 2017 

 Bacteremia due to Staphylococcus aureus 2017 

 Nondiabetic gastroparesis 2017 

 Ileus (HCC) 2017 

 Abdominal pain 2017 

 Acute hypernatremia 2017 

 Hx of pancreatitis 2017 

 CAD (coronary artery disease) 2017 

 Hypokalemia 2017 

 Malnutrition (HCC) 2017 

 Pseudocyst of pancreas 2017 

 Infective necrosis of pancreas 2017 

 Necrotizing pancreatitis 2017 

 Pancreatitis 2017 

 Hyperlipemia 2013 

 CAD (coronary artery disease) 2012 

 HTN (hypertension) 2012 

 Low testosterone 2012 

 Sleep apnea  

 Ruptured lumbar disc  

 Diverticulitis  



Objective:       

 amoxicillin/K clavulanate (AUGMENTIN) 875/125 mg tablet Take 1 tablet by 
mouth every 12 hours for 75 days. Take with food. 

 aspirin EC 81 mg tablet Take 81 mg by mouth daily.   

 citalopram (CELEXA) 20 mg tablet Take 20 mg by mouth daily. 

 clopiDOGrel (PLAVIX) 75 mg tablet Take 75 mg by mouth daily. 

 docusate (COLACE) 100 mg capsule Take 1 capsule by mouth twice daily. 

 famotidine (PEPCID) 20 mg tablet Take 20 mg by mouth twice daily. 

 fenofibrate(+) (TRIGLIDE) 160 mg tablet Take 160 mg by mouth daily. Take 
with food. 

 finasteride (PROSCAR) 5 mg tablet Take 5 mg by mouth daily. 

 fish oil- omega 3-DHA//1,000 mg capsule Take 1 Cap by mouth twice 
daily. 

 fluconazole (DIFLUCAN) 200 mg tablet Take 2 tablets by mouth daily for 14 
days. 

 hydroCHLOROthiazide (HYDRODIURIL) 25 mg tablet Take 25 mg by mouth every 
morning. 

 ibuprofen (MOTRIN) 400 mg tablet Take 400 mg by mouth every 6 hours as 
needed for Pain (Alternates with tylenol). Take with food.   Indications: FEVER 

 lisinopril (PRINIVIL; ZESTRIL) 20 mg tablet Take 20 mg by mouth daily. 

 loratadine (CLARITIN) 10 mg tablet Take 10 mg by mouth every morning. 

 MULTIVITAMINS WITH FLUORIDE (MULTI-VITAMIN PO) Take 1 Tab by mouth daily. 

 omeprazole DR(+) (PRILOSEC) 20 mg capsule Take 20 mg by mouth daily before 
breakfast. 

 ondansetron (ZOFRAN ODT) 4 mg rapid dissolve tablet Dissolve 4 mg by mouth 
every 8 hours as needed for Nausea or Vomiting. Place on tongue to disolve. 

 oxyCODONE (ROXICODONE, OXY-IR) 5 mg tablet Take 1 tablet by mouth every 4 
hours as needed Earliest Fill Date: 17 

 polyethylene glycol 3350 (MIRALAX) 17 g packet Take 1 packet by mouth 
daily. 

 prochlorperazine maleate (COMPAZINE) 10 mg tablet Take 1 tablet by mouth 
every 6 hours as needed for Nausea or Vomiting for up to 7 days. 

 scopolamine (TRANSDERM-SCOP) 1.5 mg 3 day patch Apply 1 patch to top of 
skin as directed every 72 hours. 

 senna/docusate (SENOKOT-S) 8.6/50 mg tablet Take 1 tablet by mouth twice 
daily. 

 simethicone (MYLICON) 80 mg chew tablet Chew 1 tablet by mouth every 6 
hours as needed for Flatulence. 

 tamsulosin (FLOMAX) 0.4 mg capsule Take 0.4 mg by mouth daily. Do not crush
, chew or open capsules. Take 30 minutes following the same meal each day. 

 vitamins, B complex tab Take 1 Tab by mouth daily. 

 zolpidem (AMBIEN) 5 mg tablet Take 5 mg by mouth at bedtime daily. 



Vitals: 

 17 0957 

BP: 124/62 

Pulse: 108 

Resp: 16 

Temp: 37.8 C (100 F) 

TempSrc: Oral 

Weight: 70.8 kg (156 lb) 

Height: 170.2 cm (67") 



Body mass index is 24.43 kg/(m^2). 



Physical Exam

General appearance  alert, cooperative, no distress, appears stated age 

Head  Normocephalic, without obvious abnormality, atraumatic 

Eyes  conjunctivae/corneas clear. EOM's intact. pupils equally round, 
accommodation normal. 

Nose Nares normal. No drainage or sinus tenderness. 

Throat Lips, mucosa, and tongue normal. Teeth and gums normal 

Neck supple, symmetrical, trachea midline, and no JVD 

Back   symmetric, no curvature. ROM normal. No CVA tenderness 

Lungs   clear to auscultation bilaterally 

Chest wall  no tenderness 

Heart  regular rate and rhythm, S1, S2 normal, no murmur, click, rub or gallop 

Abdomen   soft, non-tender. Scar of prior ileostomy. Bowel sounds normal. No 
masses,  No organomegaly 

Extremities extremities normal, atraumatic, no cyanosis or edema 

Pulses 2+ and symmetric 

Skin Skin color, texture, turgor normal. No rashes or lesions 

Neurologic Normal 



 

Assessment and Plan:



This is a 77-year-old  male with history of acute necrotizing 
pancreatitis, complicated by the development of walled off pancreatic necrosis 
that was treated with cyst gastrostomy and placement of a lumen opposing stent.
  He required several sessions of debridement.  Recently this was again 
performed about 3 weeks ago.  Subsequent to this a CT scan has shown 
significant improvement in the collection.  However recently he started 
developing fever.  Today it is a little better.  However he continues to have 
some nausea and abdominal bloating.



I discussed with the patient that if he continues to have fever or and if it 
worsens then we will have to admit him and repeat upper endoscopy to see if he 
needs further debridement.  He does have a multiloculated fluid collection in 
the right pararenal space.  However this is not significant in size to cause 
gastric outlet obstruction.  It is possible that this may be infected.  It may 
require placement of a pigtail drain if it is not amenable to endoscopic 
drainage byEUS.



We will check CBC and CMP.  I recommend that he start on ciprofloxacin 500 mg 
p.o. twice daily.  We will schedule him for an upper endoscopy/EUS.  I advised 
him to come to the ER if his abdominal pain worsens or if he continues to have 
fever.



Follow-up in 6-8 weeks

Thank you for allowing us to participate in his care and please feel free to 
call us if you have any questions.

  



 



 



in this encounter



Plan of Treatment







    



  Date   Type   Specialty   Care Team   Description

 

    



  2018   Procedure Pass   Infectious Diseases  

 

    



  2018   Surgery    Fan Tidwell MD   ESOPHAGOGASTRODUODENOSCOP



     [address]



     [address] 



     [place] [address] [phone] (Fax) 

 

    



  2018   Procedure Pass   

 

    



  2018   Hospital    Fan Tidwell MD   Pancreatic necrosis



   Encounter    [address] 



     [address] 



     [place] [address] 



     [phone] (Fax) 



as of this encounter



Visit Diagnoses











  Diagnosis

 





  Pancreatic necrosis - Primary

 





  Other specified disease of pancreas

## 2018-02-05 NOTE — XMS REPORT
Encounter Summary

 Created on: 2018



Moshe Lopez

External Reference #: [id number]

: 1940

Sex: Male



Demographics







 Address  [address] [place]  [address]

 

 Home Phone  [phone]

 

 Preferred Language  English

 

 Marital Status  Unknown

 

 Cheondoism Affiliation  NON

 

 Race  White

 

 Ethnic Group  Not  or 





Author







 Author  [facility]

 

 Organization  [facility]

 

 Address  Unknown

 

 Phone  Unavailable







Support







 Name  Relationship  Address  Phone

 

 Latisha Lopez  ECON  Unknown  [phone]







Care Team Providers







 Care Team Member Name  Role  Phone

 

 Jaelyn Velez   PCP  [phone]

 

 AgustinJaelyn DO  Unavailable  [phone]







Encounter Details







    



  Date   Type   Department   Care Team   Description

 

    



  2018   [facility]   Aby Bah MD   Long term (current) use



   Encounter   [address]   [address]   of antibiotics



    [place] [address]   [address] 



     [place] [address] 



     [phone] (Fax) 







Social History







    



  Tobacco Use   Types   Packs/Day   Years Used   Date

 

    



  Former Smoker   Cigarettes, Pipe, Cigars   1   15   Quit: 1975

 

    



  Smokeless Tobacco: Never   



  Used   









   



  Alcohol Use   Drinks/Week   oz/Week   Comments

 

   



  Yes   1 Cans of   0.6   1 beer every 2 weeks



   beer  









 



  Sex Assigned at Birth   Date Recorded

 

 



  Not on file 



as of this encounter



Functional Status







  



  Functional Status   Response   Date of Assessment

 

  



  Does the patient have a hearing impairment:   No   2017

 

  



  Does the patient have a visual impairment:   Yes   2017

 

  



  Does the patient have impaired ambulation:   No   2017

 

  



  Does the patient have an activity of daily living   No   2017



  (ADL) impairment:  

 

  



  Does the patient have an instrumental activity of   No   2017



  daily living (IADL) impairment:  









  



  Cognitive Status   Response   Date of Assessment

 

  



  Does the patient have a cognitive impairment:   No   2017



as of this encounter



Medications at Time of Discharge







     



  Medication   Sig.   Disp.   Refills   Start Date   End Date

 

     



  acetaminophen (TYLENOL)   Take 2 tablets by mouth    0   2017 



  325 mg tablet   every 4 hours as needed.    

 

     



  aspirin EC 81 mg tablet   Take 81 mg by mouth    



   daily.    

 

     



  citalopram (CELEXA) 20 mg   Take 20 mg by mouth    



  tablet   daily.    

 

     



  docusate (COLACE) 100 mg   Take 1 capsule by mouth   180 capsule   3   2017 



  capsule   twice daily.    

 

     



  famotidine (PEPCID) 20 mg   Take 20 mg by mouth twice    



  tablet   daily.    

 

     



  fenofibrate(+) (TRIGLIDE)   Take 160 mg by mouth    



  160 mg tablet   daily. Take with food.    

 

     



  finasteride (PROSCAR) 5   Take 5 mg by mouth daily.    



  mg tablet     

 

     



  fish oil- omega 3-DHA/EPA   Take 1 Cap by mouth twice    



  300/1,000 mg capsule   daily.    

 

     



  lisinopril (PRINIVIL;   Take 20 mg by mouth    



  ZESTRIL) 20 mg tablet   daily.    

 

     



  loratadine (CLARITIN) 10   Take 10 mg by mouth every    



  mg tablet   morning.    

 

     



  melatonin 3 mg tab   Take 1 tablet by mouth at     2017 



   bedtime as needed    



   (insomnia).    

 

     



  MULTIVITAMINS WITH   Take 1 Tab by mouth    



  FLUORIDE (MULTI-VITAMIN   daily.    



  PO)     

 

     



  omeprazole DR(+)   Take 2 capsules by mouth   90 capsule   3   2017 



  (PRILOSEC) 20 mg capsule   daily before breakfast.    

 

     



  oxyCODONE (ROXICODONE,   Take 1 tablet by mouth   40 tablet   0   2017 



  OXY-IR) 5 mg tablet   every 4 hours as needed    

 

     



  polyethylene glycol 3350   Take 1 packet by mouth   12 each   5   2017 



  (MIRALAX) 17 g packet   daily.    

 

     



  simethicone (MYLICON) 80   Chew 1 tablet by mouth   30 tablet   0   2017 



  mg chew tablet   every 6 hours as needed    



   for Flatulence.    

 

     



  tamsulosin (FLOMAX) 0.4   Take 0.4 mg by mouth    



  mg capsule   daily. Do not crush, chew    



   or open capsules. Take 30    



   minutes following the    



   same meal each day.    

 

     



  vitamins, B complex tab   Take 1 Tab by mouth    



   daily.    

 

     



  ferrous sulfate (FEOSOL,   Take 1 tablet by mouth   90 tablet   3   2018



  FEROSUL) 325 mg (65 mg   three times daily with    



  iron) tablet   meals. Take on an empty    



   stomach at least 1 hour    



   before or 2 hours after    



   food.    

 

     



  levoFLOXacin (LEVAQUIN)   Take 1 tablet by mouth   14 tablet   1   2017



  750 mg tablet   daily.    

 

     



  metroNIDAZOLE (FLAGYL)   Take 1 tablet by mouth   42 tablet   1   2017



  500 mg tablet   three times daily. Take    



   with food. Do not drink    



   alcohol while on    



   metronidazole.    

 

     



  ondansetron (ZOFRAN ODT)   Dissolve 4 mg by mouth      2018



  4 mg rapid dissolve   every 8 hours. Place on    



  tablet   tongue to disolve.    

 

     



  scopolamine   Apply 1 patch to top of      2018



  (TRANSDERM-SCOP) 1.5 mg 3   skin as directed every 72    



  day patch   hours.    

 

     



  senna/docusate   Take 1 tablet by mouth     2017



  (SENOKOT-S) 8.6/50 mg   daily as needed.    



  tablet     



as of this encounter



Plan of Treatment







    



  Date   Type   Specialty   Care Team   Description

 

    



  2018   Procedure Pass   Infectious Diseases  

 

    



  2018   Surgery    Fan Tidwell MD   ESOPHAGOGASTRODUODENOSCOP



     [address]



     [address] 



     [place] [address] [phone] (Fax) 

 

    



  2018   Procedure Pass   

 

    



  2018   Hospital    Fan Tidwell MD   Pancreatic necrosis



   Encounter    [address] 



     [address] 



     [place] [address] [phone] (Fax) 



as of this encounter



Results

* COMPREHENSIVE METABOLIC PANEL (2018  3:10 PM)





  



  Component   Value   Ref Range

 

  



  Sodium   141   137 - 147 MMOL/L

 

  



  Potassium   4.0   3.5 - 5.1 MMOL/L

 

  



  Chloride   106   98 - 110 MMOL/L

 

  



  Glucose   126 (H)   70 - 100 MG/DL

 

  



  Blood Urea Nitrogen   18   7 - 25 MG/DL

 

  



  Creatinine   1.02   0.4 - 1.24 MG/DL

 

  



  Calcium   9.4   8.5 - 10.6 MG/DL

 

  



  Total Protein   6.9   6.0 - 8.0 G/DL

 

  



  Total Bilirubin   0.2 (L)   0.3 - 1.2 MG/DL

 

  



  Albumin   4.1   3.5 - 5.0 G/DL

 

  



  Alk Phosphatase   56   25 - 110 U/L

 

  



  AST (SGOT)   31   7 - 40 U/L

 

  



  CO2   27   21 - 30 MMOL/L

 

  



  ALT (SGPT)   22   7 - 56 U/L

 

  



  Anion Gap   8   3 - 12

 

  



  eGFR Non African American   >60   >60 mL/min



   Comment: 



   The eGFR is not validated for use in drug dosing 



   adjustments.    Continue to use 



   estimated creatinine clearance per dosing 



   reference text.    Please contact the 



   Clinical Pharmacist for questions. 

 

  



  eGFR    >60   >60 mL/min



   Comment: 



   The eGFR is not validated for use in drug dosing 



   adjustments.    Continue to use 



   estimated creatinine clearance per dosing 



   reference text.    Please contact the 



   Clinical Pharmacist for questions. 









 



  Specimen   Performing Laboratory

 

 



  Blood   [facility]



   [address] [place] [address]





* CBC AND DIFF (2018  3:10 PM)





  



  Component   Value   Ref Range

 

  



  White Blood Cells   7.0   4.5 - 11.0 K/UL

 

  



  RBC   4.15 (L)   4.4 - 5.5 M/UL

 

  



  Hemoglobin   11.7 (L)   13.5 - 16.5 GM/DL

 

  



  Hematocrit   34.9 (L)   40 - 50 %

 

  



  MCV   84.2   80 - 100 FL

 

  



  MCH   28.2   26 - 34 PG

 

  



  MCHC   33.5   32.0 - 36.0 G/DL

 

  



  RDW   22.4 (H)   11 - 15 %

 

  



  Platelet Count   228   150 - 400 K/UL

 

  



  MPV   9.0   7 - 11 FL

 

  



  Neutrophils   68   41 - 77 %

 

  



  Lymphocytes   21 (L)   24 - 44 %

 

  



  Monocytes   8   4 - 12 %

 

  



  Eosinophils   3   0 - 5 %

 

  



  Basophils   0   0 - 2 %

 

  



  Absolute Neutrophil Count   4.70   1.8 - 7.0 K/UL

 

  



  Absolute Lymph Count   1.50   1.0 - 4.8 K/UL

 

  



  Absolute Monocyte Count   0.50   0 - 0.80 K/UL

 

  



  Absolute Eosinophil Count   0.20   0 - 0.45 K/UL

 

  



  Absolute Basophil Count   0.00   0 - 0.20 K/UL









 



  Specimen   Performing Laboratory

 

 



  Blood   [facility]



   [address] [place] [address]





in this encounter



Visit Diagnoses











  Diagnosis

 





  Encounter for long-term (current) use of antibiotics

 





  Fluid collection of pancreas







Admitting Diagnoses











  Diagnosis

 





  Long term (current) use of antibiotics

## 2018-02-05 NOTE — XMS REPORT
Encounter Summary

 Created on: 2018



Moshe Lopez

External Reference #: [id number]

: 1940

Sex: Male



Demographics







 Address  [address] [place]  [address]

 

 Home Phone  [phone]

 

 Preferred Language  English

 

 Marital Status  Unknown

 

 Yarsani Affiliation  NON

 

 Race  White

 

 Ethnic Group  Not  or 





Author







 Author  [facility]

 

 Organization  [facility]

 

 Address  Unknown

 

 Phone  Unavailable







Support







 Name  Relationship  Address  Phone

 

 Latisha Lopez  ECON  Unknown  [phone]







Care Team Providers







 Care Team Member Name  Role  Phone

 

 VelezJaelyn beck   PCP  [phone]

 

 Agustin Jaelyn DO  Unavailable  [phone]







Reason for Visit

* 





 



  Reason   Comments

 

 



  Vomiting 









Encounter Details







    



  Date   Type   Department   Care Team   Description

 

    



  2017   Telephone   [facility]   Fan Tidwell MD   Vomiting



    Physicians - Internal   [address] [facility] 



    Medicine   [address] 



    [address] [place] [address] 



    [address] [facility] MED   [phone] 



    OFFICE BLDG   [phone] (Fax) 



    [place]  



    [address] [phone]  







Social History







    



  Tobacco Use   Types   Packs/Day   Years Used   Date

 

    



  Former Smoker   Cigarettes, Pipe, Cigars   1   15   Quit: 1975

 

    



  Smokeless Tobacco: Never   



  Used   









   



  Alcohol Use   Drinks/Week   oz/Week   Comments

 

   



  Yes   1 Cans of   0.6   1 beer every 2 weeks



   beer  









 



  Sex Assigned at Birth   Date Recorded

 

 



  Not on file 



as of this encounter



Functional Status







  



  Functional Status   Response   Date of Assessment

 

  



  Does the patient have a hearing impairment:   No   2017

 

  



  Does the patient have a visual impairment:   Yes   10/12/2017

 

  



  Does the patient have impaired ambulation:   Yes   10/12/2017

 

  



  Does the patient have an activity of daily living   No   10/12/2017



  (ADL) impairment:  

 

  



  Does the patient have an instrumental activity of   No   10/12/2017



  daily living (IADL) impairment:  









  



  Cognitive Status   Response   Date of Assessment

 

  



  Does the patient have a cognitive impairment:   No   10/12/2017



as of this encounter



Miscellaneous Notes

* Telephone Encounter - Rosa Maria Sullivan RN - 2017  1:39 PM CST



Called and spoke to the pts wife. No the pt isn't having abd pain or fever. 
Wednesday at 10 at [facility]. She verbalized understanding.  

* Telephone Encounter - Rosa Maria Sullivan RN - 2017  1:38 PM CST



From: Fan Tidwell MD

   Sent: 2017  12:13 PM

     To: Rosa Maria Sullivan RN



Is he having abd pain or fever?

Schedule follow up in clinic this week



* Telephone Encounter - Rosa Maria Sullivan RN - 2017 11:33 AM CST



Pts wife called and LM stating that since being in the ER on 17 
he has still been vomiting. He is taking the Zofran every 6 hours and Compazine 
every 8 hours. She states she isn't going back to the ER, she states "they 
think I'm nuts". She feels that her husbands gallbladder needs to be taken out. 
Routing to Dr Tidwell to advise. 

in this encounter



Plan of Treatment







    



  Date   Type   Specialty   Care Team   Description

 

    



  2018   Procedure Pass   Infectious Diseases  

 

    



  2018   Surgery    Fan Tidwell MD   ESOPHAGOGASTRODUODENOSCOP



     [address] [facility]   [address]



     [address] 



     [place] [address] 



     [phone] (Fax) 

 

    



  2018   Procedure Pass   

 

    



  2018   Hospital    Fan Tidwell MD   Pancreatic necrosis



   Encounter    [address] [facility] 



     [address] 



     [place] [address] 



     [phone] (Fax) 



as of this encounter



Visit Diagnoses

Not on filein this encounter

## 2018-02-05 NOTE — XMS REPORT
Encounter Summary

 Created on: 2018



Moshe Lopez

External Reference #: [id number]

: 1940

Sex: Male



Demographics







 Address  [address] [place]  [address]

 

 Home Phone  [phone]

 

 Preferred Language  English

 

 Marital Status  Unknown

 

 Restorationism Affiliation  NON

 

 Race  White

 

 Ethnic Group  Not  or 





Author







 Author  [facility]

 

 Organization  [facility]

 

 Address  Unknown

 

 Phone  Unavailable







Support







 Name  Relationship  Address  Phone

 

 Ltaisha Lopez  Unknown  [phone]







Care Team Providers







 Care Team Member Name  Role  Phone

 

 VelezJaelyn beck   PCP  [phone]

 

 Jaelyn Velez DO  Unavailable  [phone]







Reason for Visit

* Auth/Cert





     



  Status   Reason   Specialty   Diagnoses /   Referred By   Referred To



     Procedures   Contact   Contact

 

     



     Diagnoses  



     Pancreatic  



     necrosis  



     UNKNOWN

  



     P  



     rocedures  



     ESOPHAGOGASTRODU  



     ODENOSCOPY  











Encounter Details







    



  Date   Type   Department   Care Team   Description

 

    



  2018   Surgery   [facility]   Fan Tidwell MD   
ESOPHAGOGASTRODUODENOSCOP



    Endoscopy   [address]   [address]



    [address]   [address] 



    [place] [address]   [place] [address] 



    [phone] (Fax) 







Social History







    



  Tobacco Use   Types   Packs/Day   Years Used   Date

 

    



  Former Smoker   Cigarettes, Pipe, Cigars   1   15   Quit: 1975

 

    



  Smokeless Tobacco: Never   



  Used   









   



  Alcohol Use   Drinks/Week   oz/Week   Comments

 

   



  Yes   1 Cans of   0.6   1 beer every 2 weeks



   beer  









 



  Sex Assigned at Birth   Date Recorded

 

 



  Not on file 



as of this encounter



Functional Status







  



  Functional Status   Response   Date of Assessment

 

  



  Does the patient have a hearing impairment:   No   2017

 

  



  Does the patient have a visual impairment:   Yes   2017

 

  



  Does the patient have impaired ambulation:   No   2017

 

  



  Does the patient have an activity of daily living   No   2017



  (ADL) impairment:  

 

  



  Does the patient have an instrumental activity of   No   2017



  daily living (IADL) impairment:  









  



  Cognitive Status   Response   Date of Assessment

 

  



  Does the patient have a cognitive impairment:   No   2017



as of this encounter



Plan of Treatment







    



  Date   Type   Specialty   Care Team   Description

 

    



  2018   Procedure Pass   Infectious Diseases  

 

    



  2018   Surgery    Fan Tidwell MD   ESOPHAGOGASTRODUODENOSCOP



     [address] [facility]   [address]



     [address] 



     [place] [address] [phone] (Fax) 

 

    



  2018   Procedure Pass   

 

    



  2018   [facility]    Fan Tidwell MD   Pancreatic necrosis



   Encounter    [address] [facility] 



     [address] 



     [place] [address] [phone] (Fax) 



as of this encounter



Visit Diagnoses











  Diagnosis

 





  Pancreatic necrosis

 





  Other specified disease of pancreas







Admitting Diagnoses











  Diagnosis

 





  Pancreatic necrosis - UNKNOWN

 





  Other specified disease of pancreas

## 2018-02-05 NOTE — XMS REPORT
Encounter Summary

 Created on: 2018



Moshe Lopez

External Reference #: [id number]

: 1940

Sex: Male



Demographics







 Address  [address] [place]  [address]

 

 Home Phone  [phone]

 

 Preferred Language  English

 

 Marital Status  Unknown

 

 Oriental orthodox Affiliation  NON

 

 Race  White

 

 Ethnic Group  Not  or 





Author







 Author  [facility]

 

 Organization  [facility]

 

 Address  Unknown

 

 Phone  Unavailable







Support







 Name  Relationship  Address  Phone

 

 Latisha Lopez  ECON  Unknown  [phone]







Care Team Providers







 Care Team Member Name  Role  Phone

 

 Jaelyn Velez DO  PCP  [phone]

 

 VelezJaelyn DO  Unavailable  [phone]







Encounter Details







    



  Date   Type   Department   Care Team   Description

 

    



  2017   Outpt.   [facility]   Aby Bah MD 



   Antibiotic   Physicians - Internal   [address] [facility] [facility] 



   Therapy   Medicine   [address] 



    [address] [place] [address] 



    [address] [facility] MED   [phone] 



    OFFICE BLDG   [phone] (Fax) 



    [place]  



    [address] [phone]  







Social History







    



  Tobacco Use   Types   Packs/Day   Years Used   Date

 

    



  Former Smoker   Cigarettes, Pipe, Cigars   1   15   Quit: 1975

 

    



  Smokeless Tobacco: Never   



  Used   









   



  Alcohol Use   Drinks/Week   oz/Week   Comments

 

   



  Yes   1 Cans of   0.6   1 beer every 2 weeks



   beer  









 



  Sex Assigned at Birth   Date Recorded

 

 



  Not on file 



as of this encounter



Functional Status







  



  Functional Status   Response   Date of Assessment

 

  



  Does the patient have a hearing impairment:   No   2017

 

  



  Does the patient have a visual impairment:   Yes   10/12/2017

 

  



  Does the patient have impaired ambulation:   Yes   10/12/2017

 

  



  Does the patient have an activity of daily living   No   10/12/2017



  (ADL) impairment:  

 

  



  Does the patient have an instrumental activity of   No   10/12/2017



  daily living (IADL) impairment:  









  



  Cognitive Status   Response   Date of Assessment

 

  



  Does the patient have a cognitive impairment:   No   10/12/2017



as of this encounter



Plan of Treatment







    



  Date   Type   Specialty   Care Team   Description

 

    



  2018   Procedure Pass   Infectious Diseases  

 

    



  2018   Surgery    Fan Tidwell MD   ESOPHAGOGASTRODUODENOSCOP



     [address]   [address]



     [address] 



     [place] [address] 



     [phone] (Fax) 

 

    



  2018   Procedure Pass   

 

    



  2018   Hospital    Fan Tidwell MD   Pancreatic necrosis



   Encounter    [address] 



     [address] 



     [place] [address] 



     [phone] (Fax) 



as of this encounter



Results

* BUN (2017)





  



  Component   Value   Ref Range

 

  



  Blood Urea Nitrogen   24 









 



  Specimen   Performing Laboratory

 

 



  Blood   IN CLINIC





* ALT (SGPT) (2017)





  



  Component   Value   Ref Range

 

  



  ALT (SGPT)   29 









 



  Specimen   Performing Laboratory

 

 



  Blood   IN CLINIC





* AST (SGOT) (2017)





  



  Component   Value   Ref Range

 

  



  AST (SGOT)   39 









 



  Specimen   Performing Laboratory

 

 



  Blood   IN CLINIC





* POTASSIUM (2017)





  



  Component   Value   Ref Range

 

  



  Potassium   4.0 









 



  Specimen   Performing Laboratory

 

 



  Blood   IN CLINIC





* ALK PHOS TOTAL (2017)





  



  Component   Value   Ref Range

 

  



  Alk Phosphatase   63 









 



  Specimen   Performing Laboratory

 

 



  Blood   IN CLINIC





* CREATININE (2017)





  



  Component   Value   Ref Range

 

  



  Creatinine   0.77 









 



  Specimen   Performing Laboratory

 

 



  Blood   IN CLINIC





* PLATELET COUNT (2017)





  



  Component   Value   Ref Range

 

  



  Platelet Count   231 









 



  Specimen   Performing Laboratory

 

 



  Blood   IN CLINIC





* CBC (2017)





  



  Component   Value   Ref Range

 

  



  White Blood Cells   7.46 









 



  Specimen   Performing Laboratory

 

 



  Blood   IN CLINIC





* HEMOGLOBIN (2017)





  



  Component   Value   Ref Range

 

  



  Hemoglobin   10.2 









 



  Specimen   Performing Laboratory

 

 



  Blood   IN CLINIC





in this encounter



Visit Diagnoses

Not on filein this encounter

## 2018-02-05 NOTE — XMS REPORT
Encounter Summary

 Created on: 2018



Moshe Lopez

External Reference #: [id number]

: 1940

Sex: Male



Demographics







 Address  [address] [place] [address]  [address]

 

 Home Phone  [phone]

 

 Preferred Language  English

 

 Marital Status  Unknown

 

 Baptism Affiliation  NON

 

 Race  White

 

 Ethnic Group  Not  or 





Author







 Author  [facility]

 

 Organization  [facility]

 

 Address  Unknown

 

 Phone  Unavailable







Support







 Name  Relationship  Address  Phone

 

 Latisha Lopez  ECON  Unknown  [phone]







Care Team Providers







 Care Team Member Name  Role  Phone

 

 Jaelyn Velez   PCP  [phone]

 

 VelezJaelyn DO  Unavailable  [phone]







Reason for Visit

* 





 



  Reason   Comments

 

 



  Imm/Inj   abx

 

 



  Test/procedure   labs





* Treatment (Routine)





     



  Status   Reason   Specialty   Diagnoses /   Referred By   Referred To



     Procedures   Contact   Contact

 

     



  Closed     Diagnoses   [facility]



     Pancreatic   [address] FLO[address]OD [address]   Cl



     pseudocyst

   [address]



     [address]   [address] MED OFFICE   [place]



     rocedures   [address]   [address]



     [address] single   [place]   Phone:



     dose   [address] [phone]



      Phone:   Fax: [phone] 



      Fax: 



      [phone] 











Encounter Details







    



  Date   Type   Department   Care Team   Description

 

    



  2017   Infusion   [facility]   Aby Bah MD   
Pancreatic pseudocyst



    [address]   [address]   (Primary Dx)



    [place] [address]   [address] 



    [phone]   [place] [address] 



     [phone] (Fax) 







Social History







    



  Tobacco Use   Types   Packs/Day   Years Used   Date

 

    



  Former Smoker   Cigarettes, Pipe, Cigars   1   15   Quit: 1975

 

    



  Smokeless Tobacco: Never   



  Used   









   



  Alcohol Use   Drinks/Week   oz/Week   Comments

 

   



  Yes   1 Cans of   0.6   1 beer every 2 weeks



   beer  









 



  Sex Assigned at Birth   Date Recorded

 

 



  Not on file 



as of this encounter



Last Filed Vital Signs







  



  Vital Sign   Reading   Time Taken

 

  



  Blood Pressure   139/65   2017 11:12 AM CST

 

  



  Pulse   60   2017 11:12 AM CST

 

  



  Temperature   36.6   C (97.9   F)   2017 11:12 AM CST

 

  



  Respiratory Rate   -   -

 

  



  Oxygen Saturation   98%   2017 11:12 AM CST

 

  



  Inhaled Oxygen   -   -



  Concentration  

 

  



  Weight   -   -

 

  



  Height   -   -

 

  



  Body Mass Index   -   -



in this encounter



Functional Status







  



  Functional Status   Response   Date of Assessment

 

  



  Does the patient have a hearing impairment:   No   2017

 

  



  Does the patient have a visual impairment:   Yes   2017

 

  



  Does the patient have impaired ambulation:   No   2017

 

  



  Does the patient have an activity of daily living   No   2017



  (ADL) impairment:  

 

  



  Does the patient have an instrumental activity of   No   2017



  daily living (IADL) impairment:  









  



  Cognitive Status   Response   Date of Assessment

 

  



  Does the patient have a cognitive impairment:   No   2017



as of this encounter



Instructions

* Patient Instructions - Zain Daily, LEOPOLDO - 2017 11:12 AM CST



Post infusion emergency medical treatment: Go to the closest Emergency 
Department or call 911. For non-urgent questions or concerns, call [phone] 
after 0900 the following day(including weekends & holidays). For urgent medical 
questions, call [phone] and ask to speak to the On-Call Physician covering 
for the physician prescribing your infusion medication.  



Ertapenem Sodium Solution for injection

What is this medicine?

ERTAPENEM (er ta PEN em) is a carbapenem antibiotic. It is used to treat 
certain kinds of bacterial infections. It will not work for colds, flu, or 
other viral infections.

This medicine may be used for other purposes; ask your health care provider or 
pharmacist if you have questions.

What should I tell my health care provider before I take this medicine?

They need to know if you have any of these conditions:

 brain tumor, lesion

 kidney disease

 seizure disorder

 an unusual or allergic reaction to ertapenem, other antibiotics, amide local 
anesthetics like lidocaine, or other medicines, foods, dyes, or preservatives

 pregnant or trying to get pregnant

 breast-feeding

How should I use this medicine?

This medicine is infused into a vein or injected deep into a muscle. It is 
usually given by a health care professional in a hospital or clinic setting.

If you get this medicine at home, you will be taught how to prepare and give 
this medicine. Use exactly as directed. Take your medicine at regular 
intervals. Do not take your medicine more often than directed.

It is important that you put your used needles and syringes in a special sharps 
container. Do not put them in a trash can. If you do not have a sharps container
, call your pharmacist or healthcare provider to get one.

Talk to your pediatrician regarding the use of this medicine in children. While 
this drug may be prescribed for children as young as 3 months old for selected 
conditions, precautions do apply.

Overdosage: If you think you have taken too much of this medicine contact a 
poison control center or emergency room at once.

NOTE: This medicine is only for you. Do not share this medicine with others.

What if I miss a dose?

If you miss a dose, take it as soon as you can. If it is almost time for your 
next dose, take only that dose. Do not take double or extra doses.

What may interact with this medicine?

 birth control pills

 probenecid

This list may not describe all possible interactions. Give your health care 
provider a list of all the medicines, herbs, non-prescription drugs, or dietary 
supplements you use. Also tell them if you smoke, drink alcohol, or use illegal 
drugs. Some items may interact with your medicine.

What should I watch for while using this medicine?

Tell your doctor or health care professional if your symptoms do not improve or 
if you get new symptoms. Your doctor will monitor your condition and blood work 
as needed.

Do not treat diarrhea with over the counter products. Contact your doctor if 
you have diarrhea that lasts more than 2 days or if it is severe and watery.

What side effects may I notice from receiving this medicine?

Side effects that you should report to your doctor or health care professional 
as soon as possible:

 allergic reactions like skin rash, itching or hives, swelling of the face, 
lips, or tongue

 anxiety, confusion, dizzy

 chest pain

 difficulty breathing, wheezing

 edema, swelling

 fever

 irregular heart rate, blood pressure

 pain or difficulty passing urine

 seizures

 unusually weak or tired

 white or red patches in mouth

Side effects that usually do not require medical attention (report to your 
doctor or health care professional if they continue or are bothersome):

 constipation or diarrhea

 difficulty sleeping

 headache

 nausea, vomiting

 pain, swelling or irritation where injected

 stomach upset

 vaginal itch, irritation

This list may not describe all possible side effects. Call your doctor for 
medical advice about side effects. You may report side effects to FDA at 3-893-
YFG-3850.

Where should I keep my medicine?

Keep out of the reach of children.

You will be instructed on how to store this medicine. Throw away any unused 
medicine after the expiration date on the label.

NOTE:This sheet is a summary. It may not cover all possible information. If you 
have questions about this medicine, talk to your doctor, pharmacist, or health 
care provider. Copyright 2017 [employer]





in this encounter



Plan of Treatment







    



  Date   Type   Specialty   Care Team   Description

 

    



  2018   Procedure Pass   Infectious Diseases  

 

    



  2018   Surgery    Fan Tidwell MD   ESOPHAGOGASTRODUODENOSCOP



     [address]   [address]



     [address] 



     [place] [address] [phone] (Fax) 

 

    



  2018   Procedure Pass   

 

    



  2018   Hospital    Fan Tidwell MD   Pancreatic necrosis



   Encounter    [address] [facility] 



     [address] 



     [place] [address] [phone] (Fax) 



as of this encounter



Results

* COMPREHENSIVE METABOLIC PANEL (2017 11:11 AM)





  



  Component   Value   Ref Range

 

  



  Sodium   139   137 - 147 MMOL/L

 

  



  Potassium   4.3   3.5 - 5.1 MMOL/L

 

  



  Chloride   107   98 - 110 MMOL/L

 

  



  Glucose   99   70 - 100 MG/DL

 

  



  Blood Urea Nitrogen   14   7 - 25 MG/DL

 

  



  Creatinine   0.87   0.4 - 1.24 MG/DL

 

  



  Calcium   9.4   8.5 - 10.6 MG/DL

 

  



  Total Protein   6.7   6.0 - 8.0 G/DL

 

  



  Total Bilirubin   0.3   0.3 - 1.2 MG/DL

 

  



  Albumin   3.7   3.5 - 5.0 G/DL

 

  



  Alk Phosphatase   55   25 - 110 U/L

 

  



  AST (SGOT)   32   7 - 40 U/L

 

  



  CO2   27   21 - 30 MMOL/L

 

  



  ALT (SGPT)   24   7 - 56 U/L

 

  



  Anion Gap   5   3 - 12

 

  



  eGFR Non African American   >60   >60 mL/min



   Comment: 



   The eGFR is not validated for use in drug dosing 



   adjustments.    Continue to use 



   estimated creatinine clearance per dosing 



   reference text.    Please contact the 



   Clinical Pharmacist for questions. 

 

  



  eGFR    >60   >60 mL/min



   Comment: 



   The eGFR is not validated for use in drug dosing 



   adjustments.    Continue to use 



   estimated creatinine clearance per dosing 



   reference text.    Please contact the 



   Clinical Pharmacist for questions. 









 



  Specimen   Performing Laboratory

 

 



  Blood   [facility]



   [address] [place] [address]





* CBC AND DIFF (2017 11:11 AM)





  



  Component   Value   Ref Range

 

  



  White Blood Cells   6.1   4.5 - 11.0 K/UL

 

  



  RBC   3.73 (L)   4.4 - 5.5 M/UL

 

  



  Hemoglobin   9.8 (L)   13.5 - 16.5 GM/DL

 

  



  Hematocrit   30.9 (L)   40 - 50 %

 

  



  MCV   82.7   80 - 100 FL

 

  



  MCH   26.4   26 - 34 PG

 

  



  MCHC   31.9 (L)   32.0 - 36.0 G/DL

 

  



  RDW   22.4 (H)   11 - 15 %

 

  



  Platelet Count   198   150 - 400 K/UL

 

  



  MPV   9.1   7 - 11 FL

 

  



  Neutrophils   69   41 - 77 %

 

  



  Lymphocytes   19 (L)   24 - 44 %

 

  



  Monocytes   6   4 - 12 %

 

  



  Eosinophils   6 (H)   0 - 5 %

 

  



  Basophils   0   0 - 2 %

 

  



  Absolute Neutrophil Count   4.20   1.8 - 7.0 K/UL

 

  



  Absolute Lymph Count   1.10   1.0 - 4.8 K/UL

 

  



  Absolute Monocyte Count   0.40   0 - 0.80 K/UL

 

  



  Absolute Eosinophil Count   0.30   0 - 0.45 K/UL

 

  



  Absolute Basophil Count   0.00   0 - 0.20 K/UL









 



  Specimen   Performing Laboratory

 

 



  Blood    MAIN LAB



   [address] [place] [address]





in this encounter



Visit Diagnoses











  Diagnosis

 





  Pancreatic pseudocyst - Primary

 





  Cyst and pseudocyst of pancreas







Administered Medications







     



  Medication Order   MAR Action   Action Date   Dose   Rate   Site

 

     



  ertapenem (INVANZ) IVP 1 g   Given   2017   1 g  



  1 g, Intravenous, 10 mL, Administer over    11:25 CST   



  5 Minutes, ONCE, 1 dose, u 17 at     



  1115, Reconstitute each 1 g vial with 10     



  mL sterile water for injection,     



  administer IV-Push over 5 minutes     

 

  



in this encounter

## 2018-02-05 NOTE — XMS REPORT
Encounter Summary

 Created on: 2018



Moshe Lopez

External Reference #: [id number]

: 1940

Sex: Male



Demographics







 Address  [address] [place]  [address]

 

 Home Phone  [phone]

 

 Preferred Language  English

 

 Marital Status  Unknown

 

 Catholic Affiliation  NON

 

 Race  White

 

 Ethnic Group  Not  or 





Author







 Author  [facility]

 

 Organization  [facility]

 

 Address  Unknown

 

 Phone  Unavailable







Support







 Name  Relationship  Address  Phone

 

 Latisha Lopez  ECON  Unknown  [phone]







Care Team Providers







 Care Team Member Name  Role  Phone

 

 Jaelyn Velez DO  PCP  [phone]

 

 Jaelyn Velez DO  Unavailable  [phone]







Encounter Details







    



  Date   Type   Department   Care Team   Description

 

    



  2017   Procedure Pass   Gen/Vasc/Plst/Trm  



    [address]  



    [place] [address] [phone]  







Social History







    



  Tobacco Use   Types   Packs/Day   Years Used   Date

 

    



  Former Smoker   Cigarettes, Pipe, Cigars   1   15   Quit: 1975

 

    



  Smokeless Tobacco: Never   



  Used   









   



  Alcohol Use   Drinks/Week   oz/Week   Comments

 

   



  Yes   1 Cans of   0.6   1 beer every 2 weeks



   beer  









 



  Sex Assigned at Birth   Date Recorded

 

 



  Not on file 



as of this encounter



Functional Status







  



  Functional Status   Response   Date of Assessment

 

  



  Does the patient have a hearing impairment:   No   2017

 

  



  Does the patient have a visual impairment:   Yes   10/12/2017

 

  



  Does the patient have impaired ambulation:   Yes   10/12/2017

 

  



  Does the patient have an activity of daily living   No   10/12/2017



  (ADL) impairment:  

 

  



  Does the patient have an instrumental activity of   No   10/12/2017



  daily living (IADL) impairment:  









  



  Cognitive Status   Response   Date of Assessment

 

  



  Does the patient have a cognitive impairment:   No   10/12/2017



as of this encounter



Plan of Treatment







    



  Date   Type   Specialty   Care Team   Description

 

    



  2018   Procedure Pass   Infectious Diseases  

 

    



  2018   Surgery    Fan Tidwell MD   ESOPHAGOGASTRODUODENOSCOP



     [address]



     [address] 



     [place] [address] [phone] (Fax) 

 

    



  2018   Procedure Pass   

 

    



  2018   Hospital    Fan Tidwell MD   Pancreatic necrosis



   Encounter    [address] [facility] 



     [address] 



     [place] [address] 



     [phone] (Fax) 



as of this encounter



Visit Diagnoses

Not on filein this encounter

## 2018-02-05 NOTE — XMS REPORT
Encounter Summary

 Created on: 2018



Moshe Lopez

External Reference #: [id number]

: 1940

Sex: Male



Demographics







 Address  [address] [place]  [address]

 

 Home Phone  [phone]

 

 Preferred Language  English

 

 Marital Status  Unknown

 

 Mu-ism Affiliation  NON

 

 Race  White

 

 Ethnic Group  Not  or 





Author







 Author  [facility]

 

 Organization  [facility]

 

 Address  Unknown

 

 Phone  Unavailable







Support







 Name  Relationship  Address  Phone

 

 Latisha Lopez  ECON  Unknown  [phone]







Care Team Providers







 Care Team Member Name  Role  Phone

 

 Jaelyn Velez DO  PCP  [phone]

 

 Jaelyn Velez DO  Unavailable  [phone]







Encounter Details







    



  Date   Type   Department   Care Team   Description

 

    



  2018   Procedure Pass   [facility]  



    Physicians - Internal  



    Medicine  



    4TH FLOOR POD C  



    [address]  



    OFFICE [address]  



    [place]  



    [address] [phone]  







Social History







    



  Tobacco Use   Types   Packs/Day   Years Used   Date

 

    



  Former Smoker   Cigarettes, Pipe, Cigars   1   15   Quit: 1975

 

    



  Smokeless Tobacco: Never   



  Used   









   



  Alcohol Use   Drinks/Week   oz/Week   Comments

 

   



  Yes   1 Cans of   0.6   1 beer every 2 weeks



   beer  









 



  Sex Assigned at Birth   Date Recorded

 

 



  Not on file 



as of this encounter



Functional Status







  



  Functional Status   Response   Date of Assessment

 

  



  Does the patient have a hearing impairment:   No   2017

 

  



  Does the patient have a visual impairment:   Yes   2017

 

  



  Does the patient have impaired ambulation:   No   2017

 

  



  Does the patient have an activity of daily living   No   2017



  (ADL) impairment:  

 

  



  Does the patient have an instrumental activity of   No   2017



  daily living (IADL) impairment:  









  



  Cognitive Status   Response   Date of Assessment

 

  



  Does the patient have a cognitive impairment:   No   2017



as of this encounter



Plan of Treatment







    



  Date   Type   Specialty   Care Team   Description

 

    



  2018   Procedure Pass   Infectious Diseases  

 

    



  2018   Surgery    Fan Tidwell MD   ESOPHAGOGASTRODUODENOSCOP



     [address]



     [address] 



     [place] [address] [phone] (Fax) 

 

    



  2018   Procedure Pass   

 

    



  2018   [facility]    Fan Tidwell MD   Pancreatic necrosis



   Encounter    [address] [facility] 



     [address] 



     [place] [address] [phone] (Fax) 



as of this encounter



Visit Diagnoses

Not on filein this encounter

## 2018-02-05 NOTE — XMS REPORT
Encounter Summary

 Created on: 2018



Moshe Lopez

External Reference #: [id number]

: 1940

Sex: Male



Demographics







 Address  [address] [place]  [address]

 

 Home Phone  [phone]

 

 Preferred Language  English

 

 Marital Status  Unknown

 

 Denominational Affiliation  NON

 

 Race  White

 

 Ethnic Group  Not  or 





Author







 Author  [facility]

 

 Organization  [facility]

 

 Address  Unknown

 

 Phone  Unavailable







Support







 Name  Relationship  Address  Phone

 

 Latisha Lopez  ECON  Unknown  [phone]







Care Team Providers







 Care Team Member Name  Role  Phone

 

 Jaelyn Velez DO  PCP  [phone]

 

 Jaelyn Velez DO  Unavailable  [phone]







Encounter Details







    



  Date   Type   Department   Care Team   Description

 

    



  2017   Procedure Pass   Gen/Vasc/Plst/Trm  



    [address]  



    [place] [address] [phone]  







Social History







    



  Tobacco Use   Types   Packs/Day   Years Used   Date

 

    



  Former Smoker   Cigarettes, Pipe, Cigars   1   15   Quit: 1975

 

    



  Smokeless Tobacco: Never   



  Used   









   



  Alcohol Use   Drinks/Week   oz/Week   Comments

 

   



  Yes   1 Cans of   0.6   1 beer every 2 weeks



   beer  









 



  Sex Assigned at Birth   Date Recorded

 

 



  Not on file 



as of this encounter



Functional Status







  



  Functional Status   Response   Date of Assessment

 

  



  Does the patient have a hearing impairment:   No   2017

 

  



  Does the patient have a visual impairment:   Yes   10/12/2017

 

  



  Does the patient have impaired ambulation:   Yes   10/12/2017

 

  



  Does the patient have an activity of daily living   No   10/12/2017



  (ADL) impairment:  

 

  



  Does the patient have an instrumental activity of   No   10/12/2017



  daily living (IADL) impairment:  









  



  Cognitive Status   Response   Date of Assessment

 

  



  Does the patient have a cognitive impairment:   No   10/12/2017



as of this encounter



Plan of Treatment







    



  Date   Type   Specialty   Care Team   Description

 

    



  2018   Procedure Pass   Infectious Diseases  

 

    



  2018   Surgery    Fan Tidwell MD   ESOPHAGOGASTRODUODENOSCOP



     [address]



     [address] 



     [place] [address] 



     [phone] (Fax) 

 

    



  2018   Procedure Pass   

 

    



  2018   Hospital    Fan Tidwell MD   Pancreatic necrosis



   Encounter    [address] [facility] 



     [address] 



     [place] [address] 



     [phone] (Fax) 



as of this encounter



Visit Diagnoses

Not on filein this encounter

## 2018-02-05 NOTE — XMS REPORT
Encounter Summary

 Created on: 2018



Moshe Lopez

External Reference #: [id number]

: 1940

Sex: Male



Demographics







 Address  [address] [place]  [address]

 

 Home Phone  [phone]

 

 Preferred Language  English

 

 Marital Status  Unknown

 

 Adventism Affiliation  NON

 

 Race  White

 

 Ethnic Group  Not  or 





Author







 Author  [facility]

 

 Organization  [facility]

 

 Address  Unknown

 

 Phone  Unavailable







Support







 Name  Relationship  Address  Phone

 

 Latisha Lopez  Unknown  [phone]







Care Team Providers







 Care Team Member Name  Role  Phone

 

 Agustin Jaelyn CHO  PCP  [phone]

 

 Jaelyn Velez DO  Unavailable  [phone]







Reason for Visit

* Auth/Cert





     



  Status   Reason   Specialty   Diagnoses /   Referred By   Referred To



     Procedures   Contact   Contact

 

     



     Diagnoses

  



     

  



     

  



     

  



     n/v  



     Pancreatic  



     pseudocyst  



     Chronic  



     pancreatitis  



     (HCC)  



     Fever  



     Nausea and  



     vomiting  











Encounter Details







    



  Date   Type   Department   Care Team   Description

 

    



  2017   Hospital   Clinlab   Eve Chamberlain DO   Fever presenting 
with



   Encounter   [address]   [address]   conditions classified



    [place] [address]   [address]   [address]where



     [place] [address] 



     [phone] (Fax) 







Social History







    



  Tobacco Use   Types   Packs/Day   Years Used   Date

 

    



  Former Smoker   Cigarettes, Pipe, Cigars   1   15   Quit: 1975

 

    



  Smokeless Tobacco: Never   



  Used   









   



  Alcohol Use   Drinks/Week   oz/Week   Comments

 

   



  Yes   1 Cans of   0.6   1 beer every 2 weeks



   beer  









 



  Sex Assigned at Birth   Date Recorded

 

 



  Not on file 



as of this encounter



Functional Status







  



  Functional Status   Response   Date of Assessment

 

  



  Does the patient have a hearing impairment:   No   2017

 

  



  Does the patient have a visual impairment:   Yes   10/12/2017

 

  



  Does the patient have impaired ambulation:   Yes   10/12/2017

 

  



  Does the patient have an activity of daily living   No   10/12/2017



  (ADL) impairment:  

 

  



  Does the patient have an instrumental activity of   No   10/12/2017



  daily living (IADL) impairment:  









  



  Cognitive Status   Response   Date of Assessment

 

  



  Does the patient have a cognitive impairment:   No   10/12/2017



as of this encounter



Medications at Time of Discharge







     



  Medication   Sig.   Disp.   Refills   Start Date   End Date

 

     



  aspirin EC 81 mg tablet   Take 81 mg by mouth    



   daily.    

 

     



  citalopram (CELEXA) 20 mg   Take 20 mg by mouth    



  tablet   daily.    

 

     



  docusate (COLACE) 100 mg   Take 1 capsule by mouth   180 capsule   3   2017 



  capsule   twice daily.    

 

     



  famotidine (PEPCID) 20 mg   Take 20 mg by mouth twice    



  tablet   daily.    

 

     



  fenofibrate(+) (TRIGLIDE)   Take 160 mg by mouth    



  160 mg tablet   daily. Take with food.    

 

     



  finasteride (PROSCAR) 5   Take 5 mg by mouth daily.    



  mg tablet     

 

     



  fish oil- omega 3-DHA/EPA   Take 1 Cap by mouth twice    



  300/1,000 mg capsule   daily.    

 

     



  lisinopril (PRINIVIL;   Take 20 mg by mouth    



  ZESTRIL) 20 mg tablet   daily.    

 

     



  loratadine (CLARITIN) 10   Take 10 mg by mouth every    



  mg tablet   morning.    

 

     



  melatonin 3 mg tab   Take 1 tablet by mouth at     2017 



   bedtime as needed    



   (insomnia).    

 

     



  MULTIVITAMINS WITH   Take 1 Tab by mouth    



  FLUORIDE (MULTI-VITAMIN   daily.    



  PO)     

 

     



  polyethylene glycol 3350   Take 1 packet by mouth   12 each   5   2017 



  (MIRALAX) 17 g packet   daily.    

 

     



  simethicone (MYLICON) 80   Chew 1 tablet by mouth   30 tablet   0   2017 



  mg chew tablet   every 6 hours as needed    



   for Flatulence.    

 

     



  tamsulosin (FLOMAX) 0.4   Take 0.4 mg by mouth    



  mg capsule   daily. Do not crush, chew    



   or open capsules. Take 30    



   minutes following the    



   same meal each day.    

 

     



  vitamins, B complex tab   Take 1 Tab by mouth    



   daily.    

 

     



  amoxicillin/K clavulanate   Take 1 tablet by mouth   60 tablet   2   10/25/
2017   2017



  (AUGMENTIN) 875/125 mg   every 12 hours for 75    



  tablet   days. Take with food.    

 

     



  ciprofloxacin (CIPRO) 500   Take 1 tablet by mouth   28 tablet   0   2017



  mg tablet   twice daily for 14 days.    

 

     



  clopiDOGrel (PLAVIX) 75   Take 75 mg by mouth      2017



  mg tablet   daily.    

 

     



  fluconazole (DIFLUCAN)   Take 2 tablets by mouth   28 tablet   3   10/25/2017
   2017



  200 mg tablet   daily for 14 days.    

 

     



  hydroCHLOROthiazide   Take 25 mg by mouth every      2017



  (HYDRODIURIL) 25 mg   morning.    



  tablet     

 

     



  ibuprofen (MOTRIN) 400 mg   Take 400 mg by mouth      2017



  tabletIndications: FEVER   every 6 hours as needed    



   for Pain (Alternates with    



   tylenol). Take with food.    



   Indications: FEVER    

 

     



  levoFLOXacin (LEVAQUIN)   Take 1 tablet by mouth   3 tablet   0   11/15/2017 
  2017



  750 mg tablet   every 24 hours for 3    



   days.    

 

     



  omeprazole DR(+)   Take 20 mg by mouth daily      2017



  (PRILOSEC) 20 mg capsule   before breakfast.    

 

     



  ondansetron (ZOFRAN ODT)   Dissolve 4 mg by mouth      2018



  4 mg rapid dissolve   every 8 hours. Place on    



  tablet   tongue to disolve.    

 

     



  oxyCODONE (ROXICODONE,   Take 1 tablet by mouth   40 tablet   0   2017



  OXY-IR) 5 mg tablet   every 4 hours as needed    



   Earliest Fill Date:    



   17    

 

     



  prochlorperazine maleate   Take 1 tablet by mouth   28 tablet   0   2017



  (COMPAZINE) 10 mg tablet   every 6 hours as needed    



   for Nausea or Vomiting    



   for up to 7 days.    

 

     



  scopolamine   Apply 1 patch to top of      2018



  (TRANSDERM-SCOP) 1.5 mg 3   skin as directed every 72    



  day patch   hours.    

 

     



  senna/docusate   Take 1 tablet by mouth     2017



  (SENOKOT-S) 8.6/50 mg   daily as needed.    



  tablet     

 

     



  senna/docusate   Take 1 tablet by mouth   15 tablet   5   2017



  (SENOKOT-S) 8.6/50 mg   twice daily.    



  tablet     

 

     



  zolpidem (AMBIEN) 5 mg   Take 5 mg by mouth at      2017



  tablet   bedtime as needed.    



as of this encounter



Plan of Treatment







    



  Date   Type   Specialty   Care Team   Description

 

    



  2018   Procedure Pass   Infectious Diseases  

 

    



  2018   Surgery    Fan Tidwell MD   ESOPHAGOGASTRODUODENOSCOP



     [address]



     [address] 



     [place], [place] [address] [phone] (Fax) 

 

    



  2018   Procedure Pass   

 

    



  2018   Hospital    Fan Tidwell MD   Pancreatic necrosis



   Encounter    [address] 



     [address] 



     [place] [address] 



     [phone] (Fax) 



as of this encounter



Results

* URINALYSIS MICROSCOPIC REFLEX TO CULTURE (2017  1:02 PM)





  



  Component   Value   Ref Range

 

  



  WBCs,UA   0-2   0 - 2 /HPF

 

  



  RBCs,UA   0-2   0 - 3 /HPF

 

  



  Comment,UA   Urine submitted for reflex culture if criteria are 



   met:WBC>10, positive nitrite 



   and/or >=1+ leukocyte esterase. If quantity is not 



   sufficient, an addendum will 



   follow. 

 

  



  MucousUA   2+ 









 



  Specimen   Performing Laboratory

 

 



  Urine    MAIN LAB



   [address] [place] [address]





* URINALYSIS DIPSTICK REFLEX TO CULTURE (2017  1:02 PM)





  



  Component   Value   Ref Range

 

  



  Color,UA   DELIFNA 

 

  



  Turbidity,UA   2+ (A)   CLEAR-CLEAR

 

  



  Specific Gravity-Urine   1.028   1.003 - 1.035

 

  



  pH,UA   5.0   5.0 - 8.0

 

  



  Protein,UA   2+ (A)   NEG-NEG

 

  



  Glucose,UA   NEG   NEG-NEG

 

  



  Ketones,UA   TRACE (A)   NEG-NEG

 

  



  Bilirubin,UA   NEG   NEG-NEG

 

  



  Blood,UA   NEG   NEG-NEG

 

  



  Urobilinogen,UA   NORMAL   NORM-NORMAL

 

  



  Nitrite,UA   NEG   NEG-NEG

 

  



  Leukocytes,UA   NEG   NEG-NEG

 

  



  Urine Ascorbic Acid, UA   POS (A)   NEG-NEG



   Comment: 



   Ascorbic acid is found in various food supplies 



   and dietary supplements, and 



   is reported to cause strong interference with 



   Macroscopic Urinalysis testing 



   for glucose, blood and nitrite, and can result in 



   a false negative result. 









 



  Specimen   Performing Laboratory

 

 



  Urine    MAIN LAB



   [address] [place] [address]





* UA REFLEX CULTURE LABEL (2017  1:02 PM)





  



  Component   Value   Ref Range

 

  



  UA Reflex Culture   LAB LABEL 









 



  Specimen   Performing Laboratory

 

 



  Urine    MAIN LAB



   [address] [place] [address]





in this encounter



Visit Diagnoses











  Diagnosis

 





  Fever in other diseases







Admitting Diagnoses











  Diagnosis

 





  Fever presenting with conditions classified elsewhere

## 2018-02-05 NOTE — XMS REPORT
Encounter Summary

 Created on: 2018



Moshe Lopez

External Reference #: [id number]

: 1940

Sex: Male



Demographics







 Address  [address] [place] [address]  [address]

 

 Home Phone  [phone]

 

 Preferred Language  English

 

 Marital Status  Unknown

 

 [employer] Affiliation  NON

 

 Race  White

 

 Ethnic Group  Not  or 





Author







 Author  [facility]

 

 Organization  [facility]

 

 Address  Unknown

 

 Phone  Unavailable







Support







 Name  Relationship  Address  Phone

 

 Latisha Lopez  ECON  Unknown  [phone]







Care Team Providers







 Care Team Member Name  Role  Phone

 

 VelezJaelyn beck   PCP  [phone]

 

 AgustinRandaldonell CHO  Unavailable  [phone]







Reason for Visit

* 





 



  Reason   Comments

 

 



  Infection 









Encounter Details







    



  Date   Type   Department   Care Team   Description

 

    



  2017   Office Visit   [facility]   Eve Chamberlain DO   
Fever in other diseases



    Physicians - Internal   [address] [facility]   (Primary Dx)



    Medicine   [address] 



    [address] [place] [address] 



    [address] [facility]   [phone] 



    OFFICE [address]G   [phone] (Fax) 



    [place]  



    [address] [phone]  







Social History







    



  Tobacco Use   Types   Packs/Day   Years Used   Date

 

    



  Former Smoker   Cigarettes, Pipe, Cigars   1   15   Quit: 1975

 

    



  Smokeless Tobacco: Never   



  Used   









   



  Alcohol Use   Drinks/Week   oz/Week   Comments

 

   



  Yes   1 Cans of   0.6   1 beer every 2 weeks



   beer  









 



  Sex Assigned at Birth   Date Recorded

 

 



  Not on file 



as of this encounter



Last Filed Vital Signs







  



  Vital Sign   Reading   Time Taken

 

  



  Blood Pressure   134/50   2017 12:19 PM CST

 

  



  Pulse   80   2017 12:19 PM CST

 

  



  Temperature   37.8   C (100   F)   2017 12:19 PM CST

 

  



  Respiratory Rate   -   -

 

  



  Oxygen Saturation   -   -

 

  



  Inhaled Oxygen   -   -



  Concentration  

 

  



  Weight   70.8 kg (156 lb)   2017 12:19 PM CST

 

  



  Height   170.2 cm (5' 7")   2017 12:19 PM CST

 

  



  Body Mass Index   24.43   2017 12:19 PM CST



in this encounter



Functional Status







  



  Functional Status   Response   Date of Assessment

 

  



  Does the patient have a hearing impairment:   No   2017

 

  



  Does the patient have a visual impairment:   Yes   10/12/2017

 

  



  Does the patient have impaired ambulation:   Yes   10/12/2017

 

  



  Does the patient have an activity of daily living   No   10/12/2017



  (ADL) impairment:  

 

  



  Does the patient have an instrumental activity of   No   10/12/2017



  daily living (IADL) impairment:  









  



  Cognitive Status   Response   Date of Assessment

 

  



  Does the patient have a cognitive impairment:   No   10/12/2017



as of this encounter



Progress Notes

* Eve Chamberlain,  - 2017  2:00 PM CST



Formatting of this note may be different from the original.

Date of Service: 2017



Subjective:        

 

Moshe Lopez is a 77 y.o. male.



History of Present Illness

The patient is here for follow up today after hospital discharge.  Please see 
the initial inpatient Infectious Diseases consultation note and inpatient 
progress notes for more remote and detailed information about the patient's 
infection and initial presentation. 



Mr. Lopez is here for acute care visit. He was dc on 10/25 on augmentin and 
fluconazole with plan to follow his symptoms clinically. He was doing ok the 
first week then had ongoing nausea with emesis more inc freq starting last 
Thurs - called to GI - sent to ED. Repeat CT a/p about the same.  He had 
labwork. With wbc ok and alk phos actually sl down.  He was on zofran prn, then 
started compazine prn w/o sig improvement.  He then saw PCP and started 
meclizine but still having n/v. He has noted difficulty swallowing solids (
chicken, bread) no cough or trouble w/liquids. No change chronic rhinorrhea w/
eating - no URI sx, cough/sob. abd pain mostly in LUQ - having small vol soft 
stool. Urine darker and malodorous but w/o dysuria.  Emesis is all yellowish - 
no blood.  No rash.  He started having fever to 101F on Sun - feeling worse, 
now 10 lb additional wt loss.  He saw Dr. Tidwell today but no room for ERCP 
urgently - added po cipro but pt concerned about tolerating this.



Mon-today ongoing intermittent fever. 



Social:



 

Review of Systems

A comprehensive 14 point review of systems was negative aside from those issues 
noted above and:

LUQ and epigastric pain and nausea/emesis + wt loss



Objective:       

 amoxicillin/K clavulanate (AUGMENTIN) 875/125 mg tablet Take 1 tablet by 
mouth every 12 hours for 75 days. Take with food. 

 aspirin EC 81 mg tablet Take 81 mg by mouth daily.   

 ciprofloxacin (CIPRO) 500 mg tablet Take 1 tablet by mouth twice daily for 
14 days. 

 citalopram (CELEXA) 20 mg tablet Take 20 mg by mouth daily. 

 clopiDOGrel (PLAVIX) 75 mg tablet Take 75 mg by mouth daily. 

 docusate (COLACE) 100 mg capsule Take 1 capsule by mouth twice daily. 

 famotidine (PEPCID) 20 mg tablet Take 20 mg by mouth twice daily. 

 fenofibrate(+) (TRIGLIDE) 160 mg tablet Take 160 mg by mouth daily. Take 
with food. 

 finasteride (PROSCAR) 5 mg tablet Take 5 mg by mouth daily. 

 fish oil- omega 3-DHA//1,000 mg capsule Take 1 Cap by mouth twice 
daily. 

 fluconazole (DIFLUCAN) 200 mg tablet Take 2 tablets by mouth daily for 14 
days. 

 hydroCHLOROthiazide (HYDRODIURIL) 25 mg tablet Take 25 mg by mouth every 
morning. 

 ibuprofen (MOTRIN) 400 mg tablet Take 400 mg by mouth every 6 hours as 
needed for Pain (Alternates with tylenol). Take with food.   Indications: FEVER 

 lisinopril (PRINIVIL; ZESTRIL) 20 mg tablet Take 20 mg by mouth daily. 

 loratadine (CLARITIN) 10 mg tablet Take 10 mg by mouth every morning. 

 MULTIVITAMINS WITH FLUORIDE (MULTI-VITAMIN PO) Take 1 Tab by mouth daily. 

 omeprazole DR(+) (PRILOSEC) 20 mg capsule Take 20 mg by mouth daily before 
breakfast. 

 ondansetron (ZOFRAN ODT) 4 mg rapid dissolve tablet Dissolve 4 mg by mouth 
every 8 hours as needed for Nausea or Vomiting. Place on tongue to disolve. 

 oxyCODONE (ROXICODONE, OXY-IR) 5 mg tablet Take 1 tablet by mouth every 4 
hours as needed Earliest Fill Date: 17 

 polyethylene glycol 3350 (MIRALAX) 17 g packet Take 1 packet by mouth 
daily. 

 prochlorperazine maleate (COMPAZINE) 10 mg tablet Take 1 tablet by mouth 
every 6 hours as needed for Nausea or Vomiting for up to 7 days. 

 scopolamine (TRANSDERM-SCOP) 1.5 mg 3 day patch Apply 1 patch to top of 
skin as directed every 72 hours. 

 senna/docusate (SENOKOT-S) 8.6/50 mg tablet Take 1 tablet by mouth twice 
daily. 

 simethicone (MYLICON) 80 mg chew tablet Chew 1 tablet by mouth every 6 
hours as needed for Flatulence. 

 tamsulosin (FLOMAX) 0.4 mg capsule Take 0.4 mg by mouth daily. Do not crush
, chew or open capsules. Take 30 minutes following the same meal each day. 

 vitamins, B complex tab Take 1 Tab by mouth daily. 

 zolpidem (AMBIEN) 5 mg tablet Take 5 mg by mouth at bedtime daily. 



Vitals: 

 17 1219 

BP: 134/50 

Pulse: 80 

Temp: 37.8 C (100 F) 

TempSrc: Oral 

Weight: 70.8 kg (156 lb) 

Height: 170.2 cm (67") 



Body mass index is 24.43 kg/(m^2). 



BMI Weight Status 

Below 18.5 Underweight 

18.5  24.9 Normal 

25.0  29.9 Overweight 

30.0 and Above Obese 

40.0 and Above Morbidly obese 

Reviewed/discussed patient's BMI. The BMI was reviewed; specialist visit only, 
patient will follow-up with primary care doctor.



Physical Exam

Gen:  A&0x3, NAD

HEENT:  MMM, no OP lesions, exudate or thrush

NECK:  Supple, no lymphadenopathy

CHEST:  no rales/rhonchi/wheezing

CV:  Regular rhythm, nml rate, no murmur, rub or nikia

ABD:  soft, no tenderness, no organomegaly, nml bowel sounds in all quadrants

EXT:  No edema

SKIN:  No rashes, lesions 



Sodium 

Date/Time Value Ref Range Status 

2017 11:00  137 - 147 MMOL/L Final 

10/25/2017 05:40  137 - 147 MMOL/L Final 



Potassium 

Date/Time Value Ref Range Status 

2017 11:00 PM 3.9 3.5 - 5.1 MMOL/L Final 

2017 4.0  Final 



Blood Urea Nitrogen 

Date/Time Value Ref Range Status 

2017 11:00 PM 14 7 - 25 MG/DL Final 

2017 12  Final 



Creatinine 

Date/Time Value Ref Range Status 

2017 11:00 PM 0.76 0.4 - 1.24 MG/DL Final 

2017 0.7  Final 



AST (SGOT) 

Date/Time Value Ref Range Status 

2017 11:00 PM 25 7 - 40 U/L Final 

2017 40  Final 



ALT (SGPT) 

Date/Time Value Ref Range Status 

2017 11:00 PM 31 7 - 56 U/L Final 

2017 54  Final 



Alk Phosphatase 

Date/Time Value Ref Range Status 

2017 11:00  (H) 25 - 110 U/L Final 

2017 350  Final 



Total Bilirubin 

Date/Time Value Ref Range Status 

2017 11:00 PM 0.4 0.3 - 1.2 MG/DL Final 

10/25/2017 05:40 AM 0.3 0.3 - 1.2 MG/DL Final 



White Blood Cells 

Date/Time Value Ref Range Status 

2017 11:00 PM 10.3 4.5 - 11.0 K/UL Final 

2017 9.7  Final 



RBC 

Date/Time Value Ref Range Status 

2017 11:00 PM 3.62 (L) 4.4 - 5.5 M/UL Final 

10/25/2017 05:40 AM 3.49 (L) 4.4 - 5.5 M/UL Final 



Hemoglobin 

Date/Time Value Ref Range Status 

2017 11:00 PM 9.1 (L) 13.5 - 16.5 GM/DL Final 

2017 9.5  Final 



Platelet Count 

Date/Time Value Ref Range Status 

2017 11:00  150 - 400 K/UL Final 

2017 382  Final 



 

Assessment and Plan:

Recurrent fever 

Likely source is related to the pancreatic pseudocyst, less likely secondary 
bacteremia, UTI, other



Fever 10/13 - secondary to ongoing suppurative pseudocyst -improved

- Improved

-10/18: CT abdomen- Slight increase in size of the complex peripancreatic gas/
fluid 

collections in the anterior pararenal space surrounding the pancreas, decrease 
size abd wall collection

- EGD 10/19 w/purulent drainage via stent - flushed with peroxide. No stent 
obstruction,

- BC 10/18 NTD



Recurrent nausea/vomiting - worse starting 



Elevated LFT- stable on last lab



Dysphagia w/solids



Left lower abdominal wall cellulitis with subcutaneous fluid collection related 
to infected abd ascitic fluid (see below)-improved

- 3 cm collection noted on initial CT abdomen/pelvis scan 7/15 and persistent 
on FU CT abd 

- IR guided drainage  "superficial left abdominal wall fluid collection, 
which appear to communicate with underlying ascitic fluid, with underlying 
loculated ascites."

- Abd wall/peritoneal fluid- MSSA, Candida glabrata (both from broth)

-Resolved



Acute pancreatitis with evolution to infected pseudocyst Formation (MSSA and 
Candida glabrata )

- symptom onset 7/15/17

- no hx of alcohol abuse, no gallstones

- serial CTs with progression since 7/15/17 of peripancreatic fluid collections 
with fat stranding, forming phelgmonous collection

-- Abd wall/peritoneal fluid- MSSA, Candida glabrata (both from broth)

- EUS w necrosectomy and drainage- purulent fluid (no cultures)

- - necrosectomy w drainage

-: CT decrease size of complex network of gas/fluid in pararenal space. 
Loculated ascites w some gas on left, persistnet thickening of splenic flexure 
of colon sec pancreatitis, stenosis superior mesenteric vein.

- IR drain in abd fluid collection- 1) Right pelvic fluid collection- neg 2) 
Left fluid collection- staph on GS- culture neg

9/3- necrosectomy w drainage

- necrosectomy w drainage



MSSA bacteremia 17 at  1 set of blood cultures, 2nd set neg

- rpt BC  No growth

- (No BC prior to transfer to  from [facility] per verbal from lab)

- TTE- nl valves



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 
reversal in 2017 ([place], [place])



CAD s/p PCI with 9 prior stents



MATHEW wears CPAP at home



HTN 



HLD



Recommendations: 



1. BC x 2

2. FU CBC and CMP from the am lab (pending)

3. UA and culture sent from clinic - FU 

4. Consider CXR but I do not hear anything on auscultation now

5. Consult GI biliary team 

6. DC augmentin and start zosyn 3.375 q 6 hrs

7. Maintenance IVF

8. diflucan 400mg po q day for now

9. Antiemetics prn

10. Nutrition consult

11. May need swallow eval before dc

12. Can consult ID team (me) tomorrow am to follow



Complexity of medical decision making is high b/c of the multi-system nature of 
the infectious disease process and concerns about the complexity of the patient 
illness including the sensitivity of the organisms being treated, the potential 
for drug toxicity and interactions, concerns about immunologic function, and 
interplay of other issues.



  



 



 



in this encounter



Plan of Treatment







    



  Date   Type   Specialty   Care Team   Description

 

    



  2018   Procedure Pass   Infectious Diseases  

 

    



  2018   Surgery    Fan Tidwell MD   ESOPHAGOGASTRODUODENOSCOP



     [address]   [address]



     [address] 



     [place] [address] [phone] (Fax) 

 

    



  2018   Procedure Pass   

 

    



  2018   Hospital    Fan Tidwell MD   Pancreatic necrosis



   Encounter    [address] 



     [address] 



     [place] [address] [phone] (Fax) 



as of this encounter



Results

* UA REFLEX CULTURE LABEL (2017  1:02 PM)





  



  Component   Value   Ref Range

 

  



  UA Reflex Culture   LAB LABEL 









 



  Specimen   Performing Laboratory

 

 



  Urine    MAIN LAB



   [address] [place] [id number]





* URINALYSIS MICROSCOPIC REFLEX TO CULTURE (2017  1:02 PM)





  



  Component   Value   Ref Range

 

  



  WBCs,UA   0-2   0 - 2 /HPF

 

  



  RBCs,UA   0-2   0 - 3 /HPF

 

  



  Comment,UA   Urine submitted for reflex culture if criteria are 



   met:WBC>10, positive nitrite 



   and/or >=1+ leukocyte esterase. If quantity is not 



   sufficient, an addendum will 



   follow. 

 

  



  MucousUA   2+ 









 



  Specimen   Performing Laboratory

 

 



  Urine   [facility]



   [address] [place] [address]





* URINALYSIS DIPSTICK REFLEX TO CULTURE (2017  1:02 PM)





  



  Component   Value   Ref Range

 

  



  Color,UA   DELFINA 

 

  



  Turbidity,UA   2+ (A)   CLEAR-CLEAR

 

  



  Specific Gravity-Urine   1.028   1.003 - 1.035

 

  



  pH,UA   5.0   5.0 - 8.0

 

  



  Protein,UA   2+ (A)   NEG-NEG

 

  



  Glucose,UA   NEG   NEG-NEG

 

  



  Ketones,UA   TRACE (A)   NEG-NEG

 

  



  Bilirubin,UA   NEG   NEG-NEG

 

  



  Blood,UA   NEG   NEG-NEG

 

  



  Urobilinogen,UA   NORMAL   NORM-NORMAL

 

  



  Nitrite,UA   NEG   NEG-NEG

 

  



  Leukocytes,UA   NEG   NEG-NEG

 

  



  Urine Ascorbic Acid, UA   POS (A)   NEG-NEG



   Comment: 



   Ascorbic acid is found in various food supplies 



   and dietary supplements, and 



   is reported to cause strong interference with 



   Macroscopic Urinalysis testing 



   for glucose, blood and nitrite, and can result in 



   a false negative result. 









 



  Specimen   Performing Laboratory

 

 



  Urine   [facility]



   [address] [place] [address]





in this encounter



Visit Diagnoses











  Diagnosis

 





  Fever in other diseases - Primary

## 2018-02-05 NOTE — XMS REPORT
Encounter Summary

 Created on: 2018



Moshe Lopez

External Reference #: [id number]

: 1940

Sex: Male



Demographics







 Address  [address] [place] [address]  [address]

 

 Home Phone  [phone]

 

 Preferred Language  English

 

 Marital Status  Unknown

 

 Baptism Affiliation  NON

 

 Race  White

 

 Ethnic Group  Not  or 





Author







 Author  [facility]

 

 Organization  [facility]

 

 Address  Unknown

 

 Phone  Unavailable







Support







 Name  Relationship  Address  Phone

 

 Latisha Lopez  Unknown  [phone]







Care Team Providers







 Care Team Member Name  Role  Phone

 

 Jaelyn Velez DO  PCP  [phone]

 

 VelezJaelyn beck DO  Unavailable  [phone]







Reason for Visit

* Auth/Cert





     



  Status   Reason   Specialty   Diagnoses /   Referred By   Referred To



     Procedures   Contact   Contact

 

     



     Diagnoses

  



     

  



     

  



     

  



     n/v  



     Pancreatic  



     pseudocyst  



     Chronic  



     pancreatitis  



     (HCC)  



     Fever  



     Nausea and  



     vomiting  











Encounter Details







    



  Date   Type   Department   Care Team   Description

 

    



  2017   Hospital   Medicine Telemetry   Lilly Ybarra MD   Pancreatic 
pseudocyst



  -   Encounter   [address]   [address] 



  2017    [place] [address]   [facility] 



    [phone]   [place] [address] 



     [phone] (Fax)

 



     Klarissa Hanley MD 



     [address] 



     MS [address] [place] [address] 



     [phone] (Fax)

 



     Roge Thompson MD 



     [address] 



     MS [address] [place] [address] 



     [phone] (Fax)

 



     Chantelle Zavala MD 



     [address] 



     MS [address] [place] [address] 



     [phone] (Fax) 







Social History







    



  Tobacco Use   Types   Packs/Day   Years Used   Date

 

    



  Former Smoker   Cigarettes, Pipe, Cigars   1   15   Quit: 1975

 

    



  Smokeless Tobacco: Never   



  Used   









   



  Alcohol Use   Drinks/Week   oz/Week   Comments

 

   



  Yes   1 Cans of   0.6   1 beer every 2 weeks



   beer  









 



  Sex Assigned at Birth   Date Recorded

 

 



  Not on file 



as of this encounter



Last Filed Vital Signs







  



  Vital Sign   Reading   Time Taken

 

  



  Blood Pressure   138/62   2017  6:44 AM CST

 

  



  Pulse   67   2017  6:44 AM CST

 

  



  Temperature   36.8   C (98.2   F)   2017  6:44 AM CST

 

  



  Respiratory Rate   -   -

 

  



  Oxygen Saturation   97%   2017  6:44 AM CST

 

  



  Inhaled Oxygen   -   -



  Concentration  

 

  



  Weight   69.3 kg (152 lb 12.8 oz)   11/10/2017  7:45 AM CST

 

  



  Height   -   -

 

  



  Body Mass Index   23.93   11/10/2017  7:45 AM CST



in this encounter



Functional Status







  



  Functional Status   Response   Date of Assessment

 

  



  Does the patient have a hearing impairment:   No   2017

 

  



  Does the patient have a visual impairment:   Yes   10/12/2017

 

  



  Does the patient have impaired ambulation:   Yes   10/12/2017

 

  



  Does the patient have an activity of daily living   No   10/12/2017



  (ADL) impairment:  

 

  



  Does the patient have an instrumental activity of   No   10/12/2017



  daily living (IADL) impairment:  









  



  Cognitive Status   Response   Date of Assessment

 

  



  Does the patient have a cognitive impairment:   No   10/12/2017



as of this encounter



Discharge Summaries

* Chantelle Zamora MD - 2017  9:55 AM CST



Formatting of this note may be different from the original.



Physician Discharge Summary



Name: Moshe Lopez

Medical Record Number: [id number]        Account Number:  [id number]

YOB: 1940                         Age:  77 years 

Admit date:  2017                     Discharge date:  2017



Attending Physician:  Chantelle Zamora MD               Service: [place] [address]



Physician Summary completed by: Chantelle Zmaora MD



Reason for hospitalization: 76 y/o M with h/o CAD s/p PCI, HTN, HLD, MATHEW, 
diverticulitis s/p Hartmans procedure 2017, acute pancreatitis with infected 
pseudocyst admitted for fever and nausea/vomiting.



Significant PMH: 

Past Medical History: 

Diagnosis Date 

 CAD (coronary artery disease)  

 Coronary artery disease  

 s/p 9 CABGs 

 Diverticulitis  

 Diverticulosis  

 s/p LAR in 2017 

 HTN (hypertension)  

 Hyperlipidemia  

 Hypertension  

 Low testosterone  

 Pancreatitis  

 Ruptured lumbar disc  

 Sleep apnea  

  

Allergies: Niacin and Ativan [lorazepam]



Brief Hospital Course:  The patient was admitted and the following issues were 
addressed during this hospitalization: 



76 y/o M with h/o CAD s/p PCI, HTN, HLD, MATHEW, diverticulitis s/p Hartmans 
procedure 2017, acute pancreatitis with infected pseudocyst admitted for fever 
and nausea/vomiting. 



Sepsis secondary to Suspected infected pancreatic pseudocyst vs CAP

He presented with fever and leukocytosis.  RVP negative urine unremarkable.  
Chest x-ray with orbital infiltrate.  H/o acute pancreatitis in 2017 which 
evolved to infected pseudocyst. Cx have grown MSSA and C glabrata.  
Pancreatitis suspected related to gallstones. Per Surg, will eventually need 
cholecystectomy. S/p EUS with necrosectomy and drainage 17 and 17. S/
p IR drain x2 on 17. Now removed. S/p necrosectomy and drainage 9/3/17 
and 17. Blood cultures: no growth so far. 11/3/17 CT A/P showed diffuse 
atrophy of the pancreas. There is a cyst gastrostomy tube in place. Overall 
decrease in multiloculated network of fluid thick-walled collections containing 
some gas within the anterior pararenal space since the previous examination. 
The complex fluid and gas collection within the anterior right pararenal space 
has overall not significant change in size; stable stenosis of SMV and portal 
vein; stable intrahepatic biliary ductal dilation likely from narrowing of CBD 
- likely inflammatory; mild R hydronephrosis and mild L pelviectasis likely 
from obstruction by fluid collection; mild diffuse peritoneal nodularity and 
stranding.  Was recently discharged on 10/25 with Augmentin and Fluconazole. ID 
consulted. Antibiotics were broadened to zosyn and fluconazole. GI evaluated. 
Had plans for endoscopy however since patient  showed improvement deferred 
procedure.  They plan to evaluate in clinic for further management. 

CXR 11/10: no changes, continued L basilar opacity.  He was treated with Zosyn 
as above.  Changed to Levaquin at discharge total 5 days per ID 
recommendations. 



Suspected toxic encephalopathy/ Delirium: Resolved at time of discharge.



CAD s/p PCI, HTN, HLD: Continued on PTA regimen.  Per cardiology okay to remain 
off Plavix indefinitely since likely to need future procedures.



Condition at Discharge: Stable



Discharge Diagnoses:  



Hospital Problems  

 

 Active Problems 

 Necrotizing pancreatitis 

 Pancreatic pseudocyst 

 Sepsis (HCC) 

 Nausea and vomiting 

 Fever 

 



Surgical Procedures: None



Significant Diagnostic Studies and Procedures: noted in brief hospital course



Consults:  GI, ID



Patient Disposition: Home   



Patient instructions/medications: 



Activity as Tolerated 

It is important to keep increasing your activity level after you leave the 
hospital.  Moving around can help prevent blood clots, lung infection (pneumonia
) and other problems.  Gradually increasing the number of times you are up 
moving around will help you return to your normal activity level more quickly.  
Continue to increase the number of times you are up to the chair and walking 
daily to return to your normal activity level. Begin to work toward your normal 
activity level at discharge 



Report These Signs and Symptoms 

Please contact your doctor if you have any of the following symptoms: 
temperature higher than 100 degrees F, uncontrolled pain, persistent nausea and/
or vomiting, difficulty breathing, chest pain, severe abdominal pain, headache, 
unable to urinate, unable to have bowel movement or drainage with a foul odor 



Questions About Your Stay 

For questions or concerns regarding your hospital stay. Call [phone] 

Discharging attending physician: CHANTELLE ZAMORA [[id number]]  



Cardiac Diet 

Limiting unhealthy fats and cholesterol is the most important step you can take 
in reducing your risk for cardiovascular disease.  Unhealthy fats include 
saturated and trans fats.  Monitor your sodium and cholesterol intake.  
Restrict your sodium to 2g (grams) or 2000mg (milligrams) daily, and your 
cholesterol to 200mg daily.



If you have questions regarding your diet at home, you may contact a dietitian 
at [phone].

 



Return Appointment 

You will be contacted by GI clinic regarding follow up with Dr. Warner 



 

Current Discharge Medication List 

 

 START taking these medications 

 Details 

levoFLOXacin (LEVAQUIN) 750 mg tablet Take 1 tablet by mouth every 24 hours for 
3 days.

Qty: 3 tablet, Refills: 0 

 PRESCRIPTION TYPE:  Normal 

 

melatonin 3 mg tab Take 1 tablet by mouth at bedtime as needed (insomnia). 

 PRESCRIPTION TYPE:  OTC 

 

 

 CONTINUE these medications which have been CHANGED or REFILLED 

 Details 

senna/docusate (SENOKOT-S) 8.6/50 mg tablet Take 1 tablet by mouth daily as 
needed. 

 PRESCRIPTION TYPE:  No Print 

 

 

 CONTINUE these medications which have NOT CHANGED 

 Details 

aspirin EC 81 mg tablet Take 81 mg by mouth daily.   

 PRESCRIPTION TYPE:  Historical Med 

 

citalopram (CELEXA) 20 mg tablet Take 20 mg by mouth daily. 

 PRESCRIPTION TYPE:  Historical Med 

 

docusate (COLACE) 100 mg capsule Take 1 capsule by mouth twice daily.

Qty: 180 capsule, Refills: 3 

 PRESCRIPTION TYPE:  Print 

 

famotidine (PEPCID) 20 mg tablet Take 20 mg by mouth twice daily. 

 PRESCRIPTION TYPE:  Historical Med 

 

fenofibrate(+) (TRIGLIDE) 160 mg tablet Take 160 mg by mouth daily. Take with 
food. 

 PRESCRIPTION TYPE:  Historical Med 

 

finasteride (PROSCAR) 5 mg tablet Take 5 mg by mouth daily. 

 PRESCRIPTION TYPE:  Historical Med 

 

fish oil- omega 3-DHA//1,000 mg capsule Take 1 Cap by mouth twice daily. 

 PRESCRIPTION TYPE:  Historical Med 

 

lisinopril (PRINIVIL; ZESTRIL) 20 mg tablet Take 20 mg by mouth daily. 

 PRESCRIPTION TYPE:  Historical Med 

 

loratadine (CLARITIN) 10 mg tablet Take 10 mg by mouth every morning. 

 PRESCRIPTION TYPE:  Historical Med 

 

MULTIVITAMINS WITH FLUORIDE (MULTI-VITAMIN PO) Take 1 Tab by mouth daily. 

 PRESCRIPTION TYPE:  Historical Med 

 

omeprazole DR(+) (PRILOSEC) 20 mg capsule Take 20 mg by mouth daily before 
breakfast. 

 PRESCRIPTION TYPE:  Historical Med 

 

ondansetron (ZOFRAN ODT) 4 mg rapid dissolve tablet Dissolve 4 mg by mouth 
every 8 hours. Place on tongue to disolve.  

 PRESCRIPTION TYPE:  Historical Med 

 

oxyCODONE (ROXICODONE, OXY-IR) 5 mg tablet Take 1 tablet by mouth every 4 hours 
as needed Earliest Fill Date: 17

Qty: 40 tablet, Refills: 0 

 PRESCRIPTION TYPE:  Print 

 

polyethylene glycol 3350 (MIRALAX) 17 g packet Take 1 packet by mouth daily.

Qty: 12 each, Refills: 5 

 PRESCRIPTION TYPE:  Print 

 

scopolamine (TRANSDERM-SCOP) 1.5 mg 3 day patch Apply 1 patch to top of skin as 
directed every 72 hours. 

 PRESCRIPTION TYPE:  Historical Med 

 

simethicone (MYLICON) 80 mg chew tablet Chew 1 tablet by mouth every 6 hours as 
needed for Flatulence.

Qty: 30 tablet, Refills: 0 

 PRESCRIPTION TYPE:  Print 

 

tamsulosin (FLOMAX) 0.4 mg capsule Take 0.4 mg by mouth daily. Do not crush, 
chew or open capsules. Take 30 minutes following the same meal each day. 

 PRESCRIPTION TYPE:  Historical Med 

 

vitamins, B complex tab Take 1 Tab by mouth daily. 

 PRESCRIPTION TYPE:  Historical Med 

 

 

 The following medications were removed from your list. This list includes 
medications discontinued this stay and those removed from your prior med list 
in our system  

 

 amoxicillin/K clavulanate (AUGMENTIN) 875/125 mg tablet 

  

 ciprofloxacin (CIPRO) 500 mg tablet 

  

 clopiDOGrel (PLAVIX) 75 mg tablet 

  

 fluconazole (DIFLUCAN) 200 mg tablet 

  

 hydroCHLOROthiazide (HYDRODIURIL) 25 mg tablet 

  

 ibuprofen (MOTRIN) 400 mg tablet 

  

 prochlorperazine maleate (COMPAZINE) 10 mg tablet 

  

 zolpidem (AMBIEN) 5 mg tablet 

  

 

 



Scheduled appointments:  

 2017 11:15 AM CST 

Post - Op with [facility] 

[facility] ([facility] 

 [facility]

[address] [facility] [address] [phone] 

 

   

 



Pending items needing follow up: none 



Signed:

Chantelle Zamora MD

2017  



cc:

Primary Care Physician:  Jaelyn Velez 



Referring physicians:  Lilly Ybarra MD 

Additional provider(s): 

 

in this encounter



Medications at Time of Discharge







     



  Medication   Sig.   Disp.   Refills   Start Date   End Date

 

     



  aspirin EC 81 mg tablet   Take 81 mg by mouth    



   daily.    

 

     



  citalopram (CELEXA) 20 mg   Take 20 mg by mouth    



  tablet   daily.    

 

     



  docusate (COLACE) 100 mg   Take 1 capsule by mouth   180 capsule   3   2017 



  capsule   twice daily.    

 

     



  famotidine (PEPCID) 20 mg   Take 20 mg by mouth twice    



  tablet   daily.    

 

     



  fenofibrate(+) (TRIGLIDE)   Take 160 mg by mouth    



  160 mg tablet   daily. Take with food.    

 

     



  finasteride (PROSCAR) 5   Take 5 mg by mouth daily.    



  mg tablet     

 

     



  fish oil- omega 3-DHA/EPA   Take 1 Cap by mouth twice    



  300/1,000 mg capsule   daily.    

 

     



  lisinopril (PRINIVIL;   Take 20 mg by mouth    



  ZESTRIL) 20 mg tablet   daily.    

 

     



  loratadine (CLARITIN) 10   Take 10 mg by mouth every    



  mg tablet   morning.    

 

     



  melatonin 3 mg tab   Take 1 tablet by mouth at     2017 



   bedtime as needed    



   (insomnia).    

 

     



  MULTIVITAMINS WITH   Take 1 Tab by mouth    



  FLUORIDE (MULTI-VITAMIN   daily.    



  PO)     

 

     



  polyethylene glycol 3350   Take 1 packet by mouth   12 each   5   2017 



  (MIRALAX) 17 g packet   daily.    

 

     



  simethicone (MYLICON) 80   Chew 1 tablet by mouth   30 tablet   0   2017 



  mg chew tablet   every 6 hours as needed    



   for Flatulence.    

 

     



  tamsulosin (FLOMAX) 0.4   Take 0.4 mg by mouth    



  mg capsule   daily. Do not crush, chew    



   or open capsules. Take 30    



   minutes following the    



   same meal each day.    

 

     



  vitamins, B complex tab   Take 1 Tab by mouth    



   daily.    

 

     



  levoFLOXacin (LEVAQUIN)   Take 1 tablet by mouth   3 tablet   0   11/15/2017 
  2017



  750 mg tablet   every 24 hours for 3    



   days.    

 

     



  omeprazole DR(+)   Take 20 mg by mouth daily      2017



  (PRILOSEC) 20 mg capsule   before breakfast.    

 

     



  ondansetron (ZOFRAN ODT)   Dissolve 4 mg by mouth      2018



  4 mg rapid dissolve   every 8 hours. Place on    



  tablet   tongue to disolve.    

 

     



  oxyCODONE (ROXICODONE,   Take 1 tablet by mouth   40 tablet   0   2017



  OXY-IR) 5 mg tablet   every 4 hours as needed    



   Earliest Fill Date:    



   17    

 

     



  scopolamine   Apply 1 patch to top of      2018



  (TRANSDERM-SCOP) 1.5 mg 3   skin as directed every 72    



  day patch   hours.    

 

     



  senna/docusate   Take 1 tablet by mouth     2017



  (SENOKOT-S) 8.6/50 mg   daily as needed.    



  tablet     



as of this encounter



Progress Notes

* Chantelle Zamora MD - 2017  8:41 AM CST



Formatting of this note may be different from the original.

          

 General Progress Note



Name:  Moshe Lopez 

Today's Date:  2017

Admission Date: 2017

LOS: 6 days

                 

Assessment/Plan:  

Active Problems:

  Necrotizing pancreatitis

  Pancreatic pseudocyst

  Sepsis (HCC)

  Nausea and vomiting

  Fever



Patient evaluated this morning.  He is doing well this morning.  No confusion.  
Tolerating diet.  Will discharge to home today.  He will follow-up with GI 
clinic for further care.



ATTESTATION



Total floor unit time 40 minutes performing discharge service.  Time spent in 
evaluating patient and coordinating care.



Staff name:  Chantelle Zamora MD Date:  2017 





* Juventino Quevedo RN - 2017  6:27 AM CST



Shift: 7p-7:30a



Mental Status: Ax O x4.



Cardiac: S1, S2. 



Pulmonary: Non-Labored.



GI/: Voids. BM: .



Nutrition: Cardiac diet.



Activity: Stand by assist.



Pain: Denies.



Family: Wife at bedside until .



Hygiene: Shower this shift.



Follow up: Plan of care.



* Bean Dean MD - 2017  7:25 PM CST



Formatting of this note may be different from the original.

Gastro-Biliary Progress Note



Assessement and Plans 

Active Problems:

  Necrotizing pancreatitis

  Pancreatic pseudocyst

  Sepsis (HCC)

  Nausea and vomiting

  Fever



Admission Date: 2017

LOS: 5 days



Moshe Lopez is a 77 y.o. male patient was past medical history of acute 
severe pancreatitis complicated by walled of pancreatic necrosis with fluid 
collections status post cystogastrostomy and necrosectomy in the past; admitted 
to the hospital after he presented with low-grade fever of three days duration 
along with persistent projectile vomiting, abdominal pain, confusion with 
fidgeting and decreased functional capacity. Liver function within normal 
limits except mildly elevated AST. Lipase within normal limits. No growth on 
blood culture so far. RVP was negative. CXR showed  increased opacities 
primarily involving the left lower lung, which may be from pneumonia or 
atelectasis. CT of the head with no acute process. CT of the abdomen pelvis (11/
3/2017) showed mildly enlarged liver with stable mild diffuse intrahepatic 
biliary duct dilation. Has diffuse atrophy of the pancreas with 
cystogastrostomy stent  in place. Overall, there was a decrease in 
multiloculated needs focal fluid thick-walled collection containing some gas 
within the anterior pararenal space since prior study. The complex fluid and 
gas collection seen within the anterior right pararenal space has not shown 
significant change from prior studies.



Patient was examined today. He has improved over the week ends. He is now alert 
and oriented time, place and person. No acute symptoms.



Recommendations:

-Continue with Abx as per ID.

-Continue with supportive care as per primary team.

-Diet as tolerated.

-Gastro-biliary team will be available for any questions or concerns.





Patient was discussed with .



Thank you for involving us in the care of this patient.



Joe Irene

GI/Hepatology Fellow

[id number]







Subjective 

Overnight: No acute events over night. Patient alert and oriented. No fever. No 
chest pain, sob or PND.



Objective 

24-Hour Vitals Range

BP: (129-139)/(57-68) 

Temp:  [36.7 C (98 F)-37.2 C (98.9 F)] 

Pulse:  [52-68] 

Respirations:  [16 PER MINUTE-18 PER MINUTE] 

SpO2:  [95 %-97 %] 

O2 Delivery: None (Room Air)



Intake/Output Summary:  (Last 24 hours)



Intake/Output Summary (Last 24 hours) at 17 192

Last data filed at 17 1658

 Gross per 24 hour 

Intake              460 ml 

Output             1550 ml 

Net            -1090 ml 

 

Stool Occurrence: 2 (per pt)



Physical Exam



GEN: Alert and oriented.

HEENT: PERRLA.

NECK: No JVD, neck supple, no thyromegaly/nodularity, no cervical or 
supraclavicular adenopathy

RESP: Clear to auscultation bilaterally

CVS: Regular rate and rhythm, S1S2 normal, no murmur/rub/gallop

ABD: Soft, non tender, non distended, no palpable masses or organomegaly, BS+

EXT: No edema/cyanosis/clubbing.



Medications

Scheduled Meds:



aspirin EC tablet 81 mg 81 mg Oral QDAY 

citalopram (CELEXA) tablet 20 mg 20 mg Oral QDAY 

enoxaparin (LOVENOX) syringe 40 mg 40 mg Subcutaneous QDAY(21) 

famotidine (PEPCID) tablet 20 mg 20 mg Oral BID 

fenofibrate nanocrystallized (TRICOR) tablet 145 mg 145 mg Oral QHS 

finasteride (PROSCAR) tablet 5 mg 5 mg Oral QDAY 

fish oil- omega 3-DHA/EPA capsule 1,000 mg 1,000 mg Oral BID 

levoFLOXacin (LEVAQUIN) tablet 750 mg 750 mg Oral Q24H* 

lisinopril (PRINIVIL; ZESTRIL) tablet 20 mg 20 mg Oral QDAY 

melatonin tablet 3 mg 3 mg Oral QHS 

pantoprazole DR (PROTONIX) tablet 40 mg 40 mg Oral QDAY(21) 

polyethylene glycol 3350 (MIRALAX) packet 34 g 2 packet Oral BID 

scopolamine (TRANSDERM-SCOP) patch 1 patch 1 patch Transdermal Q72H* 

senna/docusate (SENOKOT-S) tablet 2 tablet 2 tablet Oral BID 

tamsulosin (FLOMAX) capsule 0.4 mg 0.4 mg Oral QHS 

vitamins, B complex tablet 1 tablet 1 tablet Oral QDAY 

Continuous Infusions: 

PRN and Respiratory Meds:acetaminophen Q6H PRN, oxyCODONE Q6H PRN, simethicone 
Q6H PRN



Lab Review

24-hour labs:  

Results for orders placed or performed during the hospital encounter of  (from the past 24 hour(s)) 

CBC AND DIFF 

 Collection Time: 17  5:42 AM 

Result Value Ref Range 

 White Blood Cells 9.7 4.5 - 11.0 K/UL 

 RBC 3.55 (L) 4.4 - 5.5 M/UL 

 Hemoglobin 9.0 (L) 13.5 - 16.5 GM/DL 

 Hematocrit 28.1 (L) 40 - 50 % 

 MCV 79.1 (L) 80 - 100 FL 

 MCH 25.3 (L) 26 - 34 PG 

 MCHC 32.0 32.0 - 36.0 G/DL 

 RDW 17.9 (H) 11 - 15 % 

 Platelet Count 313 150 - 400 K/UL 

 MPV 8.9 7 - 11 FL 

 Neutrophils 82 (H) 41 - 77 % 

 Lymphocytes 9 (L) 24 - 44 % 

 Monocytes 5 4 - 12 % 

 Eosinophils 4 0 - 5 % 

 Basophils 0 0 - 2 % 

 Absolute Neutrophil Count 7.90 (H) 1.8 - 7.0 K/UL 

 Absolute Lymph Count 0.90 (L) 1.0 - 4.8 K/UL 

 Absolute Monocyte Count 0.40 0 - 0.80 K/UL 

 Absolute Eosinophil Count 0.40 0 - 0.45 K/UL 

 Absolute Basophil Count 0.00 0 - 0.20 K/UL 

COMPREHENSIVE METABOLIC PANEL 

 Collection Time: 17  5:42 AM 

Result Value Ref Range 

 Sodium 140 137 - 147 MMOL/L 

 Potassium 3.8 3.5 - 5.1 MMOL/L 

 Chloride 105 98 - 110 MMOL/L 

 Glucose 113 (H) 70 - 100 MG/DL 

 Blood Urea Nitrogen 12 7 - 25 MG/DL 

 Creatinine 0.77 0.4 - 1.24 MG/DL 

 Calcium 8.9 8.5 - 10.6 MG/DL 

 Total Protein 6.5 6.0 - 8.0 G/DL 

 Total Bilirubin 0.3 0.3 - 1.2 MG/DL 

 Albumin 2.9 (L) 3.5 - 5.0 G/DL 

 Alk Phosphatase 106 25 - 110 U/L 

 AST (SGOT) 24 7 - 40 U/L 

 CO2 27 21 - 30 MMOL/L 

 ALT (SGPT) 25 7 - 56 U/L 

 Anion Gap 8 3 - 12 

 eGFR Non African American >60 >60 mL/min 

 eGFR African American >60 >60 mL/min 

MAGNESIUM 

 Collection Time: 17  5:42 AM 

Result Value Ref Range 

 Magnesium 1.9 1.6 - 2.6 mg/dL 

PHOSPHORUS 

 Collection Time: 17  5:42 AM 

Result Value Ref Range 

 Phosphorus 2.9 2.0 - 4.0 MG/DL 



Pertinent labs reviewed



Radiology and other Diagnostics Review:  

Pertinent studies reviewed



Joe Irene MD

2017

Pager: 



He is doing well. He is not confused or septic . No abdominal pain. His 
cystogastrostomy has improved and has no fluid. 

Pt is scheduled to be discharged tomorrow. He needs repeat EGD and removal of 
his Stent . 

ATTESTATION



I personally performed the key portions of the E/M visit, discussed case with 
resident and concur with resident documentation of history, physical exam, 
assessment, and treatment plan unless otherwise noted.



Staff name:  Bean Dean MD Date:  2017 





* Kitty Olvera RN - 2017  6:39 PM CST



Shift: day



Neuro: A&O x4



CV: no tele, S1, S2, pulses palpable



Respiratory: RA, lungs CTA



GI/: voids, BM 



Diet: cardiac



Activity: SBA



Skin: no issues



Pain: denies



Family: wife at bedside



Last shower and linen change: refused



Follow up: care plan ongoing





* iLlly Eckert RD - 2017  4:47 PM CST



 CLINICAL NUTRITION  

                                                 

 Clinical Nutrition Follow-Up Summary 



Nutrition Assessment of Patient:

Current Oral Intake: Improving

Estimated Calorie Needs: 9854-8433 kcal (25-30 kcal/kg present wt 73.1 kg)

Estimated Protein Needs: 102-117 (1.4-1.6 g/kg present wt 73.1)

Oral Diet Order: Cardiac, Diabetic 4470-0085 Kcal/day (60 g Carb/meal, 30 g Carb
/HS snack)

 



Moshe Lopez is a 76 y/o M with h/o CAD s/p 9 CABGs, HTN, HLD, MATHEW, 
diverticulitis s/p Hartmans procedure 2017, acute pancreatitis with infected 
pseudocyst c/b walled off pancreatic necrosis with fluid collections, s/p 
cystogastrostomy and necrosectomy in the past; admitted with low-grade fever of 
three days duration along with persistent projectile vomiting, abdominal pain, 
confusion with fidgeting and decreased functional capacity. Spoke with patient 
and spouse at bedside. Pt has recent previous long term admission, discharged 
to SNF on TPN, and remained on it until late September. Pt has good spouse 
support. Pt follows a low CHO, low fat diet. He eats 4 small meals a day with 
one Premier protein drink daily. Pt with significant wt loss, he reports usual 
weight at 208-212 pounds. He has lost 26.2# since August (14.2% x 3 mo, severe 
weight loss). Upon physical exam patient appears well-nourished. Malnutrition 
is noted in patient's problem list. Pt tolerating Cardiac diet, reports his 
appetite and intake have been good the past few days. Pt reports he continues 
to eat 4 small meals per day. Pt and spouse seem to have good knowledge of low 
fat, low carb diet for pancreatitis. Pt ordering dinner at time of RD visit, 
denies any questions or concerns about current diet or continuing diet at home. 



Recommendation:

Continue Cardiac/Diabetic diet as ordered. 

                            

 



Intervention / Plan:

Spoke with patient and spouse regarding nutrition plan of care

Will continue to monitor patient's PO intake and tolerance, appetite, wt trends
, labs, and meds

 

 



Nutrition Diagnosis:

Nutrition Diagnosis: Inadequate energy intake

Etiology: related to N/V, abdominal pain (improved)

Signs & Symptoms: severe weight loss



Goals:

Prevent further weight loss

Time Frame: Throughout Stay

Status: Not met;Ongoing



Patient to consume >75% of meals

Time Frame: Within 72 Hours

Status: Partially met;Ongoing



Lilly Eckert MS, RD, LD, [facility]

Pager [phone]

[phone]





* Buddy Guthrie, PT - 2017 11:11 AM CST



PHYSICAL THERAPY



Patient's case reviewed and discussed during interdisciplinary rounds.



Therapist: Buddy Guthrie, PT

Date: 2017



* Eve Chamberlain,  - 2017 10:30 AM CST



Formatting of this note may be different from the original.

Infectious Diseases Progress Note



Today's Date:  2017

Admission Date: 2017



Reason for this consultation: fever



Assessment: 



Fever - resolved

- Differential of source is pseudocyst vs LLL pneumonia vs other 

- CXR 17: Sl increased opacity in the left lower lung, possibly pneumonia 
or atelectasis.

- CT A/P 11/3/17:  Overall decrease in size complex interconnecting fluid and 
gas containing collections within the anterior pararenal/ space with a cyst 
gastrostomy tube in place

- BCx : NGTD

- UA neg; Urine legionella & s. Pneumo: negative

- RVP: negative

- PCT: 0.21

- CXR 11/10: no changes, continued L basilar opacity



Elevated AST - resolved

- 55 on admission

- Has been elevated in the past

- Other LFTs & ALP WNL; Lipase WNL

- LFTs improved



H/o pancreatitis

- symptom onset 7/15/17

- no hx of alcohol abuse, no gallstones

- serial CTs with progression since 7/15/17 of peripancreatic fluid collections 
with fat stranding, forming phelgmonous collection

-  Abd wall/peritoneal fluid- MSSA, Candida glabrata (both from broth)

-  EUS w necrosectomy and drainage- purulent fluid (no cultures)

- - necrosectomy w drainage

- : CT decrease size of complex network of gas/fluid in pararenal space. 
Loculated ascites w some gas on left, persistnet thickening of splenic flexure 
of colon sec pancreatitis, stenosis superior mesenteric vein.

- IR drain in abd fluid collection- 1) Right pelvic fluid collection- neg 2) 
Left fluid collection- staph on GS- culture neg

9/3- necrosectomy w drainage

- necrosectomy w drainage

 - Lipase WNL

CT a/p as above



H/o Left lower abdominal wall cellulitis with subcutaneous fluid collection 
related to infected abd ascitic fluid (see below)-improved

- 3 cm collection noted on initial CT abdomen/pelvis scan 7/15 and persistent 
on FU CT abd 

- IR guided drainage  "superficial left abdominal wall fluid collection, 
which appear to communicate with underlying ascitic fluid, with underlying 
loculated ascites."

- 8/25 Abd wall/peritoneal fluid- MSSA, Candida glabrata (both from broth)



H/o MSSA bacteremia

- rpt BC  No growth

- (No BC prior to transfer to  from [facility] per verbal from lab)

- TTE- nl valves



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 
reversal in 2017 ([place], [place])

CAD s/p PCI with 9 prior stents

MATHEW - CPAP

HTN 



Recommendations: 



1. Switch to levaquin - tentatively 5 day course for CAP

2. As there is not felt to be an ongoing concern with the cyst gastrostomy will 
dc diflucan and zosyn.

3. IS q1 hr while awake - brought into room this am

4. Lights on during day, freq reorientation for likely delirium compounded by 
insomnia

5. I am ok with DC In am - he has surg appt 1030. 

6. Rec FU with Dr. Tidwell and his PCP within 2 weeks



No ID FU for now outpatient.



Patient to be discussed with Dr. Chamberlain.



Gen Camacho DO

Internal Medicine, PGY-1

Pager: [phone]



I have seen, personally evaluated, and discussed the patient's care with Dr. Camacho, Internal Medicine Resident.  I agree with the subjective notations, 
objective findings and agree with the plan of care as documented in this note 
with the exceptions noted. 

Eve Chamberlain DO

Division of Infectious Diseases

Pager [phone]



History of Present Illness  

Moshe Lopez is a 77 y.o.  history of CAD s/p PCI with 9 stents, MATHEW wears 
CPAP at home, HTN, HLD, sigmoid diverticulitis s/p Hernandez's procedurein 
2017 with colostomy reversal in 2017, who was admitted to [facility] originally on 17 for ongoing management of pancreatitis 
with ileus.



Doing much better this morning, no longer having confusion today although did 
have one episode of delirium yesterday (saw bugs on his food) he was quickly re-
oriented. Otherwise he has been feeling progressively better over the past 3 
days. He denies any further nausea. He also denies any fevers, chills, SOB, CP, 
abdominal pain, or diarrhea.



Afebrile; VSS; WBC 9.7; hgb stable

Antimicrobial Start date End date 

Zosyn  

Fluconazole  

Levaquin  Active 

   

   

   

   

Estimated Creatinine Clearance: 80 mL/min (based on Cr of 0.77).



Medications 

Scheduled Meds:



aspirin EC tablet 81 mg 81 mg Oral QDAY 

enoxaparin (LOVENOX) syringe 40 mg 40 mg Subcutaneous QDAY() 

famotidine (PEPCID) tablet 20 mg 20 mg Oral BID 

fenofibrate nanocrystallized (TRICOR) tablet 145 mg 145 mg Oral QHS 

finasteride (PROSCAR) tablet 5 mg 5 mg Oral QDAY 

fish oil- omega 3-DHA/EPA capsule 1,000 mg 1,000 mg Oral BID 

levoFLOXacin (LEVAQUIN) tablet 750 mg 750 mg Oral Q24H* 

lisinopril (PRINIVIL; ZESTRIL) tablet 20 mg 20 mg Oral QDAY 

melatonin tablet 3 mg 3 mg Oral QHS 

pantoprazole DR (PROTONIX) tablet 40 mg 40 mg Oral QDAY() 

polyethylene glycol 3350 (MIRALAX) packet 34 g 2 packet Oral BID 

scopolamine (TRANSDERM-SCOP) patch 1 patch 1 patch Transdermal Q72H* 

senna/docusate (SENOKOT-S) tablet 2 tablet 2 tablet Oral BID 

SODIUM CHLORIDE 0.9 % IV SOLP (Cabinet Override)   NOW 

tamsulosin (FLOMAX) capsule 0.4 mg 0.4 mg Oral QHS 

vitamins, B complex tablet 1 tablet 1 tablet Oral QDAY 

Continuous Infusions:

 

PRN and Respiratory Meds:acetaminophen Q6H PRN, fentaNYL citrate PF Q8H PRN, 
simethicone Q6H PRN



Physical Examination 



                     Vital Signs: Last                  Vital Signs: 24 Hour 
Range 

BP: 129/68 (735)

Temp: 37 C (98.6 F) (735)

Pulse: 52 (735)

Respirations: 16 PER MINUTE (735)

SpO2: 97 % (735)

O2 Delivery: None (Room Air) (735) BP: ()/(51-68) 

Temp:  [36.6 C (97.9 F)-37.2 C (98.9 F)] 

Pulse:  [52-73] 

Respirations:  [16 PER MINUTE-18 PER MINUTE] 

SpO2:  [95 %-98 %] 

O2 Delivery: None (Room Air) 



General appearance: A&Ox4 today, has recollection of being confused earlier in 
his stay NAD; MS greatly improved

HENT: no oral lesions/thrush

Eyes: Conj nl

Lungs: no wheezing, rhonchi, + L base rales today

Heart: Regular rhythm, reg rate, with no murmur

Abdomen: soft, non-tender, distended on the lateral aspects, normoactive bowel 
sounds

Ext:  Trace LE edema

Skin: no rashes/lesions



Lines: PIV



Lab Review 

Hematology

Recent Labs 

   1739  17

 0554  17

 0542 

WBC  7.9  7.9  9.7 

HGB  8.5*  8.7*  9.0* 

HCT  26.1*  27.5*  28.1* 

PLTCT  286  315  313 



Chemistry

Recent Labs 

   1739  1754  17

 0542 

NA  141  139  140 

K  3.6  3.7  3.8 

CL  107  104  105 

CO2  27  27  27 

BUN  7  8  12 

CR  0.80  0.71  0.77 

GFR  >60  >60  >60 

GLU  105*  111*  113* 

CA  8.8  8.8  8.9 

PO4  3.8  3.2  2.9 

ALBUMIN  2.8*  2.8*  2.9* 

ALKPHOS  91  125*  106 

AST  31  29  24 

ALT  32  29  25 

TOTBILI  0.3  0.3  0.3 



Microbiology, Radiology and other Diagnostics Review 

Microbiology data reviewed.



Pertinent radiology images viewed.



CXR 11/10/17: no sig change

Impression: Persistent opacities in the left lung base, likely reflecting 
pneumonia or atelectasis.



CT A/P 17:

1. Overall decrease in size complex interconnecting fluid and gas 

containing collections within the anterior pararenal space with a cyst 

gastrostomy tube in place.

2. 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 

confluence, and main portal vein. 

3. Stable intrahepatic biliary ductal dilation, likely secondary to 

thickening and narrowing of the common bile duct. This is likely 

inflammatory in nature.

4. Development of mild right hydronephrosis and mild left pelviectasis. No 

discrete obstructing lesion is seen, though right hydronephrosis is likely 

secondary to relative obstruction by the right anterior pararenal fluid 

collection.

5. Mild diffuse peritoneal nodularity and stranding.

6. Mild prostatomegaly.



CT Head w/o contrast 11/10/17: 

- Mild generalized cerebral volume loss. 

- No acute hemorrhage or mass effect



Gen Camacho DO 

Pager [phone]

ID 



* Buddy Guthrie, PT - 2017  9:34 AM CST



PHYSICAL THERAPY

PROGRESS NOTE 



MOBILITY:

Progressive Mobility Level: Walk laps

Distance Walked (feet): 400 ft

Level of Assistance: Stand by assistance

Assistive Device: None

Time Tolerated: 0-10 minutes

Activity Limited By: No limitations



SUBJECTIVE:

Significant hospital events: acute pancreatitis with infected pseudocyst 
admitted for fever and nausea/vomiting

PMH: CAD, PCI, HTN, HLD, MATHEW, diverticulitis s/p Hartmans procedure 



Mental / Cognitive Status: Confused (Not fully oriented to situation)

Persons Present: Spouse

Pain: Patient has no complaint of pain

Ambulation Assist: Independent Mobility in Community with Device

Patient Owned Equipment: Single Point Cane;Roller Walker

Home Situation: Lives with Family

Type of Home: House

Entry Stairs: Ramp;Rail on Both Sides

In-Home Stairs: No Stairs



GAIT:

Distance: 400 feet

Assistance Level: Standby Assist

Assistive Device: Hand Hold Assist

Descriptors: Pathway deviations

Activity Limited By: Patient Choice



EDUCATION:

Persons Educated: Patient

Patient Barriers To Learning: Confusion

Interventions: Repetition of Instructions;Family Education

Teaching Methods: Verbal Instruction

Patient Response: More Instruction Required

Topics: Plan/Goals of PT Interventions



ASSESSMENT/PROGRESS:

As mentation has improved so has his independence with mobility.

He still requires guidance with mobility and displays path finding 
difficulties. 



AM-PAC 6 Clicks Basic Mobility Inpatient

Turning from your back to your side while in a flat bed without using bed rails
: None

Moving from lying on your back to sitting on the side of a flatbed without 
using bedrails : None

Moving to and from a bed to a chair (including a wheelchair): A Little

Standing up from a chair using your arms (e.g. wheelchair, or bedside chair): A 
Little

To walk in hospital room: A Little

Climbing 3-5 steps with a railing: A Little

Raw Score: 20

Standardized (T-scale) Score: 43.99

Basic Mobility CMS 0-100%: 33.32

CMS G Code Modifier for Basic Mobility: CJ



GOALS:

Formulation: With Family

Time For Goal Achievement: 5 days, To, 7 days

Pt Will Go Supine To/From Sit: Independently

Pt Will Transfer Bed/Chair: Independently

Pt Will Ambulate: Greater than 200 Feet, w/ Walker, Independently



PLAN:

Treatment Interventions: Mobility Training;Strengthening;Balance Activities

Plan Frequency: 1-2 Days per Week



Complete Black.

No need for MA. Patients is ambulating with the staff frequently.



RECOMMENDATIONS:

PT Discharge Recommendations: Outpatient Therapy Setting - further balance 
assessment

Equipment Recommendations: None



Therapist: Buddy Guthrie, PT

Date: 2017





* Chantelle Zamora MD - 2017  8:01 AM CST



Formatting of this note may be different from the original.

          

 General Progress Note



Name:  Moshe Lopez 

Today's Date:  2017

Admission Date: 2017

LOS: 5 days

                 

Assessment/Plan:  

Active Problems:

  Necrotizing pancreatitis

  Pancreatic pseudocyst

  Sepsis (HCC)

  Nausea and vomiting

  Fever



76 y/o M with h/o CAD s/p PCI, HTN, HLD, MATHEW, diverticulitis s/p Hartmans 
procedure 2017, acute pancreatitis with infected pseudocyst admitted for fever 
and nausea/vomiting.



Sepsis suspected from Infected Pancreatic Pseudocyst versus low concern for 
sepsis from left lower lobe pneumonia

- Fever to 102F for past 3 days PTA. No fever in last 24 hours. WBCs  have 
normalized.  

- Lactic normal.  RVP negative.  Urine legionella antigen negative.  Urine 
strep pneumonia antigen negative

- Leukocytosis seen on admission, improved presently to normal.

- H/o acute pancreatitis in 2017 which evolved to infected pseudocyst.  Cx 
have grown MSSA and C glabrata

- Pancreatitis suspected related to gallstones.  Per Surg, will eventually need 
cholecystectomy

- S/p EUS with necrosectomy and drainage 17 and 17

- S/p IR drain x2 on 17.  Now removed

- S/p necrosectomy and drainage 9/3/17 and 17

- Blood cultures: no growth so far

- 11/3/17 CT A/P showed diffuse atrophy of the pancreas. There is a cyst 
gastrostomy tube in place. Overall decrease in multiloculated network of fluid 
thick-walled collections containing some gas within the anterior pararenal 
space since the previous examination. The complex fluid and gas collection 
within the anterior right pararenal space has overall not significant change in 
size; stable stenosis of SMV and portal vein; stable intrahepatic biliary 
ductal dilation likely from narrowing of CBD - likely inflammatory; mild R 
hydronephrosis and mild L pelviectasis likely from obstruction by fluid 
collection; mild diffuse peritoneal nodularity and stranding

- Was recently discharged on 10/25 with Augmentin and Fluconazole

- Broaden to Zosyn IV; continue PO Fluconazole per ID.  Changed to Levaquin 
today with plan for 5 more days. 

- Tylenol as needed.  Restart oxycodone prn 

-Continue Zofran PRN. Continue Scopalamine patch

- Continue PPI and H2 blocker

- ID consulted, appreciate assistance.

- GI Biliary consulted -, appreciate assistance. No plan for endoscopy at this 
time 



Suspected toxic encephalopathy/ Delirium - resolved 

- CT head 11/10 negative

- Improved mental status at this time is alert and oriented.

- d/c risperidone 0.5 at bedtime since prolonged qtc and already improved 
mental status 

- continue melatonin 3 mg at bedtime. 



Left lower lobe pneumonia

-Could be related to possible aspiration from recurrent nausea and vomiting 
prior to coming to the hospital.

-Workup as above.

- was on Zosyn for 5 days. Currently on Levaquin 

- Procalcitonin 0.21



CAD s/p PCI, HTN, HLD

- Last PCI ~ 5 yrs ago

- 10/2017 MPI stress test with no active ischemia and nml LVEF

- Seen by Cardiology during last admit - okay to remain off Plavix indefinitely 
as likely need for recurrent procedures

- Hold HCTZ 

- Continue ASA, Lisinopril, Fenofibrate

- prolonged qtc.  D/c risperidone, scheduled zofran and compazine.Improved qtc.
  Will resume citalopram today. Fluconazole also d/cd 



Microcytic Anemia

- Check B12, Folate, Iron studies

- Stable near 9s

- Monitor



BPH

- Continue Finasteride, Tamsulosin



MATHEW

- Nocturnal CPAP



FEN

- Diet: Cardiac diet.

- Lytes PRN. 

- IVF: Discontinued IV fluid



Proph

- DVT: SCDs, Lovenox



Dispo

- Code status: Full, discussed with patient on arrival.

- Continue inpatient care. Plan to d/c home tomorrow if remains stable on oral 
antibiotics. 



Complexity of decision making high because of multisystem nature of the disease
, sepsis related to either infected pancreatic necrosis or possibly pneumonia.  
I spent 40 minute in patient care. > 25 minute was spent in evaluating patient 
and coordinating care. 

________________________________________________________________________



Subjective

Moshe Lopez is a 77 y.o. male.  No acute events overnight.  He is feeling 
well this morning. Eating well. Ambulating well. No confusion noted overnight.  
He is currently alert and oriented. 

ROS.denies fever, chills, nausea, vomiting, diarrhea, constipation.



Medications

Scheduled Meds:



aspirin EC tablet 81 mg 81 mg Oral QDAY 

enoxaparin (LOVENOX) syringe 40 mg 40 mg Subcutaneous QDAY(21) 

famotidine (PEPCID) tablet 20 mg 20 mg Oral BID 

fenofibrate nanocrystallized (TRICOR) tablet 145 mg 145 mg Oral QHS 

finasteride (PROSCAR) tablet 5 mg 5 mg Oral QDAY 

fish oil- omega 3-DHA/EPA capsule 1,000 mg 1,000 mg Oral BID 

fluconazole (DIFLUCAN) tablet 400 mg 400 mg Oral QDAY 

lisinopril (PRINIVIL; ZESTRIL) tablet 20 mg 20 mg Oral QDAY 

melatonin tablet 3 mg 3 mg Oral QHS 

pantoprazole DR (PROTONIX) tablet 40 mg 40 mg Oral QDAY(21) 

piperacillin/tazobactam  (ZOSYN) 3.375 g/50 mL iso-osmotic IVPB 3.375 g 
Intravenous Q6H* 

polyethylene glycol 3350 (MIRALAX) packet 34 g 2 packet Oral BID 

scopolamine (TRANSDERM-SCOP) patch 1 patch 1 patch Transdermal Q72H* 

senna/docusate (SENOKOT-S) tablet 2 tablet 2 tablet Oral BID 

SODIUM CHLORIDE 0.9 % IV SOLP (Cabinet Override)   NOW 

tamsulosin (FLOMAX) capsule 0.4 mg 0.4 mg Oral QHS 

vitamins, B complex tablet 1 tablet 1 tablet Oral QDAY 

Continuous Infusions:

 

PRN and Respiratory Meds:acetaminophen Q6H PRN, fentaNYL citrate PF Q8H PRN, 
simethicone Q6H PRN



Objective: 



                     Vital Signs: Last Filed                 Vital Signs: 24 
Hour Range 

BP: 129/68 (735)

Temp: 37 C (98.6 F) (735)

Pulse: 52 (735)

Respirations: 16 PER MINUTE (735)

SpO2: 97 % (735)

O2 Delivery: None (Room Air) (735) BP: ()/(51-68) 

Temp:  [36.6 C (97.9 F)-37.2 C (98.9 F)] 

Pulse:  [52-73] 

Respirations:  [16 PER MINUTE-18 PER MINUTE] 

SpO2:  [95 %-98 %] 

O2 Delivery: None (Room Air) 

  Vitals: 

 17 1540 11/10/17 0745 

Weight: 73.1 kg (161 lb 3.2 oz) 69.3 kg (152 lb 12.8 oz) 

  

Intake/Output Summary:  (Last 24 hours)



Intake/Output Summary (Last 24 hours) at 17 0801

Last data filed at 17 0400

 Gross per 24 hour 

Intake                0 ml 

Output              750 ml 

Net             -750 ml 

 

Stool Occurrence: 1



Physical Exam

General: Alert and oriented, NAD 

Head: Normocephalic, without obvious abnormality, atraumatic 

Nose/Throat: Mucous membrane moist. 

Eyes: Conjunctivae/corneas clear. PERRL, EOMs intact. 

Neck: Supple, symmetrical, No JVD, No bruit 

Lungs: Clear to auscultation bilaterally 

Back: Non tender, no kyphosis or scoliosis

Heart: S1, S2 heard, Regular rate and rhythm, no murmur, click rub or gallop 

Abdomen: Soft, mild tenderness, distended, Bowel sounds normal. No masses. No 
organomegaly. 

Extremities: No cyanosis or clubbing. Pulses: 2+ and symmetric, all 
extremities. 

Skin: Clear, no rashes 



Lab Review

24-hour labs:  

Results for orders placed or performed during the hospital encounter of  (from the past 24 hour(s)) 

CBC AND DIFF 

 Collection Time: 17  5:42 AM 

Result Value Ref Range 

 White Blood Cells 9.7 4.5 - 11.0 K/UL 

 RBC 3.55 (L) 4.4 - 5.5 M/UL 

 Hemoglobin 9.0 (L) 13.5 - 16.5 GM/DL 

 Hematocrit 28.1 (L) 40 - 50 % 

 MCV 79.1 (L) 80 - 100 FL 

 MCH 25.3 (L) 26 - 34 PG 

 MCHC 32.0 32.0 - 36.0 G/DL 

 RDW 17.9 (H) 11 - 15 % 

 Platelet Count 313 150 - 400 K/UL 

 MPV 8.9 7 - 11 FL 

 Neutrophils 82 (H) 41 - 77 % 

 Lymphocytes 9 (L) 24 - 44 % 

 Monocytes 5 4 - 12 % 

 Eosinophils 4 0 - 5 % 

 Basophils 0 0 - 2 % 

 Absolute Neutrophil Count 7.90 (H) 1.8 - 7.0 K/UL 

 Absolute Lymph Count 0.90 (L) 1.0 - 4.8 K/UL 

 Absolute Monocyte Count 0.40 0 - 0.80 K/UL 

 Absolute Eosinophil Count 0.40 0 - 0.45 K/UL 

 Absolute Basophil Count 0.00 0 - 0.20 K/UL 

COMPREHENSIVE METABOLIC PANEL 

 Collection Time: 17  5:42 AM 

Result Value Ref Range 

 Sodium 140 137 - 147 MMOL/L 

 Potassium 3.8 3.5 - 5.1 MMOL/L 

 Chloride 105 98 - 110 MMOL/L 

 Glucose 113 (H) 70 - 100 MG/DL 

 Blood Urea Nitrogen 12 7 - 25 MG/DL 

 Creatinine 0.77 0.4 - 1.24 MG/DL 

 Calcium 8.9 8.5 - 10.6 MG/DL 

 Total Protein 6.5 6.0 - 8.0 G/DL 

 Total Bilirubin 0.3 0.3 - 1.2 MG/DL 

 Albumin 2.9 (L) 3.5 - 5.0 G/DL 

 Alk Phosphatase 106 25 - 110 U/L 

 AST (SGOT) 24 7 - 40 U/L 

 CO2 27 21 - 30 MMOL/L 

 ALT (SGPT) 25 7 - 56 U/L 

 Anion Gap 8 3 - 12 

 eGFR Non African American >60 >60 mL/min 

 eGFR African American >60 >60 mL/min 

MAGNESIUM 

 Collection Time: 17  5:42 AM 

Result Value Ref Range 

 Magnesium 1.9 1.6 - 2.6 mg/dL 

PHOSPHORUS 

 Collection Time: 17  5:42 AM 

Result Value Ref Range 

 Phosphorus 2.9 2.0 - 4.0 MG/DL 



Point of Care Testing  (Last 24 hours)

Glucose: (!) 113 (17 0542)



Radiology and other Diagnostics Review:  

Pertinent radiology reviewed.



Chantelle Zamora MD 

Pager [phone]

* Rodolfo Sears MD - 2017  4:22 PM CST



Formatting of this note may be different from the original.

Gastro-Biliary Progress Note



Assessement and Plans 

Active Problems:

  Necrotizing pancreatitis

  Pancreatic pseudocyst

  Sepsis (HCC)

  Nausea and vomiting

  Fever



Admission Date: 2017

LOS: 4 days



Moshe Lopez is a 77 y.o. male patient was past medical history of acute 
severe pancreatitis complicated by walled of pancreatic necrosis with fluid 
collections status post cystogastrostomy and necrosectomy in the past; admitted 
to the hospital after he presented with low-grade fever of three days duration 
along with persistent projectile vomiting, abdominal pain, confusion with 
fidgeting and decreased functional capacity. Liver function within normal 
limits except mildly elevated AST. Lipase within normal limits. No growth on 
blood culture so far. RVP was negative. CXR showed  increased opacities 
primarily involving the left lower lung, which may be from pneumonia or 
atelectasis. CT of the head with no acute process. CT of the abdomen pelvis (11/
3/2017) showed mildly enlarged liver with stable mild diffuse intrahepatic 
biliary duct dilation. Has diffuse atrophy of the pancreas with 
cystogastrostomy stent  in place. Overall, there was a decrease in 
multiloculated needs focal fluid thick-walled collection containing some gas 
within the anterior pararenal space since prior study. The complex fluid and 
gas collection seen within the anterior right pararenal space has nt shown 
significant change from prior studies.



Patient continues to improve clinically.  At this point we are not planning any 
endoscopic interventions.  The plan was conveyed to the patient, his wife as 
well as the primary team.



Recommendations:

-Continue with Zosyn

-Continue with supportive care as per primary team.

-No endoscopic interventions planned for tomorrow.

-Fall and aspiration precaution.

-Gastro-biliary team will be available for any questions or concerns.





Rodolfo Sears



Plan was discussed with Dr. Tidwell 





Subjective 

Overnight: Patient continues to improve clinically.  He however was mildly 
confused this morning but now feels a lot better.  He has tolerated his 
breakfast well and denied any abdominal pain, nausea or vomiting.  No fevers or 
chills overnight.



Objective 

24-Hour Vitals Range

BP: ()/(50-64) 

Temp:  [36.3 C (97.3 F)-37 C (98.6 F)] 

Pulse:  [57-94] 

Respirations:  [16 PER MINUTE-20 PER MINUTE] 

SpO2:  [93 %-98 %] 

O2 Delivery: None (Room Air)



Intake/Output Summary:  (Last 24 hours)



Intake/Output Summary (Last 24 hours) at 17 1622

Last data filed at 17 0736

 Gross per 24 hour 

Intake              120 ml 

Output             1050 ml 

Net             -930 ml 

 

Stool Occurrence: 0



Physical Exam



GEN: Alert and oriented x 3 but reports mild hallucinations

HEENT: PERRLA.

NECK: No JVD, neck supple, no thyromegaly/nodularity, no cervical or 
supraclavicular adenopathy

RESP: Clear to auscultation bilaterally

CVS: Regular rate and rhythm, S1S2 normal, no murmur/rub/gallop

ABD: Soft, non tender, non distended, no palpable masses or organomegaly, BS+

EXT: No edema/cyanosis/clubbing.

SKIN: Warm, dry, no ulcerations, venous stasis dermatitis, suspicious lesions, 
or rashes.



Medications

Scheduled Meds:



aspirin EC tablet 81 mg 81 mg Oral QDAY 

enoxaparin (LOVENOX) syringe 40 mg 40 mg Subcutaneous QDAY(21) 

famotidine (PEPCID) tablet 20 mg 20 mg Oral BID 

fenofibrate nanocrystallized (TRICOR) tablet 145 mg 145 mg Oral QHS 

finasteride (PROSCAR) tablet 5 mg 5 mg Oral QDAY 

fish oil- omega 3-DHA/EPA capsule 1,000 mg 1,000 mg Oral BID 

fluconazole (DIFLUCAN) tablet 400 mg 400 mg Oral QDAY 

lisinopril (PRINIVIL; ZESTRIL) tablet 20 mg 20 mg Oral QDAY 

melatonin tablet 3 mg 3 mg Oral QHS 

pantoprazole DR (PROTONIX) tablet 40 mg 40 mg Oral QDAY(21) 

piperacillin/tazobactam  (ZOSYN) 3.375 g/50 mL iso-osmotic IVPB 3.375 g 
Intravenous Q6H* 

polyethylene glycol 3350 (MIRALAX) packet 34 g 2 packet Oral BID 

scopolamine (TRANSDERM-SCOP) patch 1 patch 1 patch Transdermal Q72H* 

senna/docusate (SENOKOT-S) tablet 2 tablet 2 tablet Oral BID 

tamsulosin (FLOMAX) capsule 0.4 mg 0.4 mg Oral QHS 

vitamins, B complex tablet 1 tablet 1 tablet Oral QDAY 

Continuous Infusions: 

PRN and Respiratory Meds:acetaminophen Q6H PRN, fentaNYL citrate PF Q8H PRN, 
simethicone Q6H PRN



Lab Review

24-hour labs:  

Results for orders placed or performed during the hospital encounter of  (from the past 24 hour(s)) 

CBC AND DIFF 

 Collection Time: 17  5:54 AM 

Result Value Ref Range 

 White Blood Cells 7.9 4.5 - 11.0 K/UL 

 RBC 3.46 (L) 4.4 - 5.5 M/UL 

 Hemoglobin 8.7 (L) 13.5 - 16.5 GM/DL 

 Hematocrit 27.5 (L) 40 - 50 % 

 MCV 79.6 (L) 80 - 100 FL 

 MCH 25.1 (L) 26 - 34 PG 

 MCHC 31.6 (L) 32.0 - 36.0 G/DL 

 RDW 17.7 (H) 11 - 15 % 

 Platelet Count 315 150 - 400 K/UL 

 MPV 9.1 7 - 11 FL 

 Neutrophils 76 41 - 77 % 

 Lymphocytes 12 (L) 24 - 44 % 

 Monocytes 7 4 - 12 % 

 Eosinophils 5 0 - 5 % 

 Basophils 0 0 - 2 % 

 Absolute Neutrophil Count 6.00 1.8 - 7.0 K/UL 

 Absolute Lymph Count 1.00 1.0 - 4.8 K/UL 

 Absolute Monocyte Count 0.50 0 - 0.80 K/UL 

 Absolute Eosinophil Count 0.40 0 - 0.45 K/UL 

 Absolute Basophil Count 0.00 0 - 0.20 K/UL 

COMPREHENSIVE METABOLIC PANEL 

 Collection Time: 17  5:54 AM 

Result Value Ref Range 

 Sodium 139 137 - 147 MMOL/L 

 Potassium 3.7 3.5 - 5.1 MMOL/L 

 Chloride 104 98 - 110 MMOL/L 

 Glucose 111 (H) 70 - 100 MG/DL 

 Blood Urea Nitrogen 8 7 - 25 MG/DL 

 Creatinine 0.71 0.4 - 1.24 MG/DL 

 Calcium 8.8 8.5 - 10.6 MG/DL 

 Total Protein 6.4 6.0 - 8.0 G/DL 

 Total Bilirubin 0.3 0.3 - 1.2 MG/DL 

 Albumin 2.8 (L) 3.5 - 5.0 G/DL 

 Alk Phosphatase 125 (H) 25 - 110 U/L 

 AST (SGOT) 29 7 - 40 U/L 

 CO2 27 21 - 30 MMOL/L 

 ALT (SGPT) 29 7 - 56 U/L 

 Anion Gap 8 3 - 12 

 eGFR Non African American >60 >60 mL/min 

 eGFR African American >60 >60 mL/min 

MAGNESIUM 

 Collection Time: 17  5:54 AM 

Result Value Ref Range 

 Magnesium 2.0 1.6 - 2.6 mg/dL 

PHOSPHORUS 

 Collection Time: 17  5:54 AM 

Result Value Ref Range 

 Phosphorus 3.2 2.0 - 4.0 MG/DL 



Pertinent labs reviewed



Radiology and other Diagnostics Review:  

Pertinent studies reviewed



Rodolfo Sears MD

2017

Pager: 



* Chantelle Zamora MD - 2017 12:14 PM CST



Formatting of this note may be different from the original.

          

 General Progress Note



Name:  Moshe Lopez 

Today's Date:  2017

Admission Date: 2017

LOS: 4 days

                 

Assessment/Plan:  

Active Problems:

  Necrotizing pancreatitis

  Pancreatic pseudocyst

  Sepsis (HCC)

  Nausea and vomiting

  Fever



76 y/o M with h/o CAD s/p PCI, HTN, HLD, MATHEW, diverticulitis s/p [facility] 
procedure 2017, acute pancreatitis with infected pseudocyst admitted for fever 
and nausea/vomiting.



Sepsis suspected from Infected Pancreatic Pseudocyst versus low concern for 
sepsis from left lower lobe pneumonia

- Fever to 102F for past 3 days PTA. No fever in last 24 hours. WBCs  have 
normalized.  

- Lactic normal.  RVP negative.  Urine legionella antigen negative.  Urine 
strep pneumonia antigen negative

- Leukocytosis seen on admission, improved presently to normal.

- H/o acute pancreatitis in 2017 which evolved to infected pseudocyst.  Cx 
have grown MSSA and C glabrata

- Pancreatitis suspected related to gallstones.  Per Surg, will eventually need 
cholecystectomy

- S/p EUS with necrosectomy and drainage 17 and 17

- S/p IR drain x2 on 17.  Now removed

- S/p necrosectomy and drainage 9/3/17 and 17

- Blood cultures: no growth so far

- 11/3/17 CT A/P showed diffuse atrophy of the pancreas. There is a cyst 
gastrostomy tube in place. Overall decrease in multiloculated network of fluid 
thick-walled collections containing some gas within the anterior pararenal 
space since the previous examination. The complex fluid and gas collection 
within the anterior right pararenal space has overall not significant change in 
size; stable stenosis of SMV and portal vein; stable intrahepatic biliary 
ductal dilation likely from narrowing of CBD - likely inflammatory; mild R 
hydronephrosis and mild L pelviectasis likely from obstruction by fluid 
collection; mild diffuse peritoneal nodularity and stranding

- Was recently discharged on 10/25 with Augmentin and Fluconazole

- Broaden to Zosyn IV; continue PO Fluconazole per ID.

- Tylenol as needed.  Holding oxycodone for altered mental status.  Continue 
fentanyl as needed for now 

-Continue Zofran PRN. Continue Scopalamine patch

- Continue PPI and H2 blocker

- ID consulted, appreciate assistance.

- GI Biliary consulted -, appreciate assistance.  Possible repeat endoscopy for 
necronectomy on Monday 



Suspected toxic encephalopathy/ Delirium - improved 

- CT head 11/10 negative

-Improved and mental status at this time is alert and oriented.

-We will continue to hold the PT oxycodone as well as Ambien, continue low-dose 
fentanyl 12.5 mg

- d/c risperidone 0.5 at bedtime since prolonged qtc and already improved 
mental status 

- Added melatonin 3 mg at bedtime. 



Left lower lobe pneumonia

-Could be related to possible aspiration from recurrent nausea and vomiting 
prior to coming to the hospital.

-Workup as above.

-Zosyn should cover for infection if this related to aspiration.

- Procalcitonin 0.21



CAD s/p PCI, HTN, HLD

- Last PCI ~ 5 yrs ago

- 10/2017 MPI stress test with no active ischemia and nml LVEF

- Seen by Cardiology during last admit - okay to remain off Plavix indefinitely 
as likely need for recurrent procedures

- Hold HCTZ 

- Continue ASA, Lisinopril, Fenofibrate

- prolonged qtc.  D/c risperidone, scheduled zofran and compazine. Hold 
citalopram. Repeat ekg today. If still prolonged, will need to discuss with ID 
about fluconazole 



Microcytic Anemia

- Check B12, Folate, Iron studies

- Stable near 9s

- Monitor



BPH

- Continue Finasteride, Tamsulosin



MATHEW

- Nocturnal CPAP



FEN

- Diet: Cardiac diet.

- Lytes PRN. 

- IVF: Discontinued IV fluid



Proph

- DVT: SCDs, Lovenox



Dispo

- Code status: Full, discussed with patient on arrival.

- Continue inpatient care.



Complexity of decision making high because of multisystem nature of the disease
, sepsis related to either infected pancreatic necrosis or possibly pneumonia.  

________________________________________________________________________



Subjective

Moshe Lopez is a 77 y.o. male.  No acute events overnight.  Doing much 
better this morning. He was able to ambulate well yesterday. Eating well with 
no nausea, vomiting or pain. Denies other concern or complaing

ROS.denies fever, chills, nausea, vomiting, diarrhea, constipation.



Medications

Scheduled Meds:



aspirin EC tablet 81 mg 81 mg Oral QDAY 

citalopram (CELEXA) tablet 20 mg 20 mg Oral QDAY 

enoxaparin (LOVENOX) syringe 40 mg 40 mg Subcutaneous QDAY(21) 

famotidine (PEPCID) tablet 20 mg 20 mg Oral BID 

fenofibrate nanocrystallized (TRICOR) tablet 145 mg 145 mg Oral QHS 

finasteride (PROSCAR) tablet 5 mg 5 mg Oral QDAY 

fish oil- omega 3-DHA/EPA capsule 1,000 mg 1,000 mg Oral BID 

fluconazole (DIFLUCAN) tablet 400 mg 400 mg Oral QDAY 

lisinopril (PRINIVIL; ZESTRIL) tablet 20 mg 20 mg Oral QDAY 

loratadine (CLARITIN) tablet 10 mg 10 mg Oral QAM8 

melatonin tablet 3 mg 3 mg Oral QHS 

pantoprazole DR (PROTONIX) tablet 40 mg 40 mg Oral QDAY(21) 

piperacillin/tazobactam  (ZOSYN) 3.375 g/50 mL iso-osmotic IVPB 3.375 g 
Intravenous Q6H* 

polyethylene glycol 3350 (MIRALAX) packet 34 g 2 packet Oral BID 

risperiDONE (RISPERDAL) tablet 0.5 mg 0.5 mg Oral QHS 

scopolamine (TRANSDERM-SCOP) patch 1 patch 1 patch Transdermal Q72H* 

senna/docusate (SENOKOT-S) tablet 2 tablet 2 tablet Oral BID 

tamsulosin (FLOMAX) capsule 0.4 mg 0.4 mg Oral QHS 

vitamins, B complex tablet 1 tablet 1 tablet Oral QDAY 

Continuous Infusions:

 

PRN and Respiratory Meds:acetaminophen Q6H PRN, fentaNYL citrate PF Q8H PRN, 
ondansetron (ZOFRAN) IV Q6H PRN, simethicone Q6H PRN



Objective: 



                     Vital Signs: Last Filed                 Vital Signs: 24 
Hour Range 

BP: 98/61 (1100)

Temp: 36.6 C (97.9 F) (1100)

Pulse: 73 (1100)

Respirations: 18 PER MINUTE (1100)

SpO2: 98 % (1100)

O2 Delivery: None (Room Air) (1100) BP: ()/(50-70) 

Temp:  [36.3 C (97.3 F)-37 C (98.6 F)] 

Pulse:  [61-94] 

Respirations:  [16 PER MINUTE-20 PER MINUTE] 

SpO2:  [93 %-98 %] 

O2 Delivery: None (Room Air) 

  Vitals: 

 17 1540 11/10/17 0745 

Weight: 73.1 kg (161 lb 3.2 oz) 69.3 kg (152 lb 12.8 oz) 

  

Intake/Output Summary:  (Last 24 hours)



Intake/Output Summary (Last 24 hours) at 17 1214

Last data filed at 17 0736

 Gross per 24 hour 

Intake              120 ml 

Output             1050 ml 

Net             -930 ml 

 

Stool Occurrence: 0



Physical Exam

General: Alert and oriented, NAD 

Head: Normocephalic, without obvious abnormality, atraumatic 

Nose/Throat: Mucous membrane moist. 

Eyes: Conjunctivae/corneas clear. PERRL, EOMs intact. 

Neck: Supple, symmetrical, No JVD, No bruit 

Lungs: Clear to auscultation bilaterally 

Back: Non tender, no kyphosis or scoliosis

Heart: S1, S2 heard, Regular rate and rhythm, no murmur, click rub or gallop 

Abdomen: Soft, mild tenderness, distended, Bowel sounds normal. No masses. No 
organomegaly. 

Extremities: No cyanosis or clubbing. Pulses: 2+ and symmetric, all 
extremities. 

Skin: Clear, no rashes 



Lab Review

24-hour labs:  

Results for orders placed or performed during the hospital encounter of  (from the past 24 hour(s)) 

CBC AND DIFF 

 Collection Time: 17  5:54 AM 

Result Value Ref Range 

 White Blood Cells 7.9 4.5 - 11.0 K/UL 

 RBC 3.46 (L) 4.4 - 5.5 M/UL 

 Hemoglobin 8.7 (L) 13.5 - 16.5 GM/DL 

 Hematocrit 27.5 (L) 40 - 50 % 

 MCV 79.6 (L) 80 - 100 FL 

 MCH 25.1 (L) 26 - 34 PG 

 MCHC 31.6 (L) 32.0 - 36.0 G/DL 

 RDW 17.7 (H) 11 - 15 % 

 Platelet Count 315 150 - 400 K/UL 

 MPV 9.1 7 - 11 FL 

 Neutrophils 76 41 - 77 % 

 Lymphocytes 12 (L) 24 - 44 % 

 Monocytes 7 4 - 12 % 

 Eosinophils 5 0 - 5 % 

 Basophils 0 0 - 2 % 

 Absolute Neutrophil Count 6.00 1.8 - 7.0 K/UL 

 Absolute Lymph Count 1.00 1.0 - 4.8 K/UL 

 Absolute Monocyte Count 0.50 0 - 0.80 K/UL 

 Absolute Eosinophil Count 0.40 0 - 0.45 K/UL 

 Absolute Basophil Count 0.00 0 - 0.20 K/UL 

COMPREHENSIVE METABOLIC PANEL 

 Collection Time: 17  5:54 AM 

Result Value Ref Range 

 Sodium 139 137 - 147 MMOL/L 

 Potassium 3.7 3.5 - 5.1 MMOL/L 

 Chloride 104 98 - 110 MMOL/L 

 Glucose 111 (H) 70 - 100 MG/DL 

 Blood Urea Nitrogen 8 7 - 25 MG/DL 

 Creatinine 0.71 0.4 - 1.24 MG/DL 

 Calcium 8.8 8.5 - 10.6 MG/DL 

 Total Protein 6.4 6.0 - 8.0 G/DL 

 Total Bilirubin 0.3 0.3 - 1.2 MG/DL 

 Albumin 2.8 (L) 3.5 - 5.0 G/DL 

 Alk Phosphatase 125 (H) 25 - 110 U/L 

 AST (SGOT) 29 7 - 40 U/L 

 CO2 27 21 - 30 MMOL/L 

 ALT (SGPT) 29 7 - 56 U/L 

 Anion Gap 8 3 - 12 

 eGFR Non African American >60 >60 mL/min 

 eGFR African American >60 >60 mL/min 

MAGNESIUM 

 Collection Time: 17  5:54 AM 

Result Value Ref Range 

 Magnesium 2.0 1.6 - 2.6 mg/dL 

PHOSPHORUS 

 Collection Time: 17  5:54 AM 

Result Value Ref Range 

 Phosphorus 3.2 2.0 - 4.0 MG/DL 



Point of Care Testing  (Last 24 hours)

Glucose: (!) 111 (17 7756)



Radiology and other Diagnostics Review:  

Pertinent radiology reviewed.



Chantelle Zamora MD 

Pager [phone]

* Rodolfo Sears MD - 2017  8:20 PM CST



Formatting of this note may be different from the original.

Gastro-Biliary Progress Note



Assessement and Plans 

Active Problems:

  Necrotizing pancreatitis

  Pancreatic pseudocyst

  Sepsis (HCC)

  Nausea and vomiting

  Fever



Admission Date: 2017

LOS: 3 days



Moshe Lopez is a 77 y.o. male patient was past medical history of acute 
severe pancreatitis complicated by walled of pancreatic necrosis with fluid 
collections status post cystogastrostomy and necrosectomy in the past; admitted 
to the hospital after he presented with low-grade fever of three days duration 
along with persistent projectile vomiting, abdominal pain, confusion with 
fidgeting and decreased functional capacity. Liver function within normal 
limits except mildly elevated AST. Lipase within normal limits. No growth on 
blood culture so far. RVP was negative. CXR showed  increased opacities 
primarily involving the left lower lung, which may be from pneumonia or 
atelectasis. CT of the head with no acute process. CT of the abdomen pelvis (11/
3/2017) showed mildly enlarged liver with stable mild diffuse intrahepatic 
biliary duct dilation. Has diffuse atrophy of the pancreas with 
cystogastrostomy stent  in place. Overall, there was a decrease in 
multiloculated needs focal fluid thick-walled collection containing some gas 
within the anterior pararenal space since prior study. The complex fluid and 
gas collection seen within the anterior right pararenal space has nt shown 
significant change from prior studies.



Patient has significantly improved in terms of mental status. 



Recommendations:

-Continue with Zosyn

-Continue with supportive care as per primary team.

-Fall and aspiration precaution.

-Gastro-biliary team will be available for any questions or concerns.





Rodolfo Sears



Plan was discussed with Dr. Diann 





Subjective 

Overnight: Patient feels a lot better today compared to yesterday and wife 
feels that his confusion is resolving. 



Objective 

24-Hour Vitals Range

BP: (126-144)/(51-70) 

Temp:  [36.3 C (97.3 F)-36.8 C (98.3 F)] 

Pulse:  [63-85] 

Respirations:  [16 PER MINUTE] 

SpO2:  [93 %-96 %] 

O2 Delivery: None (Room Air)



Intake/Output Summary:  (Last 24 hours)



Intake/Output Summary (Last 24 hours) at 17

Last data filed at 17

 Gross per 24 hour 

Intake              120 ml 

Output             1250 ml 

Net            -1130 ml 

 

Stool Occurrence: 0



Physical Exam



GEN: Alert and oriented x 3 but reports mild hallucinations

HEENT: PERRLA.

NECK: No JVD, neck supple, no thyromegaly/nodularity, no cervical or 
supraclavicular adenopathy

RESP: Clear to auscultation bilaterally

CVS: Regular rate and rhythm, S1S2 normal, no murmur/rub/gallop

ABD: Soft, non tender, non distended, no palpable masses or organomegaly, BS+

EXT: No edema/cyanosis/clubbing.

SKIN: Warm, dry, no ulcerations, venous stasis dermatitis, suspicious lesions, 
or rashes.



Medications

Scheduled Meds:



aspirin EC tablet 81 mg 81 mg Oral QDAY 

citalopram (CELEXA) tablet 20 mg 20 mg Oral QDAY 

enoxaparin (LOVENOX) syringe 40 mg 40 mg Subcutaneous QDAY(21) 

famotidine (PEPCID) tablet 20 mg 20 mg Oral BID 

fenofibrate nanocrystallized (TRICOR) tablet 145 mg 145 mg Oral QHS 

finasteride (PROSCAR) tablet 5 mg 5 mg Oral QDAY 

fish oil- omega 3-DHA/EPA capsule 1,000 mg 1,000 mg Oral BID 

fluconazole (DIFLUCAN) tablet 400 mg 400 mg Oral QDAY 

lisinopril (PRINIVIL; ZESTRIL) tablet 20 mg 20 mg Oral QDAY 

loratadine (CLARITIN) tablet 10 mg 10 mg Oral QAM8 

melatonin tablet 3 mg 3 mg Oral QHS 

pantoprazole DR (PROTONIX) tablet 40 mg 40 mg Oral QDAY(21) 

piperacillin/tazobactam  (ZOSYN) 3.375 g/50 mL iso-osmotic IVPB 3.375 g 
Intravenous Q6H* 

polyethylene glycol 3350 (MIRALAX) packet 34 g 2 packet Oral BID 

risperiDONE (RISPERDAL) tablet 0.5 mg 0.5 mg Oral QHS 

scopolamine (TRANSDERM-SCOP) patch 1 patch 1 patch Transdermal Q72H* 

senna/docusate (SENOKOT-S) tablet 2 tablet 2 tablet Oral BID 

tamsulosin (FLOMAX) capsule 0.4 mg 0.4 mg Oral QHS 

vitamins, B complex tablet 1 tablet 1 tablet Oral QDAY 

Continuous Infusions: 

PRN and Respiratory Meds:acetaminophen Q6H PRN, fentaNYL citrate PF Q8H PRN, 
ondansetron (ZOFRAN) IV Q6H PRN, simethicone Q6H PRN



Lab Review

24-hour labs:  

Results for orders placed or performed during the hospital encounter of  (from the past 24 hour(s)) 

CBC AND DIFF 

 Collection Time: 17  7:39 AM 

Result Value Ref Range 

 White Blood Cells 7.9 4.5 - 11.0 K/UL 

 RBC 3.33 (L) 4.4 - 5.5 M/UL 

 Hemoglobin 8.5 (L) 13.5 - 16.5 GM/DL 

 Hematocrit 26.1 (L) 40 - 50 % 

 MCV 78.3 (L) 80 - 100 FL 

 MCH 25.4 (L) 26 - 34 PG 

 MCHC 32.4 32.0 - 36.0 G/DL 

 RDW 17.5 (H) 11 - 15 % 

 Platelet Count 286 150 - 400 K/UL 

 MPV 8.1 7 - 11 FL 

 Neutrophils 79 (H) 41 - 77 % 

 Lymphocytes 8 (L) 24 - 44 % 

 Monocytes 7 4 - 12 % 

 Eosinophils 6 (H) 0 - 5 % 

 Basophils 0 0 - 2 % 

 Absolute Neutrophil Count 6.20 1.8 - 7.0 K/UL 

 Absolute Lymph Count 0.60 (L) 1.0 - 4.8 K/UL 

 Absolute Monocyte Count 0.60 0 - 0.80 K/UL 

 Absolute Eosinophil Count 0.50 (H) 0 - 0.45 K/UL 

 Absolute Basophil Count 0.00 0 - 0.20 K/UL 

COMPREHENSIVE METABOLIC PANEL 

 Collection Time: 17  7:39 AM 

Result Value Ref Range 

 Sodium 141 137 - 147 MMOL/L 

 Potassium 3.6 3.5 - 5.1 MMOL/L 

 Chloride 107 98 - 110 MMOL/L 

 Glucose 105 (H) 70 - 100 MG/DL 

 Blood Urea Nitrogen 7 7 - 25 MG/DL 

 Creatinine 0.80 0.4 - 1.24 MG/DL 

 Calcium 8.8 8.5 - 10.6 MG/DL 

 Total Protein 6.2 6.0 - 8.0 G/DL 

 Total Bilirubin 0.3 0.3 - 1.2 MG/DL 

 Albumin 2.8 (L) 3.5 - 5.0 G/DL 

 Alk Phosphatase 91 25 - 110 U/L 

 AST (SGOT) 31 7 - 40 U/L 

 CO2 27 21 - 30 MMOL/L 

 ALT (SGPT) 32 7 - 56 U/L 

 Anion Gap 7 3 - 12 

 eGFR Non African American >60 >60 mL/min 

 eGFR African American >60 >60 mL/min 

MAGNESIUM 

 Collection Time: 17  7:39 AM 

Result Value Ref Range 

 Magnesium 1.9 1.6 - 2.6 mg/dL 

PHOSPHORUS 

 Collection Time: 17  7:39 AM 

Result Value Ref Range 

 Phosphorus 3.8 2.0 - 4.0 MG/DL 



Pertinent labs reviewed



Radiology and other Diagnostics Review:  

Pertinent studies reviewed



Rodolfo Sears MD

2017

Pager: 



* Chantelle Zamora MD - 2017  8:15 AM CST



Formatting of this note may be different from the original.

          

 General Progress Note



Name:  Moshe Lopez 

Today's Date:  2017

Admission Date: 2017

LOS: 3 days

                 

Assessment/Plan:  

Active Problems:

  Necrotizing pancreatitis

  Pancreatic pseudocyst

  Sepsis (HCC)

  Nausea and vomiting

  Fever



76 y/o M with h/o CAD s/p PCI, HTN, HLD, MATHEW, diverticulitis s/p [facility] 
procedure 2017, acute pancreatitis with infected pseudocyst admitted for fever 
and nausea/vomiting.



Sepsis suspected from Infected Pancreatic Pseudocyst versus low concern for 
sepsis from left lower lobe pneumonia

- Fever to 102F for past 3 days PTA. No fever in last 24 hours. WBCs  have 
normalized.  

- Lactic normal.  RVP negative.  Urine legionella antigen negative.  Urine 
strep pneumonia antigen negative

- Leukocytosis seen on admission, improved presently to normal.

- H/o acute pancreatitis in 2017 which evolved to infected pseudocyst.  Cx 
have grown MSSA and C glabrata

- Pancreatitis suspected related to gallstones.  Per Surg, will eventually need 
cholecystectomy

- S/p EUS with necrosectomy and drainage 17 and 17

- S/p IR drain x2 on 17.  Now removed

- S/p necrosectomy and drainage 9/3/17 and 17

- Blood cultures: no growth so far

- 11/3/17 CT A/P showed diffuse atrophy of the pancreas. There is a cyst 
gastrostomy tube in place. Overall decrease in multiloculated network of fluid 
thick-walled collections containing some gas within the anterior pararenal 
space since the previous examination. The complex fluid and gas collection 
within the anterior right pararenal space has overall not significant change in 
size; stable stenosis of SMV and portal vein; stable intrahepatic biliary 
ductal dilation likely from narrowing of CBD - likely inflammatory; mild R 
hydronephrosis and mild L pelviectasis likely from obstruction by fluid 
collection; mild diffuse peritoneal nodularity and stranding

- Was recently discharged on 10/25 with Augmentin and Fluconazole

- Broaden to Zosyn IV; continue PO Fluconazole per ID.

- Tylenol as needed.  Holding oxycodone for altered mental status.  Continue 
fentanyl as needed for now 

-Continue Zofran PRN. Continue Scopalamine patch

- Continue PPI and H2 blocker

- ID consulted, appreciate assistance.

- GI Biliary consulted -, appreciate assistance.  Plan for repeat endoscopy for 
necronectomy on Monday 



Suspected toxic encephalopathy/ Delirium - improved 

- CT head 11/10 negative

-Improved and mental status at this time is alert and oriented.

-We will continue to hold the PT oxycodone as well as Ambien, continue low-dose 
fentanyl 12.5 mg

-Continue risperidone 0.5 at bedtime melatonin

- Added melatonin 3 mg at bedtime. 



Left lower lobe pneumonia

-Could be related to possible aspiration from recurrent nausea and vomiting 
prior to coming to the hospital.

-Workup as above.

-Zosyn should cover for infection if this related to aspiration.

- Procalcitonin 0.21



CAD s/p PCI, HTN, HLD

- Last PCI ~ 5 yrs ago

- 10/2017 MPI stress test with no active ischemia and nml LVEF

- Seen by Cardiology during last admit - okay to remain off Plavix indefinitely 
as likely need for recurrent procedures

- Hold HCTZ 

- Continue ASA, Lisinopril, Fenofibrate

-Obtain EKGs and multiple QTc problems



Microcytic Anemia

- Check B12, Folate, Iron studies

- Stable near 9s

- Monitor



BPH

- Continue Finasteride, Tamsulosin



MATHEW

- Nocturnal CPAP



FEN

- Diet: Cardiac diet.

- Lytes PRN. 

- IVF: Discontinued IV fluid



Proph

- DVT: SCDs, Lovenox



Dispo

- Code status: Full, discussed with patient on arrival.

- Continue inpatient care.



Complexity of decision making high because of multisystem nature of the disease
, sepsis related to either infected pancreatic necrosis or possibly pneumonia.  

________________________________________________________________________



Subjective

Moshe Lopez is a 77 y.o. male.  No acute events overnight.  He is alert 
and oriented 4 this morning.  He was able to tell me why he was admitted and 
the plan of care.  Wife was at bedside and believes that his mental status is 
much better.  He stated that he ate a good dinner last night.  Did not have any 
nausea as he usually does with food.  Denies any other concerns or complaints.

ROS.denies fever, chills, nausea, vomiting, diarrhea, constipation.



Medications

Scheduled Meds:



aspirin EC tablet 81 mg 81 mg Oral QDAY 

citalopram (CELEXA) tablet 20 mg 20 mg Oral QDAY 

enoxaparin (LOVENOX) syringe 40 mg 40 mg Subcutaneous QDAY(21) 

famotidine (PEPCID) tablet 20 mg 20 mg Oral BID 

fenofibrate nanocrystallized (TRICOR) tablet 145 mg 145 mg Oral QHS 

fentaNYL citrate PF (SUBLIMAZE) injection 12.5 mcg 12.5 mcg Intravenous Q12H 

finasteride (PROSCAR) tablet 5 mg 5 mg Oral QDAY 

fish oil- omega 3-DHA/EPA capsule 1,000 mg 1,000 mg Oral BID 

fluconazole (DIFLUCAN) tablet 400 mg 400 mg Oral QDAY 

lisinopril (PRINIVIL; ZESTRIL) tablet 20 mg 20 mg Oral QDAY 

loratadine (CLARITIN) tablet 10 mg 10 mg Oral QAM8 

melatonin tablet 3 mg 3 mg Oral QHS 

ondansetron (ZOFRAN) injection 4 mg 4 mg Intravenous Q6H 

pantoprazole DR (PROTONIX) tablet 40 mg 40 mg Oral QDAY(21) 

piperacillin/tazobactam  (ZOSYN) 3.375 g/50 mL iso-osmotic IVPB 3.375 g 
Intravenous Q6H* 

polyethylene glycol 3350 (MIRALAX) packet 34 g 2 packet Oral BID 

risperiDONE (RISPERDAL) tablet 0.5 mg 0.5 mg Oral QHS 

scopolamine (TRANSDERM-SCOP) patch 1 patch 1 patch Transdermal Q72H* 

senna/docusate (SENOKOT-S) tablet 2 tablet 2 tablet Oral BID 

SODIUM CHLORIDE 0.9 % IV SOLP (Cabinet Override)   NOW 

tamsulosin (FLOMAX) capsule 0.4 mg 0.4 mg Oral QHS 

vitamins, B complex tablet 1 tablet 1 tablet Oral QDAY 

Continuous Infusions:

 

PRN and Respiratory Meds:acetaminophen Q6H PRN, prochlorperazine Q6H PRN, 
simethicone Q6H PRN



Objective: 



                     Vital Signs: Last Filed                 Vital Signs: 24 
Hour Range 

BP: 138/64 (800)

Temp: 36.7 C (98.1 F) (800)

Pulse: 67 (800)

Respirations: 16 PER MINUTE (800)

SpO2: 95 % (800)

O2 Delivery: None (Room Air) (800) BP: (127-144)/(54-73) 

Temp:  [36.2 C (97.1 F)-36.8 C (98.2 F)] 

Pulse:  [65-85] 

Respirations:  [15 PER MINUTE-16 PER MINUTE] 

SpO2:  [95 %-98 %] 

O2 Delivery: None (Room Air) 

Intensity Pain Scale 0-10 (Pain 1): 3 (11/10/17 2112) Vitals: 

 17 1540 11/10/17 0745 

Weight: 73.1 kg (161 lb 3.2 oz) 69.3 kg (152 lb 12.8 oz) 

  

Intake/Output Summary:  (Last 24 hours)



Intake/Output Summary (Last 24 hours) at 17 0815

Last data filed at 17 0624

 Gross per 24 hour 

Intake              340 ml 

Output             1300 ml 

Net             -960 ml 

 

Stool Occurrence: 1



Physical Exam

General: Alert and oriented, NAD 

Head: Normocephalic, without obvious abnormality, atraumatic 

Nose/Throat: Mucous membrane moist. 

Eyes: Conjunctivae/corneas clear. PERRL, EOMs intact. 

Neck: Supple, symmetrical, No JVD, No bruit 

Lungs: Clear to auscultation bilaterally 

Back: Non tender, no kyphosis or scoliosis

Heart: S1, S2 heard, Regular rate and rhythm, no murmur, click rub or gallop 

Abdomen: Soft, mild tenderness, distended, Bowel sounds normal. No masses. No 
organomegaly. 

Extremities: No cyanosis or clubbing. Pulses: 2+ and symmetric, all 
extremities. 

Skin: Clear, no rashes 



Lab Review

24-hour labs:  

Results for orders placed or performed during the hospital encounter of  (from the past 24 hour(s)) 

PROCALCITONIN 

 Collection Time: 11/10/17 11:19 AM 

Result Value Ref Range 

 Procalcitonin 0.21 (H) <0.10 NG/ML 

CBC AND DIFF 

 Collection Time: 17  7:39 AM 

Result Value Ref Range 

 White Blood Cells 7.9 4.5 - 11.0 K/UL 

 RBC 3.33 (L) 4.4 - 5.5 M/UL 

 Hemoglobin 8.5 (L) 13.5 - 16.5 GM/DL 

 Hematocrit 26.1 (L) 40 - 50 % 

 MCV 78.3 (L) 80 - 100 FL 

 MCH 25.4 (L) 26 - 34 PG 

 MCHC 32.4 32.0 - 36.0 G/DL 

 RDW 17.5 (H) 11 - 15 % 

 Platelet Count 286 150 - 400 K/UL 

 MPV 8.1 7 - 11 FL 

 Neutrophils 79 (H) 41 - 77 % 

 Lymphocytes 8 (L) 24 - 44 % 

 Monocytes 7 4 - 12 % 

 Eosinophils 6 (H) 0 - 5 % 

 Basophils 0 0 - 2 % 

 Absolute Neutrophil Count 6.20 1.8 - 7.0 K/UL 

 Absolute Lymph Count 0.60 (L) 1.0 - 4.8 K/UL 

 Absolute Monocyte Count 0.60 0 - 0.80 K/UL 

 Absolute Eosinophil Count 0.50 (H) 0 - 0.45 K/UL 

 Absolute Basophil Count 0.00 0 - 0.20 K/UL 

COMPREHENSIVE METABOLIC PANEL 

 Collection Time: 17  7:39 AM 

Result Value Ref Range 

 Sodium 141 137 - 147 MMOL/L 

 Potassium 3.6 3.5 - 5.1 MMOL/L 

 Chloride 107 98 - 110 MMOL/L 

 Glucose 105 (H) 70 - 100 MG/DL 

 Blood Urea Nitrogen 7 7 - 25 MG/DL 

 Creatinine 0.80 0.4 - 1.24 MG/DL 

 Calcium 8.8 8.5 - 10.6 MG/DL 

 Total Protein 6.2 6.0 - 8.0 G/DL 

 Total Bilirubin 0.3 0.3 - 1.2 MG/DL 

 Albumin 2.8 (L) 3.5 - 5.0 G/DL 

 Alk Phosphatase 91 25 - 110 U/L 

 AST (SGOT) 31 7 - 40 U/L 

 CO2 27 21 - 30 MMOL/L 

 ALT (SGPT) 32 7 - 56 U/L 

 Anion Gap 7 3 - 12 

 eGFR Non African American >60 >60 mL/min 

 eGFR African American >60 >60 mL/min 

MAGNESIUM 

 Collection Time: 17  7:39 AM 

Result Value Ref Range 

 Magnesium 1.9 1.6 - 2.6 mg/dL 

PHOSPHORUS 

 Collection Time: 17  7:39 AM 

Result Value Ref Range 

 Phosphorus 3.8 2.0 - 4.0 MG/DL 



Point of Care Testing  (Last 24 hours)

Glucose: (!) 105 (17 0739)



Radiology and other Diagnostics Review:  

Pertinent radiology reviewed.



Chantelle Zamora MD 

Pager [phone]

* Chato Todd - 2017  1:38 AM CST



Pt refused CPAP and Cont Pul Ox

* Latisha Louise RN - 11/10/2017  7:26 PM CST



Shift: day



NEWS Score:



Pain: adb, controlled by tylenol



Nutrition: Cardiac



GI/: voids, BM today



Activity: X 1 assist with a walker, very confused, safety watch  due to fall 
risk



Family: wife by the bedside



Last Shower: 



New Events or Follow-up: plan of care





* Roge Guerrero MD - 11/10/2017  2:29 PM CST



Formatting of this note may be different from the original.

          

 General Progress Note



Name:  Moshe Lopez 

Today's Date:  11/10/2017

Admission Date: 2017

LOS: 2 days

                 

Assessment/Plan:  

Active Problems:

  Necrotizing pancreatitis

  Pancreatic pseudocyst

  Sepsis (HCC)

  Nausea and vomiting

  Fever



76 y/o M with h/o CAD s/p PCI, HTN, HLD, MATHEW, diverticulitis s/p [facility] 
procedure 2017, acute pancreatitis with infected pseudocyst admitted for fever 
and nausea/vomiting.



Sepsis suspected from Infected Pancreatic Pseudocyst versus low concern for 
sepsis from left lower lobe pneumonia

- Fever to 102F for past 3 days PTA. No fever in last 24 hours. WBCs  have 
normalized.  

- Lactic normal.  RVP negative.  Urine legionella antigen negative.  Urine 
strep pneumonia antigen negative

- Leukocytosis seen on admission, improved presently to normal.

- H/o acute pancreatitis in 2017 which evolved to infected pseudocyst.  Cx 
have grown MSSA and C glabrata

- Pancreatitis suspected related to gallstones.  Per Surg, will eventually need 
cholecystectomy

- S/p EUS with necrosectomy and drainage 17 and 17

- S/p IR drain x2 on 17.  Now removed

- S/p necrosectomy and drainage 9/3/17 and 17

- UA unremarkable for infection

- Blood cultures: no growth so far

- 11/3/17 CT A/P showed There is diffuse atrophy of the pancreas. There is a 
cyst gastrostomy tube in place. Overall decrease in multiloculated network of 
fluid thick-walled collections containing some 

gas within the anterior pararenal space since the previous examination. The 
complex fluid and gas collection within the anterior right pararenal space has 
overall not significant change in size; stable stenosis of SMV and portal vein; 
stable intrahepatic biliary ductal dilation likely from narrowing of CBD - 
likely inflammatory; mild R hydronephrosis and mild L pelviectasis - likely 
from obstruction by fluid collection; mild diffuse peritoneal nodularity and 
stranding

- Was recently discharged on 10/25 with Augmentin and Fluconazole

- Broaden to Zosyn IV; continue PO Fluconazole per ID.

- Tylenol as needed.  Hold oxycodone because of suspected toxic encephalopathy.

- Scheduled Zofran and PRN Compazine; Continue Scopalamine patch

- Continue PPI and H2 blocker

- ID consulted, appreciate assistance.

- GI Biliary consulted -, appreciate assistance.  GI will decide to do repeat 
endoscopy procedure versus IR guided pancreatic drain placement.



Suspected toxic encephalopathy/ Delirium

-Patient was apparently confused though he was oriented to place and person.  
Patient wife was at bedside who reported that he has been acting confused, 
doing abnormal behaviors which is unusual for him.

-We will hold the PT oxycodone as well as Ambien, continue low-dose fentanyl 
12.5 mg : though reduce to 12.5 mg BID  scheduled for preventing opiate 
withdrawal, 

- Increase low-dose risperidone to 0.5 at bedtime.

- Added melatonin 3 mg at bedtime. 

- CT head 11/10 negative



Left lower lobe pneumonia

-Could be related to possible aspiration from recurrent nausea and vomiting 
prior to coming to the hospital.

-Workup as above.

-Zosyn should cover for infection if this related to aspiration.

- Procalcitonin 0.21



CAD s/p PCI, HTN, HLD

- Last PCI ~ 5 yrs ago

- 10/2017 MPI stress test with no active ischemia and nml LVEF

- Seen by Cardiology during last admit - okay to remain off Plavix indefinitely 
as likely need for recurrent procedures

- Hold HCTZ as appears dehydrated and receiving IVF

- Continue ASA, Lisinopril, Fenofibrate



Microcytic Anemia

- Check B12, Folate, Iron studies

- Stable near 9s

- Monitor



BPH

- Continue Finasteride, Tamsulosin



MATHEW

- Nocturnal CPAP



FEN

- Diet: Cardiac diet.Will keep patient NPO at [place], in case GI plans endoscopic 
procedure. Patient ate his breakfast today, can not do a procedure today. 

- Lytes PRN. 

- IVF: Discontinued IV fluid



Proph

- DVT: SCDs, Lovenox



Dispo

- Code status: Full, discussed with patient on arrival.

- Continue inpatient care.

- Discharge date unknown at present, based on clinical improvement.



Complexity of decision making high because of multisystem nature of the disease
, sepsis related to either infected pancreatic necrosis or possibly pneumonia.  

________________________________________________________________________



Subjective

Moshe Lopez is a 77 y.o. male.  Patient was seen and examined at bedside.  
Wife at bedside.  Patient was reported that patient has been confused even 
today. Nurse reported patient did not sleep all night y'day.  Patient was able 
to tell me that he is in [facility].  He mentioned the year as 1917 
though later corrected to 2017.  Patient denies any complaints of abdominal 
pain.  Patient reported cough. No abdominal pain. 



Review of system: Limited because of patient's confusion though denied any 
headache: reported dizziness, denies any chest pain palpitation or shortness of 
breath, denied any abdominal pain presently, denies any weakness or numbness in 
any part of the body.



Medications

Scheduled Meds:



aspirin EC tablet 81 mg 81 mg Oral QDAY 

citalopram (CELEXA) tablet 20 mg 20 mg Oral QDAY 

enoxaparin (LOVENOX) syringe 40 mg 40 mg Subcutaneous QDAY(21) 

famotidine (PEPCID) tablet 20 mg 20 mg Oral BID 

fenofibrate nanocrystallized (TRICOR) tablet 145 mg 145 mg Oral QHS 

fentaNYL citrate PF (SUBLIMAZE) injection 12.5 mcg 12.5 mcg Intravenous Q8H 

finasteride (PROSCAR) tablet 5 mg 5 mg Oral QDAY 

fish oil- omega 3-DHA/EPA capsule 1,000 mg 1,000 mg Oral BID 

fluconazole (DIFLUCAN) tablet 400 mg 400 mg Oral QDAY 

lisinopril (PRINIVIL; ZESTRIL) tablet 20 mg 20 mg Oral QDAY 

loratadine (CLARITIN) tablet 10 mg 10 mg Oral QAM8 

melatonin tablet 3 mg 3 mg Oral QHS 

ondansetron (ZOFRAN) injection 4 mg 4 mg Intravenous Q6H 

pantoprazole DR (PROTONIX) tablet 40 mg 40 mg Oral QDAY(21) 

piperacillin/tazobactam  (ZOSYN) 3.375 g/50 mL iso-osmotic IVPB 3.375 g 
Intravenous Q6H* 

polyethylene glycol 3350 (MIRALAX) packet 34 g 2 packet Oral BID 

risperiDONE (RISPERDAL) tablet 0.5 mg 0.5 mg Oral QHS 

scopolamine (TRANSDERM-SCOP) patch 1 patch 1 patch Transdermal Q72H* 

senna/docusate (SENOKOT-S) tablet 2 tablet 2 tablet Oral BID 

tamsulosin (FLOMAX) capsule 0.4 mg 0.4 mg Oral QHS 

vitamins, B complex tablet 1 tablet 1 tablet Oral QDAY 

Continuous Infusions:

 

PRN and Respiratory Meds:acetaminophen Q6H PRN, prochlorperazine Q6H PRN, 
simethicone Q6H PRN



Objective: 



                     Vital Signs: Last Filed                 Vital Signs: 24 
Hour Range 

BP: 129/59 (11/10 1109)

Temp: 36.2 C (97.1 F) (11/10 1109)

Pulse: 65 (11/10 1109)

Respirations: 16 PER MINUTE (11/10 1109)

SpO2: 98 % (11/10 1109)

O2 Delivery: None (Room Air) (11/10 1109) BP: (129-149)/(51-64) 

Temp:  [36.2 C (97.1 F)-37.1 C (98.7 F)] 

Pulse:  [65-87] 

Respirations:  [16 PER MINUTE-18 PER MINUTE] 

SpO2:  [94 %-98 %] 

O2 Delivery: None (Room Air) 

Intensity Pain Scale 0-10 (Pain 1): 4 (11/10/17 1317) Vitals: 

 17 1540 11/10/17 0745 

Weight: 73.1 kg (161 lb 3.2 oz) 69.3 kg (152 lb 12.8 oz) 

  

Intake/Output Summary:  (Last 24 hours)



Intake/Output Summary (Last 24 hours) at 11/10/17 1429

Last data filed at 11/10/17 1300

 Gross per 24 hour 

Intake             1635 ml 

Output             1950 ml 

Net             -315 ml 

 

Stool Occurrence: 1



Physical Exam

General: Thin built elderly male patient, alert, awake and oriented x3. 
intermittently confused, playing with his IV set needing reorientation.  

Head: Normocephalic, without obvious abnormality, atraumatic 

Nose/Throat: Mucous membrane moist. 

Eyes: Conjunctivae/corneas clear. PERRL, EOMs intact. 

Neck: Supple, symmetrical, No JVD, No bruit 

Lungs: Clear to auscultation bilaterally 

Back: Non tender, no kyphosis or scoliosis

Heart: S1, S2 heard, Regular rate and rhythm, no murmur, click rub or gallop 

Abdomen: Soft, mild diffuse tenderness, obese, distended, Bowel sounds normal. 
No masses. No organomegaly. 

Extremities: No cyanosis or clubbing. Pulses: 2+ and symmetric, all 
extremities. 

Neurologic: CNII - XII intact.  Moving all extremities.

Skin: Clear, no rashes 

Psyche: Normal affect



Lab Review

24-hour labs:  

Results for orders placed or performed during the hospital encounter of  (from the past 24 hour(s)) 

LEGIONELLA ANTIGEN URINE,RAN 

 Collection Time: 17  3:45 PM 

Result Value Ref Range 

 Battery Name LEGIONELLA URINE ANTIGEN  

 Specimen Description URINE  

 Special Requests NONE  

 Antigen NEGATIVE  

 Report Status FINAL

2017

  

STREPTOCOCCUS PNEUMO AG, URINE 

 Collection Time: 17  3:45 PM 

Result Value Ref Range 

 Battery Name STREP PNEUMO AG, UR  

 Specimen Description URINE  

 Special Requests NONE  

 Antigen NEGATIVE  

 Report Status FINAL

2017

  

RVP VIRAL PANEL PCR 

 Collection Time: 17  4:10 PM 

Result Value Ref Range 

 Specimen Source NASAL WASH  

 Adenovirus NOT DETECTED  

 Coronavirus 229E NOT DETECTED  

 Coronavirus HKU1 NOT DETECTED  

 Coronavirus NL63 NOT DETECTED  

 Coronavirus OC43 NOT DETECTED  

 Human Metapneumovirus NOT DETECTED  

 Human Rhinovirus/ENTEROVIRUS NOT DETECTED  

 Influenza A H1N1 2009 NOT DETECTED  

 Influenza A H1 NOT DETECTED  

 Influenza A H3 NOT DETECTED  

 Influenza B NOT DETECTED  

 Parainfluenza 1 NOT DETECTED  

 Parainfluenza 2 NOT DETECTED  

 Parainfluenza 3 NOT DETECTED  

 Parainfluenza 4 NOT DETECTED  

 RSV NOT DETECTED  

 Bordetella Pertussis NOT DETECTED  

 Chlamydophila Pneumoniae NOT DETECTED  

 Mycoplasma Pneumoniae NOT DETECTED  

CBC AND DIFF 

 Collection Time: 11/10/17  5:42 AM 

Result Value Ref Range 

 White Blood Cells 8.8 4.5 - 11.0 K/UL 

 RBC 3.50 (L) 4.4 - 5.5 M/UL 

 Hemoglobin 8.7 (L) 13.5 - 16.5 GM/DL 

 Hematocrit 27.8 (L) 40 - 50 % 

 MCV 79.6 (L) 80 - 100 FL 

 MCH 24.8 (L) 26 - 34 PG 

 MCHC 31.2 (L) 32.0 - 36.0 G/DL 

 RDW 17.7 (H) 11 - 15 % 

 Platelet Count 292 150 - 400 K/UL 

 MPV 9.1 7 - 11 FL 

 Neutrophils 79 (H) 41 - 77 % 

 Lymphocytes 9 (L) 24 - 44 % 

 Monocytes 8 4 - 12 % 

 Eosinophils 4 0 - 5 % 

 Basophils 0 0 - 2 % 

 Absolute Neutrophil Count 7.00 1.8 - 7.0 K/UL 

 Absolute Lymph Count 0.80 (L) 1.0 - 4.8 K/UL 

 Absolute Monocyte Count 0.70 0 - 0.80 K/UL 

 Absolute Eosinophil Count 0.40 0 - 0.45 K/UL 

 Absolute Basophil Count 0.00 0 - 0.20 K/UL 

COMPREHENSIVE METABOLIC PANEL 

 Collection Time: 11/10/17  5:42 AM 

Result Value Ref Range 

 Sodium 136 (L) 137 - 147 MMOL/L 

 Potassium 3.6 3.5 - 5.1 MMOL/L 

 Chloride 104 98 - 110 MMOL/L 

 Glucose 102 (H) 70 - 100 MG/DL 

 Blood Urea Nitrogen 7 7 - 25 MG/DL 

 Creatinine 0.79 0.4 - 1.24 MG/DL 

 Calcium 9.1 8.5 - 10.6 MG/DL 

 Total Protein 6.6 6.0 - 8.0 G/DL 

 Total Bilirubin 0.3 0.3 - 1.2 MG/DL 

 Albumin 3.0 (L) 3.5 - 5.0 G/DL 

 Alk Phosphatase 95 25 - 110 U/L 

 AST (SGOT) 52 (H) 7 - 40 U/L 

 CO2 26 21 - 30 MMOL/L 

 ALT (SGPT) 44 7 - 56 U/L 

 Anion Gap 6 3 - 12 

 eGFR Non African American >60 >60 mL/min 

 eGFR African American >60 >60 mL/min 

MAGNESIUM 

 Collection Time: 11/10/17  5:42 AM 

Result Value Ref Range 

 Magnesium 2.1 1.6 - 2.6 mg/dL 

PHOSPHORUS 

 Collection Time: 11/10/17  5:42 AM 

Result Value Ref Range 

 Phosphorus 3.0 2.0 - 4.0 MG/DL 

PROCALCITONIN 

 Collection Time: 11/10/17 11:19 AM 

Result Value Ref Range 

 Procalcitonin 0.21 (H) <0.10 NG/ML 



Point of Care Testing  (Last 24 hours)

Glucose: (!) 102 (11/10/17 0542)



Radiology and other Diagnostics Review:  

Pertinent radiology reviewed.



ROGE GUERRERO MD 

Pager [phone]

* Fan Tidwell MD - 11/10/2017 12:23 PM CST



Formatting of this note may be different from the original.

Gastro-Biliary Progress Note



Assessement and Plans 

Active Problems:

  Necrotizing pancreatitis

  Pancreatic pseudocyst

  Sepsis (HCC)

  Nausea and vomiting

  Fever



Admission Date: 2017

LOS: 2 days



Moshe Lopez is a 77 y.o. male patient was past medical history of acute 
severe pancreatitis complicated by walled of pancreatic necrosis with fluid 
collections status post cystogastrostomy and necrosectomy in the past; admitted 
to the hospital after he presented with low-grade fever of three days duration 
along with persistent projectile vomiting, abdominal pain, confusion with 
fidgeting and decreased functional capacity. Liver function within normal 
limits except mildly elevated AST. Lipase within normal limits. No growth on 
blood culture so far. RVP was negative. CXR showed  increased opacities 
primarily involving the left lower lung, which may be from pneumonia or 
atelectasis. CT of the head with no acute process. CT of the abdomen pelvis (11/
3/2017) showed mildly enlarged liver with stable mild diffuse intrahepatic 
biliary duct dilation. Has diffuse atrophy of the pancreas with 
cystogastrostomy stent  in place. Overall, there was a decrease in 
multiloculated needs focal fluid thick-walled collection containing some gas 
within the anterior pararenal space since prior study. The complex fluid and 
gas collection seen within the anterior right pararenal space has nt shown 
significant change from prior studies.



Patient was examined today. He continues to have confusion.



Recommendations:

-Continue with Iivantibiotics for now as per ID.

-Continue with supportive care as per primary team.

-Fall and aspiration precaution.

-We will plan to do upper endoscopic exam on Monday for necronectomy pending 
his clinical course over the week ends.

-Gastro-biliary team will be available for any questions or concerns.



Patient was discussed with .



Thank you for involving us in the care of this patient.





Joe Irene

GI/Hepatology Fellow

[id number]





Subjective 

Overnight: Patient continues to have confusion. Had no fever over night. No 
other acute symptoms.



Objective 

24-Hour Vitals Range

BP: (129-149)/(51-64) 

Temp:  [36.2 C (97.1 F)-37.1 C (98.7 F)] 

Pulse:  [65-87] 

Respirations:  [16 PER MINUTE-18 PER MINUTE] 

SpO2:  [94 %-98 %] 

O2 Delivery: None (Room Air)



Intake/Output Summary:  (Last 24 hours)



Intake/Output Summary (Last 24 hours) at 11/10/17 1224

Last data filed at 11/10/17 0915

 Gross per 24 hour 

Intake             1635 ml 

Output             1950 ml 

Net             -315 ml 

 

Stool Occurrence: 1



Physical Exam



GEN: Alert but confused male patient.

HEENT: PERRLA.

NECK: No JVD, neck supple, no thyromegaly/nodularity, no cervical or 
supraclavicular adenopathy

RESP: Clear to auscultation bilaterally, no increased work of breathing, no 
wheezing/rales/ronchi

CVS: Regular rate and rhythm, S1S2 normal, no murmur/rub/gallop

ABD: Soft, non tender, non distended, no palpable masses or organomegaly, BS+

EXT: No edema/cyanosis/clubbing.

SKIN: Warm, dry, no ulcerations, venous stasis dermatitis, suspicious lesions, 
or rashes.

NEURO: Plesantly confused elderly male patient. No gross motor deficit.

Medications

Scheduled Meds:

aspirin EC tablet 81 mg 81 mg Oral QDAY 

citalopram (CELEXA) tablet 20 mg 20 mg Oral QDAY 

enoxaparin (LOVENOX) syringe 40 mg 40 mg Subcutaneous QDAY(21) 

famotidine (PEPCID) tablet 20 mg 20 mg Oral BID 

fenofibrate nanocrystallized (TRICOR) tablet 145 mg 145 mg Oral QHS 

fentaNYL citrate PF (SUBLIMAZE) injection 12.5 mcg 12.5 mcg Intravenous Q8H 

finasteride (PROSCAR) tablet 5 mg 5 mg Oral QDAY 

fish oil- omega 3-DHA/EPA capsule 1,000 mg 1,000 mg Oral BID 

fluconazole (DIFLUCAN) tablet 400 mg 400 mg Oral QDAY 

lisinopril (PRINIVIL; ZESTRIL) tablet 20 mg 20 mg Oral QDAY 

loratadine (CLARITIN) tablet 10 mg 10 mg Oral QAM8 

melatonin tablet 3 mg 3 mg Oral QHS 

ondansetron (ZOFRAN) injection 4 mg 4 mg Intravenous Q6H 

pantoprazole DR (PROTONIX) tablet 40 mg 40 mg Oral QDAY(21) 

piperacillin/tazobactam  (ZOSYN) 3.375 g/50 mL iso-osmotic IVPB 3.375 g 
Intravenous Q6H* 

polyethylene glycol 3350 (MIRALAX) packet 34 g 2 packet Oral BID 

risperiDONE (RISPERDAL) tablet 0.5 mg 0.5 mg Oral QHS 

scopolamine (TRANSDERM-SCOP) patch 1 patch 1 patch Transdermal Q72H* 

senna/docusate (SENOKOT-S) tablet 2 tablet 2 tablet Oral BID 

tamsulosin (FLOMAX) capsule 0.4 mg 0.4 mg Oral QHS 

vitamins, B complex tablet 1 tablet 1 tablet Oral QDAY 

Continuous Infusions: 

PRN and Respiratory Meds:acetaminophen Q6H PRN, prochlorperazine Q6H PRN, 
simethicone Q6H PRN



Lab Review

24-hour labs:  

Results for orders placed or performed during the hospital encounter of  (from the past 24 hour(s)) 

LEGIONELLA ANTIGEN URINE,RAN 

 Collection Time: 17  3:45 PM 

Result Value Ref Range 

 Battery Name LEGIONELLA URINE ANTIGEN  

 Specimen Description URINE  

 Special Requests NONE  

 Antigen NEGATIVE  

 Report Status FINAL

2017

  

STREPTOCOCCUS PNEUMO AG, URINE 

 Collection Time: 17  3:45 PM 

Result Value Ref Range 

 Battery Name STREP PNEUMO AG, UR  

 Specimen Description URINE  

 Special Requests NONE  

 Antigen NEGATIVE  

 Report Status FINAL

2017

  

RVP VIRAL PANEL PCR 

 Collection Time: 17  4:10 PM 

Result Value Ref Range 

 Specimen Source NASAL WASH  

 Adenovirus NOT DETECTED  

 Coronavirus 229E NOT DETECTED  

 Coronavirus HKU1 NOT DETECTED  

 Coronavirus NL63 NOT DETECTED  

 Coronavirus OC43 NOT DETECTED  

 Human Metapneumovirus NOT DETECTED  

 Human Rhinovirus/ENTEROVIRUS NOT DETECTED  

 Influenza A H1N1 2009 NOT DETECTED  

 Influenza A H1 NOT DETECTED  

 Influenza A H3 NOT DETECTED  

 Influenza B NOT DETECTED  

 Parainfluenza 1 NOT DETECTED  

 Parainfluenza 2 NOT DETECTED  

 Parainfluenza 3 NOT DETECTED  

 Parainfluenza 4 NOT DETECTED  

 RSV NOT DETECTED  

 Bordetella Pertussis NOT DETECTED  

 Chlamydophila Pneumoniae NOT DETECTED  

 Mycoplasma Pneumoniae NOT DETECTED  

CBC AND DIFF 

 Collection Time: 11/10/17  5:42 AM 

Result Value Ref Range 

 White Blood Cells 8.8 4.5 - 11.0 K/UL 

 RBC 3.50 (L) 4.4 - 5.5 M/UL 

 Hemoglobin 8.7 (L) 13.5 - 16.5 GM/DL 

 Hematocrit 27.8 (L) 40 - 50 % 

 MCV 79.6 (L) 80 - 100 FL 

 MCH 24.8 (L) 26 - 34 PG 

 MCHC 31.2 (L) 32.0 - 36.0 G/DL 

 RDW 17.7 (H) 11 - 15 % 

 Platelet Count 292 150 - 400 K/UL 

 MPV 9.1 7 - 11 FL 

 Neutrophils 79 (H) 41 - 77 % 

 Lymphocytes 9 (L) 24 - 44 % 

 Monocytes 8 4 - 12 % 

 Eosinophils 4 0 - 5 % 

 Basophils 0 0 - 2 % 

 Absolute Neutrophil Count 7.00 1.8 - 7.0 K/UL 

 Absolute Lymph Count 0.80 (L) 1.0 - 4.8 K/UL 

 Absolute Monocyte Count 0.70 0 - 0.80 K/UL 

 Absolute Eosinophil Count 0.40 0 - 0.45 K/UL 

 Absolute Basophil Count 0.00 0 - 0.20 K/UL 

COMPREHENSIVE METABOLIC PANEL 

 Collection Time: 11/10/17  5:42 AM 

Result Value Ref Range 

 Sodium 136 (L) 137 - 147 MMOL/L 

 Potassium 3.6 3.5 - 5.1 MMOL/L 

 Chloride 104 98 - 110 MMOL/L 

 Glucose 102 (H) 70 - 100 MG/DL 

 Blood Urea Nitrogen 7 7 - 25 MG/DL 

 Creatinine 0.79 0.4 - 1.24 MG/DL 

 Calcium 9.1 8.5 - 10.6 MG/DL 

 Total Protein 6.6 6.0 - 8.0 G/DL 

 Total Bilirubin 0.3 0.3 - 1.2 MG/DL 

 Albumin 3.0 (L) 3.5 - 5.0 G/DL 

 Alk Phosphatase 95 25 - 110 U/L 

 AST (SGOT) 52 (H) 7 - 40 U/L 

 CO2 26 21 - 30 MMOL/L 

 ALT (SGPT) 44 7 - 56 U/L 

 Anion Gap 6 3 - 12 

 eGFR Non African American >60 >60 mL/min 

 eGFR African American >60 >60 mL/min 

MAGNESIUM 

 Collection Time: 11/10/17  5:42 AM 

Result Value Ref Range 

 Magnesium 2.1 1.6 - 2.6 mg/dL 

PHOSPHORUS 

 Collection Time: 11/10/17  5:42 AM 

Result Value Ref Range 

 Phosphorus 3.0 2.0 - 4.0 MG/DL 



Pertinent labs reviewed



Radiology and other Diagnostics Review:  

Pertinent studies reviewed



Joe Irnee MD

11/10/2017

Pager: [phone]



ATTESTATION



I personally performed the key portions of the E/M visit, discussed case with 
resident and concur with resident documentation of history, physical exam, 
assessment, and treatment plan unless otherwise noted.



Staff name:  Fna Tidwell MD Date:  2017 





* Buddy Guthrie, PT - 11/10/2017 11:06 AM CST



PHYSICAL THERAPY

PROGRESS NOTE 



MOBILITY:

Progressive Mobility Level: Walk in hallway

Distance Walked (feet): 500 ft

Level of Assistance: Assist X1

Assistive Device: Hand Held

Time Tolerated: 11-30 minutes

Activity Limited By: Mental Status Variability



SUBJECTIVE:

Significant hospital events: acute pancreatitis with infected pseudocyst 
admitted for fever and nausea/vomiting

PMH: CAD, PCI, HTN, HLD, MATHEW, diverticulitis s/p Hartmans procedure 2017



Mental / Cognitive Status: Confused (Not fully oriented to situation)

Persons Present: Spouse;Constant Observer

Pain: Patient has no complaint of pain

Ambulation Assist: Independent Mobility in Community with Device

Patient Owned Equipment: Single Point Cane;Roller Walker

Home Situation: Lives with Family

Type of Home: House

Entry Stairs: Ramp;Rail on Both Sides

In-Home Stairs: No Stairs



BED MOBILITY/TRANSFERS:

Bed mobility and transfers typically are requiring no more than supervision.

His mentation can vary so he does have moments of loss of balance particularly 
if he is becoming agitated.



GAIT:

Distance: 500 feet

Activity Limited By: Patient Choice

Primarily CGA with no device. His mentation can vary so he does have moments of 
loss of balance particularly if he is becoming agitated.

No obvious gait deficits outside of unpredictable behavior and poor safety 
awareness.



I attempted to complete the modified DGI but the patient was too confused to 
effectively participate.



EDUCATION:

Persons Educated: Patient

Patient Barriers To Learning: Confusion

Interventions: Repetition of Instructions;Family Education

Teaching Methods: Verbal Instruction

Patient Response: More Instruction Required

Topics: Plan/Goals of PT Interventions



ASSESSMENT/PROGRESS:

As his mentation improves so should his mobility.



AM-PAC 6 Clicks Basic Mobility Inpatient

Turning from your back to your side while in a flat bed without using bed rails
: None

Moving from lying on your back to sitting on the side of a flatbed without 
using bedrails : None

Moving to and from a bed to a chair (including a wheelchair): A Little

Standing up from a chair using your arms (e.g. wheelchair, or bedside chair): A 
Little

To walk in hospital room: A Little

Climbing 3-5 steps with a railing: A Little

Raw Score: 20

Standardized (T-scale) Score: 43.99

Basic Mobility CMS 0-100%: 33.32

CMS G Code Modifier for Basic Mobility: CJ



GOALS:

Formulation: With Family

Time For Goal Achievement: 5 days, To, 7 days

Pt Will Go Supine To/From Sit: Independently

Pt Will Transfer Bed/Chair: Independently

Pt Will Ambulate: Greater than 200 Feet, w/ Walker, Independently



PLAN:

Treatment Interventions: Mobility Training;Strengthening;Balance Activities

Plan Frequency: 5 Days per Week



Continue to assess all mobility to determine ability to discharge home.

Patient is too confused for an MA.



RECOMMENDATIONS:

PT Discharge Recommendations: If the patients mentation improves he should be 
able to discharge home with his wife.

Equipment Recommendations: Patient owns necessary equipment



Therapist: Buddy Guthrie, PT

Date: 11/10/2017





* Lizette Santoyo, OT - 11/10/2017  9:18 AM CST



OCCUPATIONAL THERAPY

 NOTE 



The patient was not seen due to: Patient at test/procedure



OT attempted to visit patient this date. Patient with transport in [place] - per 
RN patient leaving for CT scan and xray. OT will continue to follow and provide 
intervention as indicated.



Therapist: Lizette Santoyo, ROXANNER/L [id number]

Date: 11/10/2017

* Eve Chamberlain DO - 11/10/2017  8:17 AM CST



Formatting of this note may be different from the original.

Infectious Diseases Progress Note



Today's Date:  11/10/2017

Admission Date: 2017



Reason for this consultation: fever



Assessment: 



Fever 

- Differential of source is pseudocyst vs LLL pneumonia vs other 

- CXR 17: Sl increased opacity in the left lower lung, possibly pneumonia 
or atelectasis.

- CT A/P 11/3/17:  Overall decrease in size complex interconnecting fluid and 
gas containing collections within the anterior pararenal/ space with a cyst 
gastrostomy tube in place

- BCx : NGTD

- UA neg; Urine legionella & s. Pneumo: negative

- RVP: negative

- PCT: pending

- CXR 11/10: no changes, continued L basilar opacity



Elevated AST

- 55 on admission

- Has been elevated in the past

- Other LFTs & ALP WNL; Lipase WNL



H/o pancreatitis

- symptom onset 7/15/17

- no hx of alcohol abuse, no gallstones

- serial CTs with progression since 7/15/17 of peripancreatic fluid collections 
with fat stranding, forming phelgmonous collection

-  Abd wall/peritoneal fluid- MSSA, Candida glabrata (both from broth)

- . EUS w necrosectomy and drainage- purulent fluid (no cultures)

- - necrosectomy w drainage

- : CT decrease size of complex network of gas/fluid in pararenal space. 
Loculated ascites w some gas on left, persistnet thickening of splenic flexure 
of colon sec pancreatitis, stenosis superior mesenteric vein.

- IR drain in abd fluid collection- 1) Right pelvic fluid collection- neg 2) 
Left fluid collection- staph on GS- culture neg

9/3- necrosectomy w drainage

- necrosectomy w drainage

 - Lipase WNL

CT a/p as above



H/o Left lower abdominal wall cellulitis with subcutaneous fluid collection 
related to infected abd ascitic fluid (see below)-improved

- 3 cm collection noted on initial CT abdomen/pelvis scan 7/15 and persistent 
on FU CT abd 

- IR guided drainage  "superficial left abdominal wall fluid collection, 
which appear to communicate with underlying ascitic fluid, with underlying 
loculated ascites."

-  Abd wall/peritoneal fluid- MSSA, Candida glabrata (both from broth)



H/o MSSA bacteremia

- rpt BC  No growth

- (No BC prior to transfer to  from [facility] per verbal from lab)

- TTE- nl valves



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 
reversal in 2017 ([place], [place])

CAD s/p PCI with 9 prior stents

MATHEW - CPAP

HTN 



Recommendations: 



1. w/o for PNA given CXR finding neg fro RVP, legionella/strep and BC neg to 
date (dev on augmentin) - not clear this is cause of fever - no fever on zosyn, 
cont 

2. Continue zosyn & diflucan for now.  If fever recurs add vanc empirically

3. GI following and recommending endoscopic intervention for necrosectomy vs IR 
drain in future - endoscopic intervention if feasible would be preferred.  
Given fever at home on augmentin and no new respiratory pathogen ID hope to 
have intervention in house before dc

4. Continue to monitor CBC and monitor for symptomatic improvement

5. IS q1 hr while awake

6. Lights on during day, freq reorientation for likely delirium compounded by 
insomnia

7. I will round again on the patient on Monday.  Please feel free to call the 
ID fellow on call at pager [phone] if there are any new issues or concerns over 
the weekend.



Patient to be discussed with Dr. Chamberlain.



Gen Camacho DO

Internal Medicine, PGY-1

Pager: [phone]



I have seen, personally evaluated, and discussed the patient's care with Dr. Camacho, Internal Medicine Resident.  I agree with the subjective notations, 
objective findings and agree with the plan of care as documented in this note 
with the exceptions noted. 

Eve Chamberlain DO

Division of Infectious Diseases

Pager [phone]



History of Present Illness  

Moshe Lopez is a 77 y.o.  history of CAD s/p PCI with 9 stents, MATHEW wears 
CPAP at home, HTN, HLD, sigmoid diverticulitis s/p Hernandez's procedurein 
2017 with colostomy reversal in 2017, who was admitted to [facility] originally on 17 for ongoing management of pancreatitis 
with ileus.



This morning he is sitting the chair and reports feeling "better" today. His 
main concern is feeling off balance while walking the halls which his wife 
denies happening. When asked the year he states that it is  at first and 
then that it . He reports the month being November. He reports that he did 
not have nausea last night or this morning. He also denies any fevers or 
chills. Currently he has not N/V/D/C or abdominal pain.



Antimicrobial Start date End date 

Zosyn  Active 

Fluconazole  Active 

   

   

   

   

   

Estimated Creatinine Clearance: 78 mL/min (based on Cr of 0.79).



Medications 

Scheduled Meds:



aspirin EC tablet 81 mg 81 mg Oral QDAY 

citalopram (CELEXA) tablet 20 mg 20 mg Oral QDAY 

enoxaparin (LOVENOX) syringe 40 mg 40 mg Subcutaneous QDAY(21) 

famotidine (PEPCID) tablet 20 mg 20 mg Oral BID 

fenofibrate nanocrystallized (TRICOR) tablet 145 mg 145 mg Oral QHS 

fentaNYL citrate PF (SUBLIMAZE) injection 12.5 mcg 12.5 mcg Intravenous Q8H 

finasteride (PROSCAR) tablet 5 mg 5 mg Oral QDAY 

fish oil- omega 3-DHA/EPA capsule 1,000 mg 1,000 mg Oral BID 

fluconazole (DIFLUCAN) tablet 400 mg 400 mg Oral QDAY 

lisinopril (PRINIVIL; ZESTRIL) tablet 20 mg 20 mg Oral QDAY 

loratadine (CLARITIN) tablet 10 mg 10 mg Oral QAM8 

ondansetron (ZOFRAN) injection 4 mg 4 mg Intravenous Q6H 

pantoprazole DR (PROTONIX) tablet 40 mg 40 mg Oral QDAY(21) 

piperacillin/tazobactam  (ZOSYN) 3.375 g/50 mL iso-osmotic IVPB 3.375 g 
Intravenous Q6H* 

polyethylene glycol 3350 (MIRALAX) packet 34 g 2 packet Oral BID 

risperiDONE (RISPERDAL) tablet 0.25 mg 0.25 mg Oral QHS 

scopolamine (TRANSDERM-SCOP) patch 1 patch 1 patch Transdermal Q72H* 

senna/docusate (SENOKOT-S) tablet 2 tablet 2 tablet Oral BID 

tamsulosin (FLOMAX) capsule 0.4 mg 0.4 mg Oral QHS 

vitamins, B complex tablet 1 tablet 1 tablet Oral QDAY 

Continuous Infusions:

 

PRN and Respiratory Meds:acetaminophen Q6H PRN, prochlorperazine Q6H PRN, 
simethicone Q6H PRN, zolpidem QHS PRN



Physical Examination 



                     Vital Signs: Last                  Vital Signs: 24 Hour 
Range 

BP: 133/51 (11/10 0615)

Temp: 36.6 C (97.9 F) (11/10 0615)

Pulse: 79 (11/10 0615)

Respirations: 18 PER MINUTE (11/10 0615)

SpO2: 97 % (11/10 0615)

O2 Delivery: None (Room Air) (11/10 0615) BP: (133-149)/(51-64) 

Temp:  [36.6 C (97.9 F)-37.1 C (98.7 F)] 

Pulse:  [72-87] 

Respirations:  [18 PER MINUTE] 

SpO2:  [94 %-98 %] 

O2 Delivery: None (Room Air) 



General appearance: alert, disoriented to time and situation, but knows time 
and place, able to have meaningful conversation today, NAD - MS seems sl 
improved

HENT: no oral lesions/thrush

Eyes: Conj nl

Lungs: no wheezing, rhonchi, right side rales appreciated that clear with cough 

Heart: Regular rhythm, reg rate, with no murmur

Abdomen: soft, non-tender, distended on the lateral aspects, normoactive bowel 
sounds

Ext:  Trace LE edema

Skin: no rashes/lesions



Lines: PIV



Lab Review 

Hematology

Recent Labs 

   17

 1117  17

 1720  17

 0506  11/10/17

 0542 

WBC  12.3*   --   9.1  8.8 

HGB  9.4*   --   8.3*  8.7* 

HCT  29.2*   --   26.3*  27.8* 

PLTCT  344   --   281  292 

PTT   --   28.1   --    --  

INR   --   1.4*   --    --  



Chemistry

Recent Labs 

   17

 1117  17

 1720  17

 0506  11/10/17

 0542 

NA  137   --   139  136* 

K  4.0   --   3.2*  3.6 

CL  102   --   104  104 

CO2  25   --   25  26 

BUN  11   --   9  7 

CR  0.83   --   0.66  0.79 

GFR  >60   --   >60  >60 

GLU  118*   --   104*  102* 

CA  9.3   --   8.8  9.1 

PO4   --   2.7  2.7  3.0 

ALBUMIN  3.3*   --   2.8*  3.0* 

ALKPHOS  132*   --   98  95 

AST  55*   --   55*  52* 

ALT  42   --   40  44 

TOTBILI  0.4   --   0.4  0.3 



Microbiology, Radiology and other Diagnostics Review 

Microbiology data reviewed.



Pertinent radiology images viewed.



CXR 11/10/17: no sig change

Impression: Persistent opacities in the left lung base, likely reflecting 
pneumonia or atelectasis.



CT A/P 17:

1. Overall decrease in size complex interconnecting fluid and gas 

containing collections within the anterior pararenal space with a cyst 

gastrostomy tube in place.

2. 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 

confluence, and main portal vein. 

3. Stable intrahepatic biliary ductal dilation, likely secondary to 

thickening and narrowing of the common bile duct. This is likely 

inflammatory in nature.

4. Development of mild right hydronephrosis and mild left pelviectasis. No 

discrete obstructing lesion is seen, though right hydronephrosis is likely 

secondary to relative obstruction by the right anterior pararenal fluid 

collection.

5. Mild diffuse peritoneal nodularity and stranding.

6. Mild prostatomegaly.



CT Head w/o contrast 11/10/17: 

- Mild generalized cerebral volume loss. 

- No acute hemorrhage or mass effect



Gen Camacho DO 

Pager [phone]

ID 



* Gali Gonzalez RN - 11/10/2017  6:14 AM CST



Shift: Night



Mentation: A&Ox3. Person, time, and place. Intermittently oriented x2 to person 
and time. 



Cardiac: S1S2.



Respiratory: RA.



GI/: Voids. Last BM 11/10. 



Nutrition: Cardiac diet. Improved appetite. 



Activity: x1 assist with walker. Safety watch due to fall risk. 



Pain: No complaint of pain this shift. 



Family: Wife, Latisha, at bedside. 



Follow up: Continue to monitor and evaluate patient mental status and notify 
physician of any changes. Promote sleep for patient. 



* Roge Guerrero MD - 2017 10:01 PM CST



Formatting of this note may be different from the original.

          

 General Progress Note



Name:  Moshe HAYNES John 

Today's Date:  2017

Admission Date: 2017

LOS: 1 day

                 

Assessment/Plan:  

Active Problems:

  Necrotizing pancreatitis

  Pancreatic pseudocyst

  Sepsis (HCC)

  Nausea and vomiting

  Fever



76 y/o M with h/o CAD s/p PCI, HTN, HLD, MATHEW, diverticulitis s/p Hartmans 
procedure 2017, acute pancreatitis with infected pseudocyst admitted for fever 
and nausea/vomiting.



Sepsis suspected from Infected Pancreatic Pseudocyst versus low concern for 
sepsis from left lower lobe pneumonia

- Fever to 102F for past 3 days.  

- Lactic normal.  RVP negative.  Urine legionella antigen negative.  Urine 
strep pneumonia antigen negative

- Leukocytosis seen on admission, improved presently to normal.

- H/o acute pancreatitis in 2017 which evolved to infected pseudocyst.  Cx 
have grown MSSA and C glabrata

- Pancreatitis suspected related to gallstones.  Per Surg, will eventually need 
cholecystectomy

- S/p EUS with necrosectomy and drainage 17 and 17

- S/p IR drain x2 on 17.  Now removed

- S/p necrosectomy and drainage 9/3/17 and 17

- UA unremarkable for infection

- Blood cultures: no growth so far

- 11/3/17 CT A/P showed There is diffuse atrophy of the pancreas. There is a 
cyst gastrostomy tube in place. Overall decrease in multiloculated network of 
fluid thick-walled collections containing some 

gas within the anterior pararenal space since the previous examination. The 
complex fluid and gas collection within the anterior right pararenal space has 
overall not significant change in size; stable stenosis of SMV and portal vein; 
stable intrahepatic biliary ductal dilation likely from narrowing of CBD - 
likely inflammatory; mild R hydronephrosis and mild L pelviectasis - likely 
from obstruction by fluid collection; mild diffuse peritoneal nodularity and 
stranding

- Was recently discharged on 10/25 with Augmentin and Fluconazole

- Broaden to Zosyn IV; continue PO Fluconazole per ID.

- Tylenol as needed.  Hold oxycodone because of suspected toxic encephalopathy.

- Scheduled Zofran and PRN Compazine; Continue Scopalamine patch

- Continue PPI and H2 blocker

- ID consulted, appreciate assistance.

- GI Biliary consulted -, appreciate assistance.  GI will decide to do repeat 
endoscopy procedure versus IR guided pancreatic drain placement.



Suspected toxic encephalopathy

-Patient was apparently confused though he was oriented to place and person.  
Patient wife was at bedside who reported that he has been acting confused, 
doing abnormal behaviors which is unusual for him.

-We will hold the PT oxycodone, start the patient on low-dose fentanyl 12.5 mg 
3 times daily scheduled for preventing opiate withdrawal, start patient on low-
dose risperidone of 0.25 mg at bedtime.



Left lower lobe pneumonia

-Could be related to possible aspiration from recurrent nausea and vomiting 
prior to coming to the hospital.

-Workup as above.

-Zosyn should cover for infection if this related to aspiration.



CAD s/p PCI, HTN, HLD

- Last PCI ~ 5 yrs ago

- 10/2017 MPI stress test with no active ischemia and nml LVEF

- Seen by Cardiology during last admit - okay to remain off Plavix indefinitely 
as likely need for recurrent procedures

- Hold HCTZ as appears dehydrated and receiving IVF

- Continue ASA, Lisinopril, Fenofibrate



Microcytic Anemia

- Check B12, Folate, Iron studies

- Stable near 9s

- Monitor



BPH

- Continue Finasteride, Tamsulosin



MATHEW

- Nocturnal CPAP



FEN

- Diet: Cardiac diet.

-Hypokalemia K+ replaced.  We will give 2 g of magnesium for hypomagnesemia.

- IVF: Discontinue IV fluid



Proph

- DVT: SCDs, Lovenox



Dispo

- Code status: Full, discussed with patient on arrival.

-Continue inpatient care.

-Discharge date unknown at present, based on clinical improvement.



Complexity of decision making high because of multisystem nature of the disease
, sepsis related to either infected pancreatic necrosis or possibly pneumonia.  

________________________________________________________________________



Subjective

Moshe Lopez is a 77 y.o. male.  Patient was seen and examined at bedside.  
Wife at bedside.  Patient was reported that patient has been confused.  Patient 
was able to tell me that he is in [facility] though initially said he is 
at [facility].  He mentioned the year as 2018 though later corrected 
to 2017.  Patient denies any complaints of abdominal pain.  Patient had a bowel 
movement in the morning.  Patient reported cough.



Review of system: Limited because of patient's confusion though denied any 
headache or dizziness, denies any chest pain palpitation or shortness of breath
, denied any abdominal pain presently, denies any weakness or numbness in any 
part of the body.



Medications

Scheduled Meds:

aspirin EC tablet 81 mg 81 mg Oral QDAY 

citalopram (CELEXA) tablet 20 mg 20 mg Oral QDAY 

enoxaparin (LOVENOX) syringe 40 mg 40 mg Subcutaneous QDAY(21) 

famotidine (PEPCID) tablet 20 mg 20 mg Oral BID 

fenofibrate nanocrystallized (TRICOR) tablet 145 mg 145 mg Oral QHS 

fentaNYL citrate PF (SUBLIMAZE) injection 12.5 mcg 12.5 mcg Intravenous Q8H 

finasteride (PROSCAR) tablet 5 mg 5 mg Oral QDAY 

fish oil- omega 3-DHA/EPA capsule 1,000 mg 1,000 mg Oral BID 

fluconazole (DIFLUCAN) tablet 400 mg 400 mg Oral QDAY 

lisinopril (PRINIVIL; ZESTRIL) tablet 20 mg 20 mg Oral QDAY 

loratadine (CLARITIN) tablet 10 mg 10 mg Oral QAM8 

melatonin tablet 5 mg 5 mg Oral ONCE 

ondansetron (ZOFRAN) injection 4 mg 4 mg Intravenous Q6H 

pantoprazole DR (PROTONIX) tablet 40 mg 40 mg Oral QDAY(21) 

piperacillin/tazobactam  (ZOSYN) 3.375 g/50 mL iso-osmotic IVPB 3.375 g 
Intravenous Q6H* 

polyethylene glycol 3350 (MIRALAX) packet 34 g 2 packet Oral BID 

risperiDONE (RISPERDAL) tablet 0.25 mg 0.25 mg Oral QHS 

scopolamine (TRANSDERM-SCOP) patch 1 patch 1 patch Transdermal Q72H* 

senna/docusate (SENOKOT-S) tablet 2 tablet 2 tablet Oral BID 

tamsulosin (FLOMAX) capsule 0.4 mg 0.4 mg Oral QHS 

vitamins, B complex tablet 1 tablet 1 tablet Oral QDAY 

Continuous Infusions:

 sodium chloride 0.9 %   infusion 100 mL/hr at 17 1527 



PRN and Respiratory Meds:acetaminophen Q6H PRN, prochlorperazine Q6H PRN, 
simethicone Q6H PRN, zolpidem QHS PRN



Objective: 



                     Vital Signs: Last Filed                 Vital Signs: 24 
Hour Range 

BP: 138/56 (1859)

Temp: 36.9 C (98.4 F) (1859)

Pulse: 72 (1859)

Respirations: 18 PER MINUTE (1859)

SpO2: 96 % (2031)

O2 Delivery: None (Room Air) (1859) BP: (122-149)/(53-64) 

Temp:  [36.8 C (98.3 F)-37.4 C (99.3 F)] 

Pulse:  [72-87] 

Respirations:  [18 PER MINUTE-23 PER MINUTE] 

SpO2:  [93 %-98 %] 

O2 Delivery: None (Room Air) 

Intensity Pain Scale 0-10 (Pain 1): 2 (17 1630) Vitals: 

 17 1540 

Weight: 73.1 kg (161 lb 3.2 oz) 

  

Intake/Output Summary:  (Last 24 hours)



Intake/Output Summary (Last 24 hours) at 17 2201

Last data filed at 17

 Gross per 24 hour 

Intake             1275 ml 

Output              625 ml 

Net              650 ml 

 

Stool Occurrence: 1



Physical Exam

General: Thin built elderly male patient, alert, awake and oriented x3. 

Head: Normocephalic, without obvious abnormality, atraumatic 

Nose/Throat: Mucous membrane moist. 

Eyes: Conjunctivae/corneas clear. PERRL, EOMs intact. 

Neck: Supple, symmetrical, No JVD, No bruit 

Lungs: Clear to auscultation bilaterally 

Back: Non tender, no kyphosis or scoliosis

Heart: S1, S2 heard, Regular rate and rhythm, no murmur, click rub or gallop 

Abdomen: Soft, mild diffuse tenderness, obese, distended, Bowel sounds normal. 
No masses. No organomegaly. 

Extremities: No cyanosis or clubbing. Pulses: 2+ and symmetric, all 
extremities. 

Neurologic: CNII - XII intact.  Moving all extremities.

Skin: Clear, no rashes 

Psyche: Normal affect



Lab Review

24-hour labs:  

Results for orders placed or performed during the hospital encounter of  (from the past 24 hour(s)) 

CBC AND DIFF 

 Collection Time: 17  5:06 AM 

Result Value Ref Range 

 White Blood Cells 9.1 4.5 - 11.0 K/UL 

 RBC 3.30 (L) 4.4 - 5.5 M/UL 

 Hemoglobin 8.3 (L) 13.5 - 16.5 GM/DL 

 Hematocrit 26.3 (L) 40 - 50 % 

 MCV 79.5 (L) 80 - 100 FL 

 MCH 25.0 (L) 26 - 34 PG 

 MCHC 31.4 (L) 32.0 - 36.0 G/DL 

 RDW 17.6 (H) 11 - 15 % 

 Platelet Count 281 150 - 400 K/UL 

 MPV 9.2 7 - 11 FL 

 Neutrophils 77 41 - 77 % 

 Lymphocytes 10 (L) 24 - 44 % 

 Monocytes 9 4 - 12 % 

 Eosinophils 4 0 - 5 % 

 Basophils 0 0 - 2 % 

 Absolute Neutrophil Count 7.00 1.8 - 7.0 K/UL 

 Absolute Lymph Count 0.90 (L) 1.0 - 4.8 K/UL 

 Absolute Monocyte Count 0.90 (H) 0 - 0.80 K/UL 

 Absolute Eosinophil Count 0.40 0 - 0.45 K/UL 

 Absolute Basophil Count 0.00 0 - 0.20 K/UL 

COMPREHENSIVE METABOLIC PANEL 

 Collection Time: 17  5:06 AM 

Result Value Ref Range 

 Sodium 139 137 - 147 MMOL/L 

 Potassium 3.2 (L) 3.5 - 5.1 MMOL/L 

 Chloride 104 98 - 110 MMOL/L 

 Glucose 104 (H) 70 - 100 MG/DL 

 Blood Urea Nitrogen 9 7 - 25 MG/DL 

 Creatinine 0.66 0.4 - 1.24 MG/DL 

 Calcium 8.8 8.5 - 10.6 MG/DL 

 Total Protein 6.5 6.0 - 8.0 G/DL 

 Total Bilirubin 0.4 0.3 - 1.2 MG/DL 

 Albumin 2.8 (L) 3.5 - 5.0 G/DL 

 Alk Phosphatase 98 25 - 110 U/L 

 AST (SGOT) 55 (H) 7 - 40 U/L 

 CO2 25 21 - 30 MMOL/L 

 ALT (SGPT) 40 7 - 56 U/L 

 Anion Gap 10 3 - 12 

 eGFR Non African American >60 >60 mL/min 

 eGFR African American >60 >60 mL/min 

MAGNESIUM 

 Collection Time: 17  5:06 AM 

Result Value Ref Range 

 Magnesium 1.5 (L) 1.6 - 2.6 mg/dL 

PHOSPHORUS 

 Collection Time: 17  5:06 AM 

Result Value Ref Range 

 Phosphorus 2.7 2.0 - 4.0 MG/DL 

VITAMIN B12 

 Collection Time: 17  5:06 AM 

Result Value Ref Range 

 Vitamin B12 681 180 - 914 PG/ML 

FOLATE, SERUM 

 Collection Time: 17  5:06 AM 

Result Value Ref Range 

 Serum Folate >23.9 >3.9 NG/ML 

IRON + BINDING CAPACITY + %SAT+ FERRITIN 

 Collection Time: 17  5:06 AM 

Result Value Ref Range 

 Iron <10 (L) 50 - 185 MCG/DL 

 Iron Binding-TIBC 256 (L) 270 - 380 MCG/DL 

 Ferritin 261 30 - 300 NG/ML 

RETICULOCYTE COUNT 

 Collection Time: 17  5:06 AM 

Result Value Ref Range 

 Retic, Uncorrected 0.9 0.5 - 2.0 % 

 Retic, Corrected 0.5 % 

 Retic, Absolute 31.2 30 - 94 K/UL 

THYROID STIMULATING HORMONE-TSH 

 Collection Time: 17  5:06 AM 

Result Value Ref Range 

 TSH 2.991 0.35 - 5.00 MCU/ML 

LEGIONELLA ANTIGEN URINE,RAN 

 Collection Time: 17  3:45 PM 

Result Value Ref Range 

 Battery Name LEGIONELLA URINE ANTIGEN  

 Specimen Description URINE  

 Special Requests NONE  

 Antigen NEGATIVE  

 Report Status FINAL

2017

  

STREPTOCOCCUS PNEUMO AG, URINE 

 Collection Time: 17  3:45 PM 

Result Value Ref Range 

 Battery Name STREP PNEUMO AG, UR  

 Specimen Description URINE  

 Special Requests NONE  

 Antigen NEGATIVE  

 Report Status FINAL

2017

  

RVP VIRAL PANEL PCR 

 Collection Time: 17  4:10 PM 

Result Value Ref Range 

 Specimen Source NASAL WASH  

 Adenovirus NOT DETECTED  

 Coronavirus 229E NOT DETECTED  

 Coronavirus HKU1 NOT DETECTED  

 Coronavirus NL63 NOT DETECTED  

 Coronavirus OC43 NOT DETECTED  

 Human Metapneumovirus NOT DETECTED  

 Human Rhinovirus/ENTEROVIRUS NOT DETECTED  

 Influenza A H1N1 2009 NOT DETECTED  

 Influenza A H1 NOT DETECTED  

 Influenza A H3 NOT DETECTED  

 Influenza B NOT DETECTED  

 Parainfluenza 1 NOT DETECTED  

 Parainfluenza 2 NOT DETECTED  

 Parainfluenza 3 NOT DETECTED  

 Parainfluenza 4 NOT DETECTED  

 RSV NOT DETECTED  

 Bordetella Pertussis NOT DETECTED  

 Chlamydophila Pneumoniae NOT DETECTED  

 Mycoplasma Pneumoniae NOT DETECTED  



Point of Care Testing  (Last 24 hours)

Glucose: (!) 104 (17 0506)



Radiology and other Diagnostics Review:  

Pertinent radiology reviewed.



ROGE GUERRERO MD 

Pager [phone]

* Shane Mccracken, RT - 2017  8:32 PM CST



Spoke with wife regarding wearing pulse oximeter overnight as pt has MATHEW 
protocol ordered. Pt is confused and unable to comfortably wear finger probe. 
Wife is aware that care assistant will be in room overnight along with pt and 
will confirm pulse oximeter values periodicly, this is the same case for CPAP, 
wife also notes that pt since losing significant amount of weight has not been 
able to be reevaluated at home for new mask and device. RT offered to assist 
patient at this time, but pt confusion continues to limit ability to wear mask. 

* Latisha Louise, RN - 2017  6:10 PM CST



Shift: day



NEWS Score:



Pain: adb, controlled by tylenol



Nutrition: was NPO in the morning, ate 25% of lunch, awaiting for dinner



GI/: voids, 2 BMs today



Activity: X 1 assist with a walker, very confused, safety watch now due to fall 
risk



Family: wife by the bedside



Last Shower: Today



New Events or Follow-up: RVP panel test, waiting for results





* Buddy Guthrie, PT - 2017  3:59 PM CST



PHYSICAL THERAPY

ASSESSMENT 



MOBILITY:

Progressive Mobility Level: Walk in room

Distance Walked (feet): 20 ft

Level of Assistance: Assist X1

Assistive Device: Hand Held

Time Tolerated: 0-10 minutes

Activity Limited By: Mental Status Variability



SUBJECTIVE:

Significant hospital events: acute pancreatitis with infected pseudocyst 
admitted for fever and nausea/vomiting

PMH: CAD, PCI, HTN, HLD, MATHEW, diverticulitis s/p Hartmans procedure 2017



Mental / Cognitive Status: Confused (Not fully oriented to situation)

Persons Present: Spouse;Constant Observer

Pain: Patient has no complaint of pain

Ambulation Assist: Independent Mobility in Community with Device

Patient Owned Equipment: Single Point Cane;Roller Walker

Home Situation: Lives with Family

Type of Home: House

Entry Stairs: Ramp;Rail on Both Sides

In-Home Stairs: No Stairs



BED MOBILITY/TRANSFERS:

Sit to Supine: Minimal Assist



Transfer Type: Sit to/from Stand

Transfer: Assistance Level: Bed;To/From;Commode;Moderate Assist

Transfer: Assistive Device: Hand Hold Assist

Transfers: Type Of Assistance: Verbal Cues;For Balance



GAIT:

Distance:  bed to/from bathroom

Gait: Assistance Level: Moderate Assist

Gait: Assistive Device: Hand Hold Assist

Gait: Descriptors: Pathway deviations;Variable step length



Patient is having difficulty effectively using a walker. Hand held guidance 
worked best.



EDUCATION:

Persons Educated: Patient

Patient Barriers To Learning: Confusion

Interventions: Repetition of Instructions;Family Education

Teaching Methods: Verbal Instruction

Patient Response: More Instruction Required

Topics: Plan/Goals of PT Interventions



ASSESSMENT/PROGRESS:

As this patients mentation improves so should his mobility.



AM-PAC 6 Clicks Basic Mobility Inpatient

Turning from your back to your side while in a flat bed without using bed rails
: A Little

Moving from lying on your back to sitting on the side of a flatbed without 
using bedrails : A Little

Moving to and from a bed to a chair (including a wheelchair): A Little

Standing up from a chair using your arms (e.g. wheelchair, or bedside chair): A 
Little

To walk in hospital room: A Little

Climbing 3-5 steps with a railing: A Little

Raw Score: 18

Standardized (T-scale) Score: 41.05

Basic Mobility CMS 0-100%: 40.47

CMS G Code Modifier for Basic Mobility: CK



GOALS:

Formulation: With Family

Time For Goal Achievement: 5 days, To, 7 days

Pt Will Go Supine To/From Sit: Independently

Pt Will Transfer Bed/Chair: Independently

Pt Will Ambulate: Greater than 200 Feet, w/ Walker, Independently



PLAN:

Treatment Interventions: Mobility Training;Strengthening;Balance Activities

Plan Frequency: 5 Days per Week



RECOMMENDATIONS:

PT Discharge Recommendations: If the patients mentation improves he should be 
able to discharge home with his wife.

Equipment Recommendations: Patient owns necessary equipment



Therapist: Buddy Guthrie, PT

Date: 2017



G-Codes: Body Position

 Current Status:  40-59% Impairment



 Goal Status:  1-19% Impairment

Based on above evaluation and clinical judgment.





* Liana Romero, OT - 2017  9:25 AM CST



Formatting of this note may be different from the original.

OCCUPATIONAL THERAPY

ASSESSMENT  NOTE



Patient Name: Moshe Lopez                   Room/Bed: CT1778/01

Admitting Diagnosis: n/v

Pancreatic pseudocyst

Chronic pancreatitis (HCC)

Fever

Nausea and vomiting

Past Medical History: 

Diagnosis Date 

 CAD (coronary artery disease)  

 Coronary artery disease  

 s/p 9 CABGs 

 Diverticulitis  

 Diverticulosis  

 s/p LAR in 2017 

 HTN (hypertension)  

 Hyperlipidemia  

 Hypertension  

 Low testosterone  

 Pancreatitis  

 Ruptured lumbar disc  

 Sleep apnea  



Mobility

Progressive Mobility Level: Walk in room

Level of Assistance: Assist X1

Assistive Device: Hand Held

Time Tolerated:  (GAIT IS UNSAFE)



Subjective

Pertinent Dx per Physician: PAANCREATIC PSEUDOCYST.   FEVER,  N/V

Precautions: Falls (COGNITION AND SAFETY.)

Pain / Complaints: Patient has no c/o pain



Objective

Psychosocial Status: Willing and Cooperative to Participate

Persons Present: Spouse



Home Living

Type of Home: House

Home Layout: One Level

Bathroom Shower / Tub: Walk-in Shower

Bathroom Equipment: Shower Chair



Prior Function

Level Of [place]: Independent with ADLs and functional transfers

Lives With: Spouse

Receives Help From: None Needed

 



ADL's

Where Assessed: In Bathroom;Edge of Bed

Grooming Assist: Maximum Assist

Grooming Deficits: Verbal Cueing

LE Dressing Assist: Maximum Assist

Toileting Assist: Minimal Assist

Toileting Deficits: Steadying;Clothing Management Up;Clothing Management Down

Comment: PT AMBULATED TO/ FROM TOILET AT MIN TO MODERATE ASSIST.   PT ABLE TO 
PERFORM TOILT HYGEINE AT SBA AND VERB CUES.   ONCE SEATED AT EOB, PT TENDS TO 
DRIFT BACKWARDS AND NEEDS FREQ CUES TO RIGHT SELF.   PT FIDGITING W/ ALL BED 
LINEN, GAIT BELT, ETC AND NEEDS CONSTANT VERB CUES TO FOLLOW DIRECTIONS.   
SAFETY IS VERY LIMITING FOR ALL TASKS AT THIS TIME.



Activity Tolerance

Endurance: 3/5 Tolerates 25-30 Minutes Exercise w/Multiple Rests

Sitting Balance: 2+/5 Supports Self w/ 1 UE



Cognition

Overall Cognitive Status: Impaired

Cognition Comment: PT NEEDS CONSTANT RE DIRECTION TO ACCOMPLISH ANY TASKS 

 



UE AROM

Overall BUE AROM WNL: Yes



UE Strength / Tone

Overall Strength / Tone: WFL Able to Perform ADL Tasks



Education

Persons Educated: Patient

Barriers To Learning: Cognitive Deficits

Interventions: Repetition of Instructions (REDIRECTION)

Teaching Methods: Verbal Instruction

Patient Response: Unable to Demonstrate Understanding

Topics: Role of OT, Goals for Therapy;DME for Home Discharge;ADL Compensatory 
Techniques

Goal Formulation: With Family



Assessment

Assessment: Decreased ADL Status;Decreased Safe/Judg during ADL;Decreased 
Cognition;Decreased Endurance;Decreased Self-Care Trans

Prognosis: Good;w/Cont OT s/p Acute Discharge

Goal Formulation: Pt/family



AM-PAC 6 Clicks Daily Activity Inpatient

Putting on and taking off regular lower body clothes?: A Lot

Bathing (Including washing, rinsing, drying): A Lot

Toileting, which includes using toilet, bedpan, or urinal: A Lot

Putting on and taking off regular upper body clothing: A Lot

Taking care of personal grooming such as brushing teeth: A Lot

Eating meals?: A Lot

Daily Activity Raw Score: 12

Standardized (t-scale) score: 30.6

CMS 0-100% Score: 66.57

CMS G Code Modifier: CL



Plan 

Treatment Interventions: ADL Retraining;Functional Transfer Training;Endurance 
Training;Cognitive Reorientation;Patient/Family Training;Compensatory Technique 
Education

OT Frequency: 3-5X/week

 

ADL Goals

Patient Will Perform Grooming: w/ Stand By Assist

Patient Will Perform UE Dressing: w/ Stand By Assist

Patient Will Perform LE Dressing: w/ Stand By Assist

Patient Will Perform Toileting: w/ Stand By Assist



Functional Transfer Goals

Pt Will Perform All Functional Transfers: Minimum Assist

 

G-Codes: Self-care

 Current Status:  100% Impairment

 Goal Status:  60-79% Impairment

Based on above evaluation and clinical judgment.

 

OT Discharge Recommendations

OT Discharge Recommendations: Inpatient Setting, To address deficits, maximize 
function and improve safety.

Equipment Recommendations: Ashlee Mulligan



Therapist: SHERRI Dhaliwal

Date: 2017

* Gali Gonzalez RN - 2017  6:39 AM CST



Shift: Night



Mentation: A&O to person. Confusion increased throughout shift. MPS2 on-call 
notified via text page. Patient restless and unable to sleep with sleep aids. 



Cardiac: S1S2. 



Respiratory: RA. 



GI/: Voids. Last BM .



Nutrition: NPO at midnight. 



Activity: x1 assist with walker. 



Pain: Complaint of pain in right calf, controlled with PRN pain medication.



Family: Wife, Latisha, at bedside. 



Follow up: Continue to monitor patient's mentation and notify physician of any 
changes. 



* Griselda Painter RN - 2017  7:23 PM CST



I have reviewed the notes, assessment, and/or procedures performed by Dixie Dillon RN  and concur with her/his documentation unless otherwise noted. 

* Dixie Dillon RN - 2017  6:17 PM CST



Shift: Day



Cardiac: S1, S2



Resp: RA



Neuro: A&O x 4



GI/: voids, last BM: 



Pain: no complaints of pain; abdominal discomfort due to n/v. 



Activity: ad jordy. 



Nutrition: clear liquids, NPO @ midnight



Family: Latisha (wife) at bedside 



Follow-up: possible GI procedure tomorrow. 



Discharge: ongoing 

* Griselda Painter RN - 2017  3:56 PM CST



Patient attmitted on unit via wheelchair, transferred to bed with assistance. 
Assessment completed, refer to doc flowsheets for details. Orders released, and 
implemented as appropriate. Patient is oriented to surroundings, call light 
within reach, bed brake set. Plan of care reviewed. Will continue to monitor. 



in this encounter



H&P Notes

* Klarissa De La Garza MD - 2017  4:35 PM CST



Formatting of this note may be different from the original.



                                    Admission History and Physical Examination



Name:  Moshe Lopez                                             MRN:  
[id number] 

Admission Date:  2017

                 

Assessment/Plan:  

Active Problems:

  Necrotizing pancreatitis

  Pancreatic pseudocyst

  Sepsis (HCC)

  Nausea and vomiting

  Fever



76 y/o M with h/o CAD s/p PCI, HTN, HLD, MATHEW, diverticulitis s/p Hartmans 
procedure 2017, acute pancreatitis with infected pseudocyst admitted for fever 
and nausea/vomiting.



Sepsis suspected from Infected Pancreatic Pseudocyst

- Fever to 102F for past 3 days.  Leukocytosis on labs.  Check Lactate

- H/o acute pancreatitis in 2017 which evolved to infected pseudocyst.  Cx 
have grown MSSA and C glabrata

- Pancreatitis suspected related to gallstones.  Per Surg, will eventually need 
cholecystectomy

- S/p EUS with necrosectomy and drainage 17 and 17

- S/p IR drain x2 on 17.  Now removed

- S/p necrosectomy and drainage 9/3/17 and 17

- UA unremarkable for infection

- Obtain Blood cx x2

- Obtain CXR

- 11/3/17 CT A/P showed overall decrease in size of fluid/gas collections in 
anterior pararenal space with cyst gastrostomy tube in place; stable stenosis 
of SMV and portal vein; stable intrahepatic biliary ductal dilation likely from 
narrowing of CBD - likely inflammatory; mild R hydronephrosis and mild L 
pelviectasis - likely from obstruction by fluid collection; mild diffuse 
peritoneal nodularity and stranding

- Was recently discharged on 10/25 with Augmentin and Fluconazole

- Broaden to Zosyn IV; continue PO Fluconazole per ID

- Tylenol or Oxycodone PRN pain.  Bowel regimen

- Scheduled Zofran and PRN Compazine; Continue Scopalamine patch

- Continue PPI and H2 blocker

- ID consulted

- GI Biliary consulted - seen in clinic and repeat EUS was discussed



CAD s/p PCI, HTN, HLD

- Last PCI ~ 5 yrs ago

- 10/2017 MPI stress test with no active ischemia and nml LVEF

- Seen by Cardiology during last admit - okay to remain off Plavix indefinitely 
as likely need for recurrent procedures

- Hold HCTZ as appears dehydrated and receiving IVF

- Continue ASA, Lisinopril, Fenofibrate



Microcytic Anemia

- Check B12, Folate, Iron studies

- Stable near 9s

- Monitor



BPH

- Continue Finasteride, Tamsulosin



MATHEW

- Nocturnal CPAP



FEN

- Diet: CLD.  NPO at MN in case of GI procedure

- IVF: NS @ 100



Proph

- DVT: SCDs, Lovenox



Dispo

- Code status: Full, discussed with patient

- Admit to IM



________________________________________________________________________________
__

Primary Care Physician: Jaelyn Velez  Verified



Chief Complaint:  Fever, n/v



History of Present Illness: Moshe Lopez is a 77 y.o. male who presents 
from clinic with fever and n/v.  He was recently discharged from hospital in 
mid Oct 2017 for infected pancreatic pseudocyst.  Was compliant with 
antibiotics.  He has developed ongoing nausea and vomiting.  Vomiting typically 
occurs in the evening and has minimal content - mostly just saliva.  Has been 
following low fat small meals throughout day diet.  Losing weight - has lost 
over 50 lbs since the summer.  Has ongoing Lower abd pain, worse on left side.  
Better with pain meds.  No diarrhea or constipation; had soft formed BM today.  
States he developed fever for past 3 days with Tmax 102F.  No URI sx, cough, 
dyspnea, rash.



Past Medical History: 

Diagnosis Date 

 CAD (coronary artery disease)  

 Coronary artery disease  

 s/p 9 CABGs 

 Diverticulitis  

 Diverticulosis  

 s/p LAR in 2017 

 HTN (hypertension)  

 Hyperlipidemia  

 Hypertension  

 Low testosterone  

 Pancreatitis  

 Ruptured lumbar disc  

 Sleep apnea  



Past Surgical History: 

Procedure Laterality Date 

 IN ESOPHAGOGASTRODUODENOSCOPY US SCOPE W/ADJ STRXRS N/A 2017 

 ESOPHAGOGASTRODUODENOSCOPY ENDOSCOPIC ULTRASOUND performed by Fan Tidwell MD 
at ENDO[facility]GI 

 UPPER GASTROINTESTINAL ENDOSCOPY N/A 2017 

 ESOPHAGOGASTRODUODENOSCOPY performed by Fan Tidwell MD at ENDO[facility]GI 

 UPPER GASTROINTESTINAL ENDOSCOPY N/A 9/3/2017 

 ESOPHAGOGASTRODUODENOSCOPY performed by Fan Tidwell MD at ENDO[facility]GI 

 IN ESOPHAGOGASTRODUODENOSCOPY TRANSORAL DIAGNOSTIC N/A 2017 

 ESOPHAGOGASTRODUODENOSCOPY performed by Fan Tidwell MD at ENDO[facility]GI 

 UPPER GASTROINTESTINAL ENDOSCOPY N/A 10/19/2017 

 ESOPHAGOGASTRODUODENOSCOPY with NECROSECTOMY performed by Bean Dean MD 
at ENDO/GI 

 UPPER GASTROINTESTINAL ENDOSCOPY N/A 10/23/2017 

 ESOPHAGOGASTRODUODENOSCOPY performed by Bean Dean MD at ENDO[facility]GI 

 HX CARPAL TUNNEL RELEASE   

 HX COLOSTOMY   

 colostomy reversal in 2017 

 HX HEART CATHETERIZATION   

 HX ROTATOR CUFF REPAIR   

 left and right 

 HX ROTATOR CUFF REPAIR Bilateral  

 HX SEPTOPLASTY   

 SIGMOIDECTOMY   



Family History 

Problem Relation Age of Onset 

 Heart Failure Mother  

 Heart Attack Mother  

 Cancer Father  

 Heart Disease Maternal Aunt  

 Heart Disease Maternal Uncle  



Social History 



Social History 

 Marital status:  

  Spouse name: N/A 

 Number of children: N/A 

 Years of education: N/A 



Social History Main Topics 

 Smoking status: Former Smoker 

  Packs/day: 1.00 

  Years: 15.00 

  Types: Cigarettes, Pipe, Cigars 

  Quit date: 1975 

 Smokeless tobacco: Never Used 

 Alcohol use 0.6 oz/week 

  1 Cans of beer per week 

   Comment: 1 beer every 2 weeks 

 Drug use: No 

 Sexual activity: Not on file 



Other Topics Concern 

 Not on file 



Social History Narrative 

 ** Merged History Encounter ** 

   

 

Immunizations (includes history and patient reported): 

Immunization History 

Administered Date(s) Administered 

 Flu Vaccine=>65 YO High-Dose (PF) 2017 

    



Allergies:  Niacin and Ativan [lorazepam]



Medications:

Prescriptions Prior to Admission 

Medication Sig 

 amoxicillin/K clavulanate (AUGMENTIN) 875/125 mg tablet Take 1 tablet by 
mouth every 12 hours for 75 days. Take with food. 

 aspirin EC 81 mg tablet Take 81 mg by mouth daily.   

 ciprofloxacin (CIPRO) 500 mg tablet Take 1 tablet by mouth twice daily for 
14 days. 

 citalopram (CELEXA) 20 mg tablet Take 20 mg by mouth daily. 

 clopiDOGrel (PLAVIX) 75 mg tablet Take 75 mg by mouth daily. 

 docusate (COLACE) 100 mg capsule Take 1 capsule by mouth twice daily. 

 famotidine (PEPCID) 20 mg tablet Take 20 mg by mouth twice daily. 

 fenofibrate(+) (TRIGLIDE) 160 mg tablet Take 160 mg by mouth daily. Take 
with food. 

 finasteride (PROSCAR) 5 mg tablet Take 5 mg by mouth daily. 

 fish oil- omega 3-DHA//1,000 mg capsule Take 1 Cap by mouth twice 
daily. 

 fluconazole (DIFLUCAN) 200 mg tablet Take 2 tablets by mouth daily for 14 
days. 

 hydroCHLOROthiazide (HYDRODIURIL) 25 mg tablet Take 25 mg by mouth every 
morning. 

 ibuprofen (MOTRIN) 400 mg tablet Take 400 mg by mouth every 6 hours as 
needed for Pain (Alternates with tylenol). Take with food.   Indications: FEVER 

 lisinopril (PRINIVIL; ZESTRIL) 20 mg tablet Take 20 mg by mouth daily. 

 loratadine (CLARITIN) 10 mg tablet Take 10 mg by mouth every morning. 

 MULTIVITAMINS WITH FLUORIDE (MULTI-VITAMIN PO) Take 1 Tab by mouth daily. 

 omeprazole DR(+) (PRILOSEC) 20 mg capsule Take 20 mg by mouth daily before 
breakfast. 

 ondansetron (ZOFRAN ODT) 4 mg rapid dissolve tablet Dissolve 4 mg by mouth 
every 8 hours as needed for Nausea or Vomiting. Place on tongue to disolve. 

 oxyCODONE (ROXICODONE, OXY-IR) 5 mg tablet Take 1 tablet by mouth every 4 
hours as needed Earliest Fill Date: 17 

 polyethylene glycol 3350 (MIRALAX) 17 g packet Take 1 packet by mouth 
daily. 

 prochlorperazine maleate (COMPAZINE) 10 mg tablet Take 1 tablet by mouth 
every 6 hours as needed for Nausea or Vomiting for up to 7 days. 

 scopolamine (TRANSDERM-SCOP) 1.5 mg 3 day patch Apply 1 patch to top of 
skin as directed every 72 hours. 

 senna/docusate (SENOKOT-S) 8.6/50 mg tablet Take 1 tablet by mouth twice 
daily. 

 simethicone (MYLICON) 80 mg chew tablet Chew 1 tablet by mouth every 6 
hours as needed for Flatulence. 

 tamsulosin (FLOMAX) 0.4 mg capsule Take 0.4 mg by mouth daily. Do not crush
, chew or open capsules. Take 30 minutes following the same meal each day. 

 vitamins, B complex tab Take 1 Tab by mouth daily. 

 zolpidem (AMBIEN) 5 mg tablet Take 5 mg by mouth at bedtime daily. 



Review of Systems:

A 10 point comprehensive review of systems was negative except for: nausea, 
vomiting, lower abd pain, fever, fatigue, poor intake, weight loss



Physical Exam:

Vital Signs: Last Filed In 24 Hours Vital Signs: 24 Hour Range 

BP: 135/52 (1540)

Temp: 37.4 C (99.3 F) (1540)

Pulse: 84 (1540)

Respirations: 14 PER MINUTE (1540)

SpO2: 92 % (11/08 1540)

O2 Delivery: None (Room Air) ( 1540)

Height: 170.2 cm (67") ( 1219) BP: (124-135)/(50-62) 

Temp:  [37.4 C (99.3 F)-37.8 C (100 F)] 

Pulse:  [] 

Respirations:  [14 PER MINUTE] 

SpO2:  [92 %] 

O2 Delivery: None (Room Air) 

   



Gen - Alert, NAD, cooperative

HEENT - NC/AT, PERRL, EOMI, OP clear, MM dry

Neck - Supple, no LAD

Chest - CTAB

CV - RRR, no m/r/g

Abd - Soft, TTP over L side, ND, +BS, scars

Ext - No c/c/e, warm, well-perfused

Skin - No rash

Neuro - No focal deficit



Lab/Radiology/Other Diagnostic Tests:

24-hour labs:  No results found for this visit on 17 (from the past 24 
hour(s)).

 

Pertinent radiology reviewed.



Klarissa De La Garza MD

Pager [phone]



I spent 32 min reviewing records from [facility] as summarized above including 
admission to surgical service from 17-17 and 10/18/17-10/25/17, ID 
clinic note 17, GI procedure notes, culture notes, and previous imaging 
studies.

in this encounter



Consult Notes

* Letitia Farr - 2017  3:26 PM CST



Associated Order(s): CONSULT DIETITIAN



 CLINICAL NUTRITION  

                                                 

 Clinical Nutrition Assessment Summary 



Nutrition Assessment of Patient:

BMI Categories Adult: Acceptable: 18.5-24.9

Unintentional Weight Loss: > 7.5% in 3 months (severe)

Current Oral Intake: Improving

Estimated Calorie Needs: 2555-6201 kcal (25-30 kcal/kg present wt 73.1 kg)

Estimated Protein Needs: 102-117 (1.4-1.6 g/kg present wt 73.1)

Oral Diet Order: Nery Lopez is a 76 y/o M with h/o CAD s/p 9 CABGs, HTN, HLD, MATHEW, 
diverticulitis s/p Hartmans procedure 2017, acute pancreatitis with infected 
pseudocyst c/b walledoff pancreatic necrosis with fluid collections, s/p 
cystogastrostomy and necrosectomy in the past; admitted with low-grade fever of 
three days durationalong with persistent projectile vomiting,abdominal pain,
confusion with fidgeting and decreased functional capacity. Spoke with 
patient and spouse at bedside. Pt has recent previous long term admission, 
discharged to SNF on TPN, and remained on it until late September. Pt has good 
spouse support. Pt follows a low CHO, low fat diet. He eats 4 small meals a day 
with one Premier protein drink daily. Pt with significant wt loss, he reports 
usual weight at 208-212 pounds. He has lost 26.2# since August (14.2% x 3 mo, 
severe weight loss). Upon physical exam patient appears well-nourished. 
Malnutrition is noted in patient's problem list. Pt has been NPO since 
yesterday for possibly GI procedure. Diet just advanced to cardiac as patient 
is no longer having a procedure today. He received 2 slices french toast, 2 pcs 
turkey sausage, and peaches during RD visit. Pt currently has good appetite and 
was also eating 1/2 banana before meal was delivered. Pt has no issues 
swallowing but per spouse he will chew food for an excessively long amount of 
time. Note SLP cassandra 2 months ago reported no dysphagia.  No PU or edema noted. 
Remains on B complex and omega 3s. Encouraged good PO intake of meals and 
protein to prevent further weight loss. Pt and spouse seem to have good 
knowledge of low fat, low carb diet for pancreatitis.



Recommendation:

Continue cardiac diet as ordered. If patient is unable to maintain euglycemia, 
rec adding on consistent carb 60 g carb/meal diet (patient was on cardiac/DM 
diet on previous admissions) 

                            

 



Intervention / Plan:

Spoke with patient and spouse regarding nutrition plan of care

Will continue to monitor patient's PO intake and tolerance, appetite, wt trends
, labs, and meds

 

 



Nutrition Diagnosis:

Nutrition Diagnosis: Inadequate energy intake

Etiology: NPO for procedure, AMS, early satiety

Signs & Symptoms: severe weight loss



 



 



Goals:

Prevent further weight loss

Time Frame: Throughout Stay



Patient to consume >75% of meals

Time Frame: Within 72 Hours



 

 



Letitia Cummins MS, RD, LD 

Pager *[phone]



* Joe Irene MD - 2017 11:18 AM CST



Associated Order(s): CONSULT GASTROENTEROLOGY PHYSICIAN



Formatting of this note may be different from the original.

Gastro-Biliary Consult Note



Admission Date: 2017                                              

LOS: 1 day



Reason for Consult:  



Assessment/Plan 

 

Moshe Lopez is a 77 y.o. male patient was past medical history of acute 
severe pancreatitis complicated by walled of pancreatic necrosis with fluid 
collections status post cystogastrostomy and necrosectomy in the past; admitted 
to the hospital after he presented with low-grade fever of three days duration 
along with persistent projectile vomiting, abdominal pain, confusion with 
fidgeting and decreased functional capacity. On exam, patient was oriented only 
to place and person.  Had a leukocytosis at presentation that is now trending 
down to normal.  Liver function within normal limits except mildly elevated 
AST. Lipase within normal limits. CXR showed  increased opacities primarily 
involving the left lower lung, which may be from pneumonia or atelectasis. CT 
of the abdomen pelvis (11/3/2017) showed mildly enlarged liver with stable mild 
diffuse intrahepatic biliary duct dilation. Has diffuse atrophy of the pancreas 
with cystogastrostomy stent  in place.  Overall, there was a decrease in 
multiloculated needs focal fluid thick-walled collection containing some gas 
within the anterior pararenal space since prior study.  The complex fluid and 
gas collection seen within the anterior right pararenal space has nt shown 
significant change from prior studies.



Recommendations:

-Agree with initiating sepsis bundle and starting broad-spectrum IV antibiotics.

-Please send for rapid viral panel to rule out viral process.

-Please consider imaging of the head with CT if there is no improvement in 
patient's mental status.

-Continue with supportive care as per primary team.

-We will follow up with patient closely to see if he would benefit from 
endoscopic interventions(necrosectomy) versus IR drain after his mental status 
improves.

-Gastro-biliary team will be available for any questions or concerns.



Patient was discussed with .



Thank you for involving us in the care of this patient.



Joe Irene

GI/Hepatology Fellow

[id number]



______________________________________________________________________



History of Present Illness: Moshe Lopez is a 77 y.o. male patient was past 
medical history of acute severe pancreatitis complicated by wall of pancreatic 
necrosis status post cystogastrostomy and necrosectomy in the past; admitted to 
the hospital after he presented with low-grade fever of 3 three days.Patient 
was not completely oriented and information was gathered from his wife was at 
bedside. Wife reported that he has been having persistent projectile vomiting 
for more than 2 weeks that required recent ER visits at outside facility.  His 
functional capacity has also declined lately with decreased mobility and low-
grade fever was T-max 101.  He has been also having poor sleep lately with 
confusion and fidgeting. He has no cough or shortness of breath.  No hematemesis
, melena or hematochezia.  Patient reports he has received flu shot this 
season.  No contact history with sick person.



Past Medical History: 

Diagnosis Date 

 CAD (coronary artery disease)  

 Coronary artery disease  

 s/p 9 CABGs 

 Diverticulitis  

 Diverticulosis  

 s/p LAR in 2017 

 HTN (hypertension)  

 Hyperlipidemia  

 Hypertension  

 Low testosterone  

 Pancreatitis  

 Ruptured lumbar disc  

 Sleep apnea  



Past Surgical History: 

Procedure Laterality Date 

 IN ESOPHAGOGASTRODUODENOSCOPY US SCOPE W/ADJ STRXRS N/A 2017 

 ESOPHAGOGASTRODUODENOSCOPY ENDOSCOPIC ULTRASOUND performed by Fan Tidwell MD 
at [facility]GI 

 UPPER GASTROINTESTINAL ENDOSCOPY N/A 2017 

 ESOPHAGOGASTRODUODENOSCOPY performed by Fan Tidwell MD at [facility]GI 

 UPPER GASTROINTESTINAL ENDOSCOPY N/A 9/3/2017 

 ESOPHAGOGASTRODUODENOSCOPY performed by Fan Tidwell MD at ENDO/GI 

 IN ESOPHAGOGASTRODUODENOSCOPY TRANSORAL DIAGNOSTIC N/A 2017 

 ESOPHAGOGASTRODUODENOSCOPY performed by Fan Tidwell MD at ENDO/GI 

 UPPER GASTROINTESTINAL ENDOSCOPY N/A 10/19/2017 

 ESOPHAGOGASTRODUODENOSCOPY with NECROSECTOMY performed by Bean Dean MD 
at ENDO/GI 

 UPPER GASTROINTESTINAL ENDOSCOPY N/A 10/23/2017 

 ESOPHAGOGASTRODUODENOSCOPY performed by Bean Dean MD at ENDO[facility]GI 

 HX CARPAL TUNNEL RELEASE   

 HX COLOSTOMY   

 colostomy reversal in 2017 

 HX HEART CATHETERIZATION   

 HX ROTATOR CUFF REPAIR   

 left and right 

 HX ROTATOR CUFF REPAIR Bilateral  

 HX SEPTOPLASTY   

 SIGMOIDECTOMY   



Social History 



Social History 

 Marital status:  

  Spouse name: N/A 

 Number of children: N/A 

 Years of education: N/A 



Social History Main Topics 

 Smoking status: Former Smoker 

  Packs/day: 1.00 

  Years: 15.00 

  Types: Cigarettes, Pipe, Cigars 

  Quit date: 1975 

 Smokeless tobacco: Never Used 

 Alcohol use 0.6 oz/week 

  1 Cans of beer per week 

   Comment: 1 beer every 2 weeks 

 Drug use: No 

 Sexual activity: Not on file 



Other Topics Concern 

 Not on file 



Social History Narrative 

 ** Merged History Encounter ** 

   



Family history reviewed; non-contributory

Allergies:  Niacin and Ativan [lorazepam]



Scheduled Meds:

aspirin EC tablet 81 mg 81 mg Oral QDAY 

citalopram (CELEXA) tablet 20 mg 20 mg Oral QDAY 

enoxaparin (LOVENOX) syringe 40 mg 40 mg Subcutaneous QDAY(21) 

famotidine (PEPCID) tablet 20 mg 20 mg Oral BID 

fenofibrate nanocrystallized (TRICOR) tablet 145 mg 145 mg Oral QHS 

finasteride (PROSCAR) tablet 5 mg 5 mg Oral QDAY 

fish oil- omega 3-DHA/EPA capsule 1,000 mg 1,000 mg Oral BID 

fluconazole (DIFLUCAN) tablet 400 mg 400 mg Oral QDAY 

lisinopril (PRINIVIL; ZESTRIL) tablet 20 mg 20 mg Oral QDAY 

loratadine (CLARITIN) tablet 10 mg 10 mg Oral QAM8 

melatonin tablet 5 mg 5 mg Oral ONCE 

ondansetron (ZOFRAN) injection 4 mg 4 mg Intravenous Q6H 

pantoprazole DR (PROTONIX) tablet 40 mg 40 mg Oral QDAY(21) 

piperacillin/tazobactam  (ZOSYN) 3.375 g/50 mL iso-osmotic IVPB 3.375 g 
Intravenous Q6H* 

polyethylene glycol 3350 (MIRALAX) packet 34 g 2 packet Oral BID 

potassium chloride IVPB 10 mEq 10 mEq Intravenous Q1H X 4DO 

scopolamine (TRANSDERM-SCOP) patch 1 patch 1 patch Transdermal Q72H* 

senna/docusate (SENOKOT-S) tablet 2 tablet 2 tablet Oral BID 

tamsulosin (FLOMAX) capsule 0.4 mg 0.4 mg Oral QHS 

vitamins, B complex tablet 1 tablet 1 tablet Oral QDAY 

Continuous Infusions:

 sodium chloride 0.9 %   infusion 100 mL/hr at 17 0504 



PRN and Respiratory Meds:acetaminophen Q6H PRN, oxyCODONE Q4H PRN, 
prochlorperazine Q6H PRN, simethicone Q6H PRN, zolpidem QHS PRN



Review of Systems:

A 14 point review of systems was negative except for: except above.

Vital Signs:  Last Filed in 24 hours Vital Signs:  24 hour Range  

BP: 133/63 (1104)

Temp: 37.1 C (98.7 F) (1104)

Pulse: 78 (1104)

Respirations: 18 PER MINUTE (1104)

SpO2: 98 % (1104)

O2 Delivery: None (Room Air) (1104)

Height: 170.2 cm (67") (1219) BP: (122-151)/(50-64) 

Temp:  [37.1 C (98.7 F)-37.8 C (100 F)] 

Pulse:  [75-84] 

Respirations:  [14 PER MINUTE-23 PER MINUTE] 

SpO2:  [92 %-98 %] 

O2 Delivery: None (Room Air) 



Physical Exam:

GEN: Alert but not oriented to date, month and year.

HEENT: PERRLA.

NECK: No JVD, neck supple, no thyromegaly/nodularity, no cervical or 
supraclavicular adenopathy

RESP: Clear to auscultation bilaterally, no increased work of breathing, no 
wheezing/rales/ronchi

CVS: Regular rate and rhythm, S1S2 normal, no murmur/rub/gallop

ABD: Soft, non tender, non distended, no palpable masses or organomegaly, BS+

EXT: No edema/cyanosis/clubbing.

SKIN: Warm, dry, no ulcerations, venous stasis dermatitis, suspicious lesions, 
or rashes.

NEURO: Not oriented to date, month and year.



Lab/Radiology/Other Diagnostic Tests:

24-hour labs:  

Results for orders placed or performed during the hospital encounter of  (from the past 24 hour(s)) 

CULTURE-BLOOD W/SENSITIVITY 

 Collection Time: 17  5:15 PM 

Result Value Ref Range 

 Battery Name BLOOD CULTURE  

 Specimen Description BLOOD

RIGHT

FA

  

 Special Requests NONE  

 Culture NO GROWTH 1 DAY  

 Report Status   

PROTIME INR (PT) 

 Collection Time: 17  5:20 PM 

Result Value Ref Range 

 INR 1.4 (H) 0.8 - 1.2 

PTT (APTT) 

 Collection Time: 17  5:20 PM 

Result Value Ref Range 

 APTT 28.1 21.0 - 39.0 SEC 

MAGNESIUM 

 Collection Time: 17  5:20 PM 

Result Value Ref Range 

 Magnesium 1.6 1.6 - 2.6 mg/dL 

PHOSPHORUS 

 Collection Time: 17  5:20 PM 

Result Value Ref Range 

 Phosphorus 2.7 2.0 - 4.0 MG/DL 

LIPASE 

 Collection Time: 17  5:20 PM 

Result Value Ref Range 

 Lipase 18 11 - 82 U/L 

LACTIC ACID(LACTATE) 

 Collection Time: 17  5:20 PM 

Result Value Ref Range 

 Lactic Acid 0.7 0.5 - 2.0 MMOL/L 

CULTURE-BLOOD W/SENSITIVITY 

 Collection Time: 17  5:30 PM 

Result Value Ref Range 

 Battery Name BLOOD CULTURE  

 Specimen Description BLOOD

LEFT

ANTECUBITAL

  

 Special Requests NONE  

 Culture NO GROWTH 1 DAY  

 Report Status   

CBC AND DIFF 

 Collection Time: 17  5:06 AM 

Result Value Ref Range 

 White Blood Cells 9.1 4.5 - 11.0 K/UL 

 RBC 3.30 (L) 4.4 - 5.5 M/UL 

 Hemoglobin 8.3 (L) 13.5 - 16.5 GM/DL 

 Hematocrit 26.3 (L) 40 - 50 % 

 MCV 79.5 (L) 80 - 100 FL 

 MCH 25.0 (L) 26 - 34 PG 

 MCHC 31.4 (L) 32.0 - 36.0 G/DL 

 RDW 17.6 (H) 11 - 15 % 

 Platelet Count 281 150 - 400 K/UL 

 MPV 9.2 7 - 11 FL 

 Neutrophils 77 41 - 77 % 

 Lymphocytes 10 (L) 24 - 44 % 

 Monocytes 9 4 - 12 % 

 Eosinophils 4 0 - 5 % 

 Basophils 0 0 - 2 % 

 Absolute Neutrophil Count 7.00 1.8 - 7.0 K/UL 

 Absolute Lymph Count 0.90 (L) 1.0 - 4.8 K/UL 

 Absolute Monocyte Count 0.90 (H) 0 - 0.80 K/UL 

 Absolute Eosinophil Count 0.40 0 - 0.45 K/UL 

 Absolute Basophil Count 0.00 0 - 0.20 K/UL 

COMPREHENSIVE METABOLIC PANEL 

 Collection Time: 17  5:06 AM 

Result Value Ref Range 

 Sodium 139 137 - 147 MMOL/L 

 Potassium 3.2 (L) 3.5 - 5.1 MMOL/L 

 Chloride 104 98 - 110 MMOL/L 

 Glucose 104 (H) 70 - 100 MG/DL 

 Blood Urea Nitrogen 9 7 - 25 MG/DL 

 Creatinine 0.66 0.4 - 1.24 MG/DL 

 Calcium 8.8 8.5 - 10.6 MG/DL 

 Total Protein 6.5 6.0 - 8.0 G/DL 

 Total Bilirubin 0.4 0.3 - 1.2 MG/DL 

 Albumin 2.8 (L) 3.5 - 5.0 G/DL 

 Alk Phosphatase 98 25 - 110 U/L 

 AST (SGOT) 55 (H) 7 - 40 U/L 

 CO2 25 21 - 30 MMOL/L 

 ALT (SGPT) 40 7 - 56 U/L 

 Anion Gap 10 3 - 12 

 eGFR Non African American >60 >60 mL/min 

 eGFR African American >60 >60 mL/min 

MAGNESIUM 

 Collection Time: 17  5:06 AM 

Result Value Ref Range 

 Magnesium 1.5 (L) 1.6 - 2.6 mg/dL 

PHOSPHORUS 

 Collection Time: 17  5:06 AM 

Result Value Ref Range 

 Phosphorus 2.7 2.0 - 4.0 MG/DL 

VITAMIN B12 

 Collection Time: 17  5:06 AM 

Result Value Ref Range 

 Vitamin B12 681 180 - 914 PG/ML 

FOLATE, SERUM 

 Collection Time: 17  5:06 AM 

Result Value Ref Range 

 Serum Folate >23.9 >3.9 NG/ML 

IRON + BINDING CAPACITY + %SAT+ FERRITIN 

 Collection Time: 17  5:06 AM 

Result Value Ref Range 

 Iron <10 (L) 50 - 185 MCG/DL 

 Iron Binding-TIBC 256 (L) 270 - 380 MCG/DL 

 Ferritin 261 30 - 300 NG/ML 

RETICULOCYTE COUNT 

 Collection Time: 17  5:06 AM 

Result Value Ref Range 

 Retic, Uncorrected 0.9 0.5 - 2.0 % 

 Retic, Corrected 0.5 % 

 Retic, Absolute 31.2 30 - 94 K/UL 

THYROID STIMULATING HORMONE-TSH 

 Collection Time: 17  5:06 AM 

Result Value Ref Range 

 TSH 2.991 0.35 - 5.00 MCU/ML 



Pertinent radiology reviewed.



Joe Irene MD

Pager [phone]

* Eve Chamberlain DO - 2017 10:17 AM CST



Associated Order(s): CONSULT INFECTIOUS DISEASES PHYSICIAN



Formatting of this note may be different from the original.

Infectious Diseases Initial Consult



Today's Date:  2017

Admission Date: 2017



Reason for this consultation: fever



Assessment: 



Fever 

- Differential of source is pseudocyst vs LLL pneumonia vs other 

- CXR 17: Sl increased opacity in the left lower lung, possibly pneumonia 
or atelectasis.

- CT A/P 11/3/17:  Overall decrease in size complex interconnecting fluid and 
gas containing collections within the anterior pararenal/ space with a cyst 
gastrostomy tube in place

- BCx : NGTD

- UA neg; Urine legionella & s. Pneumo: pending

- RVP: pending



Elevated AST

- 55 on admission

- Has been elevated in the past

- Other LFTs & ALP WNL; Lipase WNL



H/o pancreatitis

- symptom onset 7/15/17

- no hx of alcohol abuse, no gallstones

- serial CTs with progression since 7/15/17 of peripancreatic fluid collections 
with fat stranding, forming phelgmonous collection

-  Abd wall/peritoneal fluid- MSSA, Candida glabrata (both from broth)

-  EUS w necrosectomy and drainage- purulent fluid (no cultures)

- - necrosectomy w drainage

- : CT decrease size of complex network of gas/fluid in pararenal space. 
Loculated ascites w some gas on left, persistnet thickening of splenic flexure 
of colon sec pancreatitis, stenosis superior mesenteric vein.

- IR drain in abd fluid collection- 1) Right pelvic fluid collection- neg 2) 
Left fluid collection- staph on GS- culture neg

9/3- necrosectomy w drainage

- necrosectomy w drainage

 - Lipase WNL

CT a/p as above



H/o Left lower abdominal wall cellulitis with subcutaneous fluid collection 
related to infected abd ascitic fluid (see below)-improved

- 3 cm collection noted on initial CT abdomen/pelvis scan 7/15 and persistent 
on FU CT abd 

- IR guided drainage  "superficial left abdominal wall fluid collection, 
which appear to communicate with underlying ascitic fluid, with underlying 
loculated ascites."

-  Abd wall/peritoneal fluid- MSSA, Candida glabrata (both from broth)



H/o MSSA bacteremia

- rpt BC  No growth

- (No BC prior to transfer to  from [facility] per verbal from lab)

- TTE- nl valves



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 
reversal in 2017 ([place], [place])

CAD s/p PCI with 9 prior stents

MATHEW - CPAP

HTN 



Recommendations: 



1. Given increased opacity on CXR in LLL would recommend workup for pneumonia 
with: procalcitonin, urinary legionella & strep pneumo, & RVP - not clear this 
is cause of fever - he has had no cough or sob and was on augmentin PTA ; will 
add atypical coverage if needed based on w/o

2. Continue zosyn & diflucan for now.  If fever recurs add vanc empirically.

3. GI following and recommending endoscopic intervention for necrosectomy vs IR 
drain in future - endoscopic intervention if feasible would be preferred.

4. Continue to monitor CBC and monitor for symptomatic improvement

5. IS 

6. Will follow



Patient discussed with Dr. Chamberlain, thank you for the consultation.



Gen Camacho DO

Internal Medicine, PGY-1

Pager: [phone]



I have seen, personally evaluated, and discussed the patient's care with Dr. Camacho, Internal Medicine Resident.  I agree with the subjective notations, 
objective findings and agree with the plan of care as documented in this note 
with the exceptions noted. 

Eve Chamberlain DO

Division of Infectious Diseases

Pager [phone]



History of Present Illness  

Moshe Lopez is a 77 y.o.  history of CAD s/p PCI with 9 stents, MATHEW wears 
CPAP at home, HTN, HLD, sigmoid diverticulitis s/p Hernandez's procedurein 
2017 with colostomy reversal in 2017, who was admitted to [facility] originally on 17 for ongoing management of pancreatitis 
with ileus.



He was initially admitted to [facility] on 7/15/17 for chest pain
[place], no radiation, had SOA, progressed to abd pain 
and vomiting, treated as ACS but ruled out with neg troponin/EKG, in ED had abd 
pain and nausea. 1 beer the day prior to onset of symptoms, no hx of 
pancreatitis. UDS neg save for opiates. 7/15 CT A/P showed interstitial 
edematous pancreatitis with mild peripancreatic fluid collection, cholelithiasis
, sm right LL lung nodule, rec f/u CT 6 mos for nodule. Initial serum lipase 
15,000. Initial Cr 1.5 with mildly elevated LFTs, afebrile. Mild lactic 
acidosis with 2.4, WBC 9.4 on admission. CXR neg for pneumonia. 7/15 US GB 
with calcified gallstones, no acute cholecystitis.  f/u CT A/P - acute 
pancreatitis without pseudocyst, sm ascites. Thought to have ileus with 
abdominal distention.  NGT placed for distention with large amt output. 
 KUB showed distended bowel loops and findings consistent with ileus. 
Lipase normalized by 17, but continued to have mild bandemia 10-22% from -, and ongoing hypokalemia.  CTA chest done for chest pain, neg for 
PE, trace pleural effusions, patchy GGO in RUL inflammatory or infectious, 
slight increased peripancreatic inflammatory fat stranding consistent with 
pancreatitis. UA  with 3-5 WBC. 17 WBC 14.  On 17 seen by GI 
with concern for acute pancreatitis. Became septic from  to  WBC 26.2, 
fever to 101.3 and tachycardic. GI did not think biliary source for 
pancreatitis. EGD on  that ruledout gastric outlet obstruction. Not 
on antibiotics, supportive care for ileus and pancreatitis. Developed DEEPTHI, 
improved with IVF. Creatinine improved to 1.2 by .  WBC 17.5. 



Transferred to [facility] to general surgery service on 17 per family request. 
Discharged from [place] with temp of 101.3C. On presentation to [facility] he had WBC is 14.8, no differential, creatinine 1.05, LFTs within normal 
limits including bilirubin, triglycerides 135, amylase 20, lipase 44. T-max 
on presentation was 37.9C, on 17 blood cultures 2 obtained.  And 1 set 
turn positive for MSSA, pain susceptible. On 17 patient was started on 
IV Zosyn. Supportive care was continued for his pancreatitis and ileus, CorPak 
placed for enteral nutrition. CT abdomen/pelvis on 17 showed 
pancreatitis with acute peripancreatic fluid collections, no evidence of 
necrosis, abdominal ascites with edematous loops of small and large bowel, 
small pleural effusions and bibasilar atelectasis. When MSSA resulted from 
blood cultures on 17 antibiotics switched from Zosyn to nafcillin through  his leukocytosis normalized by .

He had increase in WBCs on - to 11k, he was switched back to Zosyn. 
Afebrile. CT abdomen/pelvis with contrast on 17 showed mild progression 
of phlegmonous pancreatitis with increase in size of some of the acute 
peripancreatic fluid collection, no bowel obstruction, mild increase in 
abdominal pelvic ascites. Rpt blood cultures x 2 on 17 negative. Fungal 
blood culture also neg. C.diff PCR neg on . MRCP on 17 was 
unremarkable for biliary ductal dilatation or obstruction. Abdominal distention 
and constipation continued, CT abdomen and pelvis on 17 showed again 
interval increase in size of several peripancreatic fluid collections and 
progression of ascites, but no bowel obstruction pneumatosis or free air. 
There was concern that he had developed a left lower lobe pneumonia, remained 
afebrile but his white blood cell count started to increase on  and peaked 
at 18.9 on 8/10. So on 17 his antibiotics were broadened to include 
Levaquin and vancomycin in addition to the Zosyn.  GI was consulted on 17
, abdominal imaging did not reveal any significant mechanical obstruction, so 
colonoscopic decompression was deferred. NG tube was placed for 
decompression. CT abdomen and pelvis on 8/10/17 did not show any significant 
change in the phlegmonous pancreatitis with continued large peripancreatic 
fluid collections still no evidence of necrosis.  His white blood cell count 
again normalized by 17, and his antibiotics were discontinued. He was 
initiated on methylnaltrexone to help alleviate the ileus while he has been 
taking opioids.  His white blood cell count remained normal until started 
increasing on  from 11.2 up to 12.8 on , at which time he was resumed 
on IV Zosyn, T-max during that time was 37.2C. He was noted to be 
tachycardic. CT abdomen and pelvis on 17 showed no significant change in 
the peripancreatic pseudocysts, no evidence of bowel obstruction. He 
underwent IR guided diagnostic and therapeutic paracentesis on 17, with 
removal of 150 mL's of ascites fluid, however no fluid studies or cultures 
obtained.



Throughout his hospital course it was noted that he had some erythema 
bilaterally in his mid to lower abdominal quadrants at the sides of his 
previous sigmoidectomy and re-anastomosis post colostomy, left side greater 
than right. Also associated pain. However over recent days the erythema has 
spread laterally on the left side and is somewhat tender to touch. ID was 
consulted to assist with evaluation and management of the cellulitis.  He was 
cont on zosyn and the L lateral abd wall was aspirated.  Grew MSSA and C. 
Glabrata - added flucon, cont zosyn. He had drain placed then removed w/
improvement.  Zosyn transitioned to ertapenem.  He had prolonged course in 
hospital owing to severe malnutrition - need for TPN, deconditioning.  He was 
dc eventually on ertapenem and went on to complete 10 weeks or so. He fu in 
clinic - was doing well w/improving appetite - no f/c/s cough/sob/n/v/d. No abd 
pain. CT showed stable collection. He went to po doxy + flucon x 1 wk then off 
tx. I saw him last week (1 wk off) doing well.  Fri he developed temp to 101 or 
so. Had chills w/this. He had ongoing fatigue - sleeping more than nml and 
drowsy during day but no MS change.  He denies URIsx, HA, congestion, cough/sob/
CP, palpitations. No abd pain.  He sill has anorexia but no emesis until in 
route to ED. No  sx or stool changes and no other new sx.  Temps on and off 
through weekend and early wk then he came to ED.  His bili and LFTs were not 
sig high. UA and CXR neg. BC P.  CT with sl inc fluid collections but largest 
is about the same.  He was restarted on IV antibiotics. He was discharged on 
TPN & IV antibiotics to a swing bed.



At follow up in  TPN was stopped and he was instructed to begin a 
oral protein supplementation.  ON 10/18 he presented to ED for fever and an 
increase in his abdominal pain with CT scan showing increase in size of gas/
fluid collections around the pancreas. He was admitted and had several GI 
procedures including necrosectomy on 10/23 to try and improve drainage and 
remove necrotic tissue. He was discharged in stable condition and sent home on 
oral Augmentin and diflucan.



Mr. Lopez was then seen yesterday in ID clinic for post hospital follow up. 
He was doing ok the first week then had ongoing nausea with emesis more inc 
freq starting last Thurs - called to GI - sent to ED. Repeat CT a/p about the 
same.  He had labwork. With wbc ok and alk phos actually sl down.  He was on 
zofran prn, then started compazine prn w/o sig improvement.  He then saw PCP 
and started meclizine but still having n/v. He has noted difficulty swallowing 
solids (chicken, bread) no cough or trouble w/liquids. No change chronic 
rhinorrhea w/eating - no URI sx, cough/sob. abd pain mostly in LUQ - having 
small vol soft stool. Urine darker and malodorous but w/o dysuria.  Emesis is 
all yellowish - no blood.  No rash.  He started having fever to 101F on Sun - 
feeling worse, now 10 lb additional wt loss.  He saw Dr. Tidwell today but no 
room for ERCP urgently - added po cipro but pt concerned about tolerating this.



This morning patient is patient is confused and much of the additional history 
is obtained by his wife at bedside. She states that she has never seen him this 
confused. The confusion began around 4 AM on 17 when he started slamming 
his home bathroom doors and asking "why did you get new doors" and since then 
it has worsened to the point of him not being able to sleep through the night 
and continually trying to get up. She reports that he has not had any further 
fevers since admission, but he does still feel warm and her ear thermometer 
continues to run higher than the hospitals oral ones. He had a large bowel 
movement yesterday evening, and has not had issues recently with diarrhea or 
constipation. He had some nausea yesterday evening, but has not had any further 
since he has not eaten anything since midnight.



Antimicrobial Start date End date 

Zosyn  Active 

Fluconazole  Active 

   

   

   

   

   

Estimated Creatinine Clearance: 92.8 mL/min (based on Cr of 0.66).



Past Medical History 



Past Medical History: 

Diagnosis Date 

 CAD (coronary artery disease)  

 Coronary artery disease  

 s/p 9 CABGs 

 Diverticulitis  

 Diverticulosis  

 s/p LAR in 2017 

 HTN (hypertension)  

 Hyperlipidemia  

 Hypertension  

 Low testosterone  

 Pancreatitis  

 Ruptured lumbar disc  

 Sleep apnea  



Past Surgical History 



Past Surgical History: 

Procedure Laterality Date 

 IN ESOPHAGOGASTRODUODENOSCOPY US SCOPE W/ADJ STRXRS N/A 2017 

 ESOPHAGOGASTRODUODENOSCOPY ENDOSCOPIC ULTRASOUND performed by Fan Tidwell MD 
at [facility] 

 UPPER GASTROINTESTINAL ENDOSCOPY N/A 2017 

 ESOPHAGOGASTRODUODENOSCOPY performed by Fan Tidwell MD at [facility] 

 UPPER GASTROINTESTINAL ENDOSCOPY N/A 9/3/2017 

 ESOPHAGOGASTRODUODENOSCOPY performed by Fan Tidwell MD at [facility] 

 IN ESOPHAGOGASTRODUODENOSCOPY TRANSORAL DIAGNOSTIC N/A 2017 

 ESOPHAGOGASTRODUODENOSCOPY performed by Fan Tidwell MD at [facility] 

 UPPER GASTROINTESTINAL ENDOSCOPY N/A 10/19/2017 

 ESOPHAGOGASTRODUODENOSCOPY with NECROSECTOMY performed by Bean Dean MD 
at [facility] 

 UPPER GASTROINTESTINAL ENDOSCOPY N/A 10/23/2017 

 ESOPHAGOGASTRODUODENOSCOPY performed by Bean Dean MD at [facility] 

 HX CARPAL TUNNEL RELEASE   

 HX COLOSTOMY   

 colostomy reversal in 2017 

 HX HEART CATHETERIZATION   

 HX ROTATOR CUFF REPAIR   

 left and right 

 HX ROTATOR CUFF REPAIR Bilateral  

 HX SEPTOPLASTY   

 SIGMOIDECTOMY   



Social History 

Marital status/area of residence: , lives with wife in [place], [place]; 
lives in a 3 year old house

Job/occupation: retired farmer - grew wheat, corn, soy beans

Travel history: none recent

Animal, bird exposures: used to raise cattle, none now

Environmental/outdoor/food exposures: retired farmer 14 yrs prior

Recent ill contacts: denies, however wife reports she has had two boughts of 
pancreatitis in last 4 years, post cholecystectomy now

Drugs of abuse: denies

Alcohol: 1 can beer per month at most

Social History 

Substance Use Topics 

 Smoking status: Former Smoker 

  Packs/day: 1.00 

  Years: 15.00 

  Types: Cigarettes, Pipe, Cigars 

  Quit date: 1975 

 Smokeless tobacco: Never Used 

 Alcohol use 0.6 oz/week 

  1 Cans of beer per week 

   Comment: 1 beer every 2 weeks 



Family History 



Family History 

Problem Relation Age of Onset 

 Heart Failure Mother  

 Heart Attack Mother  

 Cancer Father  

 Heart Disease Maternal Aunt  

 Heart Disease Maternal Uncle  



Allergies 



Allergies 

Allergen Reactions 

 Niacin RASH 

  Extreme itching per pt 

 Ativan [Lorazepam] AGITATION 

  Severe agitation and confusion  



Review of Systems 

A comprehensive 14-point review of systems was negative with exception of: 
Fevers, nausea, vomiting, altered mentation



Medications 

Scheduled Meds:

aspirin EC tablet 81 mg 81 mg Oral QDAY 

citalopram (CELEXA) tablet 20 mg 20 mg Oral QDAY 

enoxaparin (LOVENOX) syringe 40 mg 40 mg Subcutaneous QDAY(21) 

famotidine (PEPCID) tablet 20 mg 20 mg Oral BID 

fenofibrate nanocrystallized (TRICOR) tablet 145 mg 145 mg Oral QHS 

finasteride (PROSCAR) tablet 5 mg 5 mg Oral QDAY 

fish oil- omega 3-DHA/EPA capsule 1,000 mg 1,000 mg Oral BID 

fluconazole (DIFLUCAN) tablet 400 mg 400 mg Oral QDAY 

lisinopril (PRINIVIL; ZESTRIL) tablet 20 mg 20 mg Oral QDAY 

loratadine (CLARITIN) tablet 10 mg 10 mg Oral QAM8 

melatonin tablet 5 mg 5 mg Oral ONCE 

ondansetron (ZOFRAN) injection 4 mg 4 mg Intravenous Q6H 

pantoprazole DR (PROTONIX) tablet 40 mg 40 mg Oral QDAY(21) 

piperacillin/tazobactam  (ZOSYN) 3.375 g/50 mL iso-osmotic IVPB 3.375 g 
Intravenous Q6H* 

polyethylene glycol 3350 (MIRALAX) packet 34 g 2 packet Oral BID 

scopolamine (TRANSDERM-SCOP) patch 1 patch 1 patch Transdermal Q72H* 

senna/docusate (SENOKOT-S) tablet 2 tablet 2 tablet Oral BID 

tamsulosin (FLOMAX) capsule 0.4 mg 0.4 mg Oral QHS 

vitamins, B complex tablet 1 tablet 1 tablet Oral QDAY 

Continuous Infusions:

 sodium chloride 0.9 %   infusion 100 mL/hr at 17 0504 



PRN and Respiratory Meds:acetaminophen Q6H PRN, oxyCODONE Q4H PRN, 
prochlorperazine Q6H PRN, simethicone Q6H PRN, zolpidem QHS PRN



Physical Examination 



                     Vital Signs: Last                  Vital Signs: 24 Hour 
Range 

BP: 146/64 (704)

Temp: 37.1 C (98.7 F) (704)

Pulse: 78 (955)

Respirations: 18 PER MINUTE (955)

SpO2: 95 % (955)

O2 Delivery: None (Room Air) (704)

Height: 170.2 cm (67") (1219) BP: (122-151)/(50-64) 

Temp:  [37.1 C (98.7 F)-37.8 C (100 F)] 

Pulse:  [75-84] 

Respirations:  [14 PER MINUTE-23 PER MINUTE] 

SpO2:  [92 %-98 %] 

O2 Delivery: None (Room Air) 



General appearance: alert, disoriented to time and situation, but knows time 
and place, not able to follow conversation and has tangential comments, NAD

HENT: mucus membranes moist, no oral lesions/thrush, yellowed teeth

Eyes: PERRL, EOM grossly intact, Conj nl

Neck: supple, no lymphadenopathy, thyroid not palpable

Lungs: no wheezing, rhonchi, right side rales appreciated that clear with cough

Heart: Regular rhythm, reg rate, with no murmur, rub, gallop

Abdomen: soft, non-tender, distended on the lateral aspects, normoactive bowel 
sounds, no hepatosplenomegaly, no masses

Ext:  No clubbing, cyanosis or edema

Skin: no rashes/lesions; warm to touch throughout

Lymph: no cervical, axillary or inguinal adenopathy

Neuro: CN II-XII GI, no focal deficits



Lines:



Lab Review 

Hematology

Recent Labs 

   17

 1720  17

 0506 

WBC  12.3*   --   9.1 

HGB  9.4*   --   8.3* 

HCT  29.2*   --   26.3* 

PLTCT  344   --   281 

PTT   --   28.1   --  

INR   --   1.4*   --  



Chemistry

Recent Labs 

   17

 1117  17

 1720  17

 0506 

NA  137   --   139 

K  4.0   --   3.2* 

CL  102   --   104 

CO2  25   --   25 

BUN  11   --   9 

CR  0.83   --   0.66 

GFR  >60   --   >60 

GLU  118*   --   104* 

CA  9.3   --   8.8 

PO4   --   2.7  2.7 

ALBUMIN  3.3*   --   2.8* 

ALKPHOS  132*   --   98 

AST  55*   --   55* 

ALT  42   --   40 

TOTBILI  0.4   --   0.4 



Microbiology, Radiology and other Diagnostics Review 

Microbiology data reviewed.



Pertinent radiology images viewed.



CXR 17:

Impression: Increased opacities in the left lower lung, likely reflecting 
pneumonia or atelectasis.



CT A/P 17:

1. Overall decrease in size complex interconnecting fluid and gas 

containing collections within the anterior pararenal space with a cyst 

gastrostomy tube in place.

2. 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 

confluence, and main portal vein. 

3. Stable intrahepatic biliary ductal dilation, likely secondary to 

thickening and narrowing of the common bile duct. This is likely 

inflammatory in nature.

4. Development of mild right hydronephrosis and mild left pelviectasis. No 

discrete obstructing lesion is seen, though right hydronephrosis is likely 

secondary to relative obstruction by the right anterior pararenal fluid 

collection.

5. Mild diffuse peritoneal nodularity and stranding.

6. Mild prostatomegaly.



Gen Camacho DO 

Pager 

Infectious Diseases Faculty 





in this encounter



Miscellaneous Notes

* Care Plan - Eldon Ritchie RN - 2017  9:12 AM CST



Problem: Pain

Goal: Management of pain

Outcome: Goal Achieved Date Met:  17

Pt c/o no pain at this time



Problem: Discharge Planning

Goal: Participation in plan of care

Outcome: Goal Achieved Date Met:  17

Pt to discharge home today



Problem: Nutrition Deficit

Goal: Adequate nutritional intake

Outcome: Goal Achieved Date Met:  17

Pt ate 100% of meals this shift



Problem: Fluid Volume, Imbalanced

Goal: Absence of dehydration

Outcome: Goal Achieved Date Met:  17

Pt displays no signs of fluid imbalance



Problem: Nausea/Vomiting

Goal: Absence of nausea and/or vomiting

Outcome: Goal Achieved Date Met:  17

Pt complains of no nausea/vomiting at this time

Goal: Adequate nutritional intake

Outcome: Goal Achieved Date Met:  17

Pt ate 100% of meals this shift



Problem: Falls, High Risk of

Goal: Absence of falls-Adult Patient

Outcome: Goal Achieved Date Met:  17

Fall risk assessment completed and bundle in place. 



Problem: Self-Care Deficit

Goal: Maximize ADL functioning

Outcome: Goal Achieved Date Met:  17

Pt is able to perform ADLs with minimal assistance



Problem: Neurological Status, Impaired/Altered

Goal: Progress toward maximizing functional outcomes

Outcome: Goal Achieved Date Met:  17

Pt A/Ox4





* Case Mgmt DC Plan - Alley Dailey RN - 11/10/2017 11:30 AM CST



Formatting of this note may be different from the original.

Case Management Admission Assessment



NAME:Moshe Lopez                          MRN: [id number]             :          AGE: 77 y.o.

ADMISSION DATE: 2017             DAYS ADMITTED: LOS: 2 days  

Todays Date: 11/10/2017



Source of Information: patient's wife (Latisha Lopez)  



Plan

Plan: CM Assessment, Assist PRN with SW/NCM Services, Discharge Planning for 
Home Anticipated



Emergency Contact   

Extended Emergency Contact Information

Primary Emergency Contact: Latisha Lopez

 [facility]

Home Phone: [phone]

Mobile Phone: [phone]

Relation: Spouse



DPOA 

Latisha Lopez



Transportation

Does the patient need discharge transport arranged?: No

Transportation Name, Phone and Availability #1: Latisha Lopez [phone]

Does the patient use Medicaid Transportation?: No



Expected Discharge 

Expected Discharge Date: 17



Living Situation Prior to Admission

? Living Arrangements 

Type of Residence: Home, independent

Living Arrangements: Spouse/significant other

Bathroom Shower / Tub: Tub/Shower Unit

Bathroom Toilet: Standard

Bathroom Equipment: Shower Chair

Bathroom Accessibility: Accessible via Wheelchair, Accessible via Walker

How many levels in the residence?: 1

Can patient live on one level if needed?: N/A

Support Systems: Spouse/Partner, Other family

Assistance Needed: No

Home Care Services: No



? Level of Function 

Prior level of function: Independent



? Cognitive Abilities 

Cognitive Abilities: Unable to participate in decision making (patient is 
currently confused.  Mentation expected to improve once infection is resolved.)



Financial Resources

? Coverage 

Primary Insurance: Medicare

Additional Coverage: RX  



? Source of Income 

Source Of Income: SSI



? Financial Assistance Needed?

none

Current/Previous Services

? PCP

Jaelyn Velez



? DME 

DME at home: CPAP/BiPAP, Wheelchair (manual), Roller Walker (CPAP from [facility])



? Home Health 

Receiving home health: In the past

Agency name: [facility]

Would patient use this agency again?: Yes



? HD or PD

Undergoing hemodialysis or peritoneal dialysis: No



? Tube/Enteral Feeds 

Receive tube/enteral feeds: No



? Infusion 

Receive infusions: No



? Private Duty

 Private duty help used: No



? HCBS 

Home and community based services: No



? Vaibhav White

 Vaibhav White: N/A



? Hospice 

Hospice: No



? Outpatient Therapy 

PT: Yes

When did patient receive care?: 10/2017

Name of rehab location/group: [facility]

Would patient return for future services?: Yes

OT: Yes

When did patient receive care?: 10/2017

Name of rehab location/group: [facility]

Would patient return for future services?: Yes

SLP: No



? SNF/NH 

SNF: No

NH: No



? IPR 

IPR: No



? LTACH 

LTACH: No



? Acute Hospital Stay 

Acute Hospital Stay: In the past

Was patient's stay within the last 30 days?: Yes

When did patient receive care?: 17

Name of hospital: [facility]



Psychosocial Needs

? Mental Health 

Mental Health History: No



? Substance History 

History 

Smoking Status 

 Former Smoker 

 Packs/day: 1.00 

 Years: 15.00 

 Types: Cigarettes, Pipe, Cigars 

 Quit date: 1975 

Smokeless Tobacco 

 Never Used 



History 

Alcohol Use 

 0.6 oz/week 

 1 Cans of beer per week 

  Comment: 1 beer every 2 weeks 



History 

Drug Use No 



? Abuse/Sexual Assault

Are You Alone With The Patient?: No

Have You Ever Been Hit, Hurt Or Threatened In Any Way In The Past 5 Years?: 
Unable to Assess

Nurse Suspected Abuse?: No



Alley LYONS, RN



[phone]

Pager *[phone]

* Case Mgmt DC Plan - Alley Dailey RN - 2017  3:31 PM CST



Pt attempted to meet with patient.  Patient confused and unable to answer.  CM 
will follow up at a later time when family is available.



Alley LYONS RN



[phone]

Pager *[phone]

* Advanced Care Planning/Resuscitation Status - Klarissa De La Garza MD - 2017  5:09 PM CST



Advance Care Planning/Resuscitation Status Conversation



Individuals present for advance care planning conversation: attending physician 
and patient



Pertinent details of conversation (including direct quotes from patient or 
surrogate): States he would want everything done to try to "bring him back" 
including chest compressions, intubation, shocks, CPR.  However, he wouldn't 
want to "live like a vegetable."  Explained we may not know outcome at the time 
of the arrest but patient states he would still want to try.



Outcome of conversation: Full Code



Documents completed as a result of this conversation: None



Other documents present, which outline patient/surrogate wishes: None



in this encounter



Plan of Treatment







    



  Date   Type   Specialty   Care Team   Description

 

    



  2018   Procedure Pass   Infectious Diseases  

 

    



  2018   Surgery    Fan Tidwell MD   ESOPHAGOGASTRODUODENOSCOP



     [address]   [address]



     [address] 



     [place] [address] [phone] (Fax) 

 

    



  2018   Procedure Pass   

 

    



  2018   Hospital    Fan Tidwell MD   Pancreatic necrosis



   Encounter    [address] 



     [address] 



     [place] [address] [phone] (Fax) 



as of this encounter



Procedures







    



  Procedure Name   Priority   Date/Time   Associated Diagnosis   Comments

 

    



  ECG-SCAN    2017    Results for this



    7:01 PM CST    procedure are in the



      results section.

 

    



  ECG-SCAN    2017    Results for this



    7:01 PM CST    procedure are in the



      results section.

 

    



  ECG-SCAN    2017    Results for this



    7:01 PM CST    procedure are in the



      results section.

 

    



  ECG-SCAN    2017    Results for this



    1:50 PM CST    procedure are in the



      results section.

 

    



  ECG-SCAN    2017    Results for this



    7:25 AM CST    procedure are in the



      results section.



in this encounter



Results

* ECG-SCAN (2017  7:01 PM)





 Narrative

 

 



Ordered by an unspecified provider.





* ECG-SCAN (2017  7:01 PM)





 Narrative

 

 



Ordered by an unspecified provider.





* ECG-SCAN (2017  7:01 PM)





 Narrative

 

 



Ordered by an unspecified provider.





* PHOSPHORUS (2017  5:59 AM)





  



  Component   Value   Ref Range

 

  



  Phosphorus   2.8   2.0 - 4.0 MG/DL









 



  Specimen   Performing Laboratory

 

 



  Blood    MAIN LAB



   [address] [place] [address]





* MAGNESIUM (2017  5:59 AM)





  



  Component   Value   Ref Range

 

  



  Magnesium   1.9   1.6 - 2.6 mg/dL









 



  Specimen   Performing Laboratory

 

 



  Blood    MAIN LAB



   [address] [place] [address]





* COMPREHENSIVE METABOLIC PANEL (2017  5:59 AM)





  



  Component   Value   Ref Range

 

  



  Sodium   140   137 - 147 MMOL/L

 

  



  Potassium   4.0   3.5 - 5.1 MMOL/L

 

  



  Chloride   107   98 - 110 MMOL/L

 

  



  Glucose   116 (H)   70 - 100 MG/DL

 

  



  Blood Urea Nitrogen   12   7 - 25 MG/DL

 

  



  Creatinine   0.73   0.4 - 1.24 MG/DL

 

  



  Calcium   9.3   8.5 - 10.6 MG/DL

 

  



  Total Protein   6.5   6.0 - 8.0 G/DL

 

  



  Total Bilirubin   0.3   0.3 - 1.2 MG/DL

 

  



  Albumin   2.9 (L)   3.5 - 5.0 G/DL

 

  



  Alk Phosphatase   106   25 - 110 U/L

 

  



  AST (SGOT)   27   7 - 40 U/L

 

  



  CO2   28   21 - 30 MMOL/L

 

  



  ALT (SGPT)   23   7 - 56 U/L

 

  



  Anion Gap   5   3 - 12

 

  



  eGFR Non African American   >60   >60 mL/min



   Comment: 



   The eGFR is not validated for use in drug dosing 



   adjustments.    Continue to use 



   estimated creatinine clearance per dosing 



   reference text.    Please contact the 



   Clinical Pharmacist for questions. 

 

  



  eGFR    >60   >60 mL/min



   Comment: 



   The eGFR is not validated for use in drug dosing 



   adjustments.    Continue to use 



   estimated creatinine clearance per dosing 



   reference text.    Please contact the 



   Clinical Pharmacist for questions. 









 



  Specimen   Performing Laboratory

 

 



  Blood    MAIN LAB



   [address] [place] [address]





* CBC AND DIFF (2017  5:59 AM)





  



  Component   Value   Ref Range

 

  



  White Blood Cells   10.3   4.5 - 11.0 K/UL

 

  



  RBC   3.58 (L)   4.4 - 5.5 M/UL

 

  



  Hemoglobin   8.9 (L)   13.5 - 16.5 GM/DL

 

  



  Hematocrit   28.5 (L)   40 - 50 %

 

  



  MCV   79.7 (L)   80 - 100 FL

 

  



  MCH   24.9 (L)   26 - 34 PG

 

  



  MCHC   31.3 (L)   32.0 - 36.0 G/DL

 

  



  RDW   17.8 (H)   11 - 15 %

 

  



  Platelet Count   334   150 - 400 K/UL

 

  



  MPV   8.9   7 - 11 FL

 

  



  Neutrophils   79 (H)   41 - 77 %

 

  



  Lymphocytes   11 (L)   24 - 44 %

 

  



  Monocytes   7   4 - 12 %

 

  



  Eosinophils   3   0 - 5 %

 

  



  Basophils   0   0 - 2 %

 

  



  Absolute Neutrophil Count   8.10 (H)   1.8 - 7.0 K/UL

 

  



  Absolute Lymph Count   1.20   1.0 - 4.8 K/UL

 

  



  Absolute Monocyte Count   0.70   0 - 0.80 K/UL

 

  



  Absolute Eosinophil Count   0.30   0 - 0.45 K/UL

 

  



  Absolute Basophil Count   0.00   0 - 0.20 K/UL









 



  Specimen   Performing Laboratory

 

 



  Blood    MAIN LAB



   [address] [place] [address]





* PHOSPHORUS (2017  5:42 AM)





  



  Component   Value   Ref Range

 

  



  Phosphorus   2.9   2.0 - 4.0 MG/DL









 



  Specimen   Performing Laboratory

 

 



  Blood    MAIN LAB



   [address] [place] [address]





* MAGNESIUM (2017  5:42 AM)





  



  Component   Value   Ref Range

 

  



  Magnesium   1.9   1.6 - 2.6 mg/dL









 



  Specimen   Performing Laboratory

 

 



  Blood    MAIN LAB



   [address] [place] [address]





* COMPREHENSIVE METABOLIC PANEL (2017  5:42 AM)





  



  Component   Value   Ref Range

 

  



  Sodium   140   137 - 147 MMOL/L

 

  



  Potassium   3.8   3.5 - 5.1 MMOL/L

 

  



  Chloride   105   98 - 110 MMOL/L

 

  



  Glucose   113 (H)   70 - 100 MG/DL

 

  



  Blood Urea Nitrogen   12   7 - 25 MG/DL

 

  



  Creatinine   0.77   0.4 - 1.24 MG/DL

 

  



  Calcium   8.9   8.5 - 10.6 MG/DL

 

  



  Total Protein   6.5   6.0 - 8.0 G/DL

 

  



  Total Bilirubin   0.3   0.3 - 1.2 MG/DL

 

  



  Albumin   2.9 (L)   3.5 - 5.0 G/DL

 

  



  Alk Phosphatase   106   25 - 110 U/L

 

  



  AST (SGOT)   24   7 - 40 U/L

 

  



  CO2   27   21 - 30 MMOL/L

 

  



  ALT (SGPT)   25   7 - 56 U/L

 

  



  Anion Gap   8   3 - 12

 

  



  eGFR Non African American   >60   >60 mL/min



   Comment: 



   The eGFR is not validated for use in drug dosing 



   adjustments.    Continue to use 



   estimated creatinine clearance per dosing 



   reference text.    Please contact the 



   Clinical Pharmacist for questions. 

 

  



  eGFR    >60   >60 mL/min



   Comment: 



   The eGFR is not validated for use in drug dosing 



   adjustments.    Continue to use 



   estimated creatinine clearance per dosing 



   reference text.    Please contact the 



   Clinical Pharmacist for questions. 









 



  Specimen   Performing Laboratory

 

 



  Blood    MAIN LAB



   [address] [place] [address]





* CBC AND DIFF (2017  5:42 AM)





  



  Component   Value   Ref Range

 

  



  White Blood Cells   9.7   4.5 - 11.0 K/UL

 

  



  RBC   3.55 (L)   4.4 - 5.5 M/UL

 

  



  Hemoglobin   9.0 (L)   13.5 - 16.5 GM/DL

 

  



  Hematocrit   28.1 (L)   40 - 50 %

 

  



  MCV   79.1 (L)   80 - 100 FL

 

  



  MCH   25.3 (L)   26 - 34 PG

 

  



  MCHC   32.0   32.0 - 36.0 G/DL

 

  



  RDW   17.9 (H)   11 - 15 %

 

  



  Platelet Count   313   150 - 400 K/UL

 

  



  MPV   8.9   7 - 11 FL

 

  



  Neutrophils   82 (H)   41 - 77 %

 

  



  Lymphocytes   9 (L)   24 - 44 %

 

  



  Monocytes   5   4 - 12 %

 

  



  Eosinophils   4   0 - 5 %

 

  



  Basophils   0   0 - 2 %

 

  



  Absolute Neutrophil Count   7.90 (H)   1.8 - 7.0 K/UL

 

  



  Absolute Lymph Count   0.90 (L)   1.0 - 4.8 K/UL

 

  



  Absolute Monocyte Count   0.40   0 - 0.80 K/UL

 

  



  Absolute Eosinophil Count   0.40   0 - 0.45 K/UL

 

  



  Absolute Basophil Count   0.00   0 - 0.20 K/UL









 



  Specimen   Performing Laboratory

 

 



  Blood    MAIN LAB



   [address] [place] [address]





* ECG-SCAN (2017  1:50 PM)





 Narrative

 

 



Ordered by an unspecified provider.





* PHOSPHORUS (2017  5:54 AM)





  



  Component   Value   Ref Range

 

  



  Phosphorus   3.2   2.0 - 4.0 MG/DL









 



  Specimen   Performing Laboratory

 

 



  Blood   [facility]



   [address] [place] [address]





* MAGNESIUM (2017  5:54 AM)





  



  Component   Value   Ref Range

 

  



  Magnesium   2.0   1.6 - 2.6 mg/dL









 



  Specimen   Performing Laboratory

 

 



  Blood    MAIN LAB



   [address] [place] [address]





* COMPREHENSIVE METABOLIC PANEL (2017  5:54 AM)





  



  Component   Value   Ref Range

 

  



  Sodium   139   137 - 147 MMOL/L

 

  



  Potassium   3.7   3.5 - 5.1 MMOL/L

 

  



  Chloride   104   98 - 110 MMOL/L

 

  



  Glucose   111 (H)   70 - 100 MG/DL

 

  



  Blood Urea Nitrogen   8   7 - 25 MG/DL

 

  



  Creatinine   0.71   0.4 - 1.24 MG/DL

 

  



  Calcium   8.8   8.5 - 10.6 MG/DL

 

  



  Total Protein   6.4   6.0 - 8.0 G/DL

 

  



  Total Bilirubin   0.3   0.3 - 1.2 MG/DL

 

  



  Albumin   2.8 (L)   3.5 - 5.0 G/DL

 

  



  Alk Phosphatase   125 (H)   25 - 110 U/L

 

  



  AST (SGOT)   29   7 - 40 U/L

 

  



  CO2   27   21 - 30 MMOL/L

 

  



  ALT (SGPT)   29   7 - 56 U/L

 

  



  Anion Gap   8   3 - 12

 

  



  eGFR Non African American   >60   >60 mL/min



   Comment: 



   The eGFR is not validated for use in drug dosing 



   adjustments.    Continue to use 



   estimated creatinine clearance per dosing 



   reference text.    Please contact the 



   Clinical Pharmacist for questions. 

 

  



  eGFR    >60   >60 mL/min



   Comment: 



   The eGFR is not validated for use in drug dosing 



   adjustments.    Continue to use 



   estimated creatinine clearance per dosing 



   reference text.    Please contact the 



   Clinical Pharmacist for questions. 









 



  Specimen   Performing Laboratory

 

 



  Blood   [facility]



   [address] [place] [address]





* CBC AND DIFF (2017  5:54 AM)





  



  Component   Value   Ref Range

 

  



  White Blood Cells   7.9   4.5 - 11.0 K/UL

 

  



  RBC   3.46 (L)   4.4 - 5.5 M/UL

 

  



  Hemoglobin   8.7 (L)   13.5 - 16.5 GM/DL

 

  



  Hematocrit   27.5 (L)   40 - 50 %

 

  



  MCV   79.6 (L)   80 - 100 FL

 

  



  MCH   25.1 (L)   26 - 34 PG

 

  



  MCHC   31.6 (L)   32.0 - 36.0 G/DL

 

  



  RDW   17.7 (H)   11 - 15 %

 

  



  Platelet Count   315   150 - 400 K/UL

 

  



  MPV   9.1   7 - 11 FL

 

  



  Neutrophils   76   41 - 77 %

 

  



  Lymphocytes   12 (L)   24 - 44 %

 

  



  Monocytes   7   4 - 12 %

 

  



  Eosinophils   5   0 - 5 %

 

  



  Basophils   0   0 - 2 %

 

  



  Absolute Neutrophil Count   6.00   1.8 - 7.0 K/UL

 

  



  Absolute Lymph Count   1.00   1.0 - 4.8 K/UL

 

  



  Absolute Monocyte Count   0.50   0 - 0.80 K/UL

 

  



  Absolute Eosinophil Count   0.40   0 - 0.45 K/UL

 

  



  Absolute Basophil Count   0.00   0 - 0.20 K/UL









 



  Specimen   Performing Laboratory

 

 



  Blood    MAIN LAB



   [address] [place] [address]





* PHOSPHORUS (2017  7:39 AM)





  



  Component   Value   Ref Range

 

  



  Phosphorus   3.8   2.0 - 4.0 MG/DL









 



  Specimen   Performing Laboratory

 

 



  Blood    MAIN LAB



   [address] [place], [place] [id number]





* MAGNESIUM (2017  7:39 AM)





  



  Component   Value   Ref Range

 

  



  Magnesium   1.9   1.6 - 2.6 mg/dL









 



  Specimen   Performing Laboratory

 

 



  Blood    MAIN LAB



   [address] [place] [address]





* COMPREHENSIVE METABOLIC PANEL (2017  7:39 AM)





  



  Component   Value   Ref Range

 

  



  Sodium   141   137 - 147 MMOL/L

 

  



  Potassium   3.6   3.5 - 5.1 MMOL/L

 

  



  Chloride   107   98 - 110 MMOL/L

 

  



  Glucose   105 (H)   70 - 100 MG/DL

 

  



  Blood Urea Nitrogen   7   7 - 25 MG/DL

 

  



  Creatinine   0.80   0.4 - 1.24 MG/DL

 

  



  Calcium   8.8   8.5 - 10.6 MG/DL

 

  



  Total Protein   6.2   6.0 - 8.0 G/DL

 

  



  Total Bilirubin   0.3   0.3 - 1.2 MG/DL

 

  



  Albumin   2.8 (L)   3.5 - 5.0 G/DL

 

  



  Alk Phosphatase   91   25 - 110 U/L

 

  



  AST (SGOT)   31   7 - 40 U/L

 

  



  CO2   27   21 - 30 MMOL/L

 

  



  ALT (SGPT)   32   7 - 56 U/L

 

  



  Anion Gap   7   3 - 12

 

  



  eGFR Non African American   >60   >60 mL/min



   Comment: 



   The eGFR is not validated for use in drug dosing 



   adjustments.    Continue to use 



   estimated creatinine clearance per dosing 



   reference text.    Please contact the 



   Clinical Pharmacist for questions. 

 

  



  eGFR    >60   >60 mL/min



   Comment: 



   The eGFR is not validated for use in drug dosing 



   adjustments.    Continue to use 



   estimated creatinine clearance per dosing 



   reference text.    Please contact the 



   Clinical Pharmacist for questions. 









 



  Specimen   Performing Laboratory

 

 



  Blood    MAIN LAB



   [address] [place] [address]





* CBC AND DIFF (2017  7:39 AM)





  



  Component   Value   Ref Range

 

  



  White Blood Cells   7.9   4.5 - 11.0 K/UL

 

  



  RBC   3.33 (L)   4.4 - 5.5 M/UL

 

  



  Hemoglobin   8.5 (L)   13.5 - 16.5 GM/DL

 

  



  Hematocrit   26.1 (L)   40 - 50 %

 

  



  MCV   78.3 (L)   80 - 100 FL

 

  



  MCH   25.4 (L)   26 - 34 PG

 

  



  MCHC   32.4   32.0 - 36.0 G/DL

 

  



  RDW   17.5 (H)   11 - 15 %

 

  



  Platelet Count   286   150 - 400 K/UL

 

  



  MPV   8.1   7 - 11 FL

 

  



  Neutrophils   79 (H)   41 - 77 %

 

  



  Lymphocytes   8 (L)   24 - 44 %

 

  



  Monocytes   7   4 - 12 %

 

  



  Eosinophils   6 (H)   0 - 5 %

 

  



  Basophils   0   0 - 2 %

 

  



  Absolute Neutrophil Count   6.20   1.8 - 7.0 K/UL

 

  



  Absolute Lymph Count   0.60 (L)   1.0 - 4.8 K/UL

 

  



  Absolute Monocyte Count   0.60   0 - 0.80 K/UL

 

  



  Absolute Eosinophil Count   0.50 (H)   0 - 0.45 K/UL

 

  



  Absolute Basophil Count   0.00   0 - 0.20 K/UL









 



  Specimen   Performing Laboratory

 

 



  Blood    MAIN LAB



   [address] [place] [address]





* PROCALCITONIN (11/10/2017 11:19 AM)





  



  Component   Value   Ref Range

 

  



  Procalcitonin   0.21 (H)   <0.10 NG/ML









 



  Specimen   Performing Laboratory

 

 



  Blood    MAIN LAB



   [address] [place] [address]





* CHEST 2 VIEWS (11/10/2017  9:32 AM)





 



  Specimen   Performing Laboratory

 

 



   [facility] RAD RESULTS









 Impressions

 

 



 



 



Persistent opacities in the left lung base, likely reflecting pneumonia or 
atelectasis.



 



 



 Approved by Avelino Mayo M.D. on 11/10/2017 10:13 AM



 



By my electronic signature, I attest that I have personally reviewed the images 
for this examination and formulated the interpretations and opinions expressed 
in this report



 



 



 Finalized by Addie Harley M.D. on 11/10/2017 10:14 AM. Dictated by Avelino Mayo M.D. on 11/10/2017 9:37 AM.



 









 Narrative

 

 



 



Procedure: CHEST 2 VIEWS



 



Clinical Indication:



 



77-year-old male. Follow-up left basilar opacities



 



Comparison:



Chest x-ray 2017



 



Findings:



 



The heart size and pulmonary vasculature are unremarkable. Prior coronary 
artery stents are again noted. There is persistent opacities in the left lung 
base. No pleural effusion or pneumothorax is identified.



 









 Procedure Note

 

 



Interface, Radiant Results - 11/10/2017 10:17 AM CST





Procedure: CHEST 2 VIEWS



Clinical Indication:



 77-year-old male. Follow-up left basilar opacities



Comparison:

Chest x-ray 2017



Findings:



The heart size and pulmonary vasculature are unremarkable. Prior coronary 
artery stents are again noted. There is persistent opacities in the left lung 
base. No pleural effusion or pneumothorax is identified.



IMPRESSION





Persistent opacities in the left lung base, likely reflecting pneumonia or 
atelectasis.





 Approved by Avelino Mayo M.D. on 11/10/2017 10:13 AM



By my electronic signature, I attest that I have personally reviewed the images 
for this examination and formulated the interpretations and opinions expressed 
in this report





 Finalized by Addie Harley M.D. on 11/10/2017 10:14 AM. Dictated by Avelino Mayo M.D. on 11/10/2017 9:37 AM.







* CT HEAD WO CONTRAST (11/10/2017  9:26 AM)





 



  Specimen   Performing Laboratory

 

 



   [facility] RAD RESULTS









 Impressions

 

 



 



 



No acute hemorrhage or mass effect.



 



 



 Approved by Cale Gonzalez M.D. on 11/10/2017 11:11 AM



 



By my electronic signature, I attest that I have personally reviewed the images 
for this examination and formulated the interpretations and opinions expressed 
in this report



 



 



 Finalized by Daniel Orta M.D. on 11/10/2017 5:07 PM. Dictated by Cale Gonzalez M.D. on 11/10/2017 9:51 AM.



 









 Narrative

 

 



EXAM: CT HEAD 



 



HISTORY: 



 



77-year-old male, confusion, abnormal behavior.



 



TECHNIQUE: Multiple contiguous axial images were obtained of the brain without 
intravenous contrast.



 



COMPARISON: No prior exam is available for comparison.



 



FINDINGS: 



 



Dr. Daniel Orta M.D. has personally reviewed these images and formulated 
the interpretations and opinions expressed in this report.



 



There is mild generalized cerebral volume loss. Ventricle size is concordant to 
the degree of loss. There is no midline shift or mass effect. The gray white 
matter interfaces are maintained. The basal cisterns are patent. There is no 
evidence of acute intracranial hemorrhage or extra-axial fluid collection. The 
mastoid air cells and visualized paranasal sinuses are well-aerated.



 









 Procedure Note

 

 



Interface, Radiant Results - 11/10/2017  5:10 PM CST



EXAM: CT HEAD 



HISTORY: 



 77-year-old male, confusion, abnormal behavior.



TECHNIQUE: Multiple contiguous axial images were obtained of the brain without 
intravenous contrast.



COMPARISON: No prior exam is available for comparison.



FINDINGS: 



Dr. aDniel Orta M.D. has personally reviewed these images and formulated 
the interpretations and opinions expressed in this report.



There is mild generalized cerebral volume loss. Ventricle size is concordant to 
the degree of loss. There is no midline shift or mass effect. The gray white 
matter interfaces are maintained. The basal cisterns are patent. There is no 
evidence of acute intracranial hemorrhage or extra-axial fluid collection. The 
mastoid air cells and visualized paranasal sinuses are well-aerated.



IMPRESSION





No acute hemorrhage or mass effect.





 Approved by Cale Gonzalez M.D. on 11/10/2017 11:11 AM



By my electronic signature, I attest that I have personally reviewed the images 
for this examination and formulated the interpretations and opinions expressed 
in this report





 Finalized by Daniel Orta M.D. on 11/10/2017 5:07 PM. Dictated by Cale Gonzalez M.D. on 11/10/2017 9:51 AM.







* PHOSPHORUS (11/10/2017  5:42 AM)





  



  Component   Value   Ref Range

 

  



  Phosphorus   3.0   2.0 - 4.0 MG/DL









 



  Specimen   Performing Laboratory

 

 



  Blood    MAIN LAB



   [address] [place] [address]





* MAGNESIUM (11/10/2017  5:42 AM)





  



  Component   Value   Ref Range

 

  



  Magnesium   2.1   1.6 - 2.6 mg/dL









 



  Specimen   Performing Laboratory

 

 



  Blood    MAIN LAB



   [address] [place] [address]





* COMPREHENSIVE METABOLIC PANEL (11/10/2017  5:42 AM)





  



  Component   Value   Ref Range

 

  



  Sodium   136 (L)   137 - 147 MMOL/L

 

  



  Potassium   3.6   3.5 - 5.1 MMOL/L

 

  



  Chloride   104   98 - 110 MMOL/L

 

  



  Glucose   102 (H)   70 - 100 MG/DL

 

  



  Blood Urea Nitrogen   7   7 - 25 MG/DL

 

  



  Creatinine   0.79   0.4 - 1.24 MG/DL

 

  



  Calcium   9.1   8.5 - 10.6 MG/DL

 

  



  Total Protein   6.6   6.0 - 8.0 G/DL

 

  



  Total Bilirubin   0.3   0.3 - 1.2 MG/DL

 

  



  Albumin   3.0 (L)   3.5 - 5.0 G/DL

 

  



  Alk Phosphatase   95   25 - 110 U/L

 

  



  AST (SGOT)   52 (H)   7 - 40 U/L

 

  



  CO2   26   21 - 30 MMOL/L

 

  



  ALT (SGPT)   44   7 - 56 U/L

 

  



  Anion Gap   6   3 - 12

 

  



  eGFR Non African American   >60   >60 mL/min



   Comment: 



   The eGFR is not validated for use in drug dosing 



   adjustments.    Continue to use 



   estimated creatinine clearance per dosing 



   reference text.    Please contact the 



   Clinical Pharmacist for questions. 

 

  



  eGFR    >60   >60 mL/min



   Comment: 



   The eGFR is not validated for use in drug dosing 



   adjustments.    Continue to use 



   estimated creatinine clearance per dosing 



   reference text.    Please contact the 



   Clinical Pharmacist for questions. 









 



  Specimen   Performing Laboratory

 

 



  Blood    MAIN LAB



   [address] [place] [address]





* CBC AND DIFF (11/10/2017  5:42 AM)





  



  Component   Value   Ref Range

 

  



  White Blood Cells   8.8   4.5 - 11.0 K/UL

 

  



  RBC   3.50 (L)   4.4 - 5.5 M/UL

 

  



  Hemoglobin   8.7 (L)   13.5 - 16.5 GM/DL

 

  



  Hematocrit   27.8 (L)   40 - 50 %

 

  



  MCV   79.6 (L)   80 - 100 FL

 

  



  MCH   24.8 (L)   26 - 34 PG

 

  



  MCHC   31.2 (L)   32.0 - 36.0 G/DL

 

  



  RDW   17.7 (H)   11 - 15 %

 

  



  Platelet Count   292   150 - 400 K/UL

 

  



  MPV   9.1   7 - 11 FL

 

  



  Neutrophils   79 (H)   41 - 77 %

 

  



  Lymphocytes   9 (L)   24 - 44 %

 

  



  Monocytes   8   4 - 12 %

 

  



  Eosinophils   4   0 - 5 %

 

  



  Basophils   0   0 - 2 %

 

  



  Absolute Neutrophil Count   7.00   1.8 - 7.0 K/UL

 

  



  Absolute Lymph Count   0.80 (L)   1.0 - 4.8 K/UL

 

  



  Absolute Monocyte Count   0.70   0 - 0.80 K/UL

 

  



  Absolute Eosinophil Count   0.40   0 - 0.45 K/UL

 

  



  Absolute Basophil Count   0.00   0 - 0.20 K/UL









 



  Specimen   Performing Laboratory

 

 



  Blood    MAIN LAB



   [address] [place] [address]





* RVP VIRAL PANEL PCR (2017  4:10 PM)





  



  Component   Value   Ref Range

 

  



  Specimen Source   NASAL WASH 

 

  



  Adenovirus   NOT DETECTED 

 

  



  Coronavirus 229E   NOT DETECTED 

 

  



  Coronavirus HKU1   NOT DETECTED 

 

  



  Coronavirus NL63   NOT DETECTED 

 

  



  Coronavirus OC43   NOT DETECTED 

 

  



  Human Metapneumovirus   NOT DETECTED 

 

  



  Human   NOT DETECTED 



  Rhinovirus/ENTEROVIRUS  

 

  



  Influenza A H1N1 2009   NOT DETECTED 

 

  



  Influenza A H1   NOT DETECTED 

 

  



  Influenza A H3   NOT DETECTED 

 

  



  Influenza B   NOT DETECTED 

 

  



  Parainfluenza 1   NOT DETECTED 

 

  



  Parainfluenza 2   NOT DETECTED 

 

  



  Parainfluenza 3   NOT DETECTED 

 

  



  Parainfluenza 4   NOT DETECTED 

 

  



  RSV   NOT DETECTED 

 

  



  Bordetella Pertussis   NOT DETECTED 

 

  



  Chlamydophila Pneumoniae   NOT DETECTED 

 

  



  Mycoplasma Pneumoniae   NOT DETECTED 









 



  Specimen   Performing Laboratory

 

 



  Nasopharyngeal Swab    MAIN LAB



   [address] [place] [address]





* STREPTOCOCCUS PNEUMO AG, URINE (2017  3:45 PM)





  



  Component   Value   Ref Range

 

  



  Battery Name   STREP PNEUMO AG, UR 

 

  



  Specimen Description   URINE 

 

  



  Special Requests   NONE 

 

  



  Antigen   NEGATIVE 

 

  



  Report Status   FINAL 



   2017 









 



  Specimen   Performing Laboratory

 

 



  Urine    MAIN LAB



   [address] [place] [address]





* LEGIONELLA ANTIGEN URINE,RAN (2017  3:45 PM)





  



  Component   Value   Ref Range

 

  



  Battery Name   LEGIONELLA URINE ANTIGEN 

 

  



  Specimen Description   URINE 

 

  



  Special Requests   NONE 

 

  



  Antigen   NEGATIVE 

 

  



  Report Status   FINAL 



   2017 









 



  Specimen   Performing Laboratory

 

 



  Urine    MAIN LAB



   [address] [place], [place] [id number]





* ECG-SCAN (2017  7:25 AM)





 Narrative

 

 



Ordered by an unspecified provider.





* PHOSPHORUS (2017  5:06 AM)





  



  Component   Value   Ref Range

 

  



  Phosphorus   2.7   2.0 - 4.0 MG/DL









 



  Specimen   Performing Laboratory

 

 



  Blood    MAIN LAB



   [address] [place], [place] [id number]





* MAGNESIUM (2017  5:06 AM)





  



  Component   Value   Ref Range

 

  



  Magnesium   1.5 (L)   1.6 - 2.6 mg/dL









 



  Specimen   Performing Laboratory

 

 



  Blood    MAIN LAB



   [address] [place], [place] [id number]





* COMPREHENSIVE METABOLIC PANEL (2017  5:06 AM)





  



  Component   Value   Ref Range

 

  



  Sodium   139   137 - 147 MMOL/L

 

  



  Potassium   3.2 (L)   3.5 - 5.1 MMOL/L

 

  



  Chloride   104   98 - 110 MMOL/L

 

  



  Glucose   104 (H)   70 - 100 MG/DL

 

  



  Blood Urea Nitrogen   9   7 - 25 MG/DL

 

  



  Creatinine   0.66   0.4 - 1.24 MG/DL

 

  



  Calcium   8.8   8.5 - 10.6 MG/DL

 

  



  Total Protein   6.5   6.0 - 8.0 G/DL

 

  



  Total Bilirubin   0.4   0.3 - 1.2 MG/DL

 

  



  Albumin   2.8 (L)   3.5 - 5.0 G/DL

 

  



  Alk Phosphatase   98   25 - 110 U/L

 

  



  AST (SGOT)   55 (H)   7 - 40 U/L

 

  



  CO2   25   21 - 30 MMOL/L

 

  



  ALT (SGPT)   40   7 - 56 U/L

 

  



  Anion Gap   10   3 - 12

 

  



  eGFR Non African American   >60   >60 mL/min



   Comment: 



   The eGFR is not validated for use in drug dosing 



   adjustments.    Continue to use 



   estimated creatinine clearance per dosing 



   reference text.    Please contact the 



   Clinical Pharmacist for questions. 

 

  



  eGFR    >60   >60 mL/min



   Comment: 



   The eGFR is not validated for use in drug dosing 



   adjustments.    Continue to use 



   estimated creatinine clearance per dosing 



   reference text.    Please contact the 



   Clinical Pharmacist for questions. 









 



  Specimen   Performing Laboratory

 

 



  Blood    MAIN LAB



   [address] [place] [address]





* CBC AND DIFF (2017  5:06 AM)





  



  Component   Value   Ref Range

 

  



  White Blood Cells   9.1   4.5 - 11.0 K/UL

 

  



  RBC   3.30 (L)   4.4 - 5.5 M/UL

 

  



  Hemoglobin   8.3 (L)   13.5 - 16.5 GM/DL

 

  



  Hematocrit   26.3 (L)   40 - 50 %

 

  



  MCV   79.5 (L)   80 - 100 FL

 

  



  MCH   25.0 (L)   26 - 34 PG

 

  



  MCHC   31.4 (L)   32.0 - 36.0 G/DL

 

  



  RDW   17.6 (H)   11 - 15 %

 

  



  Platelet Count   281   150 - 400 K/UL

 

  



  MPV   9.2   7 - 11 FL

 

  



  Neutrophils   77   41 - 77 %

 

  



  Lymphocytes   10 (L)   24 - 44 %

 

  



  Monocytes   9   4 - 12 %

 

  



  Eosinophils   4   0 - 5 %

 

  



  Basophils   0   0 - 2 %

 

  



  Absolute Neutrophil Count   7.00   1.8 - 7.0 K/UL

 

  



  Absolute Lymph Count   0.90 (L)   1.0 - 4.8 K/UL

 

  



  Absolute Monocyte Count   0.90 (H)   0 - 0.80 K/UL

 

  



  Absolute Eosinophil Count   0.40   0 - 0.45 K/UL

 

  



  Absolute Basophil Count   0.00   0 - 0.20 K/UL









 



  Specimen   Performing Laboratory

 

 



  Blood    MAIN LAB



   [address] [place] [address]





* THYROID STIMULATING HORMONE-TSH (2017  5:06 AM)





  



  Component   Value   Ref Range

 

  



  TSH   2.991   0.35 - 5.00 MCU/ML









 



  Specimen   Performing Laboratory

 

 



  Blood    MAIN LAB



   [address] [place] [address]





* RETICULOCYTE COUNT (2017  5:06 AM)





  



  Component   Value   Ref Range

 

  



  Retic, Uncorrected   0.9   0.5 - 2.0 %

 

  



  Retic, Corrected   0.5   %

 

  



  Retic, Absolute   31.2   30 - 94 K/UL









 



  Specimen   Performing Laboratory

 

 



  Blood    MAIN LAB



   [address] [place] [address]





* IRON + BINDING CAPACITY + %SAT+ FERRITIN (2017  5:06 AM)





  



  Component   Value   Ref Range

 

  



  Iron   <10 (L)   50 - 185 MCG/DL

 

  



  Iron Binding-TIBC   256 (L)   270 - 380 MCG/DL

 

  



  Ferritin   261   30 - 300 NG/ML









 



  Specimen   Performing Laboratory

 

 



  Blood    MAIN LAB



   [address] [place] [address]





* FOLATE, SERUM (2017  5:06 AM)





  



  Component   Value   Ref Range

 

  



  Serum Folate   >23.9Comment: NOTE NEW REFERENCE RANGES   >3.9 NG/ML









 



  Specimen   Performing Laboratory

 

 



  Blood    MAIN LAB



   [address] [place] [address]





* VITAMIN B12 (2017  5:06 AM)





  



  Component   Value   Ref Range

 

  



  Vitamin B12   681   180 - 914 PG/ML









 



  Specimen   Performing Laboratory

 

 



  Blood    MAIN LAB



   [address] [place] [address]





* ABDOMEN AP ONLY (2017  9:24 PM)





 



  Specimen   Performing Laboratory

 

 



   [facility] RAD RESULTS









 Impressions

 

 



 



 



Interval removal of enteric tube.



 



Otherwise unchanged radiograph of the abdomen with an endoscopically placed 
cystogastrostomy stent in unchanged position.



 



 



 Finalized by Lorne Maldonado M.D. on 2017 8:53 AM. Dictated by Lorne Maldonado M.D. on 2017 8:46 AM.



 









 Narrative

 

 



vomiting.



 



Technique: 



 



3 AP supine views of the abdomen were obtained.



 



Comparison: 



 



Comparison is made to an examination of 2017..



 



Findings:



 



There is mild, nonobstructive distention of large and small bowel loops. There 
is retained stool primarily in the ascending and descending colon. Vascular 
calcifications of the aorta and its branches are again identified. The 
endoscopically placed cystogastrostomy stent remains in position.



 









 Procedure Note

 

 



Interface, Radiant Results - 2017  8:56 AM CST



vomiting.



Technique: 



 3 AP supine views of the abdomen were obtained.



Comparison: 



Comparison is made to an examination of 2017..



Findings:



There is mild, nonobstructive distention of large and small bowel loops. There 
is retained stool primarily in the ascending and descending colon. Vascular 
calcifications of the aorta and its branches are again identified. The 
endoscopically placed cystogastrostomy stent remains in position.



IMPRESSION





Interval removal of enteric tube.



Otherwise unchanged radiograph of the abdomen with an endoscopically placed 
cystogastrostomy stent in unchanged position.





 Finalized by Lorne Maldonado M.D. on 2017 8:53 AM. Dictated by Lorne Maldonado M.D. on 2017 8:46 AM.







* CHEST 2 VIEWS (2017  9:24 PM)





 



  Specimen   Performing Laboratory

 

 



   [facility] RAD RESULTS









 Impressions

 

 



 



 



Increased opacities in the left lower lung, likely reflecting pneumonia or 
atelectasis.



 



 



 Approved by Avelino Mayo M.D. on 2017 10:20 AM



 



By my electronic signature, I attest that I have personally reviewed the images 
for this examination and formulated the interpretations and opinions expressed 
in this report



 



 



 Finalized by Lorne Maldonado M.D. on 2017 3:09 PM. Dictated by Avelino Mayo M.D. on 2017 8:21 AM.



 









 Narrative

 

 



 



Procedure: CHEST 2 VIEWS



 



Clinical Indication:



 



77-year-old male. Fever



 



Comparison:



Chest x-ray 2017



 



Findings:



 



The heart size and pulmonary vasculature are unremarkable. Prior coronary 
artery stents are again noted. There is increased opacities primarily involving 
the left lower lung, which may be from pneumonia or atelectasis. No pleural 
effusion, pneumothorax or focal consolidative process is identified.



 









 Procedure Note

 

 



Interface, Radiant Results - 2017  3:12 PM CST





Procedure: CHEST 2 VIEWS



Clinical Indication:



 77-year-old male. Fever



Comparison:

Chest x-ray 2017



Findings:



The heart size and pulmonary vasculature are unremarkable. Prior coronary 
artery stents are again noted. There is increased opacities primarily involving 
the left lower lung, which may be from pneumonia or atelectasis. No pleural 
effusion, pneumothorax or focal consolidative process is identified.



IMPRESSION





Increased opacities in the left lower lung, likely reflecting pneumonia or 
atelectasis.





 Approved by Avelino Mayo M.D. on 2017 10:20 AM



By my electronic signature, I attest that I have personally reviewed the images 
for this examination and formulated the interpretations and opinions expressed 
in this report





 Finalized by Lorne Maldonado M.D. on 2017 3:09 PM. Dictated by Avelino Mayo M.D. on 2017 8:21 AM.







* CULTURE-BLOOD W/SENSITIVITY (2017  5:30 PM)





  



  Component   Value   Ref Range

 

  



  Battery Name   BLOOD CULTURE 

 

  



  Specimen Description   BLOOD 



   LEFT 



   ANTECUBITAL 

 

  



  Special Requests   NONE 

 

  



  Culture   NO GROWTH 5 DAYS 

 

  



  Report Status   FINAL 



   2017 









 



  Specimen   Performing Laboratory

 

 



  Blood   [facility]



   [address] [place] [address]





* LACTIC ACID(LACTATE) (2017  5:20 PM)





  



  Component   Value   Ref Range

 

  



  Lactic Acid   0.7   0.5 - 2.0 MMOL/L









 



  Specimen   Performing Laboratory

 

 



  Blood   [facility]



   [address] [place] [address]





* LIPASE (2017  5:20 PM)





  



  Component   Value   Ref Range

 

  



  Lipase   18   11 - 82 U/L









 



  Specimen   Performing Laboratory

 

 



  Blood   [facility]



   [address] [place] [address]





* PHOSPHORUS (2017  5:20 PM)





  



  Component   Value   Ref Range

 

  



  Phosphorus   2.7   2.0 - 4.0 MG/DL









 



  Specimen   Performing Laboratory

 

 



  Blood   [facility]



   [address] [place] [address]





* MAGNESIUM (2017  5:20 PM)





  



  Component   Value   Ref Range

 

  



  Magnesium   1.6   1.6 - 2.6 mg/dL









 



  Specimen   Performing Laboratory

 

 



  Blood   [facility]



   [address] [place] [address]





* PTT (APTT) (2017  5:20 PM)





  



  Component   Value   Ref Range

 

  



  APTT   28.1Comment: NOTE NEW REFERENCE RANGES   21.0 - 39.0 SEC









 



  Specimen   Performing Laboratory

 

 



  Blood   [facility]



   [address] [place] [address]





* PROTIME INR (PT) (2017  5:20 PM)





  



  Component   Value   Ref Range

 

  



  INR   1.4 (H)   0.8 - 1.2









 



  Specimen   Performing Laboratory

 

 



  Blood   [facility]



   [address] [place] [address]





* CULTURE-BLOOD W/SENSITIVITY (2017  5:15 PM)





  



  Component   Value   Ref Range

 

  



  Battery Name   BLOOD CULTURE 

 

  



  Specimen Description   BLOOD 



   RIGHT 



   FA 

 

  



  Special Requests   NONE 

 

  



  Culture   NO GROWTH 5 DAYS 

 

  



  Report Status   FINAL 



   2017 









 



  Specimen   Performing Laboratory

 

 



  Blood   [facility]



   [address]



   [place] [address]





in this encounter



Visit Diagnoses











  Diagnosis

 





  Necrotizing pancreatitis - Primary

 





  Acute pancreatitis

 





  Intractable vomiting with nausea, unspecified vomiting type

 





  Pancreatic pseudocyst

 





  Cyst and pseudocyst of pancreas

 





  Sepsis, due to unspecified organism (HCC)

 





  Infected pseudocyst of pancreas

 





  Cyst and pseudocyst of pancreas

 





  Left lower quadrant pain

 





  Abdominal pain, left lower quadrant

 





  Microcytic anemia

 





  Iron deficiency anemia, unspecified

 





  Nausea and vomiting

 





  Nausea with vomiting

 





  Fever

 





  Fever, unspecified







Admitting Diagnoses











  Diagnosis

 





  n/v

 





  Pancreatic pseudocyst

 





  Chronic pancreatitis (HCC)

 





  Fever

 





  Nausea and vomiting

 





  Pancreatic pseudocyst - Nausea and vomiting

 





  Cyst and pseudocyst of pancreas







Administered Medications







     



  Medication Order   MAR Action   Action Date   Dose   Rate   Site

 

     



  acetaminophen (TYLENOL) tablet 650 mg   Given   2017   650 mg  



  650 mg, Oral, EVERY  6 HOURS PRN,    08:59 CST   



  Starting 17 at 1651, Until 17 at 1201, Pain non-opioid: may     



  be used alone or in combination with     



  opioid analgesia, TOTAL ACETAMINOPHEN     



  DOSE NOT TO EXCEED 4GM DAILY     









    



  Given   11/10/2017   650 mg  



   03:33 CST   

 

    



  Given   11/10/2017   650 mg  



   13:16 CST   









  

 

     



  aspirin EC tablet 81 mg   Given   2017   81 mg  



  81 mg, Oral, DAILY, First dose on Thu    07:50 CST   



  17 at 0900, Until Discontinued     









    



  Given   2017   81 mg  



   09:12 CST   

 

    



  Given   2017   81 mg  



   08:00 CST   









  

 

     



  citalopram (CELEXA) tablet 20 mg   Given   11/10/2017   20 mg  



  20 mg, Oral, DAILY, First dose on Thu    08:28 CST   



  17 at 0900, Until Discontinued     









    



  Given   2017   20 mg  



   08:24 CST   

 

    



  Given   2017   20 mg  



   07:50 CST   









  

 

     



  citalopram (CELEXA) tablet 20 mg   Given   2017   20 mg  



  20 mg, Oral, DAILY, First dose on Mon    12:20 CST   



  17 at 1315, Until Discontinued     









    



  Given   2017   20 mg  



   08:00 CST   









  

 

     



  enoxaparin (LOVENOX) syringe 40 mg   Given   2017   40 mg    Abdomen:LLQ



  40 mg, Subcutaneous, DAILY, First dose    20:19 CST   



  on 17 at 2100, Until     



  Discontinued, For patients undergoing     



  surgery: Consult physician in advance --     



  enoxaparin is an anticoagulant and may     



  need to be held for 12hr prior to     



  surgery or invasive procedures. NOTE:     



  This is a HIGH ALERT Medication.     









    



  Given   2017   40 mg    Abdomen:LLQ



   20:28 CST   

 

    



  Given   2017   40 mg    Abdomen:RLQ



   22:41 CST   









  

 

     



  famotidine (PEPCID) tablet 20 mg   Given   2017   20 mg  



  20 mg, Oral, TWICE DAILY, First dose on    09:12 CST   



  17 at 2100, Until Discontinued     









    



  Given   2017   20 mg  



   20:52 CST   

 

    



  Given   2017   20 mg  



   08:00 CST   









  

 

     



  fenofibrate nanocrystallized (TRICOR)   Given   2017   145 mg  



  tablet 145 mg    20:17 CST   



  145 mg, Oral, AT BEDTIME DAILY, First     



  dose on 17 at 2100, Until     



  Discontinued     









    



  Given   2017   145 mg  



   20:27 CST   

 

    



  Given   2017   145 mg  



   20:52 CST   









  

 

     



  fentaNYL citrate PF (SUBLIMAZE)   Given   2017   12.5 mcg  



  injection 12.5 mcg    21:41 CST   



  12.5 mcg, Intravenous, EVERY  8 HOURS,     



  First dose on Thu 17 at 1445, Until     



  Discontinued     









    



  Given   11/10/2017   12.5 mcg  



   05:47 CST   

 

    



  Given   11/10/2017   12.5 mcg  



   13:54 CST   









  

 

     



  fentaNYL citrate PF (SUBLIMAZE)   Given   11/10/2017   12.5 mcg  



  injection 12.5 mcg    21:03 CST   



  12.5 mcg, Intravenous, EVERY 12 HOURS,     



  First dose on Fri 11/10/17 at 2100,     



  Until Discontinued     

 

  

 

     



  finasteride (PROSCAR) tablet 5 mg   Given   2017   5 mg  



  5 mg, Oral, DAILY, First dose on Thu    07:50 CST   



  17 at 0900, Until Discontinued,     



  NOTE:  If pregnant or wanting to become     



  pregnant, do not handle broken tablets     



  without gloves due to risk of birth     



  defects.     









    



  Given   2017   5 mg  



   09:12 CST   

 

    



  Given   2017   5 mg  



   08:00 CST   









  

 

     



  fish oil- omega 3-DHA/EPA capsule 1,000   Given   2017   1,000 mg  



  mg    09:12 CST   



  1,000 mg, Oral, TWICE DAILY, First dose     



  on 17 at 2100, Until     



  Discontinued     









    



  Given   2017   1,000 mg  



   20:52 CST   

 

    



  Given   2017   1,000 mg  



   08:00 CST   









  

 

     



  fluconazole (DIFLUCAN) tablet 400 mg   Given   2017   400 mg  



  400 mg, Oral, DAILY, First dose on Thu    09:01 CST   



  17 at 0900, Until Discontinued     









    



  Given   11/10/2017   400 mg  



   08:28 CST   

 

    



  Given   2017   400 mg  



   08:24 CST   









  

 

     



  levoFLOXacin (LEVAQUIN) tablet 750 mg   Given   2017   750 mg  



  750 mg, Oral, EVERY 24 HOURS, First dose    09:12 CST   



  on 17 at 0915, Until     



  Discontinued, NURSING: Please educate     



  patient and document: Give 1 hour before     



  or 2 hours after meals. If patient is     



  receiving tube feedings, hold tube     



  feedings 1 hour before and 2 hours after     



  dose. Do not give within 2 hours of     



  antacids, magnesium, calcium, iron,     



  zinc, or vitamins containing these     



  minerals.     









    



  Given   2017   750 mg  



   08:00 CST   









  

 

     



  lisinopril (PRINIVIL; ZESTRIL) tablet 20   Given   2017   20 mg  



  mg    07:50 CST   



  20 mg, Oral, DAILY, First dose on 17 at 0900, Until Discontinued     









    



  Given   2017   20 mg  



   09:12 CST   

 

    



  Given   2017   20 mg  



   08:00 CST   









  

 

     



  loratadine (CLARITIN) tablet 10 mg   Given   11/10/2017   10 mg  



  10 mg, Oral, EVERY MORNING, First dose    08:28 CST   



  on u 17 at 0900, Until     



  Discontinued     









    



  Given   2017   10 mg  



   08:24 CST   

 

    



  Given   2017   10 mg  



   07:50 CST   









  

 

     



  magnesium sulfate   1 g/D5W 100 mL IVPB   Given - New   2017   1 g   100 
mL/hr 



  1 g, Intravenous, 100 mL, Administer   Bag   22:51 CST   



  over 1 Hours, EVERY  1 HOUR FOR 2 DOSES,     



  2 doses, First dose on 17 at     



  2230, Last dose on 17 at 2300,     



  Each 1gm delivers 8.1 mEq Magnsium.     









    



  Given - New Bag   11/10/2017   1 g   100 mL/hr 



   00:29 CST   









  

 

     



  melatonin tablet 3 mg   Given   2017   3 mg  



  3 mg, Oral, AT BEDTIME DAILY, First dose    20:17 CST   



  on Fri 11/10/17 at 2100, Until     



  Discontinued     









    



  Given   2017   3 mg  



   20:27 CST   

 

    



  Given   2017   3 mg  



   22:40 CST   









  

 

     



  ondansetron (ZOFRAN) injection 4 mg   Given   11/10/2017   4 mg  



  4 mg, Intravenous, EVERY  6 HOURS, First    06:35 CST   



  dose on 17 at 1815, Until     



  Discontinued     









    



  Given   11/10/2017   4 mg  



   13:12 CST   

 

    



  Given   11/10/2017   4 mg  



   18:36 CST   









  

 

     



  pantoprazole DR (PROTONIX) tablet 40 mg   Given   2017   40 mg  



  40 mg, Oral, DAILY, First dose on Th    20:16 CST   



  17 at 2100, Until Discontinued, Do     



  not crush or chew tablet.     









    



  Given   2017   40 mg  



   20:27 CST   

 

    



  Given   2017   40 mg  



   20:52 CST   









  

 

     



  piperacillin/tazobactam  (ZOSYN) 3.375   Given - New   2017   3.375 g   
100 mL/hr 



  g/50 mL iso-osmotic IVPB   Bag   18:27 CST   



  3.375 g, Intravenous, at 100 mL/hr,     



  EVERY  6 HOURS, First dose on 17 at 1745, Until Discontinued     









    



  Given - New Bag   2017   3.375 g   100 mL/hr 



   23:51 CST   

 

    



  Given - New Bag   2017   3.375 g   100 mL/hr 



   05:52 CST   









  

 

     



  polyethylene glycol 3350 (MIRALAX)   Given   11/10/2017   17 g  



  packet 34 g    08:21 CST   



  34 g (2 packet), Oral, TWICE DAILY,     



  First dose on Th17 at 2100, Until     



  Discontinued, 8.5 GRAMS=0.5 PACKET 17     



  GRAMS=1 PACKET 34 GRAMS=2 PACKETS     









    



  Given   2017   17 g  



   08:25 CST   

 

    



  Given   2017   17 g  



   07:51 CST   









  

 

     



  potassium chloride IVPB 10 mEq   Given - New   2017   10 mEq   50 mL/hr 



  10 mEq, Intravenous, 50 mL, Administer   Bag   12:33 CST   



  over 60 Minutes, EVERY  1 HOUR FOR 4     



  DOSES, 4 doses, First dose on 17 at 1200, Last dose on 17 at 1500, NOTE: This is a HIGH     



  ALERT Medication.     









    



  Given - New Bag   2017   10 mEq   50 mL/hr 



   13:26 CST   









  

 

     



  potassium chloride IVPB 10 mEq   Given - New   2017   10 mEq   50 mL/hr 



  10 mEq, Intravenous, 50 mL, Administer   Bag   15:27 CST   



  over 60 Minutes, EVERY  1 HOUR FOR 2     



  DOSES, 2 doses, First dose on 17 at 1600, Last dose on 17 at 1700, NOTE: This is a HIGH     



  ALERT Medication.     









    



  Given - New Bag   2017   10 mEq   50 mL/hr 



   17:02 CST   









  

 

     



  risperiDONE (RISPERDAL) tablet 0.25 mg   Given   2017   0.25 mg  



  0.25 mg, Oral, AT BEDTIME DAILY, First    21:21 CST   



  dose on 17 at 2100, Until     



  Discontinued     

 

  

 

     



  risperiDONE (RISPERDAL) tablet 0.5 mg   Given   11/10/2017   0.5 mg  



  0.5 mg, Oral, AT BEDTIME DAILY, First    21:05 CST   



  dose on Fri 11/10/17 at 2100, Until     



  Discontinued     









    



  Given   2017   0.5 mg  



   20:17 CST   









  

 

     



  scopolamine (TRANSDERM-SCOP) patch 1   Patch   2017   1 patch    Ear, 
Behind



  patch   Applied   17:47 CST   



  1 patch, Transdermal, Administer over 72     



  Hours, EVERY 72 HOURS, First dose on 17 at 1730, Until Discontinued     









    



  Patch Applied   2017   1 patch    Ear, Behind



   18:42 CST   









  

 

     



  senna/docusate (SENOKOT-S) tablet 1   Given   2017   1 tablet  



  tablet    08:59 CST   



  1 tablet, Oral, TWICE DAILY, First dose     



  on 17 at 2100, Until     



  Discontinued, Hold for loose stools     

 

  

 

     



  sodium chloride 0.9 %   infusion   Given - New   2017    100 mL/hr 



  1,000 mL, Intravenous, at 100 mL/hr,   Bag   18:05 CST   



  CONTINUOUS, Starting 17 at     



  1700, Until 17 at 2214     









    



  Given - New Bag   2017    100 mL/hr 



   05:04 CST   

 

    



  Given - New Bag   2017    100 mL/hr 



   15:27 CST   









  

 

     



  tamsulosin (FLOMAX) capsule 0.4 mg   Given   2017   0.4 mg  



  0.4 mg, Oral, AT BEDTIME DAILY, First    20:17 CST   



  dose on 17 at 2100, Until     



  Discontinued, NURSING: Please educate     



  patient and document: Give 1/2 hour     



  following same meal everyday. Do not     



  crush, chew or open the capsule.     









    



  Given   2017   0.4 mg  



   20:27 CST   

 

    



  Given   2017   0.4 mg  



   20:52 CST   









  

 

     



  vitamins, B complex tablet 1 tablet   Given   2017   1 tablet  



  1 tablet, Oral, DAILY, First dose on Thu    07:50 CST   



  17 at 0900, Until Discontinued     









    



  Given   2017   1 tablet  



   09:12 CST   

 

    



  Given   2017   1 tablet  



   08:00 CST   









  

 

     



  zolpidem (AMBIEN) tablet 5 mg   Given   2017   5 mg  



  5 mg, Oral, AT BEDTIME PRN, Starting Wed    23:51 CST   



  17 at 1652, Until Fri 11/10/17 at     



  1110, Insomnia     

 

  



in this encounter

## 2018-02-05 NOTE — XMS REPORT
Encounter Summary

 Created on: 2018



Moshe Lopez

External Reference #: [id number]

: 1940

Sex: Male



Demographics







 Address  [address] [place]  [address]

 

 Home Phone  [phone]

 

 Preferred Language  English

 

 Marital Status  Unknown

 

 Taoism Affiliation  NON

 

 Race  White

 

 Ethnic Group  Not  or 





Author







 Author  [facility]

 

 Organization  [facility]

 

 Address  Unknown

 

 Phone  Unavailable







Support







 Name  Relationship  Address  Phone

 

 Latisha Lopez  Unknown  [phone]







Care Team Providers







 Care Team Member Name  Role  Phone

 

 Velez Jaelyn   PCP  [phone]

 

 Jaelyn Velez DO  Unavailable  [phone]







Reason for Visit

* Auth/Cert





     



  Status   Reason   Specialty   Diagnoses /   Referred By   Referred To



     Procedures   Contact   Contact

 

     



     Diagnoses

  



     

  



     

  



     

  



     n/v  



     Pancreatic  



     pseudocyst  



     Chronic  



     pancreatitis  



     (HCC)  



     Fever  



     Nausea and  



     vomiting  











Encounter Details







    



  Date   Type   Department   Care Team   Description

 

    



  2017   [facility]   Fan Tidwell MD 



   Encounter   [address] [place] [address]   [address] 



     [place] [address] 



     [phone] (Fax) 







Social History







    



  Tobacco Use   Types   Packs/Day   Years Used   Date

 

    



  Former Smoker   Cigarettes, Pipe, Cigars   1   15   Quit: 1975

 

    



  Smokeless Tobacco: Never   



  Used   









   



  Alcohol Use   Drinks/Week   oz/Week   Comments

 

   



  Yes   1 Cans of   0.6   1 beer every 2 weeks



   beer  









 



  Sex Assigned at Birth   Date Recorded

 

 



  Not on file 



as of this encounter



Functional Status







  



  Functional Status   Response   Date of Assessment

 

  



  Does the patient have a hearing impairment:   No   2017

 

  



  Does the patient have a visual impairment:   Yes   10/12/2017

 

  



  Does the patient have impaired ambulation:   Yes   10/12/2017

 

  



  Does the patient have an activity of daily living   No   10/12/2017



  (ADL) impairment:  

 

  



  Does the patient have an instrumental activity of   No   10/12/2017



  daily living (IADL) impairment:  









  



  Cognitive Status   Response   Date of Assessment

 

  



  Does the patient have a cognitive impairment:   No   10/12/2017



as of this encounter



Medications at Time of Discharge







     



  Medication   Sig.   Disp.   Refills   Start Date   End Date

 

     



  aspirin EC 81 mg tablet   Take 81 mg by mouth    



   daily.    

 

     



  citalopram (CELEXA) 20 mg   Take 20 mg by mouth    



  tablet   daily.    

 

     



  docusate (COLACE) 100 mg   Take 1 capsule by mouth   180 capsule   3   2017 



  capsule   twice daily.    

 

     



  famotidine (PEPCID) 20 mg   Take 20 mg by mouth twice    



  tablet   daily.    

 

     



  fenofibrate(+) (TRIGLIDE)   Take 160 mg by mouth    



  160 mg tablet   daily. Take with food.    

 

     



  finasteride (PROSCAR) 5   Take 5 mg by mouth daily.    



  mg tablet     

 

     



  fish oil- omega 3-DHA/EPA   Take 1 Cap by mouth twice    



  300/1,000 mg capsule   daily.    

 

     



  lisinopril (PRINIVIL;   Take 20 mg by mouth    



  ZESTRIL) 20 mg tablet   daily.    

 

     



  loratadine (CLARITIN) 10   Take 10 mg by mouth every    



  mg tablet   morning.    

 

     



  melatonin 3 mg tab   Take 1 tablet by mouth at     2017 



   bedtime as needed    



   (insomnia).    

 

     



  MULTIVITAMINS WITH   Take 1 Tab by mouth    



  FLUORIDE (MULTI-VITAMIN   daily.    



  PO)     

 

     



  polyethylene glycol 3350   Take 1 packet by mouth   12 each   5   2017 



  (MIRALAX) 17 g packet   daily.    

 

     



  simethicone (MYLICON) 80   Chew 1 tablet by mouth   30 tablet   0   2017 



  mg chew tablet   every 6 hours as needed    



   for Flatulence.    

 

     



  tamsulosin (FLOMAX) 0.4   Take 0.4 mg by mouth    



  mg capsule   daily. Do not crush, chew    



   or open capsules. Take 30    



   minutes following the    



   same meal each day.    

 

     



  vitamins, B complex tab   Take 1 Tab by mouth    



   daily.    

 

     



  amoxicillin/K clavulanate   Take 1 tablet by mouth   60 tablet   2   10/25/
2017   2017



  (AUGMENTIN) 875/125 mg   every 12 hours for 75    



  tablet   days. Take with food.    

 

     



  ciprofloxacin (CIPRO) 500   Take 1 tablet by mouth   28 tablet   0   2017



  mg tablet   twice daily for 14 days.    

 

     



  clopiDOGrel (PLAVIX) 75   Take 75 mg by mouth      2017



  mg tablet   daily.    

 

     



  fluconazole (DIFLUCAN)   Take 2 tablets by mouth   28 tablet   3   10/25/2017
   2017



  200 mg tablet   daily for 14 days.    

 

     



  hydroCHLOROthiazide   Take 25 mg by mouth every      2017



  (HYDRODIURIL) 25 mg   morning.    



  tablet     

 

     



  ibuprofen (MOTRIN) 400 mg   Take 400 mg by mouth      2017



  tabletIndications: FEVER   every 6 hours as needed    



   for Pain (Alternates with    



   tylenol). Take with food.    



   Indications: FEVER    

 

     



  levoFLOXacin (LEVAQUIN)   Take 1 tablet by mouth   3 tablet   0   11/15/2017 
  2017



  750 mg tablet   every 24 hours for 3    



   days.    

 

     



  omeprazole DR(+)   Take 20 mg by mouth daily      2017



  (PRILOSEC) 20 mg capsule   before breakfast.    

 

     



  ondansetron (ZOFRAN ODT)   Dissolve 4 mg by mouth      2018



  4 mg rapid dissolve   every 8 hours. Place on    



  tablet   tongue to disolve.    

 

     



  oxyCODONE (ROXICODONE,   Take 1 tablet by mouth   40 tablet   0   2017



  OXY-IR) 5 mg tablet   every 4 hours as needed    



   Earliest Fill Date:    



   17    

 

     



  prochlorperazine maleate   Take 1 tablet by mouth   28 tablet   0   2017



  (COMPAZINE) 10 mg tablet   every 6 hours as needed    



   for Nausea or Vomiting    



   for up to 7 days.    

 

     



  scopolamine   Apply 1 patch to top of      2018



  (TRANSDERM-SCOP) 1.5 mg 3   skin as directed every 72    



  day patch   hours.    

 

     



  senna/docusate   Take 1 tablet by mouth     2017



  (SENOKOT-S) 8.6/50 mg   daily as needed.    



  tablet     

 

     



  senna/docusate   Take 1 tablet by mouth   15 tablet   5   2017



  (SENOKOT-S) 8.6/50 mg   twice daily.    



  tablet     

 

     



  zolpidem (AMBIEN) 5 mg   Take 5 mg by mouth at      2017



  tablet   bedtime as needed.    



as of this encounter



Plan of Treatment







    



  Date   Type   Specialty   Care Team   Description

 

    



  2018   Procedure Pass   Infectious Diseases  

 

    



  2018   Surgery    Fan Tidwell MD   ESOPHAGOGASTRODUODENOSCOP



     [address]   [address]



     [address] 



     [place] [address] [phone] (Fax) 

 

    



  2018   Procedure Pass   

 

    



  2018   Hospital    Fan Tidwell MD   Pancreatic necrosis



   Encounter    [address] 



     [address] 



     [place] [address] [phone] (Fax) 



as of this encounter



Results

* COMPREHENSIVE METABOLIC PANEL (2017 11:17 AM)





  



  Component   Value   Ref Range

 

  



  Sodium   137   137 - 147 MMOL/L

 

  



  Potassium   4.0   3.5 - 5.1 MMOL/L

 

  



  Chloride   102   98 - 110 MMOL/L

 

  



  Glucose   118 (H)   70 - 100 MG/DL

 

  



  Blood Urea Nitrogen   11   7 - 25 MG/DL

 

  



  Creatinine   0.83   0.4 - 1.24 MG/DL

 

  



  Calcium   9.3   8.5 - 10.6 MG/DL

 

  



  Total Protein   7.5   6.0 - 8.0 G/DL

 

  



  Total Bilirubin   0.4   0.3 - 1.2 MG/DL

 

  



  Albumin   3.3 (L)   3.5 - 5.0 G/DL

 

  



  Alk Phosphatase   132 (H)   25 - 110 U/L

 

  



  AST (SGOT)   55 (H)   7 - 40 U/L

 

  



  CO2   25   21 - 30 MMOL/L

 

  



  ALT (SGPT)   42   7 - 56 U/L

 

  



  Anion Gap   10   3 - 12

 

  



  eGFR Non African American   >60   >60 mL/min



   Comment: 



   The eGFR is not validated for use in drug dosing 



   adjustments.    Continue to use 



   estimated creatinine clearance per dosing 



   reference text.    Please contact the 



   Clinical Pharmacist for questions. 

 

  



  eGFR    >60   >60 mL/min



   Comment: 



   The eGFR is not validated for use in drug dosing 



   adjustments.    Continue to use 



   estimated creatinine clearance per dosing 



   reference text.    Please contact the 



   Clinical Pharmacist for questions. 









 



  Specimen   Performing Laboratory

 

 



  Blood   [facility]



   [address] [place] [address]





* CBC AND DIFF (2017 11:17 AM)





  



  Component   Value   Ref Range

 

  



  White Blood Cells   12.3 (H)   4.5 - 11.0 K/UL

 

  



  RBC   3.69 (L)   4.4 - 5.5 M/UL

 

  



  Hemoglobin   9.4 (L)   13.5 - 16.5 GM/DL

 

  



  Hematocrit   29.2 (L)   40 - 50 %

 

  



  MCV   79.2 (L)   80 - 100 FL

 

  



  MCH   25.5 (L)   26 - 34 PG

 

  



  MCHC   32.2   32.0 - 36.0 G/DL

 

  



  RDW   18.2 (H)   11 - 15 %

 

  



  Platelet Count   344   150 - 400 K/UL

 

  



  MPV   9.1   7 - 11 FL

 

  



  Neutrophils   80 (H)   41 - 77 %

 

  



  Lymphocytes   10 (L)   24 - 44 %

 

  



  Monocytes   9   4 - 12 %

 

  



  Eosinophils   1   0 - 5 %

 

  



  Basophils   0   0 - 2 %

 

  



  Absolute Neutrophil Count   10.00 (H)   1.8 - 7.0 K/UL

 

  



  Absolute Lymph Count   1.20   1.0 - 4.8 K/UL

 

  



  Absolute Monocyte Count   1.10 (H)   0 - 0.80 K/UL

 

  



  Absolute Eosinophil Count   0.10   0 - 0.45 K/UL

 

  



  Absolute Basophil Count   0.00   0 - 0.20 K/UL









 



  Specimen   Performing Laboratory

 

 



  Blood   [facility]



   [address] [place] [address]





in this encounter



Visit Diagnoses











  Diagnosis

 





  Necrotizing pancreatitis

 





  Acute pancreatitis

 





  Pancreatic pseudocyst

 





  Cyst and pseudocyst of pancreas

 





  Pancreatic necrosis

 





  Other specified disease of pancreas

## 2018-02-05 NOTE — XMS REPORT
Encounter Summary

 Created on: 2018



Moshe Lopez

External Reference #: [id number]

: 1940

Sex: Male



Demographics







 Address  [address] [place]  [address]

 

 Home Phone  [phone]

 

 Preferred Language  English

 

 Marital Status  Unknown

 

 Yarsanism Affiliation  NON

 

 Race  White

 

 Ethnic Group  Not  or 





Author







 Author  [facility]

 

 Organization  [facility]

 

 Address  Unknown

 

 Phone  Unavailable







Support







 Name  Relationship  Address  Phone

 

 Latisha Lopez  Unknown  [phone]







Care Team Providers







 Care Team Member Name  Role  Phone

 

  PCP  Unavailable







Reason for Visit

* Auth/Cert





     



  Status   Reason   Specialty   Diagnoses /   Referred By   Referred To



     Procedures   Contact   Contact

 

     



     Diagnoses  



     Pancreatic  



     necrosis  



     UNKNOWN

  



     P  



     rocedures  



     ESOPHAGOGASTRODU  



     ODENOSCOPY  











Encounter Details







    



  Date   Type   Department   Care Team   Description

 

    



  2018   Hospital   [facility]   Fan Tidwell MD   Pancreatic 
necrosis



   Encounter   Endoscopy   [address] 



    [address]   [address] 



    [place] [address]   [place] [address] [phone] (Fax) 







Social History







    



  Tobacco Use   Types   Packs/Day   Years Used   Date

 

    



  Former Smoker   Cigarettes, Pipe, Cigars   1   15   Quit: 1975

 

    



  Smokeless Tobacco: Never   



  Used   









   



  Alcohol Use   Drinks/Week   oz/Week   Comments

 

   



  Yes   1 Cans of   0.6   1 beer every 2 weeks



   beer  









 



  Sex Assigned at Birth   Date Recorded

 

 



  Not on file 



as of this encounter



Functional Status







  



  Functional Status   Response   Date of Assessment

 

  



  Does the patient have a hearing impairment:   No   2017

 

  



  Does the patient have a visual impairment:   Yes   2017

 

  



  Does the patient have impaired ambulation:   No   2017

 

  



  Does the patient have an activity of daily living   No   2017



  (ADL) impairment:  

 

  



  Does the patient have an instrumental activity of   No   2017



  daily living (IADL) impairment:  









  



  Cognitive Status   Response   Date of Assessment

 

  



  Does the patient have a cognitive impairment:   No   2017



as of this encounter



Plan of Treatment







    



  Date   Type   Specialty   Care Team   Description

 

    



  2018   Procedure Pass   Infectious Diseases  

 

    



  2018   Surgery    Fan Tidwell MD   ESOPHAGOGASTRODUODENOSCOP



     [address]   [address]



     [address] 



     [place] [address] 



     [phone] (Fax) 

 

    



  2018   Procedure Pass   

 

    



  2018   [facility]    Fan Tidwell MD   Pancreatic necrosis



   Encounter    [address] [facility] 



     [address] 



     [place] [address] 



     [phone] (Fax) 



as of this encounter



Visit Diagnoses











  Diagnosis

 





  Pancreatic necrosis

 





  Other specified disease of pancreas







Admitting Diagnoses











  Diagnosis

 





  Pancreatic necrosis - UNKNOWN

 





  Other specified disease of pancreas

## 2018-02-05 NOTE — XMS REPORT
Encounter Summary

 Created on: 2018



Moshe Lopez

External Reference #: [id number]

: 1940

Sex: Male



Demographics







 Address  [address] [place]  [address]

 

 Home Phone  [phone]

 

 Preferred Language  English

 

 Marital Status  Unknown

 

 Protestant Affiliation  NON

 

 Race  White

 

 Ethnic Group  Not  or 





Author







 Author  [facility]

 

 Organization  [facility]

 

 Address  Unknown

 

 Phone  Unavailable







Support







 Name  Relationship  Address  Phone

 

 Latisha Lopez  ECON  Unknown  [phone]







Care Team Providers







 Care Team Member Name  Role  Phone

 

 Jaelyn Velez DO  PCP  [phone]

 

 Jaelyn Velez DO  Unavailable  [phone]







Reason for Visit

* Auth/Cert





     



  Status   Reason   Specialty   Diagnoses /   Referred By   Referred To



     Procedures   Contact   Contact

 

     



     Diagnoses  



     Pancreatic  



     pseudocyst  



     Pancreatic  



     pseudocyst  



     [K86.3]

  



     P  



     rocedures  



     CHOLANGIOPANCREA  



     TOGRAPHY  



     ENDOSCOPY  



     RETROGRADE  











Encounter Details







    



  Date   Type   Department   Care Team   Description

 

    



  2018   Anesthesia   [facility]   Tom Javed MD 



    Endoscopy   [address] 



    [address] 



    [place] [address]   [place] [address] 



    [phone] (Fax) 







Anesthesia Record







   



  Procedure Name   Responsible   Anesthesia Start Time   Anesthesia Stop Time



   Anesthesiologist  

 

   



  CHOLANGIOPANCREATOGRAPHY   Tom Javed MD   18 1036   18 
1124



  ENDOSCOPY RETROGRADE (N/A   



  )   









   



  Date   Time   Event   Comment

 

   



  1029   AN Equip Check 



     

 

   



   1036   Anes Start 

 

   



   1036   An Start Data 

 

   



   1036   Start 



    Supplemental O2 

 

   



   1037   Anesthesia 



    Ready 

 

   



   1037   Quick Note   MAC per MD Javed.  No abx per surgeon request.

 

   



   1048   Proc Start 

 

   



   1057   An Surgeon on 



    Cuff 

 

   



   1124   an stop data 

 

   



   1124   Handoff to RN   I completed my SBAR handoff to the receiving nurse.

 

   



   1124   An Stop 













  Meds









 



  Name   Total

 

 



  propofol (DIPRIVAN) 200 mg/ 20 mL   270 mg



  injection (VIAL) 

 

 



  propofol (DIPRIVAN) infusion 200 mg   202.21 mg

 

 



  glucagon injection   0.5 mg

 

 



  lactated ringers infusion (1000 mL bag)   600 mL





* 





 Name

 

 O2 

 

 N2O Inspired

 

 N2O





* 





 No blood administrations on file.











   



  Type   Details   Placement   Removal

 

   



  Wounds   17; Left, Lower; Abdomen; Puncture   17 0000 by Gali 



  (NOT for   Wound; old drain site   ELOPOLDO Rm 



  Pressure   



  Injuries)   

 

   



  Wounds   17; Right, Lower; Abdomen;   17 0000 by Gali 



  (NOT for   Puncture Wound; old drain site   LEOPOLDO Rm 



  Pressure   



  Injuries)   

 

   



  CVC   17; 08; IR; Dr Howard; Correct   17 08 by Kingsley 



  Tunneled   Patient, Correct Procedure, Correct   VomhoLEOPOLDO arenas 



  Non-Cuffed   Patient Position, Correct Equipment /  



  Single   Implants Available, Marking Waived, Not  



  Lumen   Side Specific; Hand Hygiene, Cap , Mask,  



   Eye Protection, Sterile Gown, Sterile  



   Gloves, Full Body Sterile Drape;  



   Chlorhexadine (CHG); Internal Jugular,  



   Right; 4 FR (21 cm); Sutured,  



   Chlorhexadine (CHG) impregnated sponge,  



   Sterile occlusive dressing; X-ray  

 

   



  Peripheral   18; 833; R; Forearm; 20 G   18 08 by Maurice Quijano RN 



in this encounter



Social History







    



  Tobacco Use   Types   Packs/Day   Years Used   Date

 

    



  Former Smoker   Cigarettes, Pipe, Cigars   1   15   Quit: 1975

 

    



  Smokeless Tobacco: Never   



  Used   









   



  Alcohol Use   Drinks/Week   oz/Week   Comments

 

   



  Yes   1 Cans of   0.6   1 beer every 2 weeks



   beer  









 



  Sex Assigned at Birth   Date Recorded

 

 



  Not on file 



as of this encounter



Functional Status







  



  Functional Status   Response   Date of Assessment

 

  



  Does the patient have a hearing impairment:   No   2017

 

  



  Does the patient have a visual impairment:   Yes   2017

 

  



  Does the patient have impaired ambulation:   No   2017

 

  



  Does the patient have an activity of daily living   No   2017



  (ADL) impairment:  

 

  



  Does the patient have an instrumental activity of   No   2017



  daily living (IADL) impairment:  









  



  Cognitive Status   Response   Date of Assessment

 

  



  Does the patient have a cognitive impairment:   No   2017



as of this encounter



OR Notes

* Anesthesia Postprocedure Evaluation - Tom Javed MD - 2018 12:
02 PM CST



Post-Anesthesia Evaluation



Name: Moshe Lopez      MRN: [id number]     : 1940     Age: 77 y.o.
     Sex: male 

__________________________________________________________________________ 



Procedure Date: 2018

Procedure: Procedure(s):

CHOLANGIOPANCREATOGRAPHY ENDOSCOPY RETROGRADE

ESOPHAGOGASTRODUODENOSCOPY ENDOSCOPIC ULTRASOUND

CHOLANGIOPANCREATOGRAPHY ENDOSCOPY RETROGRADE WITH STENT PLACEMENT  



Surgeon: Surgeon(s):

MD Fan Ramon MD



Post-Anesthesia Vitals

BP: 141/69 (1149)

Temp: 36.5 C (97.7 F) (1126)

Pulse: 56 (1149)

Respirations: 16 PER MINUTE (1149)

SpO2: 98 % (1149)

O2 Delivery: Nasal Cannula (1126)

SpO2 Pulse: 54 (1149)

Height: 167.6 cm (66") (834) 



Post Anesthesia Evaluation Note



Evaluation location: Pre/Post

Patient participation: recovered; patient participated in evaluation

Level of consciousness: alert



Pain score: 0

Pain management: adequate



Hydration: normovolemia

Temperature: 36.0C - 38.4C

Airway patency: adequate



Perioperative Events

Perioperative events:  no  

   Post-op nausea and vomiting: no PONV



Postoperative Status

Cardiovascular status: hemodynamically stable

Respiratory status: spontaneous ventilation



Perioperative Events

Perioperative Event: No

Emergency Case Activation: No 

* Anesthesia Preprocedure Evaluation - Tom Javed MD - 2018  8:32 
AM CST



Formatting of this note may be different from the original.

Anesthesia Pre-Procedure Evaluation



Name: Moshe Lopez      MRN: [id number]     : 1940     Age: 77 y.o.
     Sex: male 

__________________________________________________________________________ 



Procedure Date: 2018 

Procedure: Procedure(s):

CHOLANGIOPANCREATOGRAPHY ENDOSCOPY RETROGRADE

 

Physical Assessment

Vital Signs (last filed in past 24 hours):

  

 

Patient History

Allergies 

Allergen Reactions 

 Niacin RASH 

  Extreme itching per pt 

 Ativan [Lorazepam] AGITATION 

  Severe agitation and confusion  

 



Current Medications  

Medication Directions 

acetaminophen (TYLENOL) 325 mg tablet Take 2 tablets by mouth every 4 hours as 
needed. 

aspirin EC 81 mg tablet Take 81 mg by mouth daily.   

citalopram (CELEXA) 20 mg tablet Take 20 mg by mouth daily. 

docusate (COLACE) 100 mg capsule Take 1 capsule by mouth twice daily. 

famotidine (PEPCID) 20 mg tablet Take 20 mg by mouth twice daily. 

fenofibrate(+) (TRIGLIDE) 160 mg tablet Take 160 mg by mouth daily. Take with 
food. 

ferrous sulfate (FEOSOL, FEROSUL) 325 mg (65 mg iron) tablet Take 1 tablet by 
mouth three times daily with meals. Take on an empty stomach at least 1 hour 
before or 2 hours after food. 

finasteride (PROSCAR) 5 mg tablet Take 5 mg by mouth daily. 

fish oil- omega 3-DHA//1,000 mg capsule Take 1 Cap by mouth twice daily. 

levoFLOXacin (LEVAQUIN) 750 mg tablet Take 1 tablet by mouth daily. 

lisinopril (PRINIVIL; ZESTRIL) 20 mg tablet Take 20 mg by mouth daily. 

loratadine (CLARITIN) 10 mg tablet Take 10 mg by mouth every morning. 

melatonin 3 mg tab Take 1 tablet by mouth at bedtime as needed (insomnia). 

metroNIDAZOLE (FLAGYL) 500 mg tablet Take 1 tablet by mouth three times daily. 
Take with food. Do not drink alcohol while on metronidazole. 

MULTIVITAMINS WITH FLUORIDE (MULTI-VITAMIN PO) Take 1 Tab by mouth daily. 

omeprazole DR(+) (PRILOSEC) 20 mg capsule Take 2 capsules by mouth daily before 
breakfast. 

ondansetron (ZOFRAN ODT) 4 mg rapid dissolve tablet Dissolve 4 mg by mouth 
every 8 hours. Place on tongue to disolve.  

oxyCODONE (ROXICODONE, OXY-IR) 5 mg tablet Take 1 tablet by mouth every 4 hours 
as needed 

polyethylene glycol 3350 (MIRALAX) 17 g packet Take 1 packet by mouth daily. 

scopolamine (TRANSDERM-SCOP) 1.5 mg 3 day patch Apply 1 patch to top of skin as 
directed every 72 hours. 

senna/docusate (SENOKOT-S) 8.6/50 mg tablet Take 1 tablet by mouth daily as 
needed. 

simethicone (MYLICON) 80 mg chew tablet Chew 1 tablet by mouth every 6 hours as 
needed for Flatulence. 

tamsulosin (FLOMAX) 0.4 mg capsule Take 0.4 mg by mouth daily. Do not crush, 
chew or open capsules. Take 30 minutes following the same meal each day. 

vitamins, B complex tab Take 1 Tab by mouth daily. 



Review of Systems/Medical History



Patient summary reviewed

Nursing notes reviewed

Pertinent labs reviewed



PONV Screening: Non-smoker

No history of anesthetic complications

No family history of anesthetic complications



Airway - negative



    Prior intubation with MAC 3, grade 2 view. 



Pulmonary 



    Shortness of breath



    Sleep apnea

        Interventions: CPAP; compliant

    Currently 1L O2



Cardiovascular 

    Recent diagnostic studies:

        echocardiogram

        17

1. Normal LV size and systolic function. Estimated LV EF of 60%.

2. Normal LV diastolic function.

3. Normal RV size and function.

4. Valves appear structurally unremarkable for age. Trace MR, mild TR, trace AI.

5. No obvious vegetation noted on valves.

6. Estimated peak PA systolic pressure of 38 mmHg.

7. No pericardial effusion.



No prior study available for comparison. 

    Exercise tolerance: <4 METS



    Hypertension, poorly controlled

    Coronary artery disease

    PTCA (9 stents. lasts )

    Hyperlipidemia

    Dyspnea on exertion

    No heart studies or consults in chart



GI/Hepatic/Renal 



    GERD, well controlled

    Ascites

    Nausea

    No vomiting

    Pseudocyst of pancreas

Post pyloric feeding tube

TPN

Ascites drained 17

FINDINGS:

One AP supine view of the upper abdomen is submitted for interpretation. 

Mild atelectasis and effusion persists in the left lung base. Interval 

advancement of the Corpak with tip overlying the expected location of the 

ligament of [place].



Neuro/Psych - negative



    Probable early dementia with difficulty remembering recent events but good 
long-term memory



Musculoskeletal - negative



Endocrine/Other 

    Diabetes, well controlled, type 2

    Anemia

    Obesity

 Physical Exam



Airway Findings

    Mallampati: II

    TM distance: >3 FB

    Neck ROM: full

    Mouth opening: good

    Airway patency: adequate



Dental Findings: 

    Poor dentition

    



Cardiovascular Findings: 

    Rhythm: regular      Rate: normal



Pulmonary Findings: 

    Breath sounds clear to auscultation.



Neurological Findings: Negative



 



Diagnostic Tests

Hematology: 

Lab Results 

Component Value Date 

 HGB 11.7 2018 

 HCT 34.9 2018 

 PLTCT 228 2018 

 WBC 7.0 2018 

 NEUT 68 2018 

 ANC 4.70 2018 

 ALC 1.50 2018 

 PABLO 8 2018 

 AMC 0.50 2018 

 EOSA 3 2018 

 ABC 0.00 2018 

 MCV 84.2 2018 

 MCH 28.2 2018 

 MCHC 33.5 2018 

 MPV 9.0 2018 

 RDW 22.4 2018 

  



General Chemistry: 

Lab Results 

Component Value Date 

  2018 

 K 4.0 2018 

  2018 

 CO2 27 2018 

 GAP 8 2018 

 BUN 18 2018 

 CR 1.02 2018 

  2018 

 CA 9.4 2018 

 ALBUMIN 4.1 2018 

 LACTIC 0.7 2017 

 OBSCA 1.18 2017 

 MG 2.0 2017 

 TOTBILI 0.2 2018 

 PO4 3.3 2017 

 

Coagulation: 

Lab Results 

Component Value Date 

 PTT 28.1 2017 

 INR 1.4 2017 



Anesthesia Plan



ASA score: 3 

Plan: MAC

Induction method: intravenous

NPO status: acceptable

Comments: (  )



Informed Consent

Anesthetic plan and risks discussed with patient and spouse.

Use of blood products discussed with patient and spouse;.



Plan discussed with: CRNA.



in this encounter



Plan of Treatment







    



  Date   Type   Specialty   Care Team   Description

 

    



  2018   Procedure Pass   Infectious Diseases  

 

    



  2018   Surgery    Fan Tidwell MD   ESOPHAGOGASTRODUODENOSCOP



     [address]   [address]



     [address] 



     [place] [address] [phone] (Fax) 

 

    



  2018   Procedure Pass   

 

    



  2018   Hospital    Fan Tidwell MD   Pancreatic necrosis



   Encounter    [address] 



     [address] 



     [place] [address] [phone] (Fax) 



as of this encounter



Visit Diagnoses

Not on filein this encounter



Administered Medications







     



  Medication Order   MAR Action   Action Date   Dose   Rate   Site

 

     



  glucagon (human recombinant) injection   Given   2018   0.5 mg  



  INTRA-PROCEDURE MED, Starting 18    10:50 CST   



  at 1050, Until 18 at 1124, Blood     



  Sugar..., Anesthesia Intra-op     

 

  

 

     



  lactated ringers infusion   Given - New   2018   



  INTRA-PROCEDURE MED(CONT), Starting Tue   Bag   10:36 CST   



  18 at 1036, Until 18 at     



  1124, Anesthesia Intra-op     

 

  

 

     



  propofol (DIPRIVAN) infusion 200 mg   Dose/Rate   2018   70   30.7 mL/hr 



  200 mg   Change   10:46 CST   mcg/kg/min  



  20 mL, Intravenous, INTRA-PROCEDURE     



  MED(CONT), Starting 18 at 1039,     



  Until 18 at 1124, Anesthesia     



  Intra-op     









    



  Dose/Rate Change   2018   100   43.8 mL/hr 



   10:55 CST   mcg/kg/min  

 

    



  Dose/Rate Change   2018   20   8.8 mL/hr 



   11:10 CST   mcg/kg/min  









  

 

     



  propofol (DIPRIVAN) injection   Given   2018   10 mg  



  INTRA-PROCEDURE MED, Starting 18    11:12 CST   



  at 1048, Until 18 at 1124,     



  Anesthesia Intra-op     









    



  Given   2018   10 mg  



   11:14 CST   

 

    



  Given   2018   20 mg  



   11:20 CST   









  



in this encounter

## 2018-02-05 NOTE — XMS REPORT
Encounter Summary

 Created on: 2018



Moshe Lopez

External Reference #: [id number]

: 1940

Sex: Male



Demographics







 Address  [address] [place] [address]  [address]

 

 Home Phone  [phone]

 

 Preferred Language  English

 

 Marital Status  Unknown

 

 Cheondoism Affiliation  NON

 

 Race  White

 

 Ethnic Group  Not  or 





Author







 Author  [facility]

 

 Organization  [facility]

 

 Address  Unknown

 

 Phone  Unavailable







Support







 Name  Relationship  Address  Phone

 

 Latisha Lopez  ECON  Unknown  [phone]







Care Team Providers







 Care Team Member Name  Role  Phone

 

 VelezJaelyn beck   PCP  [phone]

 

 Agustin Jaelyn DO  Unavailable  [phone]







Encounter Details







    



  Date   Type   Department   Care Team   Description

 

    



  2018   Prep for Case   [facility]   Fan Tidwell MD 



    Physicians - Internal   [address] [facility] 



    Medicine   [address] 



    [address] [place] [address] 



    [address] [facility] MED   [phone] 



    OFFICE [address]   [phone] (Fax) 



    [place]  



    [address] [phone]  







Social History







    



  Tobacco Use   Types   Packs/Day   Years Used   Date

 

    



  Former Smoker   Cigarettes, Pipe, Cigars   1   15   Quit: 1975

 

    



  Smokeless Tobacco: Never   



  Used   









   



  Alcohol Use   Drinks/Week   oz/Week   Comments

 

   



  Yes   1 Cans of   0.6   1 beer every 2 weeks



   beer  









 



  Sex Assigned at Birth   Date Recorded

 

 



  Not on file 



as of this encounter



Functional Status







  



  Functional Status   Response   Date of Assessment

 

  



  Does the patient have a hearing impairment:   No   2017

 

  



  Does the patient have a visual impairment:   Yes   2017

 

  



  Does the patient have impaired ambulation:   No   2017

 

  



  Does the patient have an activity of daily living   No   2017



  (ADL) impairment:  

 

  



  Does the patient have an instrumental activity of   No   2017



  daily living (IADL) impairment:  









  



  Cognitive Status   Response   Date of Assessment

 

  



  Does the patient have a cognitive impairment:   No   2017



as of this encounter



Plan of Treatment







    



  Date   Type   Specialty   Care Team   Description

 

    



  2018   Procedure Pass   Infectious Diseases  

 

    



  2018   Surgery    Fan Tidwell MD   ESOPHAGOGASTRODUODENOSCOP



     [address] [facility]   [address]



     [address] 



     [place] [address] 



     [phone] (Fax) 

 

    



  2018   Procedure Pass   

 

    



  2018   [facility]    Fan Tidwell MD   Pancreatic necrosis



   Encounter    [address] 



     [address] 



     [place] [address] 



     [phone] (Fax) 



as of this encounter



Visit Diagnoses

Not on filein this encounter

## 2018-02-05 NOTE — XMS REPORT
Encounter Summary

 Created on: 2018



Moshe Lopez

External Reference #: [id number]

: 1940

Sex: Male



Demographics







 Address  [address] [place] [address]  [address]

 

 Home Phone  [phone]

 

 Preferred Language  English

 

 Marital Status  Unknown

 

 Mormonism Affiliation  NON

 

 Race  White

 

 Ethnic Group  Not  or 





Author







 Author  [facility]

 

 Organization  [facility]

 

 Address  Unknown

 

 Phone  Unavailable







Support







 Name  Relationship  Address  Phone

 

 Latisha Lopez  ECON  Unknown  [phone]







Care Team Providers







 Care Team Member Name  Role  Phone

 

 VelezJaelyn beck   PCP  [phone]

 

 VelezJaelyn DO  Unavailable  [phone]







Reason for Visit

* 





 



  Reason   Comments

 

 



  Follow-up Phone Call 









Encounter Details







    



  Date   Type   Department   Care Team   Description

 

    



  2017   Telephone   [facility]   Fan Tidwell MD   Follow-up 
Phone Call



    Physicians - Internal   [address] [facility] 



    Medicine   [address] 



    [address] [address] 



    [address] [facility] MED   [phone] 



    OFFICE [address]   [phone] (Fax) 



    [place]  



    [address] [phone]  







Social History







    



  Tobacco Use   Types   Packs/Day   Years Used   Date

 

    



  Former Smoker   Cigarettes, Pipe, Cigars   1   15   Quit: 1975

 

    



  Smokeless Tobacco: Never   



  Used   









   



  Alcohol Use   Drinks/Week   oz/Week   Comments

 

   



  Yes   1 Cans of   0.6   1 beer every 2 weeks



   beer  









 



  Sex Assigned at Birth   Date Recorded

 

 



  Not on file 



as of this encounter



Functional Status







  



  Functional Status   Response   Date of Assessment

 

  



  Does the patient have a hearing impairment:   No   2017

 

  



  Does the patient have a visual impairment:   Yes   10/12/2017

 

  



  Does the patient have impaired ambulation:   Yes   10/12/2017

 

  



  Does the patient have an activity of daily living   No   10/12/2017



  (ADL) impairment:  

 

  



  Does the patient have an instrumental activity of   No   10/12/2017



  daily living (IADL) impairment:  









  



  Cognitive Status   Response   Date of Assessment

 

  



  Does the patient have a cognitive impairment:   No   10/12/2017



as of this encounter



Miscellaneous Notes

* Telephone Encounter - Marla Moncada RN - 2017  3:33 PM CST



Pts wife returned call and LM to call back. Returned call and schedule pt for 
appt with Dr. Tidwell on 17 at1pm. Email sent to scheduling to schedule. 

* Telephone Encounter - Marla Moncada RN - 2017  3:22 PM CST



Attempted to call patient. LM to call back. 



* Telephone Encounter - Marla Moncada RN - 2017  1:37 PM CST



----- Message from Fan Tidwell MD sent at 11/15/2017  9:27 AM CST -----

Lets seen him in clinic first

----- Message -----

   From: Marla Moncada RN

   Sent: 2017   1:10 PM

     To: Fan Tidwell MD



When do we needs to schedule his EGD for stent removal?



----- Message -----

   From: Joe Irene MD

   Sent: 2017  10:47 AM

     To: Fan Tidwell MD, LEOPOLDO Jama,

Please help with scheduling this patient to see  in clinic in a 
month. Thanks.



Joe.





in this encounter



Plan of Treatment







    



  Date   Type   Specialty   Care Team   Description

 

    



  2018   Procedure Pass   Infectious Diseases  

 

    



  2018   Surgery    Fan Tidwell MD   ESOPHAGOGASTRODUODENOSCOP



     [address]   [address]



     [address] 



     [place] [address] 



     [phone] (Fax) 

 

    



  2018   Procedure Pass   

 

    



  2018   Hospital    Fan Tidwell MD   Pancreatic necrosis



   Encounter    [address] 



     [address] 



     [place] [address] 



     [phone] (Fax) 



as of this encounter



Visit Diagnoses

Not on filein this encounter

## 2018-02-05 NOTE — XMS REPORT
Clinical Summary

 Created on: 2018



Moshe Lopez

External Reference #: [id number]

: 1940

Sex: Male



Demographics







 Address  [address] [facility]

[place]  [address]

 

 Home Phone  [phone]

 

 Preferred Language  English

 

 Marital Status  Unknown

 

 Yarsani Affiliation  NON

 

 Race  White

 

 Ethnic Group  Not  or 





Author







 Author  [facility]

 

 Organization  [facility]

 

 Address  Unknown

 

 Phone  Unavailable







Support







 Name  Relationship  Address  Phone

 

 Latisha Lopez  ECON  Unknown  [phone]







Care Team Providers







 Care Team Member Name  Role  Phone

 

 VelezJaelyn beck   PCP  [phone]

 

 Agustin Jaelyn DO  Unavailable  [phone]







Source Comments

Some departments are not documenting in the electronic medical record.  If you 
do not see the information that you expected, contact Release of Information in 
the Health Information Management department at [phone] for further 
assistance in locating additional records.[facility]



Allergies







    



  Active Allergy   Reactions   Severity   Noted Date   Comments

 

    



  Lorazepam   AGITATION   Medium   10/18/2017   Severe agitation and



      confusion

 

    



  Niacin   RASH   High   2012   Extreme itching per pt







Current Medications







      



  Prescription   Sig.   Disp.   Refills   Start   End Date   Status



      Date  

 

      



  aspirin EC 81 mg tablet   Take 81 mg by mouth       Active



   daily.     

 

      



  famotidine (PEPCID) 20 mg   Take 20 mg by mouth twice       Active



  tablet   daily.     

 

      



  fenofibrate(+) (TRIGLIDE)   Take 160 mg by mouth       Active



  160 mg tablet   daily. Take with food.     

 

      



  finasteride (PROSCAR) 5   Take 5 mg by mouth daily.       Active



  mg tablet      

 

      



  tamsulosin (FLOMAX) 0.4   Take 0.4 mg by mouth       Active



  mg capsule   daily. Do not crush, chew     



   or open capsules. Take 30     



   minutes following the     



   same meal each day.     

 

      



  fish oil- omega 3-DHA/EPA   Take 1 Cap by mouth twice       Active



  300/1,000 mg capsule   daily.     

 

      



  vitamins, B complex tab   Take 1 Tab by mouth       Active



   daily.     

 

      



  MULTIVITAMINS WITH   Take 1 Tab by mouth       Active



  FLUORIDE (MULTI-VITAMIN   daily.     



  PO)      

 

      



  loratadine (CLARITIN) 10   Take 10 mg by mouth every       Active



  mg tablet   morning.     

 

      



  simethicone (MYLICON) 80   Chew 1 tablet by mouth   30 tablet   0   20 
   Active



  mg chew tablet   every 6 hours as needed     17  



   for Flatulence.     

 

      



  docusate (COLACE) 100 mg   Take 1 capsule by mouth   180 capsule   3       Active



  capsule   twice daily.     17  

 

      



  polyethylene glycol 3350   Take 1 packet by mouth   12 each   5   20    
Active



  (MIRALAX) 17 g packet   daily.     17  

 

      



  citalopram (CELEXA) 20 mg   Take 20 mg by mouth       Active



  tablet   daily.     

 

      



  lisinopril (PRINIVIL;   Take 20 mg by mouth       Active



  ZESTRIL) 20 mg tablet   daily.     

 

      



  melatonin 3 mg tab   Take 1 tablet by mouth at     20    Active



   bedtime as needed     17  



   (insomnia).     

 

      



  acetaminophen (TYLENOL)   Take 2 tablets by mouth    0   20    Active



  325 mg tablet   every 4 hours as needed.     17  

 

      



  oxyCODONE (ROXICODONE,   Take 1 tablet by mouth   40 tablet   0   20    
Active



  OXY-IR) 5 mg tablet   every 4 hours as needed     17  

 

      



  omeprazole DR(+)   Take 2 capsules by mouth   90 capsule   3   20    
Active



  (PRILOSEC) 20 mg capsule   daily before breakfast.     17  

 

      



  levoFLOXacin (LEVAQUIN)   Take 1 tablet by mouth   14 tablet   0   20  
  Active



  750 mg tablet   daily.     18  

 

      



  metroNIDAZOLE (FLAGYL)   Take 1 tablet by mouth   42 tablet   0   20    
Active



  500 mg tablet   three times daily. Take     18  



   with food. Do not drink     



   alcohol while on     



   metronidazole.     

 

      



  ondansetron (ZOFRAN ODT)   Dissolve 4 mg by mouth      20   Discontin



  4 mg rapid dissolve   every 8 hours. Place on      18   ued



  tablet   tongue to disolve.     

 

      



  scopolamine   Apply 1 patch to top of      20   Discontin



  (TRANSDERM-SCOP) 1.5 mg 3   skin as directed every 72      18   ued



  day patch   hours.     

 

      



  senna/docusate   Take 1 tablet by mouth     20   Discontin



  (SENOKOT-S) 8.6/50 mg   daily as needed.     17   18   ued



  tablet      

 

      



  ferrous sulfate (FEOSOL,   Take 1 tablet by mouth   90 tablet   3   20   Discontin



  FEROSUL) 325 mg (65 mg   three times daily with     17   18   ued



  iron) tablet   meals. Take on an empty     



   stomach at least 1 hour     



   before or 2 hours after     



   food.     

 

      



  levoFLOXacin (LEVAQUIN)   Take 1 tablet by mouth   14 tablet   1   20   Discontin



  750 mg tablet   daily.     17   18   ued

 

      



  metroNIDAZOLE (FLAGYL)   Take 1 tablet by mouth   42 tablet   1   20   Discontin



  500 mg tablet   three times daily. Take     17   18   ued



   with food. Do not drink     



   alcohol while on     



   metronidazole.     







Active Problems







 



  Problem   Noted Date

 

 



  Pancreatic necrosis   2018

 

 



  Overview:



  Added automatically from request for surgery [id number]

 

 



  Iron deficiency anemia   2017

 

 



  Dehydration   2017

 

 



  Vomiting   2017

 

 



  History of pancreatitis   2017

 

 



  Sepsis (HCC)   2017

 

 



  Nausea and vomiting   2017

 

 



  Fever   2017

 

 



  Pancreatic pseudocyst   10/18/2017

 

 



  Fluid collection of pancreas   10/18/2017

 

 



  Overview:



  Added automatically from request for surgery [id number]

 

 



  Necrosis, pancreas aseptic   10/12/2017

 

 



  Protein malnutrition (HCC)   2017

 

 



  HCAP (healthcare-associated pneumonia)   2017

 

 



  Pancreatitis, acute   2017

 

 



  Bacteremia due to Staphylococcus aureus   2017

 

 



  Nondiabetic gastroparesis   2017

 

 



  Ileus (HCC)   2017

 

 



  Abdominal pain   2017

 

 



  Acute hypernatremia   2017

 

 



  Hx of pancreatitis   2017

 

 



  CAD (coronary artery disease)   2017

 

 



  Hypokalemia   2017

 

 



  Malnutrition (HCC)   2017

 

 



  Pseudocyst of pancreas   2017

 

 



  Overview:



  Added automatically from request for surgery [id number]

 

 



  Infective necrosis of pancreas   2017

 

 



  Overview:



  Added automatically from request for surgery [id number]

 

 



  Necrotizing pancreatitis   2017

 

 



  Overview:



  Added automatically from request for surgery [id number]

 

 



  Pancreatitis   2017

 

 



  Overview:



  Added automatically from request for surgery [id number]

 

 



  Hyperlipemia   2013

 

 



  Overview:



  Myalgias w/ Lipitor, tolerates Vytorin. HDL chronically under 30.





  Last Assessment & Plan:



  He can have a fasting lipid panel tomorrow. LDL goal is 70. He can increase



  Vytorin if needed.

 

 



  CAD (coronary artery disease)   2012

 

 



  Overview:



  6397-3823 Multiple PCIs prox & Mild LAD, Prox RCA DE & Bare metal stents



  [facility], last PCI [place] 10/18/12 Prox RCA BMetal Stent EF



  60%





  Post RCA PCI in late 2012L Diaphragm artifact EF 53%.





  2012: [facility] Normal LV size and function EF 60%





  





  L



  ast Assessment & Plan:



  He has exercise intolerance due to deconditioning and possibly the



  bradycardia from carvedilol. He had a benign stress nuclear study in



  October after his PCI and does not have sufficient symptoms to warrant



  another stress test.





  Increasing his exercise program gradually is the best route to improved



  exercise capacity and physical conditioning. I reassured him that his



  cardiovascular prognosis and findings after his  last PCI were favorable



  and that he faces greater risks from inactivity and deconditioning than



  from becoming more active.

 

 



  HTN (hypertension)   2012

 

 



  Overview:



  10/2012 Shortly after RCA Bare metal stent: BP  200 mmHg+ [place],  life



  flighted back to [place].where  myocardial perfusion imaging studyShowed Ef



  53%, th mild inferior wall hypokinesis and a borderline defect in the



  inferior wall that was felt to likely be diaphragmatic attenuation.Renal



  artery duplex:No   evidence of renal artery stenosis.





  Last Assessment & Plan:



  His blood pressure log show readings to 150 to170 range on carvedilol 25



  BID and lisinopril 20 BID. I'm starting him on hydrochlorothiazide 25.





  If blood pressure control remains suboptimal,  I plan to switch him to



  labetalol to improve blood pressure control over what we are seeing now



  with carvedilol . His heart rate in 50s may be part of his exercise



  intolerance. Labetalol with likely give him better blood pressure control



  with less bradycardia and fatigue.

 

 



  Low testosterone   2012

 

 



  Sleep apnea 

 

 



  Overview:



  Uses CPAP and has for years.





  L



  ast Assessment & Plan:



  Continuing CPAP

 

 



  Ruptured lumbar disc 

 

 



  Diverticulitis 

 

 



  Coronary artery disease 

 

 



  Overview:



  s/p 9 CABGs







Encounters







    



  Date   Type   Specialty   Care Team   Description

 

    



  2018   Outpt.   Infectious Diseases   Aby Bah MD 



   Antibiotic   



   Therapy   

 

    



  2018   Telephone   Gastroenterology   Nicole Ledezma   Appointment 
Request (EGD)

 

    



  2018   Prep for Case   Gastroenterology   Fan Tidwell MD 

 

    



  2018   Telephone   Gastroenterology   Fan Tidwell MD   Procedure

 

    



  2018   Office Visit   Infectious Diseases   Aby Bah MD   
Infective necrosis of



      pancreas (Primary Dx);



      Fluid collection of



      pancreas;



      Long-term use of



      high-risk medication

 

    



  2018   Hospital   Radiology   Fan Tidwell MD 



   Encounter   

 

    



  2018   Hospital    Fan Tidwell MD   Pancreatic pseudocyst



   Encounter   

 

    



  2018   Anesthesia    Tom Javed MD 



   Event   

 

    



  2018   Procedure Pass   

 

    



  2018   Surgery    Fan Tidwell MD   CHOLANGIOPANCREATOGRAPHY



      ENDOSCOPY RETROGRADE

 

    



  2018   Hospital   Lab   Aby Bah MD   Long term (current) use



   Encounter     of antibiotics

 

    



  2018   Telephone   Infectious Aby Barrett MD   Outpatient 
Antibiotic



      Therapy (Opat)

 

    



  2017   Telephone    Brook Velez 

 

    



  2017   Outpt.   Aby Cleaning MD 



   Antibiotic   



   Therapy   

 

    



  2017   Outpt.   Aby Cleaning MD 



   Antibiotic   



   Therapy   

 

    



  2017   Outpt.   Aby Cleaning MD 



   Antibiotic   



   Therapy   

 

    



  2017   Infusion   Infusion   Aby Bah MD   Pancreatic pseudocyst



      (Primary Dx)

 

    



  2017   Office Visit   Infectious Aby Barrett MD   
Infective necrosis of



      pancreas (Primary Dx)

 

    



  2017   Office Visit   General Surgery   Scott Colon MD   Infective 
necrosis of



      pancreas (Primary Dx);



      Hx of pancreatitis

 

    



  2017   Telephone   Gastroenterology   Nicole Ledezma   Appointment 
Request



      (ERCP)

 

    



  2017   Outpt.   Aby Cleaning MD 



   Antibiotic   



   Therapy   

 

    



  2017   Office Visit   Gastroenterology   Fan Tidwell MD   
Necrotizing pancreatitis



      (Primary Dx)

 

    



  2017   Hospital   Radiology   Akua Rinaldi MD 



   Encounter   

 

    



  2017   Prep for Case   Gastroenterology   Fan Tidwell MD 

 

    



  2017   Telephone    Ashley Maldonado RN   Follow-up Phone Call

 

    



  2017   Outpt.   Infectious Aby Barrett MD 



   Antibiotic   



   Therapy   

 

    



  2017   Telephone    Ashley Maldonado RN   Follow-up Phone Call

 

    



  2017   Outpt.   Infectious Aby Barrett MD 



   Antibiotic   



   Therapy   

 

    



  2017   Procedure Pass   Radiology  

 

    



  2017   Orders Only   General Surgery   Akua Rinaldi MD   Other 
acute pancreatitis



      with uninfected necrosis



      (Primary Dx)

 

    



  2017   Procedure Pass   

 

    



  2017   [facility]    David Stuart MD   Dehydration



  -   Encounter   



  2017   Office Visit   General Surgery   David Stuart MD   
Necrotizing pancreatitis



      (Primary Dx)

 

    



  2017   Procedure Pass   

 

    



  2017   Telephone   Gastroenterology   Fan Tidwell MD   Follow-up 
Phone Call

 

    



  11/10/2017   Procedure Pass   

 

    



  2017   Hospital    Lilly Ybarra MD   Pancreatic pseudocyst



  -   Encounter    Klarissa De La Garza MD 



  2017     Roge Milan MD [place], Peniel, MD 

 

    



  2017   Hospital   Lab   Eve Chamberlain DO   Fever presenting with



   Encounter     conditions classified



      elsewhere

 

    



  2017   Office Visit   Infectious Diseases   Eve Chamberlain DO   
Fever in other diseases



      (Primary Dx)

 

    



  2017   Hospital   Lab   Fan Tidwell MD 



   Encounter   

 

    



  2017   Office Visit   Gastroenterology   Fan Tidwell MD   Pancreatic 
necrosis



      (Primary Dx)

 

    



  2017   Telephone   Gastroenterology   Fan Tidwell MD   Vomiting



from Last 3 Months



Immunizations







  



  Name   Dates Previously Given   Next Due

 

  



  Flu Vaccine=>65 YO   2017 



  High-Dose (PF)  







Family History







   



  Medical History   Relation   Name   Comments

 

   



  Cancer   Father  

 

   



  Heart Disease   Maternal Aunt  

 

   



  Heart Disease   Maternal  



   Uncle  

 

   



  Heart Attack   Mother  

 

   



  Heart Failure   Mother  









   



  Relation   Name   Status   Comments

 

   



  Father     

 

   



  Maternal Aunt   

 

   



  Maternal Uncle   

 

   



  Mother     

 

   



  Sister    Alive 

 

   



  Sister    Alive 







Social History







    



  Tobacco Use   Types   Packs/Day   Years Used   Date

 

    



  Former Smoker   Cigarettes, Pipe, Cigars   1   15   Quit: 1975

 

    



  Smokeless Tobacco: Never   



  Used   









   



  Alcohol Use   Drinks/Week   oz/Week   Comments

 

   



  Yes   1 Cans of   0.6   1 beer every 2 weeks



   beer  









 



  Sex Assigned at Birth   Date Recorded

 

 



  Not on file 







Last Filed Vital Signs







  



  Vital Sign   Reading   Time Taken

 

  



  Blood Pressure   160/70   2018  1:34 PM CST

 

  



  Pulse   56   2018  1:34 PM CST

 

  



  Temperature   36.6   C (97.9   F)   2018  1:34 PM CST

 

  



  Respiratory Rate   14   2017  8:05 AM CST

 

  



  Oxygen Saturation   98%   2018 11:49 AM CST

 

  



  Inhaled Oxygen   -   -



  Concentration  

 

  



  Weight   76.3 kg (168 lb 3.2 oz)   2018  1:34 PM CST

 

  



  Height   167.6 cm (5' 6")   2018  1:34 PM CST

 

  



  Body Mass Index   27.15   2018  1:34 PM CST







Plan of Treatment







    



  Date   Type   Specialty   Care Team   Description

 

    



  2018   Procedure Pass   Infectious Diseases  

 

    



  2018   Surgery    Fan Tidwell MD   ESOPHAGOGASTRODUODENOSCOP



     [address]



     [address] 



     [place] [address] 



     [phone] (Fax) 

 

    



  2018   Procedure Pass   

 

    



  2018   Hospital    Fan Tidwell MD   Pancreatic necrosis



   Encounter    [address] 



     [address] 



     [place] [address] 



     [phone] (Fax) 









   



  Health Maintenance   Due Date   Last Done   Comments

 

   



  PHYSICAL (COMPREHENSIVE)   1948  



  EXAM   

 

   



  PERTUSSIS VACCINE   1952  

 

   



  TETANUS VACCINE   1958  

 

   



  SHINGLES VACCINE   2001  

 

   



  PREVNAR/PNEUMOVAX (#1)   2006  

 

   



  INFLUENZA VACCINE   Completed   2017, 2017 







Implants







      



  Implanted   Type   Area      Device   Expiration   Model /



      Identifier   Date   Serial /



        Lot

 

      



  Stent Pancreatic 15mm 24mm 10mm     [employer]    2019   [id number]



  146mm 138mm 10.8fr - Sna        /



  Implanted: Qty: 1 on 2017 by        NA /



  Fan Tidwell MD        [id number]

 

      



  Kit 70cm 4fr 18ga 1 Lumen Nitinol    Right:   CR BARD:ACCESS   [id number]      [id number] /



  Guidewire Radstic - Sn/A    Chest Wall   SYS   [id number]    N/A /



  Implanted: Qty: 1 on 2017 by        [id number]



  David Stevens MD      

 

      



  Stent Pancreatic 5fr 3cm .035in     [facility]    2022   [id number] /



  Small Pigtail Flexible - Sna        NA /



  Implanted: Qty: 1 on 2018 by        [id number]



  aFn Tidwell MD      







Procedures







    



  Procedure Name   Priority   Date/Time   Associated Diagnosis   Comments

 

    



  TELEMETRY STRIPS-SCAN    01/10/2018    Results for this



    3:25 PM CST    procedure are in the



      results section.

 

    



  CHOLANGIOPANCREATOGRAPHY    2018   Pancreatic pseudocyst 



  ENDOSCOPY RETROGRADE WITH    9:30 AM CST  



  STENT PLACEMENT    

 

  



   Special



   Needs



   3 day -



   Reminder



   Call -



   spoke with



   pt. wife,



   17 @



   4825 (cs)

 

    



  ESOPHAGOGASTRODUODENOSCOP    2018   Pancreatic pseudocyst 



  Y ENDOSCOPIC ULTRASOUND    9:30 AM CST  

 

  



   Special



   Needs



   3 day -



   Reminder



   Call -



   spoke with



   pt. wife,



   17 @



   1256 (cs)

 

    



  CHOLANGIOPANCREATOGRAPHY    2018   Pancreatic pseudocyst 



  ENDOSCOPY RETROGRADE    9:30 AM CST  

 

  



   Special



   Needs



   3 day -



   Reminder



   Call -



   spoke with



   pt. wife,



   17 @



   1256 ()

 

    



  TELEMETRY STRIPS-SCAN    2017    Results for this



    5:07 PM CST    procedure are in the



      results section.

 

    



  CONSULT IV THERAPY TEAM   Routine   2017  



    12:40 PM CST  

 

    



  CONSULT IV THERAPY TEAM   Routine   2017  



    5:28 PM CST  

 

    



  ECG-SCAN    2017    Results for this



    7:01 PM CST    procedure are in the



      results section.

 

    



  ECG-SCAN    2017    Results for this



    7:01 PM CST    procedure are in the



      results section.

 

    



  ECG-SCAN    2017    Results for this



    7:01 PM CST    procedure are in the



      results section.

 

    



  ECG-SCAN    2017    Results for this



    1:42 PM CST    procedure are in the



      results section.

 

    



  ECG-SCAN    2017    Results for this



    1:50 PM CST    procedure are in the



      results section.

 

    



  ECG-SCAN    2017    Results for this



    7:25 AM CST    procedure are in the



      results section.



from Last 3 Months



Results

* PLATELET COUNT (2018)



Only the most recent of 3 results within the time period is included.





  



  Component   Value   Ref Range

 

  



  Platelet Count   204 









 



  Specimen   Performing Laboratory

 

 



  Blood   OTHER OUTSIDE LAB





* CBC (2018)



Only the most recent of 10 results within the time period is included.





  



  Component   Value   Ref Range

 

  



  White Blood Cells   6.05 









 



  Specimen   Performing Laboratory

 

 



  Blood   OTHER OUTSIDE LAB





* HEMOGLOBIN (2018)



Only the most recent of 3 results within the time period is included.





  



  Component   Value   Ref Range

 

  



  Hemoglobin   12.4 









 



  Specimen   Performing Laboratory

 

 



  Blood   OTHER OUTSIDE LAB





* BUN (2018)



Only the most recent of 3 results within the time period is included.





  



  Component   Value   Ref Range

 

  



  Blood Urea Nitrogen   26 









 



  Specimen   Performing Laboratory

 

 



  Blood   OTHER OUTSIDE LAB





* ALT (SGPT) (2018)



Only the most recent of 3 results within the time period is included.





  



  Component   Value   Ref Range

 

  



  ALT (SGPT)   17 









 



  Specimen   Performing Laboratory

 

 



  Blood   OTHER OUTSIDE LAB





* AST (SGOT) (2018)



Only the most recent of 3 results within the time period is included.





  



  Component   Value   Ref Range

 

  



  AST (SGOT)   19 









 



  Specimen   Performing Laboratory

 

 



  Blood   OTHER OUTSIDE LAB





* POTASSIUM (2018)



Only the most recent of 3 results within the time period is included.





  



  Component   Value   Ref Range

 

  



  Potassium   4.2 









 



  Specimen   Performing Laboratory

 

 



  Blood   OTHER OUTSIDE LAB





* ALK PHOS TOTAL (2018)



Only the most recent of 3 results within the time period is included.





  



  Component   Value   Ref Range

 

  



  Alk Phosphatase   65 









 



  Specimen   Performing Laboratory

 

 



  Blood   OTHER OUTSIDE LAB





* CREATININE (2018)



Only the most recent of 3 results within the time period is included.





  



  Component   Value   Ref Range

 

  



  Creatinine   0.99 









 



  Specimen   Performing Laboratory

 

 



  Blood   OTHER OUTSIDE LAB





* TELEMETRY STRIPS-SCAN (01/10/2018  3:25 PM)





 Narrative

 

 



Ordered by an unspecified provider.





* GI ENDO CATH LORI & PANC DUCT (2018 11:00 AM)





 



  Specimen   Performing Laboratory

 

 



   [facility]









 Narrative

 

 



This order has been auto finalized and does not contain a result.





* ERCP (2018 10:28 AM)





  



  Component   Value   Ref Range

 

  



  Provation Report   Patient Name: John Mesa 



   Procedure Date: 2018 10:28 AM 



   MRN: [id number] 



   CSN: [id number] 



   Case ID: [id number] 



   YOB: 1940 



   Gender: Male 



   Attending Physician: Fan Tidwell MD 



   Procedure: 



   ERCP 



   Indications:                                    Hi 



   story of Acute necrotizing pancreatitis, 



   Walled off pancreatic necrosis s/p 



   endosocpic 



   necrosectomy and placement of AXIOS 



   stent. 



   Providers: 



   Fan Tidwell MD (Doctor), Joe Irene MD 



   (Fellow), Jinny Ji RN (Nurse), 



   Guillermina Gonzalez Technician (Technician) 



   Referring Physician:                    David Stuart 



   Medications:                                    Mo 



   nitored Anesthesia Care 



   Complications:                                No 



   immediate complications. 



   Procedure: 



   Pre-Anesthesia Assessment: 



   - Prior to the procedure, a History and 



   Physical was performed, and 



   patient medications and allergies were 



   reviewed. The patient's tolerance 



   of previous anesthesia was also reviewed. 



   The risks and benefits of the 



   procedure and the sedation options and 



   risks were discussed with the 



   patient. All questions were answered, and 



   informed consent was obtained. 



   Prior Anticoagulants: The patient has 



   taken no previous anticoagulant or 



   antiplatelet agents. ASA Grade 



   Assessment: III - A patient with severe 



   systemic disease. After reviewing the 



   risks and benefits, the patient 



   was deemed in satisfactory condition to 



   undergo the procedure. 



   After obtaining informed consent, the 



   scope was passed under direct 



   vision. Throughout the procedure, the 



   patient's blood pressure, pulse, 



   and oxygen saturations were monitored 



   continuously. The Duodenoscope 



   0911 was introduced through the mouth, 



   and advanced to the duodenum and 



   used to inject contrast into the ventral 



   pancreatic duct. The ERCP was 



   accomplished without difficulty. The 



   patient tolerated the procedure 



   well. 



   Findings: 



   The limited view of the esophagus, 



   stomach and duodenum unremarkable. 



   There was a large diverticulum seen in 



   the second portion of duodenum. 



   The ampulla was seen at the rim of this 



   diverticulum at 5 o'clock 



   position. It was facing inwards. This was 



   everted with the help of the 



   sphincterotome. The pancreatic duct was 



   easily cannulated. Injection of 



   contrast revealed a 2 mm pancreatic duct 



   in the head. There was abrupt 



   termination of the pancreatic duct in the 



   region of the genu. No 



   contrast was seen coming out of the 



   pancreatic duct and the pancreatic 



   duct in the body and tail could not be 



   opacified. This was suggestive of 



   complete pancreatic duct disconnection. A 



   guidewire was passed. We then 



   performed a 3-4 mm pancreatic 



   sphincterotomy. We then passed a 5 French 



   3 mm pancreatic duct stent with retention 



   flange (Jean stent) to 



   decrease the risk of post ERCP 



   pancreatitis. Patient was also given 100 



   mg of rectal indomethacin. 



   The linear EUS scope was then passed. The 



   celiac and was seen and there 



   were no celiac lymph nodes visualized. 



   The left adrenal in the left lobe 



   of the liver were normal-appearing. The 



   body of the pancreas was 



   visualized with normal-appearing 



   parenchyma. The pancreatic duct was 1.8 



   mm in the body. The AXIOS stent was seen. 



   No large necrotic area was 



   visualized in the region of the body. 



   There was some fluid seen around 



   the AXIOS stent but this was minimal. 



   The scope was then passed to the duodenal 



   bulb. The CBD was 7.9 mm. Some 



   inflammation was seen in the head of the 



   pancreas. At this point we 



   decided to leave the AXIOS stent and will 



   remove it in 4 weeks. 



   Impression: 



   The limited view of the esophagus, stomach and 



   duodenum unremarkable. There was a large

 



   diverticulum seen in the second portion 



   of 



   duodenum. The ampulla was seen at the 



   rim of 



   this diverticulum at 5 o'clock position. 



   It was 



   facing inwards. This was everted with 



   the help 



   of the sphincterotome. The pancreatic 



   duct was 



   easily cannulated. Injection of contrast

 



   revealed a 2 mm pancreatic duct in the 



   head. 



   There was abrupt termination of the 



   pancreatic 



   duct in the region of the genu. No 



   contrast was 



   seen coming out of the pancreatic duct 



   and the 



   pancreatic duct in the body and tail 



   could not 



   be opacified. This was suggestive of 



   complete 



   pancreatic duct disconnection. A 



   guidewire was 



   passed. We then performed a 3-4 mm 



   pancreatic 



   sphincterotomy. We then passed a 5 



   French 3 mm 



   pancreatic duct stent with retention 



   flange 



   (Jean stent) to decrease the risk of 



   post ERCP 



   pancreatitis. Patient was also given 100 



   mg of 



   rectal indomethacin. 



   The linear EUS scope was then passed. 



   The 



   celiac and was seen and there were no 



   celiac 



   lymph nodes visualized. The left adrenal 



   in the 



   left lobe of the liver were 



   normal-appearing. 



   The body of the pancreas was visualized 



   with 



   normal-appearing parenchyma. The 



   pancreatic 



   duct was 1.8 mm in the body. The AXIOS 



   stent 



   was seen. No large necrotic area was 



   visualized 



   in the region of the body. There was 



   some fluid 



   seen around the AXIOS stent but this was

 



   minimal. 



   The scope was then passed to the 



   duodenal bulb. 



   The CBD was 7.9 mm. Some inflammation 



   was seen 



   in the head of the pancreas. At this 



   point we 



   decided to leave the AXIOS stent and 



   will 



   remove it in 4 weeks. 



   Estimated Blood Loss:                 Estimated 



   blood loss: none. 



   Recommendation:                             - 



   Written discharge instructions were provided 



   to the patient. 



   - Resume previous diet. 



   - Continue present medications. 



   - EGD in 4 weeks to remove the stents 



   Scope In: 10:48:50 AM 



   Scope Out: 11:20:41 AM 



   Total Procedure Duration Time 0 hours 31 minutes 



   51 seconds 



   Procedure Code(s):                        --- 



   Professional --- 



   73355, Endoscopic retrograde 



   cholangiopancreatography (ERCP); with 



   placement 



   of endoscopic stent into biliary or 



   pancreatic 



   duct, including pre- and post-dilation 



   and 



   guide wire passage, when performed, 



   including 



   sphincterotomy, when performed, each 



   stent 



   88736, Esophagogastroduodenoscopy, 



   flexible, 



   transoral; with endoscopic ultrasound 



   examination, including the esophagus, 



   stomach, 



   and either the duodenum or a surgically 



   altered 



   stomach where the jejunum is examined 



   distal to 



   the anastomosis 



   CPT copyright 2016 [employer]. 



   All rights reserved. 



   The codes documented in this report are 



   preliminary and upon  review may 



   be revised to meet current compliance 



   requirements. 



   Attending Participation: 



   I personally performed the entire 



   procedure. 



   Fan Tidwell MD 



   2018 11:28:54 AM 



   The attending physician has electronically signed 



   and finalized this document. 



   Joe Irene MD 



   Number of Addenda: 0 



   Note Initiated On: 2018 10:28 AM 









 



  Specimen   Performing Laboratory

 

 



   [facility] OTHER RESULTS





* CBC AND DIFF (2018  3:10 PM)



Only the most recent of 10 results within the time period is included.





  



  Component   Value   Ref Range

 

  



  White Blood Cells   7.0   4.5 - 11.0 K/UL

 

  



  RBC   4.15 (L)   4.4 - 5.5 M/UL

 

  



  Hemoglobin   11.7 (L)   13.5 - 16.5 GM/DL

 

  



  Hematocrit   34.9 (L)   40 - 50 %

 

  



  MCV   84.2   80 - 100 FL

 

  



  MCH   28.2   26 - 34 PG

 

  



  MCHC   33.5   32.0 - 36.0 G/DL

 

  



  RDW   22.4 (H)   11 - 15 %

 

  



  Platelet Count   228   150 - 400 K/UL

 

  



  MPV   9.0   7 - 11 FL

 

  



  Neutrophils   68   41 - 77 %

 

  



  Lymphocytes   21 (L)   24 - 44 %

 

  



  Monocytes   8   4 - 12 %

 

  



  Eosinophils   3   0 - 5 %

 

  



  Basophils   0   0 - 2 %

 

  



  Absolute Neutrophil Count   4.70   1.8 - 7.0 K/UL

 

  



  Absolute Lymph Count   1.50   1.0 - 4.8 K/UL

 

  



  Absolute Monocyte Count   0.50   0 - 0.80 K/UL

 

  



  Absolute Eosinophil Count   0.20   0 - 0.45 K/UL

 

  



  Absolute Basophil Count   0.00   0 - 0.20 K/UL









 



  Specimen   Performing Laboratory

 

 



  Blood   [facility]



   [address] [place] [address]





* COMPREHENSIVE METABOLIC PANEL (2018  3:10 PM)



Only the most recent of 13 results within the time period is included.





  



  Component   Value   Ref Range

 

  



  Sodium   141   137 - 147 MMOL/L

 

  



  Potassium   4.0   3.5 - 5.1 MMOL/L

 

  



  Chloride   106   98 - 110 MMOL/L

 

  



  Glucose   126 (H)   70 - 100 MG/DL

 

  



  Blood Urea Nitrogen   18   7 - 25 MG/DL

 

  



  Creatinine   1.02   0.4 - 1.24 MG/DL

 

  



  Calcium   9.4   8.5 - 10.6 MG/DL

 

  



  Total Protein   6.9   6.0 - 8.0 G/DL

 

  



  Total Bilirubin   0.2 (L)   0.3 - 1.2 MG/DL

 

  



  Albumin   4.1   3.5 - 5.0 G/DL

 

  



  Alk Phosphatase   56   25 - 110 U/L

 

  



  AST (SGOT)   31   7 - 40 U/L

 

  



  CO2   27   21 - 30 MMOL/L

 

  



  ALT (SGPT)   22   7 - 56 U/L

 

  



  Anion Gap   8   3 - 12

 

  



  eGFR Non African American   >60   >60 mL/min



   Comment: 



   The eGFR is not validated for use in drug dosing 



   adjustments.    Continue to use 



   estimated creatinine clearance per dosing 



   reference text.    Please contact the 



   Clinical Pharmacist for questions. 

 

  



  eGFR    >60   >60 mL/min



   Comment: 



   The eGFR is not validated for use in drug dosing 



   adjustments.    Continue to use 



   estimated creatinine clearance per dosing 



   reference text.    Please contact the 



   Clinical Pharmacist for questions. 









 



  Specimen   Performing Laboratory

 

 



  Blood   [facility]



   [address] [place] [address]





* EKG TRACING INTERP AND READ (2017)





 



  Specimen   Performing Laboratory

 

 



   IN CLINIC





* CT ABD/PELV W CONTRAST (2017  7:59 AM)



Only the most recent of 2 results within the time period is included.





 



  Specimen   Performing Laboratory

 

 



   [facility] RAD RESULTS









 Impressions

 

 



 



1. Further slight improvement in complex fluid collections related to prior 
pancreatitis. No new fluid collections are seen.



2. Continued narrowing at the portal confluence and mild ascites.



3. Continued mild biliary ductal dilatation. Renal thickening of the gastric 
antrum is evident likely related to pancreatitis versus associated gastritis.



 



 



 Finalized by Dameon Yates M.D. on 2017 8:52 AM. Dictated by Dameon Yates M.D. on 2017 8:38 AM.



 









 Narrative

 

 



CT ABDOMEN AND PELVIS



 



Clinical Indication:Male, 77 years old. Pancreatitis. 



 



Technique:Multiple contiguous axial images were obtained through the 
abdomen and pelvis following the uneventful administration of IV contrast 
material. Portal venous phase of postcontrast imaging was obtained. Post 
processing coronal and sagittal reconstruction images were made from the axial 
images.



 



IV contrast: 100 mL Isovue 370.



Bowel contrast:Water



 



Comparison: 2017



 



FINDINGS:



 



Lower Thorax: Linear scarring is noted within the left lung base. Calcification 
the coronary vessels is evident.



 



Liver and Biliary system: Small amount persistent perihepatic fluid is evident. 
There is mild prominence of the intra and extrahepatic biliary ducts which is 
minimally improved when compared to prior exam. Portal vein opacifies normally. 
The superior mesenteric vein is markedly narrowed secondary to pancreatitis. 
Gallbladder is unchanged. 



 



Spleen: Unremarkable.



 



Adrenal Glands and Kidneys: Adrenal glands are normal. Kidneys are unremarkable 
with exception of a small punctate nonobstructive lower pole left renal 
calculus. Small renal hypodensities are too small to characterize and unchanged.



 



Pancreas and Retroperitoneum: Cystogastrostomy tube is in place without change 
in position. Peripancreatic fluid collections straight further improvement. 
Right thick-walled retroperitoneal fluid measured previously now measures 5.3 x 
2.3 cm compared to 5.6 x 2.4 cm. Adjacent extension into the inferior right 
retroperitoneum adjacent to the right psoas is also improved in size measuring 
2.8 compared to 3.4 cm image 46 series 3. No new pancreatic fluid collections 
are seen. There is mild ectasia and prominence of the pancreatic duct in the 
pancreatic tail.



 



Aorta and Major Vessels: Calcification of the abdominal aorta with small distal 
dissection is unchanged narrowing at the confluence of the portal veins. 
Mesenteric vein persists unchanged from prior exam. 



 



Bowel, Mesentery and Peritoneal space: There is mural thickening of the gastric 
antrum likely reflecting sequela of pancreatitis and/or associated gastritis. 
Small bowel is normal in caliber. Small volume abdominal ascites is evident. 
Prior colonic anastomosis is noted with minimal colonic diverticulosis but no 
diverticulitis.



 



Pelvis: Prostate is enlarged. No pelvic adenopathy is seen.



 



Abdominal wall and Osseous Structures: Unchanged from prior exam. L2 
compression fracture and sclerotic appearance to L1 is unchanged.



 









 Procedure Note

 

 



Interface, Radiant Results - 2017  8:55 AM CST



CT ABDOMEN AND PELVIS



Clinical Indication:  Male, 77 years old. Pancreatitis.   



Technique:  Multiple contiguous axial images were obtained through the abdomen 
and pelvis following the uneventful administration of IV contrast material. 
Portal venous phase of postcontrast imaging was obtained. Post processing 
coronal and sagittal reconstruction images were made from the axial images.



IV contrast: 100 mL Isovue 370.

Bowel contrast:  Water



Comparison: 2017



FINDINGS:



Lower Thorax: Linear scarring is noted within the left lung base. Calcification 
the coronary vessels is evident.



Liver and Biliary system: Small amount persistent perihepatic fluid is evident. 
There is mild prominence of the intra and extrahepatic biliary ducts which is 
minimally improved when compared to prior exam. Portal vein opacifies normally. 
The superior mesenteric vein is markedly narrowed secondary to pancreatitis. 
Gallbladder is unchanged. 



Spleen: Unremarkable.



Adrenal Glands and Kidneys: Adrenal glands are normal. Kidneys are unremarkable 
with exception of a small punctate nonobstructive lower pole left renal 
calculus. Small renal hypodensities are too small to characterize and unchanged.



Pancreas and Retroperitoneum: Cystogastrostomy tube is in place without change 
in position. Peripancreatic fluid collections straight further improvement. 
Right thick-walled retroperitoneal fluid measured previously now measures 5.3 x 
2.3 cm compared to 5.6 x 2.4 cm. Adjacent extension into the inferior right 
retroperitoneum adjacent to the right psoas is also improved in size measuring 
2.8 compared to 3.4 cm image 46 series 3. No new pancreatic fluid collections 
are seen. There is mild ectasia and prominence of the pancreatic duct in the 
pancreatic tail.



Aorta and Major Vessels: Calcification of the abdominal aorta with small distal 
dissection is unchanged narrowing at the confluence of the portal veins. 
Mesenteric vein persists unchanged from prior exam. 



Bowel, Mesentery and Peritoneal space: There is mural thickening of the gastric 
antrum likely reflecting sequela of pancreatitis and/or associated gastritis. 
Small bowel is normal in caliber. Small volume abdominal ascites is evident. 
Prior colonic anastomosis is noted with minimal colonic diverticulosis but no 
diverticulitis.



Pelvis: Prostate is enlarged. No pelvic adenopathy is seen.



Abdominal wall and Osseous Structures: Unchanged from prior exam. L2 
compression fracture and sclerotic appearance to L1 is unchanged.



IMPRESSION



 1. Further slight improvement in complex fluid collections related to prior 
pancreatitis. No new fluid collections are seen.  

 2. Continued narrowing at the portal confluence and mild ascites.

 3. Continued mild biliary ductal dilatation. Renal thickening of the gastric 
antrum is evident likely related to pancreatitis versus associated gastritis.





 Finalized by Dameon Yates M.D. on 2017 8:52 AM. Dictated by Dameon Yates M.D. on 2017 8:38 AM.







* TELEMETRY STRIPS-SCAN (2017  5:07 PM)





 Narrative

 

 



Ordered by an unspecified provider.





* IR CENTRAL VENOUS CATHETER (2017  8:10 AM)





 



  Specimen   Performing Laboratory

 

 



   [facility] RESULTS









 Impressions

 

 



 



Successfulimage-guided placement of a non-cuffed tunneled central venous 
catheter as described above.



 



 



 



 



 Approved by Kali Howard M.D. on 2017 9:00 AM



 



By my electronic signature, I attest that I have personally reviewed the images 
for this examination and formulated the interpretations and opinions expressed 
in this report



 



 



 Finalized by Jake Stevens M.D. on 2017 9:50 AM. Dictated by Kali Howard M.D. on 2017 8:58 AM.



 









 Narrative

 

 



Tunneled Non-cuffed Central Venous Catheter Placement Under Ultrasound and 
Fluoro Guidance 



 



CLINICAL INDICATION: Bacteremia



 



OPERATING PHYSICIANS: David stevens M.D.



 



MEDICATIONS:I was personally responsible for the administration of moderate 
sedation services during the procedure performed and I confirm requirements 
described in CPT section on moderate sedation were followed, including the use 
of an independent trained observer who had no other duties during the procedure.
The total supervised sedation time was 11 minutes.See nursing log for 
complete details; the drugs utilized were:



1. Versed 2 mg IV



2. Fentanyl 100 mcg IV 



 



ACCESS:Right internal jugular vein



 



CONTRAST: None



 



COMPLICATIONS: None



 



CATHETER: 4 French single lumen tunneled 21 cm PICC



 



FLUOROSCOPY DOSE: 9 mGy



 



TECHNIQUE:



I, David Stevens M.D, the attending radiologist, was present for the 
critical and key portions of the procedure with a midlevel, resident, and/or 
fellow participating.Overlapping portions were non key and I was 
immediately available.I interpret the critical and key portion of this 
procedure to have been needle access.



 



Informed, written consent was obtained from the patient after explaining the 
risks and benefits of the procedure.With the patient in the supine position
, the right neck and upper chest were prepped and draped in the usual sterile 
fashion. The skin and subcutaneous tissues were infiltrated with 2% Lidocaine 
without epinephrine. Under direct ultrasound guidance, the right internal 
jugular vein was successfully cannulated using a micropuncture needle and an 
image was stored to PACS.An 0.018 wire was advanced through the needle into 
the vein and a 5.5 French peel-away sheath was advanced centrally over wire.



 



Attention was turned to the creation of a subcutaneous tunnel.2% Lidocaine 
with epinephrine were infiltrated along a 10-12 cm tract caudal and lateral to 
the initial venotomy site.A second small dermatotomy was made.The 
tunneling device was used to create a subcutaneous tunnel, and the catheter was 
advanced through to the initial venotomy site. The catheter was cut to length. 



 



The introducer of the 5.5 French sheath was removed and the tunneled non-cuffed 
catheter was advanced through the sheath and positioned centrally under 
fluoroscopic guidance. The venotomy site was closed with Dermabond. The 
catheter was secured to the skin with 2-0 Ethilon per hospital policy.The 
patient tolerated the procedure well and remained in stable condition 
throughout the stay in the angiography suite.The catheter was flushed, 
heparinized, and a sterile dressing was applied.



 



FINDINGS:



1. Patent right internal jugular vein by ultrasound. Needle entry was documented
, and an image was stored to PACS.



2. Tip of the catheter terminates in the right atrium.



 









 Procedure Note

 

 



Interface, Radiant Results - 2017  9:53 AM CST



Tunneled Non-cuffed Central Venous Catheter Placement Under Ultrasound and 
Fluoro Guidance 



CLINICAL INDICATION: Bacteremia



OPERATING PHYSICIANS: David stevens M.D.



MEDICATIONS:  I was personally responsible for the administration of moderate 
sedation services during the procedure performed and I confirm requirements 
described in CPT section on moderate sedation were followed, including the use 
of an independent trained observer who had no other duties during the 
procedure.  The total supervised sedation time was 11 minutes.  See nursing log 
for complete details; the drugs utilized were:  

 1. Versed 2 mg IV

 2. Fentanyl 100 mcg IV 



ACCESS:  Right internal jugular vein



CONTRAST: None



COMPLICATIONS: None



CATHETER: 4 French single lumen tunneled 21 cm PICC



FLUOROSCOPY DOSE: 9 mGy



TECHNIQUE:  

IDavid M.D, the attending radiologist, was present for the 
critical and key portions of the procedure with a midlevel, resident, and/or 
fellow participating.  Overlapping portions were non key and I was immediately 
available.  I interpret the critical and key portion of this procedure to have 
been needle access.



Informed, written consent was obtained from the patient after explaining the 
risks and benefits of the procedure.  With the patient in the supine position, 
the right neck and upper chest were prepped and draped in the usual sterile 
fashion. The skin and subcutaneous tissues were infiltrated with 2% Lidocaine 
without epinephrine. Under direct ultrasound guidance, the right internal 
jugular vein was successfully cannulated using a micropuncture needle and an 
image was stored to PACS.  An 0.018 wire was advanced through the needle into 
the vein and a 5.5 French peel-away sheath was advanced centrally over wire.



Attention was turned to the creation of a subcutaneous tunnel.  2% Lidocaine 
with epinephrine were infiltrated along a 10-12 cm tract caudal and lateral to 
the initial venotomy site.  A second small dermatotomy was made.  The tunneling 
device was used to create a subcutaneous tunnel, and the catheter was advanced 
through to the initial venotomy site. The catheter was cut to length. 



The introducer of the 5.5 French sheath was removed and the tunneled non-cuffed 
catheter was advanced through the sheath and positioned centrally under 
fluoroscopic guidance. The venotomy site was closed with Dermabond. The 
catheter was secured to the skin with 2-0 Ethilon per hospital policy.  The 
patient tolerated the procedure well and remained in stable condition 
throughout the stay in the angiography suite.  The catheter was flushed, 
heparinized, and a sterile dressing was applied.



FINDINGS:

 1. Patent right internal jugular vein by ultrasound. Needle entry was 
documented, and an image was stored to PACS.

 2. Tip of the catheter terminates in the right atrium.



IMPRESSION



Successful  image-guided placement of a non-cuffed tunneled central venous 
catheter as described above.









 Approved by Kali Howard M.D. on 2017 9:00 AM



By my electronic signature, I attest that I have personally reviewed the images 
for this examination and formulated the interpretations and opinions expressed 
in this report





 Finalized by Jake Stevens M.D. on 2017 9:50 AM. Dictated by Kali Howard M.D. on 2017 8:58 AM.







* PHOSPHORUS (2017  4:15 AM)



Only the most recent of 15 results within the time period is included.





  



  Component   Value   Ref Range

 

  



  Phosphorus   3.3   2.0 - 4.0 MG/DL









 



  Specimen   Performing Laboratory

 

 



  Blood    [facility]



   [address] [place] [address]





* MAGNESIUM (2017  4:15 AM)



Only the most recent of 15 results within the time period is included.





  



  Component   Value   Ref Range

 

  



  Magnesium   2.0   1.6 - 2.6 mg/dL









 



  Specimen   Performing Laboratory

 

 



  Blood    [facility]



   [address] [place] [address]





* LIPASE (2017  4:15 AM)



Only the most recent of 9 results within the time period is included.





  



  Component   Value   Ref Range

 

  



  Lipase   33   11 - 82 U/L









 



  Specimen   Performing Laboratory

 

 



  Blood    [facility]



   [address] [place] [address]





* BASIC METABOLIC PANEL (2017  4:15 AM)



Only the most recent of 4 results within the time period is included.





  



  Component   Value   Ref Range

 

  



  Sodium   139   137 - 147 MMOL/L

 

  



  Potassium   4.1   3.5 - 5.1 MMOL/L

 

  



  Chloride   106   98 - 110 MMOL/L

 

  



  CO2   25   21 - 30 MMOL/L

 

  



  Anion Gap   8   3 - 12

 

  



  Glucose   105 (H)   70 - 100 MG/DL

 

  



  Blood Urea Nitrogen   11   7 - 25 MG/DL

 

  



  Creatinine   0.72   0.4 - 1.24 MG/DL

 

  



  Calcium   9.9   8.5 - 10.6 MG/DL

 

  



  eGFR Non African American   >60   >60 mL/min



   Comment: 



   The eGFR is not validated for use in drug dosing 



   adjustments.    Continue to use 



   estimated creatinine clearance per dosing 



   reference text.    Please contact the 



   Clinical Pharmacist for questions. 

 

  



  eGFR    >60   >60 mL/min



   Comment: 



   The eGFR is not validated for use in drug dosing 



   adjustments.    Continue to use 



   estimated creatinine clearance per dosing 



   reference text.    Please contact the 



   Clinical Pharmacist for questions. 









 



  Specimen   Performing Laboratory

 

 



  Blood   [facility]



   [address] [place] [address]





* IRON + BINDING CAPACITY + %SAT+ FERRITIN (2017  9:27 AM)



Only the most recent of 2 results within the time period is included.





  



  Component   Value   Ref Range

 

  



  Iron   29 (L)   50 - 185 MCG/DL

 

  



  Iron Binding-TIBC   356   270 - 380 MCG/DL

 

  



  % Saturation   8 (L)   28 - 42 %

 

  



  Ferritin   188   30 - 300 NG/ML









 



  Specimen   Performing Laboratory

 

 



  Blood   [facility]



   [address] [place] [address]





* TRANSFERRIN (2017  9:27 AM)





  



  Component   Value   Ref Range

 

  



  Transferrin   239   185 - 336 MG/DL









 



  Specimen   Performing Laboratory

 

 



  Blood   [facility]



   [address] [place] [address]





* PREALBUMIN (2017  9:27 AM)





  



  Component   Value   Ref Range

 

  



  Prealbumin   11.0 (L)   17 - 34 MG/DL









 



  Specimen   Performing Laboratory

 

 



  Blood   [facility]



   [address] [place] [address]





* CT GUIDE ABCESS DRAIN W CATH (2017  6:28 PM)





 



  Specimen   Performing Laboratory

 

 



   [facility] RESULTS









 Impressions

 

 



 



1. CT-guided aspiration of retroperitoneal fluid collection as described.



 



I, Thomas M. Fahrbach, M.D., the attending radiologist, was present for the 
procedure, personally reviewed the images, and formulated the interpretations 
and opinions expressed in this report. 



 



@TT



 



 



 Finalized by Thomas Fahrbach, M.D. on 2017 6:40 PM. Dictated by Thomas Fahrbach, M.D. on 2017 6:36 PM.



 









 Narrative

 

 



 



CT-guided aspiration:



 



Clinical Indication:



 



Retroperitoneal abscess



 



Technique and Findings:



 



The nature of the procedure, risks, benefits, and expected outcomes were 
discussed with the patient who then provided informed written and verbal 
consent.



 



The patient was placed in a prone position on the CT scanner. Initial scans 
through the retroperitoneum demonstrated a small retroperitonealfluid 
collection. The patient was prepped and draped in the usual sterile manner and 
the superficial tissues were anesthetized with 2% lidocaine solution. An 18 
gauge Seldinger needle was advanced into the fluid collection under CT guidance 
no significant fluid was able to be aspirated. Using an Amplatz wire, the 
viscous, loculated fluid was fenestrated to facilitate aspiration and drain 
placement. Unfortunately, only 2 mL of brown, bloody fluid was able to be 
aspirated given the small size, and no drain was able to be placed. The patient 
tolerated the procedure well and left the department in unchanged condition.



 



I was personally responsible for the administration of moderate sedation 
services during the procedure performed and I confirm requirements described in 
CPT section on moderate sedation were followed, including the use of an 
independent trained observer who had no other duties during the procedure.
The total supervised sedation time was 17 minutes.See nursing log for 
complete details; the drugs utilized were:IV medication: 3 mg Versed, 200 
mcg fentanyl.



 









 Procedure Note

 

 



Interface, Radiant Results - 2017  6:43 PM CST





CT-guided aspiration:



Clinical Indication:



Retroperitoneal abscess



Technique and Findings:



The nature of the procedure, risks, benefits, and expected outcomes were 
discussed with the patient who then provided informed written and verbal 
consent.



The patient was placed in a prone position on the CT scanner. Initial scans 
through the retroperitoneum demonstrated a small retroperitoneal  fluid 
collection. The patient was prepped and draped in the usual sterile manner and 
the superficial tissues were anesthetized with 2% lidocaine solution. An 18 
gauge Seldinger needle was advanced into the fluid collection under CT guidance 
no significant fluid was able to be aspirated. Using an Amplatz wire, the 
viscous, loculated fluid was fenestrated to facilitate aspiration and drain 
placement. Unfortunately, only 2 mL of brown, bloody fluid was able to be 
aspirated given the small size, and no drain was able to be placed. The patient 
tolerated the procedure well and left the department in unchanged condition.



I was personally responsible for the administration of moderate sedation 
services during the procedure performed and I confirm requirements described in 
CPT section on moderate sedation were followed, including the use of an 
independent trained observer who had no other duties during the procedure.  The 
total supervised sedation time was 17 minutes.  See nursing log for complete 
details; the drugs utilized were:  IV medication: 3 mg Versed, 200 mcg fentanyl.



IMPRESSION



 1. CT-guided aspiration of retroperitoneal fluid collection as described.



I, Thomas M. Fahrbach, M.D., the attending radiologist, was present for the 
procedure, personally reviewed the images, and formulated the interpretations 
and opinions expressed in this report. 



@TT





 Finalized by Thomas Fahrbach, M.D. on 2017 6:40 PM. Dictated by Thomas Fahrbach, M.D. on 2017 6:36 PM.







* CULTURE-FUNGAL,OTHER (2017  6:15 PM)





  



  Component   Value   Ref Range

 

  



  Battery Name   FUNGUS CULTURE 

 

  



  Specimen Description   MISC FLUID 



   PERIPANCREATIC 



   ASPIRATE 

 

  



  Special Requests   NONE 

 

  



  Culture   NO GROWTH OF FUNGUS AT 4 WEEKS 

 

  



  Report Status   FINAL 



   2017 









 



  Specimen   Performing Laboratory

 

 



  Fluid - Other (Specify)    MAIN LAB



   [address] [place] [address]





* GRAM STAIN (2017  6:15 PM)





  



  Component   Value   Ref Range

 

  



  Battery Name   GRAM STAIN 

 

  



  Specimen Description   MISC FLUID 



   PERIPANCREATIC 



   ASPIRATE 

 

  



  Special Requests   NONE 

 

  



  Gram Stain   FEW 



   NEUTROPHILS 



   FEW 



   GRAM POSITIVE COCCI 

 

  



  Report Status   FINAL 



   2017 









 



  Specimen   Performing Laboratory

 

 



  Misc Fluid    MAIN LAB



   [address] [place] [id number]





* CULTURE-WOUND/TISSUE/FLUID(AEROBIC ONLY)W/SENSITIVITY (2017  6:15 PM)





  



  Component   Value   Ref Range

 

  



  Battery Name   ROUTINE CULTURE 

 

  



  Specimen Description   MISC FLUID 



   PERIPANCREATIC 



   ASPIRATE 

 

  



  Special Requests   NONE 

 

  



  Direct Gram Stain   FEW 



   NEUTROPHILS 



   FEW 



   GRAM POSITIVE COCCI 

 

  



  Culture   NO GROWTH 5 DAYS 

 

  



  Report Status   FINAL 



   2017 









 



  Specimen   Performing Laboratory

 

 



  Fluid - Other (Specify)    MAIN LAB



   [address] [place] [address]





* CULTURE-ANAEROBIC (2017  6:15 PM)





  



  Component   Value   Ref Range

 

  



  Battery Name   ANAEROBE CULTURE 

 

  



  Specimen Description   MISC FLUID 



   PERIPANCREATIC 



   ASPIRATE 

 

  



  Special Requests   NONE 

 

  



  Culture   Light growth 



   PREVOTELLA MELANINOGENICA 



   beta lactamase positive 



   Light growth 



   PREVOTELLA DENTICOLA 



   beta lactamase positive 

 

  



  Report Status   FINAL 



   2017 









 



  Specimen   Performing Laboratory

 

 



  Fluid - Other (Specify)    MAIN LAB



   [address] [place] [address]





* PROCALCITONIN (2017  6:07 PM)



Only the most recent of 2 results within the time period is included.





  



  Component   Value   Ref Range

 

  



  Procalcitonin   0.12 (H)   <0.10 NG/ML









 



  Specimen   Performing Laboratory

 

 



  Blood   [facility]



   [address] [place] [address]





* CULTURE-BLOOD W/SENSITIVITY (2017  6:07 PM)



Only the most recent of 4 results within the time period is included.





  



  Component   Value   Ref Range

 

  



  Battery Name   BLOOD CULTURE 

 

  



  Specimen Description   BLOOD 



   LEFT 



   FA 

 

  



  Special Requests   NONE 

 

  



  Culture   NO GROWTH 5 DAYS 

 

  



  Report Status   FINAL 



   2017 









 



  Specimen   Performing Laboratory

 

 



  Blood   [facility]



   [address] [place] [address]





* ECG-SCAN (2017  7:01 PM)





 Narrative

 

 



Ordered by an unspecified provider.





* ECG-SCAN (2017  7:01 PM)





 Narrative

 

 



Ordered by an unspecified provider.





* ECG-SCAN (2017  7:01 PM)





 Narrative

 

 



Ordered by an unspecified provider.





* ECG-SCAN (2017  1:42 PM)





 Narrative

 

 



Ordered by an unspecified provider.





* ECG-SCAN (2017  1:50 PM)





 Narrative

 

 



Ordered by an unspecified provider.





* CHEST 2 VIEWS (11/10/2017  9:32 AM)



Only the most recent of 2 results within the time period is included.





 



  Specimen   Performing Laboratory

 

 



   [facility] RESULTS









 Impressions

 

 



 



 



Persistent opacities in the left lung base, likely reflecting pneumonia or 
atelectasis.



 



 



 Approved by Avelino Mayo M.D. on 11/10/2017 10:13 AM



 



By my electronic signature, I attest that I have personally reviewed the images 
for this examination and formulated the interpretations and opinions expressed 
in this report



 



 



 Finalized by Addie Harley M.D. on 11/10/2017 10:14 AM. Dictated by Avelino Mayo M.D. on 11/10/2017 9:37 AM.



 









 Narrative

 

 



 



Procedure: CHEST 2 VIEWS



 



Clinical Indication:



 



77-year-old male. Follow-up left basilar opacities



 



Comparison:



Chest x-ray 2017



 



Findings:



 



The heart size and pulmonary vasculature are unremarkable. Prior coronary 
artery stents are again noted. There is persistent opacities in the left lung 
base. No pleural effusion or pneumothorax is identified.



 









 Procedure Note

 

 



Interface, Radiant Results - 11/10/2017 10:17 AM CST





Procedure: CHEST 2 VIEWS



Clinical Indication:



 77-year-old male. Follow-up left basilar opacities



Comparison:

Chest x-ray 2017



Findings:



The heart size and pulmonary vasculature are unremarkable. Prior coronary 
artery stents are again noted. There is persistent opacities in the left lung 
base. No pleural effusion or pneumothorax is identified.



IMPRESSION





Persistent opacities in the left lung base, likely reflecting pneumonia or 
atelectasis.





 Approved by Avelino Mayo M.D. on 11/10/2017 10:13 AM



By my electronic signature, I attest that I have personally reviewed the images 
for this examination and formulated the interpretations and opinions expressed 
in this report





 Finalized by Addie Harley M.D. on 11/10/2017 10:14 AM. Dictated by Avelino Mayo M.D. on 11/10/2017 9:37 AM.







* CT HEAD WO CONTRAST (11/10/2017  9:26 AM)





 



  Specimen   Performing Laboratory

 

 



   [facility] RESULTS









 Impressions

 

 



 



 



No acute hemorrhage or mass effect.



 



 



 Approved by Cale Gonzalez M.D. on 11/10/2017 11:11 AM



 



By my electronic signature, I attest that I have personally reviewed the images 
for this examination and formulated the interpretations and opinions expressed 
in this report



 



 



 Finalized by Daniel Orta M.D. on 11/10/2017 5:07 PM. Dictated by Cale Gonzalez M.D. on 11/10/2017 9:51 AM.



 









 Narrative

 

 



EXAM: CT HEAD 



 



HISTORY: 



 



77-year-old male, confusion, abnormal behavior.



 



TECHNIQUE: Multiple contiguous axial images were obtained of the brain without 
intravenous contrast.



 



COMPARISON: No prior exam is available for comparison.



 



FINDINGS: 



 



Dr. Daniel Orta M.D. has personally reviewed these images and formulated 
the interpretations and opinions expressed in this report.



 



There is mild generalized cerebral volume loss. Ventricle size is concordant to 
the degree of loss. There is no midline shift or mass effect. The gray white 
matter interfaces are maintained. The basal cisterns are patent. There is no 
evidence of acute intracranial hemorrhage or extra-axial fluid collection. The 
mastoid air cells and visualized paranasal sinuses are well-aerated.



 









 Procedure Note

 

 



Interface, Radiant Results - 11/10/2017  5:10 PM CST



EXAM: CT HEAD 



HISTORY: 



 77-year-old male, confusion, abnormal behavior.



TECHNIQUE: Multiple contiguous axial images were obtained of the brain without 
intravenous contrast.



COMPARISON: No prior exam is available for comparison.



FINDINGS: 



Dr. Daniel Orta M.D. has personally reviewed these images and formulated 
the interpretations and opinions expressed in this report.



There is mild generalized cerebral volume loss. Ventricle size is concordant to 
the degree of loss. There is no midline shift or mass effect. The gray white 
matter interfaces are maintained. The basal cisterns are patent. There is no 
evidence of acute intracranial hemorrhage or extra-axial fluid collection. The 
mastoid air cells and visualized paranasal sinuses are well-aerated.



IMPRESSION





No acute hemorrhage or mass effect.





 Approved by Cale Gonzalez M.D. on 11/10/2017 11:11 AM



By my electronic signature, I attest that I have personally reviewed the images 
for this examination and formulated the interpretations and opinions expressed 
in this report





 Finalized by Daniel Orta M.D. on 11/10/2017 5:07 PM. Dictated by Cale Gonzalez M.D. on 11/10/2017 9:51 AM.







* RVP VIRAL PANEL PCR (2017  4:10 PM)





  



  Component   Value   Ref Range

 

  



  Specimen Source   NASAL WASH 

 

  



  Adenovirus   NOT DETECTED 

 

  



  Coronavirus 229E   NOT DETECTED 

 

  



  Coronavirus HKU1   NOT DETECTED 

 

  



  Coronavirus NL63   NOT DETECTED 

 

  



  Coronavirus OC43   NOT DETECTED 

 

  



  Human Metapneumovirus   NOT DETECTED 

 

  



  Human   NOT DETECTED 



  Rhinovirus/ENTEROVIRUS  

 

  



  Influenza A H1N1 2009   NOT DETECTED 

 

  



  Influenza A H1   NOT DETECTED 

 

  



  Influenza A H3   NOT DETECTED 

 

  



  Influenza B   NOT DETECTED 

 

  



  Parainfluenza 1   NOT DETECTED 

 

  



  Parainfluenza 2   NOT DETECTED 

 

  



  Parainfluenza 3   NOT DETECTED 

 

  



  Parainfluenza 4   NOT DETECTED 

 

  



  RSV   NOT DETECTED 

 

  



  Bordetella Pertussis   NOT DETECTED 

 

  



  Chlamydophila Pneumoniae   NOT DETECTED 

 

  



  Mycoplasma Pneumoniae   NOT DETECTED 









 



  Specimen   Performing Laboratory

 

 



  Nasopharyngeal Swab    MAIN LAB



   [address] [place] [address]





* STREPTOCOCCUS PNEUMO AG, URINE (2017  3:45 PM)





  



  Component   Value   Ref Range

 

  



  Battery Name   STREP PNEUMO AG, UR 

 

  



  Specimen Description   URINE 

 

  



  Special Requests   NONE 

 

  



  Antigen   NEGATIVE 

 

  



  Report Status   FINAL 



   2017 









 



  Specimen   Performing Laboratory

 

 



  Urine    MAIN LAB



   [address] [place] [address]





* LEGIONELLA ANTIGEN URINE,RAN (2017  3:45 PM)





  



  Component   Value   Ref Range

 

  



  Battery Name   LEGIONELLA URINE ANTIGEN 

 

  



  Specimen Description   URINE 

 

  



  Special Requests   NONE 

 

  



  Antigen   NEGATIVE 

 

  



  Report Status   FINAL 



   2017 









 



  Specimen   Performing Laboratory

 

 



  Urine    MAIN LAB



   [address] [place] [address]





* ECG-SCAN (2017  7:25 AM)





 Narrative

 

 



Ordered by an unspecified provider.





* RETICULOCYTE COUNT (2017  5:06 AM)





  



  Component   Value   Ref Range

 

  



  Retic, Uncorrected   0.9   0.5 - 2.0 %

 

  



  Retic, Corrected   0.5   %

 

  



  Retic, Absolute   31.2   30 - 94 K/UL









 



  Specimen   Performing Laboratory

 

 



  Blood   [facility]



   [address] [place] [address]





* THYROID STIMULATING HORMONE-TSH (2017  5:06 AM)





  



  Component   Value   Ref Range

 

  



  TSH   2.991   0.35 - 5.00 MCU/ML









 



  Specimen   Performing Laboratory

 

 



  Blood   [facility]



   [address] [place] [address]





* FOLATE, SERUM (2017  5:06 AM)





  



  Component   Value   Ref Range

 

  



  Serum Folate   >23.9Comment: NOTE NEW REFERENCE RANGES   >3.9 NG/ML









 



  Specimen   Performing Laboratory

 

 



  Blood   [facility]



   [address] [place] [address]





* VITAMIN B12 (2017  5:06 AM)





  



  Component   Value   Ref Range

 

  



  Vitamin B12   681   180 - 914 PG/ML









 



  Specimen   Performing Laboratory

 

 



  Blood   [facility]



   [address] [place] [address]





* ABDOMEN AP ONLY (2017  9:24 PM)





 



  Specimen   Performing Laboratory

 

 



   [facility] RESULTS









 Impressions

 

 



 



 



Interval removal of enteric tube.



 



Otherwise unchanged radiograph of the abdomen with an endoscopically placed 
cystogastrostomy stent in unchanged position.



 



 



 Finalized by Lorne Maldonado M.D. on 2017 8:53 AM. Dictated by Lorne Maldonado M.D. on 2017 8:46 AM.



 









 Narrative

 

 



vomiting.



 



Technique: 



 



3 AP supine views of the abdomen were obtained.



 



Comparison: 



 



Comparison is made to an examination of 2017..



 



Findings:



 



There is mild, nonobstructive distention of large and small bowel loops. There 
is retained stool primarily in the ascending and descending colon. Vascular 
calcifications of the aorta and its branches are again identified. The 
endoscopically placed cystogastrostomy stent remains in position.



 









 Procedure Note

 

 



Interface, Radiant Results - 2017  8:56 AM CST



vomiting.



Technique: 



 3 AP supine views of the abdomen were obtained.



Comparison: 



Comparison is made to an examination of 2017..



Findings:



There is mild, nonobstructive distention of large and small bowel loops. There 
is retained stool primarily in the ascending and descending colon. Vascular 
calcifications of the aorta and its branches are again identified. The 
endoscopically placed cystogastrostomy stent remains in position.



IMPRESSION





Interval removal of enteric tube.



Otherwise unchanged radiograph of the abdomen with an endoscopically placed 
cystogastrostomy stent in unchanged position.





 Finalized by Lorne Maldonado M.D. on 2017 8:53 AM. Dictated by Lorne Maldonado M.D. on 2017 8:46 AM.







* PTT (APTT) (2017  5:20 PM)





  



  Component   Value   Ref Range

 

  



  APTT   28.1Comment: NOTE NEW REFERENCE RANGES   21.0 - 39.0 SEC









 



  Specimen   Performing Laboratory

 

 



  Blood    MAIN LAB



   [address] [place] [address]





* PROTIME INR (PT) (2017  5:20 PM)





  



  Component   Value   Ref Range

 

  



  INR   1.4 (H)   0.8 - 1.2









 



  Specimen   Performing Laboratory

 

 



  Blood    MAIN LAB



   [address] [place] [address]





* LACTIC ACID(LACTATE) (2017  5:20 PM)





  



  Component   Value   Ref Range

 

  



  Lactic Acid   0.7   0.5 - 2.0 MMOL/L









 



  Specimen   Performing Laboratory

 

 



  Blood    MAIN LAB



   [address] [place] [address]





* UA REFLEX CULTURE LABEL (2017  1:02 PM)





  



  Component   Value   Ref Range

 

  



  UA Reflex Culture   LAB LABEL 









 



  Specimen   Performing Laboratory

 

 



  Urine    MAIN LAB



   [address] [place] [address]





* URINALYSIS MICROSCOPIC REFLEX TO CULTURE (2017  1:02 PM)





  



  Component   Value   Ref Range

 

  



  WBCs,UA   0-2   0 - 2 /HPF

 

  



  RBCs,UA   0-2   0 - 3 /HPF

 

  



  Comment,UA   Urine submitted for reflex culture if criteria are 



   met:WBC>10, positive nitrite 



   and/or >=1+ leukocyte esterase. If quantity is not 



   sufficient, an addendum will 



   follow. 

 

  



  MucousUA   2+ 









 



  Specimen   Performing Laboratory

 

 



  Urine    MAIN LAB



   [address] [place] [address]





* URINALYSIS DIPSTICK REFLEX TO CULTURE (2017  1:02 PM)





  



  Component   Value   Ref Range

 

  



  Color,UA   DELFINA 

 

  



  Turbidity,UA   2+ (A)   CLEAR-CLEAR

 

  



  Specific Gravity-Urine   1.028   1.003 - 1.035

 

  



  pH,UA   5.0   5.0 - 8.0

 

  



  Protein,UA   2+ (A)   NEG-NEG

 

  



  Glucose,UA   NEG   NEG-NEG

 

  



  Ketones,UA   TRACE (A)   NEG-NEG

 

  



  Bilirubin,UA   NEG   NEG-NEG

 

  



  Blood,UA   NEG   NEG-NEG

 

  



  Urobilinogen,UA   NORMAL   NORM-NORMAL

 

  



  Nitrite,UA   NEG   NEG-NEG

 

  



  Leukocytes,UA   NEG   NEG-NEG

 

  



  Urine Ascorbic Acid, UA   POS (A)   NEG-NEG



   Comment: 



   Ascorbic acid is found in various food supplies 



   and dietary supplements, and 



   is reported to cause strong interference with 



   Macroscopic Urinalysis testing 



   for glucose, blood and nitrite, and can result in 



   a false negative result. 









 



  Specimen   Performing Laboratory

 

 



  Urine   [facility]



   [address] [place] [address]





from Last 3 Months

## 2018-02-05 NOTE — DIAGNOSTIC IMAGING REPORT
EXAMINATION: CT abdomen and pelvis with contrast, 02/05/2018.



TECHNIQUE: Multiple contiguous axial images were obtained through

the abdomen and pelvis after administration of intravenous

contrast. 



INDICATION: Upper abdomen pain with nausea and vomiting.

Left-sided abdomen pain radiating into the umbilicus. Status post

18 inches of colon removed in the past. Prior appendectomy as

well as a history of pancreatitis.



COMPARISON: 09/30/2016.



FINDINGS:



There is a 2.8 cm hyperdensity within the mid aspect of the

stomach immediately anterior to the pancreas. This appears to

represent a foreign body and is stent-like in appearance on

recent abdominal radiographs from earlier today. Clinical

correlation for prior procedure and desired placement of this

nonspecific structure is recommended. There is diffuse wall

thickening of the stomach in its mid and distal aspects. There is

also some wall thickening along the proximal duodenum. There is a

fluid-like collection along the right aspect of the duodenum and

just medial to the gallbladder. This is most likely a portion of

tortuous duodenum or even duodenal diverticulum, less likely a

separate fluid collection such as abscess. Clinical correlation

with either post oral contrast early imaging through this region

on CT or upper GI examination could further characterize this

finding and exclude a separate fluid collection. There is fatty

infiltration about the pancreas. Clinical correlation and

exclusion of pancreatitis is recommended. The spleen and liver

demonstrate no acute abnormality. The kidneys are unremarkable.

Adrenal glands demonstrate no gross abnormalities. There is

diffuse atherosclerotic disease along the course of the aorta and

its branches. No free air is appreciated. There is fluid

throughout the colon which could be due to colitis. Diverticular

disease is seen along the left colon, but no focal diverticulitis

is appreciated. There is no free fluid in the pelvis. The

prostate is enlarged and contains calcifications. Urinary bladder

wall thickening but likely due to underdistention. Fat-containing

herniation along the left lateral aspect of the mid abdomen is

noted and appears to be new since the previous examination. It is

incompletely imaged along its lateral-most aspect. The osseous

structures demonstrate no acute disease. The lung bases are

unremarkable other than chronic interstitial changes.



IMPRESSION:

1. Diffuse inflammatory change about the stomach and adjacent

pancreas which could be due to acute pancreatitis given history.

Correlation with laboratory values and history recommended. A

hyperdense foreign body anterior to the pancreas is most likely a

previous device for decompression of pseudocyst; however,

clinical correlation with surgical history and desired placement

of this structure would be recommended. No adjacent abnormal

fluid collection seen at this time.

2. Other areas of hyperdensity about the pancreatic tail and

head, likely calcifications from previous pancreatitis with an

adjacent tiny non-measurable less than centimeter cystic area in

that area, not mentioned above, likely from prior pancreatitis as

well.

3. Fluid-like collection to the right of the duodenum, not seen

previously and nonspecific, most likely a duodenal diverticulum.

However, an abscess is difficult to completely exclude, and if

concern for such a process, further imaging as discussed above

would be recommended.

4. Other findings as noted above including possible colitis and a

new fat-containing hernia along the left lateral abdominal wall

and incompletely imaged.



Dictated by: 



  Dictated on workstation # [id number]

## 2018-02-05 NOTE — XMS REPORT
Encounter Summary

 Created on: 2018



Moshe Lopez

External Reference #: [id number]

: 1940

Sex: Male



Demographics







 Address  [address] [place] [address]  [address]

 

 Home Phone  [phone]

 

 Preferred Language  English

 

 Marital Status  Unknown

 

 Spiritism Affiliation  NON

 

 Race  White

 

 Ethnic Group  Not  or 





Author







 Author  [facility]

 

 Organization  [facility]

 

 Address  Unknown

 

 Phone  Unavailable







Support







 Name  Relationship  Address  Phone

 

 Latisha Lopez  Unknown  [phone]







Care Team Providers







 Care Team Member Name  Role  Phone

 

 Jaelyn Velez   PCP  [phone]

 

 VelezJaelyn   Unavailable  [phone]







Reason for Visit

* Auth/Cert





     



  Status   Reason   Specialty   Diagnoses /   Referred By   Referred To



     Procedures   Contact   Contact

 

     



     Diagnoses  



     Pancreatic  



     pseudocyst  



     Pancreatic  



     pseudocyst  



     [K86.3]

  



     P  



     rocedures  



     CHOLANGIOPANCREA  



     TOGRAPHY  



     ENDOSCOPY  



     RETROGRADE  











Encounter Details







    



  Date   Type   Department   Care Team   Description

 

    



  2018   [facility]   Fan Tidwell MD   Pancreatic 
pseudocyst



   Encounter   Endoscopy   [address] 



    [address]   [address] 



    [place] [address]   [place] [address] 



    [phone] (Fax) 







Social History







    



  Tobacco Use   Types   Packs/Day   Years Used   Date

 

    



  Former Smoker   Cigarettes, Pipe, Cigars   1   15   Quit: 1975

 

    



  Smokeless Tobacco: Never   



  Used   









   



  Alcohol Use   Drinks/Week   oz/Week   Comments

 

   



  Yes   1 Cans of   0.6   1 beer every 2 weeks



   beer  









 



  Sex Assigned at Birth   Date Recorded

 

 



  Not on file 



as of this encounter



Last Filed Vital Signs







  



  Vital Sign   Reading   Time Taken

 

  



  Blood Pressure   141/69   2018 11:49 AM CST

 

  



  Pulse   56   2018 11:49 AM CST

 

  



  Temperature   36.5   C (97.7   F)   2018 11:26 AM CST

 

  



  Respiratory Rate   -   -

 

  



  Oxygen Saturation   98%   2018 11:49 AM CST

 

  



  Inhaled Oxygen   -   -



  Concentration  

 

  



  Weight   73 kg (161 lb)   2018  8:34 AM CST

 

  



  Height   167.6 cm (5' 6")   2018  8:34 AM CST

 

  



  Body Mass Index   25.99   2018  8:34 AM CST



in this encounter



Functional Status







  



  Functional Status   Response   Date of Assessment

 

  



  Does the patient have a hearing impairment:   No   2017

 

  



  Does the patient have a visual impairment:   Yes   2017

 

  



  Does the patient have impaired ambulation:   No   2017

 

  



  Does the patient have an activity of daily living   No   2017



  (ADL) impairment:  

 

  



  Does the patient have an instrumental activity of   No   2017



  daily living (IADL) impairment:  









  



  Cognitive Status   Response   Date of Assessment

 

  



  Does the patient have a cognitive impairment:   No   2017



as of this encounter



Discharge Instructions

* Discharge Instr - Education - Valentina Boone RN - 2018 11:36 AM CST



EGD/Upper EUS/ERCP/Antegrade Enteroscopy

Post Upper Endoscopy Instructions



 



-You may have a sore throat after the procedure for 2-3 days.  Try sucrets or 
lozenges to help ease the pain.  If it continues please contact us.



-If you feel feverish, have a temperature of 101 degrees or higher, persistent 
nausea and vomiting, abdominal pain or dark stools; please notify your nurse or 
GI physician.



-You may have abdominal cramping following the procedure this can be relieved 
by belching or passing air.



-If you have redness or swelling at the IV site, place a warm, wet washcloth 
over the affected areas for 15 minutes, 3-4 times a day until the redness 
subsides.  If symptoms continue for 2-3 days, contact your regular physician.



- If you have bleeding from your mouth, over 2 tablespoons and increasing, 
please notify your physician.  A small amount of bleeding is normal if a biopsy 
or polyps were taken.  If you are vomiting blood you need to seek immediate 
medical attention.



- You may resume all your routine medications, if medications need to be held 
your physician and/or nurse will notify you post procedure.



Diet after Procedure:



Please return to your previous diet as tolerated.



OUTPATIENTS:

A. Because of sedation and lack of coordination, UNTIL TOMORROW, DO NOT:

1. Operate any motorized vehicle - this includes driving.

2. Sign any legal documents or conduct important business matters.

3. Use any dangerous machinery (chain saw, lawnmower, etc.).

4. Drink any alcoholic beverages.



***Should you have any questions or concerns after your procedure please call 
[phone] M-F 8am-5:00 pm. After 5:00 pm, holidays or weekends call [phone]
[phone] and ask for the GI Doctor on call.



in this encounter



Medications at Time of Discharge







     



  Medication   Sig.   Disp.   Refills   Start Date   End Date

 

     



  acetaminophen (TYLENOL)   Take 2 tablets by mouth    0   2017 



  325 mg tablet   every 4 hours as needed.    

 

     



  aspirin EC 81 mg tablet   Take 81 mg by mouth    



   daily.    

 

     



  citalopram (CELEXA) 20 mg   Take 20 mg by mouth    



  tablet   daily.    

 

     



  docusate (COLACE) 100 mg   Take 1 capsule by mouth   180 capsule   3   2017 



  capsule   twice daily.    

 

     



  famotidine (PEPCID) 20 mg   Take 20 mg by mouth twice    



  tablet   daily.    

 

     



  fenofibrate(+) (TRIGLIDE)   Take 160 mg by mouth    



  160 mg tablet   daily. Take with food.    

 

     



  finasteride (PROSCAR) 5   Take 5 mg by mouth daily.    



  mg tablet     

 

     



  fish oil- omega 3-DHA/EPA   Take 1 Cap by mouth twice    



  300/1,000 mg capsule   daily.    

 

     



  lisinopril (PRINIVIL;   Take 20 mg by mouth    



  ZESTRIL) 20 mg tablet   daily.    

 

     



  loratadine (CLARITIN) 10   Take 10 mg by mouth every    



  mg tablet   morning.    

 

     



  melatonin 3 mg tab   Take 1 tablet by mouth at     2017 



   bedtime as needed    



   (insomnia).    

 

     



  MULTIVITAMINS WITH   Take 1 Tab by mouth    



  FLUORIDE (MULTI-VITAMIN   daily.    



  PO)     

 

     



  omeprazole DR(+)   Take 2 capsules by mouth   90 capsule   3   2017 



  (PRILOSEC) 20 mg capsule   daily before breakfast.    

 

     



  oxyCODONE (ROXICODONE,   Take 1 tablet by mouth   40 tablet   0   2017 



  OXY-IR) 5 mg tablet   every 4 hours as needed    

 

     



  polyethylene glycol 3350   Take 1 packet by mouth   12 each   5   2017 



  (MIRALAX) 17 g packet   daily.    

 

     



  simethicone (MYLICON) 80   Chew 1 tablet by mouth   30 tablet   0   2017 



  mg chew tablet   every 6 hours as needed    



   for Flatulence.    

 

     



  tamsulosin (FLOMAX) 0.4   Take 0.4 mg by mouth    



  mg capsule   daily. Do not crush, chew    



   or open capsules. Take 30    



   minutes following the    



   same meal each day.    

 

     



  vitamins, B complex tab   Take 1 Tab by mouth    



   daily.    



as of this encounter



H&P Notes

* Joe Irene MD - 2018 10:21 AM CST



Formatting of this note may be different from the original.

Pre Procedure History and Physical/Sedation Plan



Name:Moshe Lopez                                                       
            MRN: [id number]                 :1940          Age: 77 y.o.

Date of Service: 2018



Date of Procedure:  2018



Planned Procedure(s):  GI:  ERCP

Sedation/Medication Plan: MAC (Monitored Anesthesia Care)

Discussion/Reviews:  Physician has discussed risks and alternatives of this 
type of sedation and above planned procedures with patient

___________________________________________________________________

Chief Complaint: 



History of Present Illness: Moshe Lopez is a 77 y.o. male patient. Here 
for ERCP for history of necrotizing pancreatitis s/p axios stent placement in 
the past.



Previous Anesthetic/Sedation History: 



Past Medical History: 

Diagnosis Date 

 CAD (coronary artery disease)  

 Coronary artery disease  

 s/p 9 CABGs 

 Diverticulitis  

 Diverticulosis  

 s/p LAR in 2017 

 HTN (hypertension)  

 Hyperlipidemia  

 Hypertension  

 Low testosterone  

 Pancreatitis  

 Ruptured lumbar disc  

 Sleep apnea  



Past Surgical History: 

Procedure Laterality Date 

 TN ESOPHAGOGASTRODUODENOSCOPY US SCOPE W/ADJ STRXRS N/A 2017 

 ESOPHAGOGASTRODUODENOSCOPY ENDOSCOPIC ULTRASOUND performed by Fan Tidwell MD 
at ENDO/GI 

 UPPER GASTROINTESTINAL ENDOSCOPY N/A 2017 

 ESOPHAGOGASTRODUODENOSCOPY performed by Fan Tidwell MD at ENDO/GI 

 UPPER GASTROINTESTINAL ENDOSCOPY N/A 9/3/2017 

 ESOPHAGOGASTRODUODENOSCOPY performed by Fan Tidwell MD at ENDO/GI 

 TN ESOPHAGOGASTRODUODENOSCOPY TRANSORAL DIAGNOSTIC N/A 2017 

 ESOPHAGOGASTRODUODENOSCOPY performed by Fan Tidwell MD at ENDO/GI 

 UPPER GASTROINTESTINAL ENDOSCOPY N/A 10/19/2017 

 ESOPHAGOGASTRODUODENOSCOPY with NECROSECTOMY performed by Bean Dean MD 
at ENDO/GI 

 UPPER GASTROINTESTINAL ENDOSCOPY N/A 10/23/2017 

 ESOPHAGOGASTRODUODENOSCOPY performed by Bean Dean MD at ENDO/GI 

 HX CARPAL TUNNEL RELEASE   

 HX COLOSTOMY   

 colostomy reversal in 2017 

 HX HEART CATHETERIZATION   

 HX ROTATOR CUFF REPAIR   

 left and right 

 HX ROTATOR CUFF REPAIR Bilateral  

 HX SEPTOPLASTY   

 SIGMOIDECTOMY   



Pertinent medical/surgical history reviewed

Pertinent family history reviewed

Social History 

Substance Use Topics 

 Smoking status: Former Smoker 

  Packs/day: 1.00 

  Years: 15.00 

  Types: Cigarettes, Pipe, Cigars 

  Quit date: 1975 

 Smokeless tobacco: Never Used 

 Alcohol use 0.6 oz/week 

  1 Cans of beer per week 

   Comment: 1 beer every 2 weeks 



History 

Drug Use No 



Allergies:  Niacin and Ativan [lorazepam]

Medications

No current facility-administered medications for this encounter.  



Review of Systems:

A 14 point review of systems was negative except for: mentioned above

       

Physical Exam:

Temp: 36.8 C (98.2 F) (834)

Pulse: 64 (834)

BP: 154/84 (834)

General appearance: alert

Throat: Lips, mucosa, and tongue normal. Teeth and gums normal

Lungs: clear to auscultation bilaterally

Heart: regular rate and rhythm, S1, S2 normal, no murmur, click, rub or gallop

Abdomen: soft, non-tender. Bowel sounds normal. No masses,  no organomegaly

Extremities: extremities normal, atraumatic, no cyanosis or edema

@

Airway:  airway assessment performed  Mallampati III (soft palate, base of 
uvula visible)

Anesthesia Classification:  ASA III (A patient with a severe systemic disease 
that limits activity, but is not incapacitating)

NPO Status: Acceptable

Pregnancy Status: Not Pregnant



Lab/Radiology/Other Diagnostic Tests

Labs:  24-hour labs:  No results found for this visit on 18 (from the 
past 24 hour(s)).



Joe Irene MD

Pager [phone]

in this encounter



Plan of Treatment







    



  Date   Type   Specialty   Care Team   Description

 

    



  2018   Procedure Pass   Infectious Diseases  

 

    



  2018   Surgery    Fan Tidwell MD   ESOPHAGOGASTRODUODENOSCOP



     [address]   [address]



     [address] 



     [place] [address] 



     [phone] (Fax) 

 

    



  2018   Procedure Pass   

 

    



  2018   [facility]    Fan Tidwell MD   Pancreatic necrosis



   Encounter    [address] [facility] 



     [address] 



     [place] [address] 



     [phone] (Fax) 



as of this encounter



Procedures







    



  Procedure Name   Priority   Date/Time   Associated Diagnosis   Comments

 

    



  TELEMETRY STRIPS-SCAN    01/10/2018    Results for this



    3:25 PM CST    procedure are in the



      results section.

 

    



  CHOLANGIOPANCREATOGRAPHY    2018   Pancreatic pseudocyst 



  ENDOSCOPY RETROGRADE WITH    9:30 AM CST  



  STENT PLACEMENT    

 

  



   Special



   Needs



   3 day -



   Reminder



   Call -



   spoke with



   pt. wife,



   17 @



   1256 (cs)

 

    



  ESOPHAGOGASTRODUODENOSCOP    2018   Pancreatic pseudocyst 



  Y ENDOSCOPIC ULTRASOUND    9:30 AM CST  

 

  



   Special



   Needs



   3 day -



   Reminder



   Call -



   spoke with



   pt. wife,



   17 @



   1256 (cs)

 

    



  CHOLANGIOPANCREATOGRAPHY    2018   Pancreatic pseudocyst 



  ENDOSCOPY RETROGRADE    9:30 AM CST  

 

  



   Special



   Needs



   3 day -



   Reminder



   Call -



   spoke with



   pt. wife,



   17 @



   1256 (cs)



in this encounter



Results

* TELEMETRY STRIPS-SCAN (01/10/2018  3:25 PM)





 Narrative

 

 



Ordered by an unspecified provider.





* GI ENDO CATH LORI & PANC DUCT (2018 11:00 AM)





 



  Specimen   Performing Laboratory

 

 



   [facility]









 Narrative

 

 



This order has been auto finalized and does not contain a result.





* ERCP (2018 10:28 AM)





  



  Component   Value   Ref Range

 

  



  Provation Report   Patient Name: John Mesa 



   Procedure Date: 2018 10:28 AM 



   MRN: [id number] 



   CSN: [id number] 



   Case ID: [id number] 



   YOB: 1940 



   Gender: Male 



   Attending Physician: Fan Tidwell MD 



   Procedure: 



   ERCP 



   Indications:                                    Hi 



   story of Acute necrotizing pancreatitis, 



   Walled off pancreatic necrosis s/p 



   endosocpic 



   necrosectomy and placement of AXIOS 



   stent. 



   Providers: 



   Fan Tidwell MD (Doctor), Joe Irene MD 



   (Fellow), Jinny Ji RN (Nurse), 



   Guillermina Gonzalez Technician (Technician) 



   Referring Physician:                    David Stuart 



   Medications:                                    Mo 



   nitored Anesthesia Care 



   Complications:                                No 



   immediate complications. 



   Procedure: 



   Pre-Anesthesia Assessment: 



   - Prior to the procedure, a History and 



   Physical was performed, and 



   patient medications and allergies were 



   reviewed. The patient's tolerance 



   of previous anesthesia was also reviewed. 



   The risks and benefits of the 



   procedure and the sedation options and 



   risks were discussed with the 



   patient. All questions were answered, and 



   informed consent was obtained. 



   Prior Anticoagulants: The patient has 



   taken no previous anticoagulant or 



   antiplatelet agents. ASA Grade 



   Assessment: III - A patient with severe 



   systemic disease. After reviewing the 



   risks and benefits, the patient 



   was deemed in satisfactory condition to 



   undergo the procedure. 



   After obtaining informed consent, the 



   scope was passed under direct 



   vision. Throughout the procedure, the 



   patient's blood pressure, pulse, 



   and oxygen saturations were monitored 



   continuously. The Duodenoscope 



   0911 was introduced through the mouth, 



   and advanced to the duodenum and 



   used to inject contrast into the ventral 



   pancreatic duct. The ERCP was 



   accomplished without difficulty. The 



   patient tolerated the procedure 



   well. 



   Findings: 



   The limited view of the esophagus, 



   stomach and duodenum unremarkable. 



   There was a large diverticulum seen in 



   the second portion of duodenum. 



   The ampulla was seen at the rim of this 



   diverticulum at 5 o'clock 



   position. It was facing inwards. This was 



   everted with the help of the 



   sphincterotome. The pancreatic duct was 



   easily cannulated. Injection of 



   contrast revealed a 2 mm pancreatic duct 



   in the head. There was abrupt 



   termination of the pancreatic duct in the 



   region of the genu. No 



   contrast was seen coming out of the 



   pancreatic duct and the pancreatic 



   duct in the body and tail could not be 



   opacified. This was suggestive of 



   complete pancreatic duct disconnection. A 



   guidewire was passed. We then 



   performed a 3-4 mm pancreatic 



   sphincterotomy. We then passed a 5 French 



   3 mm pancreatic duct stent with retention 



   flange (Jean stent) to 



   decrease the risk of post ERCP 



   pancreatitis. Patient was also given 100 



   mg of rectal indomethacin. 



   The linear EUS scope was then passed. The 



   celiac and was seen and there 



   were no celiac lymph nodes visualized. 



   The left adrenal in the left lobe 



   of the liver were normal-appearing. The 



   body of the pancreas was 



   visualized with normal-appearing 



   parenchyma. The pancreatic duct was 1.8 



   mm in the body. The AXIOS stent was seen. 



   No large necrotic area was 



   visualized in the region of the body. 



   There was some fluid seen around 



   the AXIOS stent but this was minimal. 



   The scope was then passed to the duodenal 



   bulb. The CBD was 7.9 mm. Some 



   inflammation was seen in the head of the 



   pancreas. At this point we 



   decided to leave the AXIOS stent and will 



   remove it in 4 weeks. 



   Impression: 



   The limited view of the esophagus, stomach and 



   duodenum unremarkable. There was a large

 



   diverticulum seen in the second portion 



   of 



   duodenum. The ampulla was seen at the 



   rim of 



   this diverticulum at 5 o'clock position. 



   It was 



   facing inwards. This was everted with 



   the help 



   of the sphincterotome. The pancreatic 



   duct was 



   easily cannulated. Injection of contrast

 



   revealed a 2 mm pancreatic duct in the 



   head. 



   There was abrupt termination of the 



   pancreatic 



   duct in the region of the genu. No 



   contrast was 



   seen coming out of the pancreatic duct 



   and the 



   pancreatic duct in the body and tail 



   could not 



   be opacified. This was suggestive of 



   complete 



   pancreatic duct disconnection. A 



   guidewire was 



   passed. We then performed a 3-4 mm 



   pancreatic 



   sphincterotomy. We then passed a 5 



   French 3 mm 



   pancreatic duct stent with retention 



   flange 



   (Jean stent) to decrease the risk of 



   post ERCP 



   pancreatitis. Patient was also given 100 



   mg of 



   rectal indomethacin. 



   The linear EUS scope was then passed. 



   The 



   celiac and was seen and there were no 



   celiac 



   lymph nodes visualized. The left adrenal 



   in the 



   left lobe of the liver were 



   normal-appearing. 



   The body of the pancreas was visualized 



   with 



   normal-appearing parenchyma. The 



   pancreatic 



   duct was 1.8 mm in the body. The AXIOS 



   stent 



   was seen. No large necrotic area was 



   visualized 



   in the region of the body. There was 



   some fluid 



   seen around the AXIOS stent but this was

 



   minimal. 



   The scope was then passed to the 



   duodenal bulb. 



   The CBD was 7.9 mm. Some inflammation 



   was seen 



   in the head of the pancreas. At this 



   point we 



   decided to leave the AXIOS stent and 



   will 



   remove it in 4 weeks. 



   Estimated Blood Loss:                 Estimated 



   blood loss: none. 



   Recommendation:                             - 



   Written discharge instructions were provided 



   to the patient. 



   - Resume previous diet. 



   - Continue present medications. 



   - EGD in 4 weeks to remove the stents 



   Scope In: 10:48:50 AM 



   Scope Out: 11:20:41 AM 



   Total Procedure Duration Time 0 hours 31 minutes 



   51 seconds 



   Procedure Code(s):                        --- 



   Professional --- 



   26850, Endoscopic retrograde 



   cholangiopancreatography (ERCP); with 



   placement 



   of endoscopic stent into biliary or 



   pancreatic 



   duct, including pre- and post-dilation 



   and 



   guide wire passage, when performed, 



   including 



   sphincterotomy, when performed, each 



   stent 



   87213, Esophagogastroduodenoscopy, 



   flexible, 



   transoral; with endoscopic ultrasound 



   examination, including the esophagus, 



   stomach, 



   and either the duodenum or a surgically 



   altered 



   stomach where the jejunum is examined 



   distal to 



   the anastomosis 



   CPT copyright 2016 [employer]. 



   All rights reserved. 



   The codes documented in this report are 



   preliminary and upon  review may 



   be revised to meet current compliance 



   requirements. 



   Attending Participation: 



   I personally performed the entire 



   procedure. 



   Fan Tidwell MD 



   2018 11:28:54 AM 



   The attending physician has electronically signed 



   and finalized this document. 



   Joe Irene MD 



   Number of Addenda: 0 



   Note Initiated On: 2018 10:28 AM 









 



  Specimen   Performing Laboratory

 

 



   [facility] OTHER RESULTS





in this encounter



Visit Diagnoses

Not on filein this encounter



Admitting Diagnoses











  Diagnosis

 





  Pancreatic pseudocyst - Pancreatic pseudocyst [K86.3]

 





  Cyst and pseudocyst of pancreas

## 2018-02-05 NOTE — XMS REPORT
Encounter Summary

 Created on: 2018



Moshe Lopez

External Reference #: [id number]

: 1940

Sex: Male



Demographics







 Address  [address] [place]  [address]

 

 Home Phone  [phone]

 

 Preferred Language  English

 

 Marital Status  Unknown

 

 Christianity Affiliation  NON

 

 Race  White

 

 Ethnic Group  Not  or 





Author







 Author  [facility]

 

 Organization  [facility]

 

 Address  Unknown

 

 Phone  Unavailable







Support







 Name  Relationship  Address  Phone

 

 Latisha Lopez  ECON  Unknown  [phone]







Care Team Providers







 Care Team Member Name  Role  Phone

 

 Agustin Jaelyn CHO  PCP  [phone]

 

 Jaelyn Velez DO  Unavailable  [phone]







Reason for Referral

* Radiology Services





     



  Status   Reason   Specialty   Diagnoses /   Referred By   Referred To



     Procedures   Contact   Contact

 

     



  No Auth Needed    Radiology   Diagnoses   Aby Bah MD   [address]



     Fluid collection   [address]



     of pancreas

   BLVD   MED OFFICE BLDG



     [address]



     rocedures   [place], [place]



     CT ABD/PELV W   [id number]   [id number]



     CONTRAST   Phone:   Phone:



      [phone]



      Fax:   Fax: [phone] 











Reason for Visit

* 





 



  Reason   Comments

 

 



  Infection 









Encounter Details







    



  Date   Type   Department   Care Team   Description

 

    



  2018   Office Visit   [facility]   Aby Bah MD   
Infective necrosis of



    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



    Medicine   [address]   Fluid collection of



    [address] [place] [address]   pancreas;



    [address] MED   [phone]   Long-term use of



    OFFICE BLDG   [phone] (Fax)   high-risk medication



    [place]  



    [address] [phone]  







Social History







    



  Tobacco Use   Types   Packs/Day   Years Used   Date

 

    



  Former Smoker   Cigarettes, Pipe, Cigars   1   15   Quit: 1975

 

    



  Smokeless Tobacco: Never   



  Used   









   



  Alcohol Use   Drinks/Week   oz/Week   Comments

 

   



  Yes   1 Cans of   0.6   1 beer every 2 weeks



   beer  









 



  Sex Assigned at Birth   Date Recorded

 

 



  Not on file 



as of this encounter



Last Filed Vital Signs







  



  Vital Sign   Reading   Time Taken

 

  



  Blood Pressure   160/70   2018  1:34 PM CST

 

  



  Pulse   56   2018  1:34 PM CST

 

  



  Temperature   36.6   C (97.9   F)   2018  1:34 PM CST

 

  



  Respiratory Rate   -   -

 

  



  Oxygen Saturation   -   -

 

  



  Inhaled Oxygen   -   -



  Concentration  

 

  



  Weight   76.3 kg (168 lb 3.2 oz)   2018  1:34 PM CST

 

  



  Height   167.6 cm (5' 6")   2018  1:34 PM CST

 

  



  Body Mass Index   27.15   2018  1:34 PM CST



in this encounter



Functional Status







  



  Functional Status   Response   Date of Assessment

 

  



  Does the patient have a hearing impairment:   No   2017

 

  



  Does the patient have a visual impairment:   Yes   2017

 

  



  Does the patient have impaired ambulation:   No   2017

 

  



  Does the patient have an activity of daily living   No   2017



  (ADL) impairment:  

 

  



  Does the patient have an instrumental activity of   No   2017



  daily living (IADL) impairment:  









  



  Cognitive Status   Response   Date of Assessment

 

  



  Does the patient have a cognitive impairment:   No   2017



as of this encounter



Instructions

* Patient Instructions - Aby Bah MD - 2018  2:00 PM CST



Our clinic nurse is Edie. Her phone number is [phone]



We will contact you by phone or '[employer]' with any test results when they are 
available. 

 It is important to me that you have all of your questions answered.

 If you have any questions or concerns, please contact our nurses at [phone]
[phone]

 Please let the nurses know if anything is unclear, and I will call you 
personally to discuss further.



Your time is important and if you had to wait at all today, I apologize. My 
goal is to run exactly on time; however, on occasion, I get behind in clinic 
due to unexpected patient issues.



Your satisfaction with the care you received today is important to me. Please 
contact us directly via phone or [employer] if you have any concerns. 



It was nice to see you today. 



Dr Bah



in this encounter



Progress Notes

* Whit Brock, RN - 2018  2:00 PM CST



Reviewed chart, ERCP scheduled for  @ 1330, ordered CT abd/pelv w/ contrast 
to be complete in am on  per Dr. Bah, awaiting scheduling.  Also ordered 
for CBC w/ diff/ CMP to be complete early next week.

Reviewed w/pt's wife.  They would like for labs to be faxed to [facility] OP lab.  Faxed to [phone].

* Aby Bah MD - 2018  2:00 PM CST



Formatting of this note may be different from the original.

Date of Service: 2018



Subjective:        

 

Moshe Lopez is a 77 y.o. male.



History of Present Illness

Moshe Lopez is a 77 y.o.  history of CAD s/p PCI with 9 stents, MATHEW 
wears CPAP at home, HTN, HLD, sigmoid diverticulitis s/p Hernandez's procedure
in 2017 with colostomy reversal in 2017, who was admitted to [facility] originally on 17 for ongoing management of pancreatitis 
with ileus. 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 
and left abdominal wall. 



He has a very complex history as outlined in previous notes by Dr Chamberlain. He 
has been on/off atbx since July due to infected pancreatic pseudocyst which 
tracked to left abd wall and right retroperitoneal space. He has undergone 
multiple necrosectomy and drainage endoscopically by GI. They have been able to 
clean out left side of fluid collection but has persistent complex network of 
small fluid collection, the most dominant one is on the right retroperitoneal 
space. 



He was readmitted to  on  with fever and confusion. He was on oral 
Augmentin at the time. There was concern for persistent/recurrent abd abscess.  
He had a CXR which showed slight increased opacity in the left lower lobe, and 
on CT of the abdomen showed overall decreased size of a complex interconnecting 
fluid and gas collection.  He was treated with Zosyn and Diflucan. He had 
negative blood cultures, UA, negative RVP, his procalcitonin was 0.21.   He was 
followed by Dr. Chamberlain.  He was felt to most likely have community-acquired 
pneumonia as a cause of his presentation. Given the decreased abd fluid 
collection on CT there was less concern of ongoing infected pancreatic fluid 
collection .  He was transitioned to levofloxacin and discharged on .  His 
wife reports that he did well initially.  He completed his antibiotics on .  Over the next few days he felt well he was eating well.  On  he 
developed abrupt onset of nausea vomiting and some mild left mid epigastric 
area.



He presented to surgery clinic on  as per a previously scheduled appt.  
They felt that he was dehydrated and looked poorly.  He was admitted to the 
hospital.  His white count was 21K.  His kidney function was stable, liver 
function tests were normal.  Procalcitonin was 0.12.  He had 2 sets of blood 
cultures which have been no growth.  He underwent an repeat abdominal CT.  That 
showed that there was an overall decrease in size of the complex network of 
peripancreatic gas and fluid collection throughout the anterior pararenal 
space. There was a dominant right retroperitoneal component measuring 5.6 x 2.4 
cm compared with 9.0 x 4.1 cm on previous examination  He had persistent 
cystogastrostomy tube.  He had minimal amount of ascites.  He had unchanged 
intrahepatic biliary ductal dilatation with narrowing and wall thickening of  
the common bile duct. colonic diverticulosis without acute diverticulitis, 
probable cholelithiasis and gallbladder sludge without evidence of 
cholecystitis and moderate prostate enlargement.



On  he underwent IR guided aspiration of the right-sided fluid collection.
  This was described as viscous loculated fluid which was difficult to aspirate 
and was not amenable to a drain.  Only 2 mL's of brown bloody fluid were able 
to be aspirated.  gram stain GPC, culture: Prevotella melaninogenica nad 
dneticola. The patient was discharged on  on ertapenem. 



Returned on 17. He was tolerating ertapenem through a PICC line, taking 
that in infusion room.  He reported weight gain and good appetite. Repeat CT 
abdomen/pelvis  showed slight improvement in complex fluid collections 
related to prior pancreatitis with no new fluid collections are seen. Plan was 
to do ERCP in 2018. Our atbx plan was to to complete Ertapenem through  and then transition to levofloxacin and metronidazole. 



Interval: 

He returns for scheduled FU today. He and his wife feel that he had significant 
improvement while on the IV atbx. He continued to gain weight, strength and 
energy. He transitioned to oral atbx well. He tolerated without new SE. He 
denies new joint pain, tendinitis but does acknowledge that he has bilat hip 
weakness due to prolonged hospitalization and that is still taking some time to 
regain his strength. 

He stopped the Levo and metronidazole on  when abtx ran out. 



He had ERCP today. They found pancreatic duct occlusion and had to pass a new 
stent in duct. They were able to visualize the Axios stent, there was no large 
necrotic area in the body of the pancreas, there was some fluid around the 
Axios stent but was felt to be minimal. They did leave the Axios stent in place 
and plan for him to return in 1 month for repeat ERCP. 



We reviewed his labs from 18 together. Cr and LFT nl CBC stable w improving 
anemia

 

Antimicrobial Start date End date 

Zosyn  

Ertapenem   

Levofloaxin :  

Metronidazole : 

   

   

Prior Atbx     

Augmentin  10/25 11/8 

zosyn  

diflucan 10/21 11/13 

Levoflox  

Ertapenem 10/19 10/25 

Diflucan 9/3 10/5 

Ertapenem  

Doxy    

zosyn -; -;    

levaquin  

vanco  



Social:



Review of Systems

A comprehensive 14 point review of systems was negative aside from those issues 
noted above and:

Right ear- popping when opens jaw- started using CPAP mask again this week- may 
be related. 



Objective:       

 acetaminophen (TYLENOL) 325 mg tablet Take 2 tablets by mouth every 4 hours 
as needed. 

 aspirin EC 81 mg tablet Take 81 mg by mouth daily.   

 citalopram (CELEXA) 20 mg tablet Take 20 mg by mouth daily. 

 docusate (COLACE) 100 mg capsule Take 1 capsule by mouth twice daily. 

 famotidine (PEPCID) 20 mg tablet Take 20 mg by mouth twice daily. 

 fenofibrate(+) (TRIGLIDE) 160 mg tablet Take 160 mg by mouth daily. Take 
with food. 

 finasteride (PROSCAR) 5 mg tablet Take 5 mg by mouth daily. 

 fish oil- omega 3-DHA//1,000 mg capsule Take 1 Cap by mouth twice 
daily. 

 levoFLOXacin (LEVAQUIN) 750 mg tablet Take 1 tablet by mouth daily. 

 lisinopril (PRINIVIL; ZESTRIL) 20 mg tablet Take 20 mg by mouth daily. 

 loratadine (CLARITIN) 10 mg tablet Take 10 mg by mouth every morning. 

 melatonin 3 mg tab Take 1 tablet by mouth at bedtime as needed (insomnia). 

 metroNIDAZOLE (FLAGYL) 500 mg tablet Take 1 tablet by mouth three times 
daily. Take with food. Do not drink alcohol while on metronidazole. 

 MULTIVITAMINS WITH FLUORIDE (MULTI-VITAMIN PO) Take 1 Tab by mouth daily. 

 omeprazole DR(+) (PRILOSEC) 20 mg capsule Take 2 capsules by mouth daily 
before breakfast. 

 oxyCODONE (ROXICODONE, OXY-IR) 5 mg tablet Take 1 tablet by mouth every 4 
hours as needed 

 polyethylene glycol 3350 (MIRALAX) 17 g packet Take 1 packet by mouth 
daily. 

 simethicone (MYLICON) 80 mg chew tablet Chew 1 tablet by mouth every 6 
hours as needed for Flatulence. 

 tamsulosin (FLOMAX) 0.4 mg capsule Take 0.4 mg by mouth daily. Do not crush
, chew or open capsules. Take 30 minutes following the same meal each day. 

 vitamins, B complex tab Take 1 Tab by mouth daily. 



Vitals: 

 18 1334 

BP: 160/70 

Pulse: 56 

Temp: 36.6 C (97.9 F) 

TempSrc: Oral 

Weight: 76.3 kg (168 lb 3.2 oz) 

Height: 167.6 cm (66") 



Body mass index is 27.15 kg/(m^2). 



BMI Weight Status 

Below 18.5 Underweight 

18.5  24.9 Normal 

25.0  29.9 Overweight 

30.0 and Above Obese 

40.0 and Above Morbidly obese 

Reviewed/discussed patient's BMI. The BMI was reviewed; specialist visit only, 
patient will follow-up with primary care doctor.



Physical Exam

Gen:  A&0x3, NAD

HEENT:  MMM, no OP lesions, exudate or thrush. Right jaw- no popping. Right ear
- no fluid behind ear drum

NECK:  Supple, no lymphadenopathy

CHEST:  no rales/rhonchi/wheezing

CV:  Regular rhythm, nml rate, no murmur, rub or nikia

ABD:  soft, no tenderness, L sided abd wall hernia

EXT:  No edema

SKIN:  No rashes, lesions 



Sodium 

Date/Time Value Ref Range Status 

2018 03:10  137 - 147 MMOL/L Final 

2017 11:11  137 - 147 MMOL/L Final 



Potassium 

Date/Time Value Ref Range Status 

2018 03:10 PM 4.0 3.5 - 5.1 MMOL/L Final 

2017 3.6  Final 



Blood Urea Nitrogen 

Date/Time Value Ref Range Status 

2018 03:10 PM 18 7 - 25 MG/DL Final 

2017 14  Final 



Creatinine 

Date/Time Value Ref Range Status 

2018 03:10 PM 1.02 0.4 - 1.24 MG/DL Final 

2017 0.86  Final 



AST (SGOT) 

Date/Time Value Ref Range Status 

2018 03:10 PM 31 7 - 40 U/L Final 

2017 34  Final 



ALT (SGPT) 

Date/Time Value Ref Range Status 

2018 03:10 PM 22 7 - 56 U/L Final 

2017 26  Final 



Alk Phosphatase 

Date/Time Value Ref Range Status 

2018 03:10 PM 56 25 - 110 U/L Final 

2017 58  Final 



Total Bilirubin 

Date/Time Value Ref Range Status 

2018 03:10 PM 0.2 (L) 0.3 - 1.2 MG/DL Final 

2017 11:11 AM 0.3 0.3 - 1.2 MG/DL Final 



White Blood Cells 

Date/Time Value Ref Range Status 

2018 03:10 PM 7.0 4.5 - 11.0 K/UL Final 

2017 4.98  Final 



RBC 

Date/Time Value Ref Range Status 

2018 03:10 PM 4.15 (L) 4.4 - 5.5 M/UL Final 

2017 11:11 AM 3.73 (L) 4.4 - 5.5 M/UL Final 



Hemoglobin 

Date/Time Value Ref Range Status 

2018 03:10 PM 11.7 (L) 13.5 - 16.5 GM/DL Final 

2017 10.5  Final 



Platelet Count 

Date/Time Value Ref Range Status 

2018 03:10  150 - 400 K/UL Final 

2017 184  Final 



 

Assessment and Plan:

H/o pancreatitis w Persistent infected peripancreatic fluid collection

- symptom onset 7/15/17

- no hx of alcohol abuse, no gallstones

- serial CTs with progression since 7/15/17 of peripancreatic fluid collections 
with fat stranding, forming phelgmonous collection

-  Abd wall/peritoneal fluid- MSSA, Candida glabrata (both from broth)

-  EUS w necrosectomy (Axios stent) and drainage- purulent fluid (no 
cultures)

- - necrosectomy w drainage

- : CT decrease size of complex network of gas/fluid in pararenal space. 
Loculated ascites w some gas on left, persistnet thickening of splenic flexure 
of colon sec pancreatitis, stenosis superior mesenteric vein.

- IR drain in abd fluid collection- 1) Right pelvic fluid collection- neg 2) 
Left fluid collection- staph on GS- culture neg

9/3- necrosectomy w drainage

- necrosectomy w drainage

 to  for fever/confusion- responded to atbx- thought possibly to have 
PNA, in retrospect ongoing pancreatic fluid collection

-11/3- levoquin

-- recurrent acute n/v

-- Leukocytosis, dehydration

-: CT: Continued overall decrease in size of complex network of 
peripancreatic gas/fluid collections throughout the anterior pararenal space 
and retroperitoneum , with stable position of cystogastrostomy tube. dominant 
right retroperitoneal component measures 5.6 x 2.4 cm compared with 9.0 x 4.1 cm

- IR aspiration fluid collection- 2cc thick viscous fluid- gram stain GPC, 
culture -->Prevotella

- discharged on ertapenem.

- CT abdomen/pelvis showed slight improvement in complex fluid collections 
related to prior pancreatitis with no new fluid collections are seen

17: CT a/p as above

- completed Ertapenem- changed to Levo/flagyl

18: ERCP- pancreatic duct obstruction- s/p stent. Ongoing clinical 
improvement



H/o Left lower abdominal wall cellulitis with subcutaneous fluid collection 
related to infected abd ascitic fluid (see above)-improved

- 3 cm collection noted on initial CT abdomen/pelvis scan 7/15 and persistent 
on FU CT abd 

- IR guided drainage  "superficial left abdominal wall fluid collection, 
which appear to communicate with underlying ascitic fluid, with underlying 
loculated ascites."

-  Abd wall/peritoneal fluid- MSSA, Candida glabrata (both from broth)



H/o MSSA bacteremia

- rpt BC  No growth

- (No BC prior to transfer to  from [facility] per verbal from lab)

- TTE- nl valves



Hx Fever/confusion- responded to atbx- thought possibly LLL PNA

- CXR 17: Sl increased opacity in the left lower lung, possibly pneumonia 
or atelectasis.

- CT A/P 11/3/17: Overall decrease in size complex interconnecting fluid and 
gas containing collections within the anterior pararenal/ space with a cyst 
gastrostomy tube in place

- BCx : NGTD

- UA neg; Urine legionella & s. Pneumo: negative

- RVP: negative

- PCT:0.21

- CXR 11/10: no changes, continued L basilar opacity



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 
reversal in 2017 ([place], [place])

CAD s/p PCI with 9 prior stents

MATHEW - CPAP

HTN 



Recommendations: 



I had a long discussion w pt and wife re atbx. I think he has made significant 
progress. I am not sure if he has persistent abdominal fluid collection as we 
do not have CT today, but given the slow resolution of this based on previous 
CT and no drain in this collection I think it is likely. Also given the ERCP w 
new stent for obstruction I think he is at risk for some post procedure 
cholangitis. For this reason I would favor continuing antibiotics. They are 
very much in agreement as they feel that his ongoing improvement was 
attributable to the more prolonged course of atbx he received following his 
last hospitalization. I think it would be helpful to repeat CT to access fluid 
collection, but given distance that he lives and upcoming repeat ERCP we will 
defer this until he returns for next ERCP appt. Given the potential for 
toxicity related to long term quinolone use, I will plan to step him down to 
augmentin in 2 weeks. He will cont that until FU. He was again counseled re 
potential SE of long term atbx. 



1. Continue Levofloxacin and metronidazole. Rx sent to [facility] OP per pt 
request. Plan 2 weeks. At conclusion of this atbx regimen will step down to 
Augmentin 875/125mg po bid - pt has large supply at home given prior Rx that 
was not used. He will stay on this until he returns in 4 weeks for repeat ERCP

2. Plan repeat CT abd/pelvis w contrast when returns for ERCP appt in 4 weeks- 
will coordinate once ERCP date set

3. FU labs in 2 weeks; CBC, CMP

4. Counseled re long term use of levofloxacin including tendinitis. He will 
call if questions or concerns and stop immediately. 



Aby Bah MD



Complexity of medical decision making is high b/c of the multi-system nature of 
the infectious disease process and concerns about the complexity of the patient 
illness including the sensitivity of the organisms being treated, the potential 
for drug toxicity and interactions, concerns about immunologic function, and 
interplay of other issues.



 



 



in this encounter



Miscellaneous Notes

* Addendum Note - Whit Brock RN - 2018  2:20 PM CST



 Addended by: WHIT BROCK on: 2018 02:20 PM



  Modules accepted: Orders



  

in this encounter



Plan of Treatment







    



  Date   Type   Specialty   Care Team   Description

 

    



  2018   Procedure Pass   Infectious Diseases  

 

    



  2018   Surgery    Fan Tidwell MD   ESOPHAGOGASTRODUODENOSCOP



     [address]



     [address] 



     [place] [address] 



     [phone] (Fax) 

 

    



  2018   Procedure Pass   

 

    



  2018   Hospital    Fan Tidwell MD   Pancreatic necrosis



   Encounter    [address] 



     [address] 



     [place] [address] 



     [phone] (Fax) 









   



  Name   Priority   Associated Diagnoses   Order Schedule

 

   



  CBC AND DIFF   Routine   Fluid collection of   Expected: 2018



    pancreas   (Approximate), Expires:



    Long-term use of   2019



    high-risk medication 

 

   



  COMPREHENSIVE METABOLIC PANEL   Routine   Fluid collection of   Expected: 



    pancreas   (Approximate), Expires:



    Long-term use of   2019



    high-risk medication 

 

   



  CT ABD/PELV W CONTRAST   Routine   Fluid collection of   Expected: 2018



    pancreas   (Approximate), Expires:



     2019



as of this encounter



Visit Diagnoses











  Diagnosis

 





  Infective necrosis of pancreas - Primary

 





  Acute pancreatitis

 





  Fluid collection of pancreas

 





  Long-term use of high-risk medication

## 2018-02-05 NOTE — DIAGNOSTIC IMAGING REPORT
INDICATION: Left abdominal pain with nausea and emesis.



EXAM: Supine and upright views of the abdomen are obtained with a

single view of the chest.



FINDINGS:  

The heart size and pulmonary vascularity are within normal

limits. The lungs appear clear. There are coronary artery stents

present.



Within the abdomen, there is air-fluid level noted in the right

colon near site of surgical anastomosis. There is circular mesh

or a stent device in the left upper quadrant. A moderate amount

of stool is seen throughout the colon without transition point to

indicate an obstruction.



IMPRESSION:

Air-fluid level in the right midabdomen may be within or adjacent

to bowel. Consideration could be given to CT scan to exclude

extraluminal collection such as abscess.



Dictated by: 



  Dictated on workstation # [id number]

## 2018-02-05 NOTE — XMS REPORT
Encounter Summary

 Created on: 2018



Moshe Lopez

External Reference #: [id number]

: 1940

Sex: Male



Demographics







 Address  [address] [place]  [address]

 

 Home Phone  [phone]

 

 Preferred Language  English

 

 Marital Status  Unknown

 

 Rastafarian Affiliation  NON

 

 Race  White

 

 Ethnic Group  Not  or 





Author







 Author  [facility]

 

 Organization  [facility]

 

 Address  Unknown

 

 Phone  Unavailable







Support







 Name  Relationship  Address  Phone

 

 Latisha Lopez  EC[employer]  Unknown  [phone]







Care Team Providers







 Care Team Member Name  Role  Phone

 

 VelezJaelyn beck   PCP  [phone]

 

 Jaelyn Velez DO  Unavailable  [phone]







Reason for Visit

* 





 



  Reason   Comments

 

 



  Appointment Request   EGD









Encounter Details







    



  Date   Type   Department   Care Team   Description

 

    



  2018   Telephone   [facility]   Nicole Ledezma   Appointment 
Request (EGD)



    Physicians - Internal  



    Medicine  



    2ND FLOOR POD B  



    [address] [facility] [address]  



    [place]  



    [address] [phone]  







Social History







    



  Tobacco Use   Types   Packs/Day   Years Used   Date

 

    



  Former Smoker   Cigarettes, Pipe, Cigars   1   15   Quit: 1975

 

    



  Smokeless Tobacco: Never   



  Used   









   



  Alcohol Use   Drinks/Week   oz/Week   Comments

 

   



  Yes   1 Cans of   0.6   1 beer every 2 weeks



   beer  









 



  Sex Assigned at Birth   Date Recorded

 

 



  Not on file 



as of this encounter



Functional Status







  



  Functional Status   Response   Date of Assessment

 

  



  Does the patient have a hearing impairment:   No   2017

 

  



  Does the patient have a visual impairment:   Yes   2017

 

  



  Does the patient have impaired ambulation:   No   2017

 

  



  Does the patient have an activity of daily living   No   2017



  (ADL) impairment:  

 

  



  Does the patient have an instrumental activity of   No   2017



  daily living (IADL) impairment:  









  



  Cognitive Status   Response   Date of Assessment

 

  



  Does the patient have a cognitive impairment:   No   2017



as of this encounter



Miscellaneous Notes

* Telephone Encounter - Nicole Ledezma - 2018  4:08 PM CST



Scheduled EGD with Dr Tidwell on 18. Verbal instructions given over the 
phone. Advised patient to contact the prescribing provider for any blood 
thinning medication and/or aspirin recommendations 1 week prior to having 
procedure. Patient verbalized understanding. 



in this encounter



Plan of Treatment







    



  Date   Type   Specialty   Care Team   Description

 

    



  2018   Procedure Pass   Infectious Diseases  

 

    



  2018   Surgery    Fan Tidwell MD   ESOPHAGOGASTRODUODENOSCOP



     [address]



     [address] 



     [place] [address] [phone] (Fax) 

 

    



  2018   Procedure Pass   

 

    



  2018   Hospital    Fan Tidwell MD   Pancreatic necrosis



   Encounter    [address] 



     [address] 



     [place] [address] [phone] (Fax) 



as of this encounter



Visit Diagnoses

Not on filein this encounter

## 2019-09-23 ENCOUNTER — HOSPITAL ENCOUNTER (OUTPATIENT)
Dept: HOSPITAL 75 - CARD | Age: 79
End: 2019-09-23
Attending: INTERNAL MEDICINE
Payer: MEDICARE

## 2019-09-23 VITALS — SYSTOLIC BLOOD PRESSURE: 164 MMHG | DIASTOLIC BLOOD PRESSURE: 70 MMHG

## 2019-09-23 VITALS — DIASTOLIC BLOOD PRESSURE: 72 MMHG | SYSTOLIC BLOOD PRESSURE: 147 MMHG

## 2019-09-23 DIAGNOSIS — I49.5: ICD-10-CM

## 2019-09-23 DIAGNOSIS — I10: Primary | ICD-10-CM

## 2019-09-23 DIAGNOSIS — E78.5: ICD-10-CM

## 2019-09-23 PROCEDURE — 93017 CV STRESS TEST TRACING ONLY: CPT

## 2019-09-23 PROCEDURE — 93306 TTE W/DOPPLER COMPLETE: CPT

## 2019-09-23 PROCEDURE — 78452 HT MUSCLE IMAGE SPECT MULT: CPT

## 2019-09-23 NOTE — STRESS TEST
DATE OF SERVICE:  09/23/2019



LEXISCAN MYOVIEW STRESS TEST REPORT



REFERRING PHYSICIAN:

Jaelyn Velez DO



Baseline heart rate is 53.  Baseline blood pressure 164/70.  Baseline EKG is

sinus rhythm with no ischemic changes.



In summary, the patient was injected with 10.4 mCi of technetium-99 Myoview and

the resting images were obtained.  Then, the patient received 0.4 mg of Lexiscan

followed by 28.8 mCi of technetium-99 Myoview.  Throughout the test, there were

no EKG changes.

t

The resting and stress images were reviewed and compared in the short axis,

horizontal long axis, and vertical long axis views.  Review of the images showed

motion artifact with diaphragmatic attenuation with mild reversible ischemia

involving the mid to apical inferolateral and anterolateral wall.  SSS is 3, SDS

3, TID value 1.05.  On the gated images, the left ventricle appeared to be in

normal size with normal contractility.  Calculated ejection fraction 54%.



CONCLUSION:

1.  The patient tolerated Lexiscan well.

2.  Diaphragmatic attenuation with motion artifact with questionable mild

ischemia involving the mid to apical inferolateral and anterolateral wall.

3.  Normal left ventricular size with normal contractility.  Calculated ejection

fraction 54%.





Job ID: [id number]

DocumentID: [id number]

Dictated Date:  09/23/2019 11:49:05

Transcription Date: 09/23/2019 15:12:02

Dictated By: THEO ALMEIDA MD

## 2019-10-02 ENCOUNTER — HOSPITAL ENCOUNTER (OUTPATIENT)
Dept: HOSPITAL 75 - CATH | Age: 79
Discharge: HOME | End: 2019-10-02
Attending: INTERNAL MEDICINE
Payer: MEDICARE

## 2019-10-02 VITALS — DIASTOLIC BLOOD PRESSURE: 57 MMHG | SYSTOLIC BLOOD PRESSURE: 124 MMHG

## 2019-10-02 VITALS — HEIGHT: 66.14 IN | BODY MASS INDEX: 33.66 KG/M2 | WEIGHT: 209.44 LBS

## 2019-10-02 VITALS — SYSTOLIC BLOOD PRESSURE: 130 MMHG | DIASTOLIC BLOOD PRESSURE: 55 MMHG

## 2019-10-02 VITALS — SYSTOLIC BLOOD PRESSURE: 126 MMHG | DIASTOLIC BLOOD PRESSURE: 58 MMHG

## 2019-10-02 VITALS — SYSTOLIC BLOOD PRESSURE: 130 MMHG | DIASTOLIC BLOOD PRESSURE: 60 MMHG

## 2019-10-02 VITALS — SYSTOLIC BLOOD PRESSURE: 124 MMHG | DIASTOLIC BLOOD PRESSURE: 57 MMHG

## 2019-10-02 VITALS — DIASTOLIC BLOOD PRESSURE: 61 MMHG | SYSTOLIC BLOOD PRESSURE: 130 MMHG

## 2019-10-02 VITALS — SYSTOLIC BLOOD PRESSURE: 115 MMHG | DIASTOLIC BLOOD PRESSURE: 49 MMHG

## 2019-10-02 VITALS — DIASTOLIC BLOOD PRESSURE: 60 MMHG | SYSTOLIC BLOOD PRESSURE: 130 MMHG

## 2019-10-02 VITALS — SYSTOLIC BLOOD PRESSURE: 131 MMHG | DIASTOLIC BLOOD PRESSURE: 59 MMHG

## 2019-10-02 VITALS — SYSTOLIC BLOOD PRESSURE: 143 MMHG | DIASTOLIC BLOOD PRESSURE: 66 MMHG

## 2019-10-02 VITALS — DIASTOLIC BLOOD PRESSURE: 87 MMHG | SYSTOLIC BLOOD PRESSURE: 162 MMHG

## 2019-10-02 VITALS — SYSTOLIC BLOOD PRESSURE: 131 MMHG | DIASTOLIC BLOOD PRESSURE: 61 MMHG

## 2019-10-02 DIAGNOSIS — J44.9: ICD-10-CM

## 2019-10-02 DIAGNOSIS — I49.5: ICD-10-CM

## 2019-10-02 DIAGNOSIS — Z79.899: ICD-10-CM

## 2019-10-02 DIAGNOSIS — Z99.89: ICD-10-CM

## 2019-10-02 DIAGNOSIS — E78.5: ICD-10-CM

## 2019-10-02 DIAGNOSIS — Z87.891: ICD-10-CM

## 2019-10-02 DIAGNOSIS — Z88.8: ICD-10-CM

## 2019-10-02 DIAGNOSIS — M19.90: ICD-10-CM

## 2019-10-02 DIAGNOSIS — I25.10: Primary | ICD-10-CM

## 2019-10-02 DIAGNOSIS — Z80.0: ICD-10-CM

## 2019-10-02 DIAGNOSIS — G47.33: ICD-10-CM

## 2019-10-02 DIAGNOSIS — I10: ICD-10-CM

## 2019-10-02 DIAGNOSIS — Z91.048: ICD-10-CM

## 2019-10-02 DIAGNOSIS — Z79.82: ICD-10-CM

## 2019-10-02 DIAGNOSIS — I77.1: ICD-10-CM

## 2019-10-02 DIAGNOSIS — F41.9: ICD-10-CM

## 2019-10-02 DIAGNOSIS — M51.36: ICD-10-CM

## 2019-10-02 DIAGNOSIS — Z82.49: ICD-10-CM

## 2019-10-02 DIAGNOSIS — Z87.19: ICD-10-CM

## 2019-10-02 LAB
ALBUMIN SERPL-MCNC: 4.5 GM/DL (ref 3.2–4.5)
ALP SERPL-CCNC: 79 U/L (ref 40–136)
ALT SERPL-CCNC: 70 U/L (ref 0–55)
APTT BLD: 32 SEC (ref 24–35)
APTT PPP: YELLOW S
BACTERIA #/AREA URNS HPF: (no result) /HPF
BILIRUB SERPL-MCNC: 0.5 MG/DL (ref 0.1–1)
BILIRUB UR QL STRIP: NEGATIVE
BUN/CREAT SERPL: 16
CALCIUM SERPL-MCNC: 9.7 MG/DL (ref 8.5–10.1)
CHLORIDE SERPL-SCNC: 109 MMOL/L (ref 98–107)
CO2 SERPL-SCNC: 24 MMOL/L (ref 21–32)
CREAT SERPL-MCNC: 1.4 MG/DL (ref 0.6–1.3)
ERYTHROCYTE [DISTWIDTH] IN BLOOD BY AUTOMATED COUNT: 13.5 % (ref 10–14.5)
FIBRINOGEN PPP-MCNC: CLEAR MG/DL
GFR SERPLBLD BASED ON 1.73 SQ M-ARVRAT: 49 ML/MIN
GLUCOSE SERPL-MCNC: 115 MG/DL (ref 70–105)
GLUCOSE UR STRIP-MCNC: NEGATIVE MG/DL
HCT VFR BLD CALC: 40 % (ref 40–54)
HGB BLD-MCNC: 13.6 G/DL (ref 13.3–17.7)
INR PPP: 1 (ref 0.8–1.4)
KETONES UR QL STRIP: NEGATIVE
LEUKOCYTE ESTERASE UR QL STRIP: (no result)
MCH RBC QN AUTO: 31 PG (ref 25–34)
MCHC RBC AUTO-ENTMCNC: 34 G/DL (ref 32–36)
MCV RBC AUTO: 91 FL (ref 80–99)
NITRITE UR QL STRIP: NEGATIVE
PH UR STRIP: 6 [PH] (ref 5–9)
PLATELET # BLD: 187 10^3/UL (ref 130–400)
PMV BLD AUTO: 11 FL (ref 7.4–10.4)
POTASSIUM SERPL-SCNC: 4.3 MMOL/L (ref 3.6–5)
PROT SERPL-MCNC: 7.3 GM/DL (ref 6.4–8.2)
PROT UR QL STRIP: NEGATIVE
PROTHROMBIN TIME: 13.3 SEC (ref 12.2–14.7)
RBC #/AREA URNS HPF: (no result) /HPF
SODIUM SERPL-SCNC: 144 MMOL/L (ref 135–145)
SP GR UR STRIP: 1.02 (ref 1.02–1.02)
SQUAMOUS #/AREA URNS HPF: (no result) /HPF
UROBILINOGEN UR-MCNC: NORMAL MG/DL
WBC # BLD AUTO: 5.6 10^3/UL (ref 4.3–11)
WBC #/AREA URNS HPF: (no result) /HPF

## 2019-10-02 PROCEDURE — 93458 L HRT ARTERY/VENTRICLE ANGIO: CPT

## 2019-10-02 PROCEDURE — 85027 COMPLETE CBC AUTOMATED: CPT

## 2019-10-02 PROCEDURE — 80053 COMPREHEN METABOLIC PANEL: CPT

## 2019-10-02 PROCEDURE — 36415 COLL VENOUS BLD VENIPUNCTURE: CPT

## 2019-10-02 PROCEDURE — 87081 CULTURE SCREEN ONLY: CPT

## 2019-10-02 PROCEDURE — 85730 THROMBOPLASTIN TIME PARTIAL: CPT

## 2019-10-02 PROCEDURE — 85610 PROTHROMBIN TIME: CPT

## 2019-10-02 PROCEDURE — 71045 X-RAY EXAM CHEST 1 VIEW: CPT

## 2019-10-02 PROCEDURE — 81000 URINALYSIS NONAUTO W/SCOPE: CPT

## 2019-10-02 NOTE — DIAGNOSTIC IMAGING REPORT
PATIENT HISTORY:  Abnormal stress test, chest pain, coronary

artery disease.



TECHNIQUE: Single frontal view of the chest



COMPARISON:  02/05/2018



FINDINGS:



The lung volumes are normal. No focal consolidation is seen. No

large pleural effusion or pneumothorax is seen. The

cardiomediastinal silhouette is normal in size and contour. No

acute osseous abnormality is seen.



IMPRESSION:



No acute pulmonary abnormality seen.



Dictated by: 



  Dictated on workstation # [id number]

## 2019-10-02 NOTE — CARDIAC PROCEDURE NOTE-CS/ASA
Pre-Procedure Note


Pre-Op Procedure Note


H&P Reviewed


The H&P was reviewed, patient examined and no changes noted.


Date H&P Reviewed:  Oct 2, 2019


Time H&P Reviewed:  12:59





Conscious Sedation Pre-Proced


Time


12:59





ASA Score


3


For ASA 3 and 4: Consider anesthesia and medical clearance. Also, for patients

with a history of failed moderate sedation consider anesthesia.

















Airway 


 


Lungs 


 


Heart 


 


 ASA score


 


 ASA 1: a normal healthy patient


 


 ASA 2:  a patient with a mild systemic disease (mid diabetes, controlled 

hypertension, obesity 


 


x ASA 3:  a patient with a severe systemic disease that limits activity  

(angina, COPD, prior Myocardial infarction)


 


 ASA 4:  a patient with an incapacitating disease that is a constant threat to 

life (CHF, renal failure)


 


 ASA 5:  a moribund patient not expected to survive 24 hrs.  (ruptured aneurysm)


 


 ASA 6:  a declared brain-dead patient whose organs are being harvested.


 


 For emergent operations, add the letter E after the classification











Mallampati Classification


Grade 3





Sedation Plan


Analgesia, Amnesia, Plan communicated to team members, Discussed options with 

patient/fam, Discussed risks with patient/fam


The patient is an appropriate candidate to undergo the planned procedure, 

sedation, and anesthesia.





The patient immediately re-assessed prior to indication.











THEO ALMEIDA MD               Oct 2, 2019 13:00

## 2019-10-02 NOTE — NUR
SPOKE WITH PT (AND HIS WIFE) AS WELL AS CALLING [facility] TO COMPLETE THE MED REC.



2019 TAMSULOSIN #180/90DS

2019 OMEPRAZOLE #90/90DS

2019 FENOFIBRATE #90/90DS

2019 FINASTERIDE #90/90DS

09- AMLODIPINE #90/90DS

2019 FAMOTIDINE #30/30DS

2019 HYDRALAZINE #60/30DS



OTC MEDS:

ASPIRIN 81M DAILY

DOCUSATE: 1 DAILY

LORATADINE: 1 DAILY

MULTIVITAMIN: 1 DAILY

FISH OIL: BID

VITAMIN B COMPLEX: 1 DAILY

 



CITALOPRAM: HAS BEEN DISCONTINUED

## 2019-10-02 NOTE — CARDIAC CATH REPORT
Cardiac Cath Report


Physician (s)/Assistant (s)


Physician


THEO ALMEIDA MD





Pre-Procedure Diagnosis


Pre-Procedure Diagnosis:  Coronary artery disease





Post-Procedure Note


Procedure Start Date:  Oct 2, 2019


Name of Procedure:  


Left heart catheterization


Findings/Procedure Note


PROCEDURE NOTE:


79 years old gentleman with history of coronary artery disease multiple 

interventions of past, reporting history of having total of 9 stents done in 

[place].  Had an abnormal stress test scheduled for cardiac catheterization 

possible PTCA.


After explaining the procedure to the patient, all pros and cons were explained,

all questions were answered.  The patient signed the consent and then he  was 

placed on the cardiac catheterization laboratory. Groin was prepped SL fashion 

local anesthesia was used. Sheath placed in the right femoral artery. Pillo 

right and left catheter were used to access the coronary system. Pigtail was 

used to access the left ventricular cavity. 


Left ventriculogram was not done to limit the exposure to contrast





At the end of the procedure the sheath was removed. Closure device was used





FINDINGS:





Hemodynamics 


/14, end-diastolic pressure 14


Aorta 126/45 mean of 73





ANATOMY:


Left Main is free of obstructive disease


Left Anterior Descending has multiple overlapping stent extending from the 

proximal to the mid to the distal portion with moderate in-stent restenosis 

distally, the artery tapered down into less than 1 mm artery distally with 

moderate disease


Left Circumflex has multiple stents at the proximal and midportion, mild in-

stent restenosis, distally there is an area of subtotal occlusion the artery is 

less than 1 mm in diameter medical therapy is recommended


Right Coronory Artery is large dominant artery with 50 percent stenosis distally

proximal to the bifurcation, nonobstructive disease


LV Gram was not done, pressure was measured





CONCLUSION:


1.  Multiple stents in the LAD overlapping from the proximal to the mid to the 

distal portion with mild to moderate in-stent restenosis, moderate disease at 

the distal LAD less than 1 mm in diameter


2.  Mild to moderate disease in the circumflex stent, nonobstructive disease.  

Distally the circumflex artery has subtotal occlusion, the artery is very small 

less than 1 mm in diameter


3.  Large dominant right coronary artery with moderate disease distally prior to

the bifurcation, nonobstructive disease





DISCUSSION AND RECOMMENDATION:


continue to maximize medical therapy at this point and monitor closely for 

progression of the disease in the LAD and the right coronary artery


Anesthesia Type:  Conscious Sedation


Estimated blood loss (mL):  15 ml


Contrast Amount:  35 ml


Total Radiation Dose:  663 mGy





Post-Procedure Diagnosis


Post-operative diagnosis:  


Shortness of breath


Coronary artery disease


Hypertension


Hyperlipidemia











THEO ALMEIDA MD               Oct 2, 2019 13:07

## 2019-10-02 NOTE — DISCHARGE INST-POST CATH
Discharge Inst-CATH/EP


Problems Reviewed?:  Yes


Post Cardiac Cath/EP D/C Inst


Follow Up/Plan





Appointment with Dr. ALMEIDA's office in 2-4 weeks


<b>CARDIAC CATH/EP PROCEDURE DISCHARGE INSTRUCTIONS</b>





ACTIVITY





* Go Home directly and rest.


* Limit activity of the leg (or wrist if it was used) for 7 days including 

aerobics, swimming,


   jogging, bicycling, etc.


* Restrict stair-climbing for 7 days if possible, if not, climb up with your 

non-cath leg, then


   bring together on the same step.


* Avoid lifting, pushing, pulling or excessive movement of the affected 

extremity for 7 days.


* Customary sexual activity may be resumed after 2 days-use caution not to use a

position  


   that strains or causes pain to the affected extremity.


* No driving for 24 hours.


* NO SMOKING. 


* Avoid straining for bowel movements for 7 days.


* Gentle walking on level ground is allowed.


* Returning to work will depend on the type of procedure and the results. Your 

doctor will discuss


   this with you.





CALL YOUR DOCTOR FOR ANY OF THE FOLLOWING:





*If bleeding from the puncture site occurs- Apply gentle pressure to site with 

clean cloth and call


   your doctor or EMS.


* If a knot or lump forms under the skin, increases in size, or causes pain.


* If bruising appears to be worsening or moving further down your leg instead of

disappearing.


* Temperature above 101 F.





CARE OF YOUR GROIN INCISION;





* Bruising or purple discoloration of the skin near the puncture site is common.


* You may shower only, no bathtub bathing for 5 days.  Be careful to avoid 

slipping as your


   leg may feel stiff.


* If a closure device was used on your femoral artery, please see the attached 

guide regarding


   care of the device and your leg.


* Leave dressing on FOR 24 hours.





CARE OF YOUR WRIST INCISION;





* Bruising or purple discoloration of the skin near the puncture site is common.


* You may shower.


* DO NOT submerge wrist.


* Leave dressing on FOR 24 hours.











THEO ALMEIDA MD               Oct 2, 2019 13:02

## 2019-10-30 ENCOUNTER — HOSPITAL ENCOUNTER (OUTPATIENT)
Dept: HOSPITAL 75 - RT | Age: 79
End: 2019-10-30
Attending: NURSE PRACTITIONER
Payer: MEDICARE

## 2019-10-30 DIAGNOSIS — G47.10: ICD-10-CM

## 2019-10-30 DIAGNOSIS — G47.33: Primary | ICD-10-CM

## 2019-10-30 DIAGNOSIS — R06.00: ICD-10-CM

## 2019-10-30 PROCEDURE — 94060 EVALUATION OF WHEEZING: CPT

## 2019-10-30 PROCEDURE — 94729 DIFFUSING CAPACITY: CPT

## 2019-10-30 PROCEDURE — 94726 PLETHYSMOGRAPHY LUNG VOLUMES: CPT

## 2019-11-13 ENCOUNTER — HOSPITAL ENCOUNTER (OUTPATIENT)
Dept: HOSPITAL 75 - CARD | Age: 79
LOS: 90 days | Discharge: HOME | End: 2020-02-11
Attending: PHYSICIAN ASSISTANT
Payer: MEDICARE

## 2019-11-13 DIAGNOSIS — I49.3: ICD-10-CM

## 2019-11-13 DIAGNOSIS — I10: ICD-10-CM

## 2019-11-13 DIAGNOSIS — Z82.49: ICD-10-CM

## 2019-11-13 DIAGNOSIS — I25.10: Primary | ICD-10-CM

## 2019-11-13 PROCEDURE — 93226 XTRNL ECG REC<48 HR SCAN A/R: CPT

## 2019-11-13 PROCEDURE — 93225 XTRNL ECG REC<48 HRS REC: CPT

## 2019-11-22 ENCOUNTER — APPOINTMENT (RX ONLY)
Dept: URBAN - METROPOLITAN AREA CLINIC 39 | Facility: CLINIC | Age: 79
Setting detail: DERMATOLOGY
End: 2019-11-22

## 2019-11-22 DIAGNOSIS — D22 MELANOCYTIC NEVI: ICD-10-CM

## 2019-11-22 DIAGNOSIS — L71.8 OTHER ROSACEA: ICD-10-CM

## 2019-11-22 DIAGNOSIS — L82.1 OTHER SEBORRHEIC KERATOSIS: ICD-10-CM

## 2019-11-22 DIAGNOSIS — D18.0 HEMANGIOMA: ICD-10-CM

## 2019-11-22 DIAGNOSIS — L81.3 CAFÉ AU LAIT SPOTS: ICD-10-CM

## 2019-11-22 DIAGNOSIS — L81.4 OTHER MELANIN HYPERPIGMENTATION: ICD-10-CM

## 2019-11-22 DIAGNOSIS — L91.8 OTHER HYPERTROPHIC DISORDERS OF THE SKIN: ICD-10-CM

## 2019-11-22 PROBLEM — D18.01 HEMANGIOMA OF SKIN AND SUBCUTANEOUS TISSUE: Status: ACTIVE | Noted: 2019-11-22

## 2019-11-22 PROBLEM — D22.72 MELANOCYTIC NEVI OF LEFT LOWER LIMB, INCLUDING HIP: Status: ACTIVE | Noted: 2019-11-22

## 2019-11-22 PROBLEM — D22.5 MELANOCYTIC NEVI OF TRUNK: Status: ACTIVE | Noted: 2019-11-22

## 2019-11-22 PROBLEM — D22.62 MELANOCYTIC NEVI OF LEFT UPPER LIMB, INCLUDING SHOULDER: Status: ACTIVE | Noted: 2019-11-22

## 2019-11-22 PROBLEM — D22.61 MELANOCYTIC NEVI OF RIGHT UPPER LIMB, INCLUDING SHOULDER: Status: ACTIVE | Noted: 2019-11-22

## 2019-11-22 PROBLEM — D22.71 MELANOCYTIC NEVI OF RIGHT LOWER LIMB, INCLUDING HIP: Status: ACTIVE | Noted: 2019-11-22

## 2019-11-22 PROCEDURE — 99203 OFFICE O/P NEW LOW 30 MIN: CPT

## 2019-11-22 PROCEDURE — ? COUNSELING

## 2019-11-22 ASSESSMENT — LOCATION SIMPLE DESCRIPTION DERM
LOCATION SIMPLE: LEFT CHEEK
LOCATION SIMPLE: LEFT UPPER ARM
LOCATION SIMPLE: LEFT THIGH
LOCATION SIMPLE: LEFT LOWER BACK
LOCATION SIMPLE: RIGHT CHEEK
LOCATION SIMPLE: RIGHT UPPER BACK
LOCATION SIMPLE: ABDOMEN
LOCATION SIMPLE: CHEST
LOCATION SIMPLE: RIGHT UPPER ARM
LOCATION SIMPLE: LEFT AXILLA
LOCATION SIMPLE: LEFT ELBOW
LOCATION SIMPLE: RIGHT AXILLA
LOCATION SIMPLE: RIGHT THIGH

## 2019-11-22 ASSESSMENT — LOCATION ZONE DERM
LOCATION ZONE: LEG
LOCATION ZONE: TRUNK
LOCATION ZONE: AXILLAE
LOCATION ZONE: ARM
LOCATION ZONE: FACE

## 2019-11-22 ASSESSMENT — LOCATION DETAILED DESCRIPTION DERM
LOCATION DETAILED: RIGHT ANTERIOR DISTAL UPPER ARM
LOCATION DETAILED: EPIGASTRIC SKIN
LOCATION DETAILED: STERNUM
LOCATION DETAILED: LEFT SUPERIOR LATERAL MIDBACK
LOCATION DETAILED: LEFT CENTRAL MALAR CHEEK
LOCATION DETAILED: RIGHT MID-UPPER BACK
LOCATION DETAILED: LEFT ANTERIOR DISTAL UPPER ARM
LOCATION DETAILED: LEFT ANTERIOR PROXIMAL THIGH
LOCATION DETAILED: RIGHT ANTERIOR PROXIMAL THIGH
LOCATION DETAILED: RIGHT CENTRAL MALAR CHEEK
LOCATION DETAILED: LEFT ANTERIOR AXILLA
LOCATION DETAILED: LEFT ANTECUBITAL SKIN
LOCATION DETAILED: RIGHT ANTERIOR AXILLA
LOCATION DETAILED: RIGHT INFERIOR MEDIAL UPPER BACK
LOCATION DETAILED: PERIUMBILICAL SKIN

## 2019-11-22 ASSESSMENT — PAIN INTENSITY VAS: HOW INTENSE IS YOUR PAIN 0 BEING NO PAIN, 10 BEING THE MOST SEVERE PAIN POSSIBLE?: NO PAIN

## 2019-11-22 NOTE — PROCEDURE: COUNSELING
Detail Level: Zone
Detail Level: Simple
Patient Specific Counseling (Will Not Stick From Patient To Patient): Pt. declines treatment today
Detail Level: Generalized

## 2020-12-01 ENCOUNTER — HOSPITAL ENCOUNTER (OUTPATIENT)
Dept: HOSPITAL 75 - LABNPT | Age: 80
End: 2020-12-01
Attending: INTERNAL MEDICINE
Payer: MEDICARE

## 2020-12-01 DIAGNOSIS — Z20.828: Primary | ICD-10-CM

## 2020-12-01 PROCEDURE — 87635 SARS-COV-2 COVID-19 AMP PRB: CPT

## 2021-01-20 ENCOUNTER — APPOINTMENT (RX ONLY)
Dept: URBAN - METROPOLITAN AREA CLINIC 39 | Facility: CLINIC | Age: 81
Setting detail: DERMATOLOGY
End: 2021-01-20

## 2021-01-20 DIAGNOSIS — D22 MELANOCYTIC NEVI: ICD-10-CM

## 2021-01-20 DIAGNOSIS — Z71.89 OTHER SPECIFIED COUNSELING: ICD-10-CM

## 2021-01-20 DIAGNOSIS — L81.3 CAFÉ AU LAIT SPOTS: ICD-10-CM

## 2021-01-20 DIAGNOSIS — L57.8 OTHER SKIN CHANGES DUE TO CHRONIC EXPOSURE TO NONIONIZING RADIATION: ICD-10-CM

## 2021-01-20 DIAGNOSIS — D18.0 HEMANGIOMA: ICD-10-CM

## 2021-01-20 DIAGNOSIS — L91.8 OTHER HYPERTROPHIC DISORDERS OF THE SKIN: ICD-10-CM

## 2021-01-20 DIAGNOSIS — L72.8 OTHER FOLLICULAR CYSTS OF THE SKIN AND SUBCUTANEOUS TISSUE: ICD-10-CM

## 2021-01-20 DIAGNOSIS — L82.1 OTHER SEBORRHEIC KERATOSIS: ICD-10-CM

## 2021-01-20 DIAGNOSIS — L81.4 OTHER MELANIN HYPERPIGMENTATION: ICD-10-CM

## 2021-01-20 PROBLEM — D22.61 MELANOCYTIC NEVI OF RIGHT UPPER LIMB, INCLUDING SHOULDER: Status: ACTIVE | Noted: 2021-01-20

## 2021-01-20 PROBLEM — D22.71 MELANOCYTIC NEVI OF RIGHT LOWER LIMB, INCLUDING HIP: Status: ACTIVE | Noted: 2021-01-20

## 2021-01-20 PROBLEM — D29.4 BENIGN NEOPLASM OF SCROTUM: Status: ACTIVE | Noted: 2021-01-20

## 2021-01-20 PROBLEM — D22.5 MELANOCYTIC NEVI OF TRUNK: Status: ACTIVE | Noted: 2021-01-20

## 2021-01-20 PROBLEM — D22.62 MELANOCYTIC NEVI OF LEFT UPPER LIMB, INCLUDING SHOULDER: Status: ACTIVE | Noted: 2021-01-20

## 2021-01-20 PROBLEM — D18.01 HEMANGIOMA OF SKIN AND SUBCUTANEOUS TISSUE: Status: ACTIVE | Noted: 2021-01-20

## 2021-01-20 PROBLEM — D22.72 MELANOCYTIC NEVI OF LEFT LOWER LIMB, INCLUDING HIP: Status: ACTIVE | Noted: 2021-01-20

## 2021-01-20 PROCEDURE — 99213 OFFICE O/P EST LOW 20 MIN: CPT

## 2021-01-20 PROCEDURE — ? COUNSELING

## 2021-01-20 ASSESSMENT — LOCATION SIMPLE DESCRIPTION DERM
LOCATION SIMPLE: RIGHT THIGH
LOCATION SIMPLE: RIGHT UPPER BACK
LOCATION SIMPLE: LEFT UPPER BACK
LOCATION SIMPLE: CHEST
LOCATION SIMPLE: SCROTUM
LOCATION SIMPLE: ABDOMEN
LOCATION SIMPLE: RIGHT UPPER ARM
LOCATION SIMPLE: LEFT THIGH
LOCATION SIMPLE: LEFT UPPER ARM
LOCATION SIMPLE: LEFT AXILLARY VAULT
LOCATION SIMPLE: RIGHT AXILLARY VAULT
LOCATION SIMPLE: RIGHT FOREHEAD

## 2021-01-20 ASSESSMENT — LOCATION DETAILED DESCRIPTION DERM
LOCATION DETAILED: RIGHT MEDIAL INFERIOR CHEST
LOCATION DETAILED: LEFT ANTERIOR DISTAL THIGH
LOCATION DETAILED: LEFT ANTERIOR PROXIMAL THIGH
LOCATION DETAILED: RIGHT SUPERIOR MEDIAL FOREHEAD
LOCATION DETAILED: LEFT SUPERIOR UPPER BACK
LOCATION DETAILED: RIGHT SUPERIOR UPPER BACK
LOCATION DETAILED: LEFT INFERIOR FLANK
LOCATION DETAILED: LEFT ANTERIOR PROXIMAL UPPER ARM
LOCATION DETAILED: RIGHT ANTERIOR PROXIMAL THIGH
LOCATION DETAILED: LEFT AXILLARY VAULT
LOCATION DETAILED: RIGHT AXILLARY VAULT
LOCATION DETAILED: RIGHT ANTERIOR PROXIMAL UPPER ARM
LOCATION DETAILED: RIGHT ANTERIOR SCROTUM
LOCATION DETAILED: RIGHT ANTERIOR DISTAL THIGH

## 2021-01-20 ASSESSMENT — LOCATION ZONE DERM
LOCATION ZONE: LEG
LOCATION ZONE: GENITALIA
LOCATION ZONE: AXILLAE
LOCATION ZONE: ARM
LOCATION ZONE: TRUNK
LOCATION ZONE: FACE

## 2021-01-20 ASSESSMENT — PAIN INTENSITY VAS: HOW INTENSE IS YOUR PAIN 0 BEING NO PAIN, 10 BEING THE MOST SEVERE PAIN POSSIBLE?: NO PAIN

## 2021-01-29 ENCOUNTER — HOSPITAL ENCOUNTER (OUTPATIENT)
Dept: HOSPITAL 75 - RAD | Age: 81
End: 2021-01-29
Attending: NURSE PRACTITIONER
Payer: MEDICARE

## 2021-01-29 DIAGNOSIS — R91.8: ICD-10-CM

## 2021-01-29 DIAGNOSIS — R06.02: ICD-10-CM

## 2021-01-29 DIAGNOSIS — K76.0: Primary | ICD-10-CM

## 2021-01-29 LAB
BUN/CREAT SERPL: 18
CREAT SERPL-MCNC: 1.23 MG/DL (ref 0.6–1.3)
GFR SERPLBLD BASED ON 1.73 SQ M-ARVRAT: 57 ML/MIN

## 2021-01-29 PROCEDURE — 84520 ASSAY OF UREA NITROGEN: CPT

## 2021-01-29 PROCEDURE — 36415 COLL VENOUS BLD VENIPUNCTURE: CPT

## 2021-01-29 PROCEDURE — 71260 CT THORAX DX C+: CPT

## 2021-01-29 PROCEDURE — 82565 ASSAY OF CREATININE: CPT

## 2021-01-29 NOTE — DIAGNOSTIC IMAGING REPORT
EXAMINATION: CT Chest with intravenous contrast.



TECHNIQUE: Multiple contiguous axial images were obtained through

the chest after the uneventful administration of intravenous

contrast. All CT scans use one or more of the following dose

optimizing techniques: automated exposure control, MA and/or KvP

adjustment based on a patient size and exam type, or iterative

reconstruction. 



HISTORY: Shortness of breath.



COMPARISON: None available.



FINDINGS: 



There is no edema or pneumonia. No pleural effusion. No

pneumothorax. There are a few 4 mm and smaller perifissural lymph

nodes and subpleural lymph nodes particularly in the left lower

lobe.



There is no axillary or supraclavicular lymphadenopathy. There is

no mediastinal lymphadenopathy. There are likely stents and

multiple coronary arteries, difficult to tell extents from

calcification due to motion artifact. Heart size is normal. There

are moderate coronary artery calcifications.  No pericardial

effusion. Aorta is normal in caliber.



Limited views of the upper abdomen show hepatic steatosis.



There are no suspicious osseous lesions.



IMPRESSION:



1. No acute abnormality in the chest.



2. Scattered small 4 mm and smaller pulmonary nodules, the

majority of which are likely lymph nodes. According to the

Fleischner Society guidelines: In a low risk patient, no routine

follow up is recommended. In a high risk patient, consider

optional CT at 12 months.



3. Hepatic steatosis.



Dictated by: 



  Dictated on workstation # [id number]

## 2021-08-04 ENCOUNTER — HOSPITAL ENCOUNTER (OUTPATIENT)
Dept: HOSPITAL 75 - RAD | Age: 81
End: 2021-08-04
Attending: INTERNAL MEDICINE
Payer: MEDICARE

## 2021-08-04 DIAGNOSIS — M51.26: ICD-10-CM

## 2021-08-04 DIAGNOSIS — M47.816: ICD-10-CM

## 2021-08-04 DIAGNOSIS — M24.28: ICD-10-CM

## 2021-08-04 DIAGNOSIS — M48.061: ICD-10-CM

## 2021-08-04 DIAGNOSIS — M47.817: ICD-10-CM
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
## 2021-08-04 DIAGNOSIS — S76.012A: Primary | ICD-10-CM

## 2021-08-04 DIAGNOSIS — M51.27: ICD-10-CM

## 2021-08-04 DIAGNOSIS — M16.12: ICD-10-CM

## 2021-08-04 PROCEDURE — 72148 MRI LUMBAR SPINE W/O DYE: CPT

## 2021-08-04 PROCEDURE — 73721 MRI JNT OF LWR EXTRE W/O DYE: CPT

## 2021-08-04 NOTE — DIAGNOSTIC IMAGING REPORT
PROCEDURE: MRI lumbar spine.



TECHNIQUE: Multiplanar, multisequence MRI of the lumbar spine was

performed without contrast.



INDICATION:  Left hip pain. Lower back pain.



COMPARISON: None



FINDINGS: For the purposes of this exam, last well-formed disc

space is denoted to be the L5-S1 level. Evaluation of static

alignment shows slight reversal of normal lordotic curvature

epicentered at the L1-L2 level. There is no significant tom-

or retrolisthesis. There is no evidence of jumped facets.



Vertebral body heights are maintained. There is no acute

fracture. There is diffuse moderate heterogeneity to the L1

vertebral body. This however has appearance consistent with

probable Paget's disease. This has consistent appearance on

previous CT abdomen and pelvis dated 02/05/2018 as well.

Otherwise, marrow signal is unremarkable.



There is mild multilevel intervertebral disc height loss with

multilevel anterior and posterior disc bulging. Visualized

portions of the distal cord are unremarkable. Conus terminates at

approximately the T12-L1 level. No abnormal intrathecal filling

defects are seen. Pre and paravertebral soft tissue structures

are unremarkable.



Axial images demonstrate the following: T12-L1: There is no large

disc bulge or focal protrusion. There is no significant spinal

canal or neuroforaminal stenosis.



L1-L2: There is broad-based posterior disc bulge with bilateral

ligamentum flavum laxity and facet arthropathy. As a result,

there is moderate-to-severe spinal canal stenosis. Thecal sac is

narrowed to approximately 7 mm in AP dimension. There is also

mild narrowing of the bilateral neural foramen.



L2-L3: There is broad-based posterior disc bulge with bilateral

ligamentum flavum laxity and facet arthropathy. As a result,

there is mild narrowing of the spinal canal and bilateral neural

foramen.



L3-L4: There is broad-based posterior disc bulge, eccentric to

the left. This does result in moderate stenosis of the spinal

canal and asymmetric stenosis of the left lateral recess. There

is also mild-to-moderate narrowing of the bilateral neural

foramen.



L4-L5: There is broad-based posterior disc bulge with bilateral

ligamentum flavum laxity and facet arthropathy. As a result,

there is moderate stenosis of the spinal canal and mild narrowing

of the bilateral neural foramen.



L5-S1: There is no large disc bulge. There is bilateral facet

arthropathy, but no significant spinal canal or neuroforaminal

stenosis.



IMPRESSION:

1. No acute fracture or dislocation of the lumbar spine.

2. Multilevel degenerative changes greatest at the L1-L2 and

L3-L4 levels as described above.

3. Abnormal appearance of the L1 vertebral body again, this is

most suggestive of Paget's disease and is consistent with

appearance seen on previous CT dated 02/05/2018.



Dictated by: 



  Dictated on workstation # [id number]

## 2021-08-04 NOTE — DIAGNOSTIC IMAGING REPORT
PROCEDURE: MRI left joint lower extremity without contrast.



TECHNIQUE: Multiplanar, multisequence non contrast-enhanced MRI

of the left lower extremity was accomplished.



INDICATION:  Left hip pain.



COMPARISON: None available.



FINDINGS:



Bones: No osteonecrosis in the femoral heads. No bone marrow

edema in the proximal femurs or pelvis that would indicate stress

fracture. No features of sacroiliitis.



Muscles and tendons: The bilateral distal iliopsoas tendons are

intact. Proximal hamstring complexes show no acute abnormality.

There is some chronic low-grade partial-thickness tearing of the

left semimembranosus origin from the ischial tuberosity.

Partial-thickness tear is also present at the left gluteus

minimus insertion. The left gluteus medias insertion is normal.

The right gluteus medius and minimus insertions are also intact.

No abnormality of the adductor musculature.



Left hip: Diffuse chondral wear throughout the left hip is

present with broad region of high-grade partial thickness

chondral loss. No underlying subchondral bone marrow edema to

suggest full-thickness chondromalacia. No paralabral cyst.

Assessment of labrum is suboptimal without intra-articular

contrast.



Pelvis: No free pelvic fluid. The prostate is mildly enlarged. No

pelvic or inguinal lymphadenopathy.



IMPRESSION:

1. Partial-thickness tear of the left gluteus minimus at its

distal insertion. Correlation for focal tenderness at the level

the greater trochanter suggested. 

2. Moderate osteoarthritis of left hip with diffuse high-grade

partial-thickness chondral where/thinning throughout the hip. 

3. No fracture or osteonecrosis.



Dictated by: 



  Dictated on workstation # [id number]

## 2021-10-11 ENCOUNTER — HOSPITAL ENCOUNTER (OUTPATIENT)
Dept: HOSPITAL 75 - CARD | Age: 81
End: 2021-10-11
Attending: INTERNAL MEDICINE
Payer: MEDICARE

## 2021-10-11 DIAGNOSIS — I35.1: Primary | ICD-10-CM

## 2021-10-11 DIAGNOSIS — I10: ICD-10-CM

## 2021-10-11 DIAGNOSIS — I25.10: ICD-10-CM

## 2021-10-11 PROCEDURE — 93306 TTE W/DOPPLER COMPLETE: CPT

## 2021-11-17 ENCOUNTER — HOSPITAL ENCOUNTER (OUTPATIENT)
Dept: HOSPITAL 75 - CARD | Age: 81
End: 2021-11-17
Attending: INTERNAL MEDICINE
Payer: MEDICARE

## 2021-11-17 VITALS — DIASTOLIC BLOOD PRESSURE: 58 MMHG | SYSTOLIC BLOOD PRESSURE: 142 MMHG

## 2021-11-17 VITALS — WEIGHT: 194.01 LBS | HEIGHT: 65.75 IN | BODY MASS INDEX: 31.55 KG/M2

## 2021-11-17 DIAGNOSIS — I25.89: ICD-10-CM

## 2021-11-17 DIAGNOSIS — I25.10: ICD-10-CM

## 2021-11-17 DIAGNOSIS — I10: Primary | ICD-10-CM

## 2021-11-17 PROCEDURE — 93017 CV STRESS TEST TRACING ONLY: CPT

## 2021-11-17 PROCEDURE — 78452 HT MUSCLE IMAGE SPECT MULT: CPT

## 2021-11-17 NOTE — CARDIOLOGY STRESS TEST REPORT
Stress Test Report


Date of Procedure/Referring:


Date of Procedure:  Nov 17, 2021


PCP


Theo Mayfield MD


Admitting Physician


Jaelyn Velez DO





Indications:


HTN





Baseline Heart Rate:


54





Baseline Blood Pressure:


Blood Pressure Systolic:  142


Blood Pressure Diastolic:  58


Baseline Vitals





Vital Signs








  Date Time  Temp Pulse Resp B/P (MAP) Pulse Ox O2 Delivery O2 Flow Rate FiO2


 


11/17/21 09:07  58 18 142/58 (86) 97 Room Air  











Baseline EKG:


Baseline EKG:  NSR





Summary


After explaining the procedure to the patient, he  signed a consent and then 

brought to the stress nuclear laboratory.


Patient received 0.4 mg Lexiscan for stress test, ECG, heart rate and blood 

pressure were monitored continuously.  Resting and stress dose of radio tracer 

were injected, imaging was acquired and reviewed in short axis, horizontal long 

axis and vertical long axis views.


TID:  1.12


SSS:  5


SDS:  3


EF:  54


1.  Patient tolerated Lexiscan well


2.  Baseline sinus rhythm with incomplete right bundle branch block with 

occasional PVCs noted during test, no EKG changes


3.  Diaphragmatic attenuation with mild reversible ischemia involving the mid to

apical inferolateral wall, could be secondary to diaphragmatic attenuation


4.  Normal left ventricular size, EF 54%











THEO MAYFIELD MD              Nov 17, 2021 13:28

## 2021-12-15 ENCOUNTER — HOSPITAL ENCOUNTER (OUTPATIENT)
Dept: HOSPITAL 75 - CSD | Age: 81
LOS: 1 days | Discharge: HOME | End: 2021-12-16
Attending: INTERNAL MEDICINE
Payer: MEDICARE

## 2021-12-15 VITALS — SYSTOLIC BLOOD PRESSURE: 132 MMHG | DIASTOLIC BLOOD PRESSURE: 72 MMHG

## 2021-12-15 VITALS — DIASTOLIC BLOOD PRESSURE: 61 MMHG | SYSTOLIC BLOOD PRESSURE: 148 MMHG

## 2021-12-15 VITALS — SYSTOLIC BLOOD PRESSURE: 132 MMHG | DIASTOLIC BLOOD PRESSURE: 56 MMHG

## 2021-12-15 VITALS — DIASTOLIC BLOOD PRESSURE: 83 MMHG | SYSTOLIC BLOOD PRESSURE: 135 MMHG

## 2021-12-15 VITALS — DIASTOLIC BLOOD PRESSURE: 96 MMHG | SYSTOLIC BLOOD PRESSURE: 131 MMHG

## 2021-12-15 VITALS — DIASTOLIC BLOOD PRESSURE: 63 MMHG | SYSTOLIC BLOOD PRESSURE: 129 MMHG

## 2021-12-15 VITALS — SYSTOLIC BLOOD PRESSURE: 130 MMHG | DIASTOLIC BLOOD PRESSURE: 63 MMHG

## 2021-12-15 VITALS — DIASTOLIC BLOOD PRESSURE: 53 MMHG | SYSTOLIC BLOOD PRESSURE: 117 MMHG

## 2021-12-15 VITALS — DIASTOLIC BLOOD PRESSURE: 58 MMHG | SYSTOLIC BLOOD PRESSURE: 130 MMHG

## 2021-12-15 VITALS — WEIGHT: 199.08 LBS | BODY MASS INDEX: 31.99 KG/M2 | HEIGHT: 65.98 IN

## 2021-12-15 VITALS — DIASTOLIC BLOOD PRESSURE: 54 MMHG | SYSTOLIC BLOOD PRESSURE: 132 MMHG

## 2021-12-15 VITALS — SYSTOLIC BLOOD PRESSURE: 135 MMHG | DIASTOLIC BLOOD PRESSURE: 58 MMHG

## 2021-12-15 VITALS — DIASTOLIC BLOOD PRESSURE: 57 MMHG | SYSTOLIC BLOOD PRESSURE: 119 MMHG

## 2021-12-15 DIAGNOSIS — J44.9: ICD-10-CM

## 2021-12-15 DIAGNOSIS — Z79.82: ICD-10-CM

## 2021-12-15 DIAGNOSIS — K43.9: ICD-10-CM

## 2021-12-15 DIAGNOSIS — M51.36: ICD-10-CM

## 2021-12-15 DIAGNOSIS — Z95.5: ICD-10-CM

## 2021-12-15 DIAGNOSIS — N40.0: ICD-10-CM

## 2021-12-15 DIAGNOSIS — I10: ICD-10-CM

## 2021-12-15 DIAGNOSIS — R63.5: ICD-10-CM

## 2021-12-15 DIAGNOSIS — I48.91: ICD-10-CM

## 2021-12-15 DIAGNOSIS — Z79.899: ICD-10-CM

## 2021-12-15 DIAGNOSIS — I25.10: Primary | ICD-10-CM

## 2021-12-15 DIAGNOSIS — I65.23: ICD-10-CM

## 2021-12-15 DIAGNOSIS — Z87.891: ICD-10-CM

## 2021-12-15 DIAGNOSIS — E78.2: ICD-10-CM

## 2021-12-15 LAB
ALBUMIN SERPL-MCNC: 4.5 GM/DL (ref 3.2–4.5)
ALP SERPL-CCNC: 76 U/L (ref 40–136)
ALT SERPL-CCNC: 33 U/L (ref 0–55)
APTT BLD: 30 SEC (ref 24–35)
BILIRUB SERPL-MCNC: 0.6 MG/DL (ref 0.1–1)
BUN/CREAT SERPL: 23
CALCIUM SERPL-MCNC: 9.7 MG/DL (ref 8.5–10.1)
CHLORIDE SERPL-SCNC: 105 MMOL/L (ref 98–107)
CHOLEST SERPL-MCNC: 163 MG/DL (ref ?–200)
CO2 SERPL-SCNC: 22 MMOL/L (ref 21–32)
CREAT SERPL-MCNC: 1.52 MG/DL (ref 0.6–1.3)
GFR SERPLBLD BASED ON 1.73 SQ M-ARVRAT: 44 ML/MIN
GLUCOSE SERPL-MCNC: 132 MG/DL (ref 70–105)
HCT VFR BLD CALC: 42 % (ref 40–54)
HDLC SERPL-MCNC: 42 MG/DL (ref 40–60)
HGB BLD-MCNC: 14.2 G/DL (ref 13.3–17.7)
INR PPP: 1 (ref 0.8–1.4)
MCH RBC QN AUTO: 32 PG (ref 25–34)
MCHC RBC AUTO-ENTMCNC: 34 G/DL (ref 32–36)
MCV RBC AUTO: 94 FL (ref 80–99)
PLATELET # BLD: 189 10^3/UL (ref 130–400)
PMV BLD AUTO: 11 FL (ref 9–12.2)
POTASSIUM SERPL-SCNC: 4.1 MMOL/L (ref 3.6–5)
PROT SERPL-MCNC: 7.2 GM/DL (ref 6.4–8.2)
PROTHROMBIN TIME: 13.2 SEC (ref 12.2–14.7)
SODIUM SERPL-SCNC: 140 MMOL/L (ref 135–145)
TRIGL SERPL-MCNC: 131 MG/DL (ref ?–150)
VLDLC SERPL CALC-MCNC: 26 MG/DL (ref 5–40)
WBC # BLD AUTO: 8.4 10^3/UL (ref 4.3–11)

## 2021-12-15 PROCEDURE — 85610 PROTHROMBIN TIME: CPT

## 2021-12-15 PROCEDURE — 87081 CULTURE SCREEN ONLY: CPT

## 2021-12-15 PROCEDURE — 93458 L HRT ARTERY/VENTRICLE ANGIO: CPT

## 2021-12-15 PROCEDURE — 80048 BASIC METABOLIC PNL TOTAL CA: CPT

## 2021-12-15 PROCEDURE — 85027 COMPLETE CBC AUTOMATED: CPT

## 2021-12-15 PROCEDURE — 85730 THROMBOPLASTIN TIME PARTIAL: CPT

## 2021-12-15 PROCEDURE — 36415 COLL VENOUS BLD VENIPUNCTURE: CPT

## 2021-12-15 PROCEDURE — 80053 COMPREHEN METABOLIC PANEL: CPT

## 2021-12-15 PROCEDURE — 80061 LIPID PANEL: CPT

## 2021-12-15 PROCEDURE — 85347 COAGULATION TIME ACTIVATED: CPT

## 2021-12-15 PROCEDURE — 71045 X-RAY EXAM CHEST 1 VIEW: CPT

## 2021-12-15 PROCEDURE — 82947 ASSAY GLUCOSE BLOOD QUANT: CPT

## 2021-12-15 RX ADMIN — PANTOPRAZOLE SODIUM SCH MG: 40 TABLET, DELAYED RELEASE ORAL at 17:40

## 2021-12-15 RX ADMIN — SODIUM CHLORIDE SCH MLS/HR: 900 INJECTION, SOLUTION INTRAVENOUS at 20:00

## 2021-12-15 RX ADMIN — SODIUM CHLORIDE SCH MLS/HR: 900 INJECTION, SOLUTION INTRAVENOUS at 10:00

## 2021-12-15 NOTE — CONSCIOUS SEDATION/ASA
Conscious Sedation Pre-Proced


Time


08:51





ASA Score


3


For ASA 3 and 4: Consider anesthesia and medical clearance. Also, for patients

with a history of failed moderate sedation consider anesthesia.

















Airway 


 


Lungs 


 


Heart 


 


 ASA score


 


 ASA 1: a normal healthy patient


 


 ASA 2:  a patient with a mild systemic disease (mid diabetes, controlled 

hypertension, obesity 


 


x ASA 3:  a patient with a severe systemic disease that limits activity  

(angina, COPD, prior Myocardial infarction)


 


 ASA 4:  a patient with an incapacitating disease that is a constant threat to 

life (CHF, renal failure)


 


 ASA 5:  a moribund patient not expected to survive 24 hrs.  (ruptured aneurysm)


 


 ASA 6:  a declared brain-dead patient whose organs are being harvested.


 


 For emergent operations, add the letter E after the classification











Mallampati Classification


Grade 3





Sedation Plan


Analgesia, Amnesia, Plan communicated to team members, Discussed options with 

patient/fam, Discussed risks with patient/fam


The patient is an appropriate candidate to undergo the planned procedure, 

sedation, and anesthesia.





The patient immediately re-assessed prior to indication.











THEO ALMEIDA MD              Dec 15, 2021 08:51

## 2021-12-15 NOTE — DIAGNOSTIC IMAGING REPORT
Indication: Preop. Coronary artery disease



Upright chest shows normal heart size and vascularity. The lungs

are clear. There is no effusion or pneumothorax.



IMPRESSION: Normal chest with no change from 10/02/2019.



Dictated by: 



  Dictated on workstation # [id number]

## 2021-12-15 NOTE — CARDIOLOGY PROGRESS NOTE
Progress Note-Cardiology


Events since last exam


Date Seen by Provider:  Dec 15, 2021


Time Seen by Provider:  12:45


Events since last exam


This is a brief note.  I was asked to see the patient by Dr. Mayfield for consi

deration of intervention to the distal left anterior descending coronary artery 

following intervention to the right coronary artery by Dr. Mayfield earlier today. 

I spoke to the patient and his daughter at the bedside.  I reviewed the 

angiogram films from today as well as 2019.  The patient appears to have a 

subtotal occlusion of the distal left anterior descending coronary artery just 

distal to a previously placed stent.  This is new compared to 2019.  I reviewed 

his nuclear stress test that he does have apical ischemia which could be 

explained by the stenosis.  The patient will receive intravenous fluids today 

following the procedure from this morning.  I will have him scheduled for 

percutaneous coronary intervention next week.  I have asked him to get follow-up

labs on Monday at [facility].  I also asked him to drink plenty of 

fluids the night before the procedure in light of his stage III chronic kidney 

disease.





Vitals


Last set of Vitals Signs





Vital Signs








 12/15/21 12/15/21





 07:34 11:45


 


Temp  35.9


 


Pulse  43


 


Resp  14


 


B/P (MAP)  135/83 (100)


 


Pulse Ox  96


 


O2 Delivery Room Air 











Labs


Labs


Laboratory Tests


12/15/21 07:32














Exam


Vital Signs





Vital Signs








  Date Time  Temp Pulse Resp B/P (MAP) Pulse Ox O2 Delivery O2 Flow Rate FiO2


 


12/15/21 11:45 35.9 43 14 135/83 (100) 96   


 


12/15/21 07:34      Room Air  








Labs





Laboratory Tests








Test


 12/15/21


07:32 Range/Units


 


 


White Blood Count


 8.4 


 4.3-11.0


10^3/uL


 


Red Blood Count


 4.45 


 4.30-5.52


10^6/uL


 


Hemoglobin 14.2  13.3-17.7  g/dL


 


Hematocrit 42  40-54  %


 


Mean Corpuscular Volume 94  80-99  fL


 


Mean Corpuscular Hemoglobin 32  25-34  pg


 


Mean Corpuscular Hemoglobin


Concent 34 


 32-36  g/dL





 


Red Cell Distribution Width 12.9  10.0-14.5  %


 


Platelet Count


 189 


 130-400


10^3/uL


 


Mean Platelet Volume 11.0  9.0-12.2  fL


 


Prothrombin Time 13.2  12.2-14.7  SEC


 


INR Comment 1.0  0.8-1.4  


 


Activated Partial


Thromboplast Time 30 


 24-35  SEC





 


Sodium Level 140  135-145  MMOL/L


 


Potassium Level 4.1  3.6-5.0  MMOL/L


 


Chloride Level 105    MMOL/L


 


Carbon Dioxide Level 22  21-32  MMOL/L


 


Anion Gap 13  5-14  MMOL/L


 


Blood Urea Nitrogen 35 H 7-18  MG/DL


 


Creatinine


 1.52 H


 0.60-1.30


MG/DL


 


Estimat Glomerular Filtration


Rate 44 


  





 


BUN/Creatinine Ratio 23   


 


Glucose Level 132 H   MG/DL


 


Calcium Level 9.7  8.5-10.1  MG/DL


 


Corrected Calcium 9.3  8.5-10.1  MG/DL


 


Total Bilirubin 0.6  0.1-1.0  MG/DL


 


Aspartate Amino Transf


(AST/SGOT) 21 


 5-34  U/L





 


Alanine Aminotransferase


(ALT/SGPT) 33 


 0-55  U/L





 


Alkaline Phosphatase 76    U/L


 


Total Protein 7.2  6.4-8.2  GM/DL


 


Albumin 4.5  3.2-4.5  GM/DL


 


Triglycerides Level 131  <150  MG/DL


 


Cholesterol Level 163  < 200  MG/DL


 


LDL Cholesterol Direct 107  1-129  MG/DL


 


VLDL Cholesterol 26  5-40  MG/DL


 


HDL Cholesterol 42  40-60  MG/DL

















GIL PEREZ JR, MD         Dec 15, 2021 13:41

## 2021-12-15 NOTE — CARDIAC CATH REPORT
Cardiac Cath Report


Physician (s)/Assistant (s)


Physician


THEO ALMEIDA MD





Pre-Procedure Diagnosis


Pre-Procedure Diagnosis:  Coronary artery disease





Post-Procedure Note


Procedure Start Date:  Dec 15, 2021


Name of Procedure:  


Left heart catheterization


Stenting of the right coronary artery


Findings/Procedure Note


PROCEDURE NOTE:


81 years old gentleman with history of coronary artery disease, multiple 

interventions in the past.  Had mildly abnormal stress test.  Scheduled for 

cardiac catheterization possible PTCA.


After explaining the procedure to the patient, all pros and cons were explained,

all questions were answered.  The patient signed the consent and then he  was 

placed on the cardiac catheterization laboratory. Groin was prepped SL fashion 

local anesthesia was used. Sheath placed in the right radial artery, [place] 

catheter was advanced to the left ventricular cavity, pressure was measured no 

left ventriculogram was done then it was pulled back and engaged the right 

coronary artery, angiogram was done, I was unable to engage the left system with

that catheter I exchange it using a long wire and used FL 4.0 diagnostic 

catheter without success then I used Miles catheter and I was able to visualize 

the artery with some engagement.





Patient received 6000 units of heparin, has severe stenosis at multiple segment 

in the distal right coronary artery, FFR guide with sideholes was advanced to 

the right coronary artery, BMW wire was advanced and parked distally, 

predilatation with 3.0 balloon then I proceeded with deployment of rae point 

stent 3 x 28 mm expanded to 3.1 mm under 15 ryan with excellent results.


At the end of the procedure the sheath was removed.  Vascular band was used





FINDINGS:





Hemodynamics 


/10, end-diastolic pressure of 10


Aorta 102/49 mean of 69





ANATOMY:


Left Main is free of obstructive disease


Left Anterior Descending has multiple stent extending from the proximal to the 

distal portion total occlusion distally the distal LAD is getting filled by 

collaterals


Left Circumflex is moderate in size with mild to moderate disease.


Right Coronary Artery is large dominant artery with severe stenosis distally, 

complex intervention with deployment of rae point stent 3 x 28 expanded to 3.1 

mm under 15 ryan with excellent results


LV Gram was not done, pressure was measured





CONCLUSION:


1.  Severe stenosis at the distal right coronary artery, large dominant artery 

successful stenting using rae point stent 3 x 28 mm expanded to 3.1 mm with 

excellent results


2.  Total occlusion of the distal LAD beyond multiple stent extending from the 

proximal to the distal portion, getting filled by collaterals from the left 

system


3.  Moderate disease in the circumflex artery


4.  Normal left ventricular end-diastolic pressure





DISCUSSION AND RECOMMENDATION:


Patient was loaded with aspirin and Plavix, maximize medical therapy, add statin

if patient can tolerate it, planning to arrange for staged intervention for the 

chronic total occlusion of the LAD


Anesthesia Type:  Conscious Sedation


Estimated blood loss (mL):  25 ml


Contrast Amount:  85 ml


Total Radiation Dose:  1271 mGy





Post-Procedure Diagnosis


Post-operative diagnosis:  


Chest pain


Coronary artery disease


Hypertension


Hyperlipidemia











THEO ALMEIDA MD              Dec 15, 2021 09:59

## 2021-12-16 VITALS — SYSTOLIC BLOOD PRESSURE: 140 MMHG | DIASTOLIC BLOOD PRESSURE: 63 MMHG

## 2021-12-16 VITALS — DIASTOLIC BLOOD PRESSURE: 54 MMHG | SYSTOLIC BLOOD PRESSURE: 132 MMHG

## 2021-12-16 LAB
BUN/CREAT SERPL: 22
CALCIUM SERPL-MCNC: 9.6 MG/DL (ref 8.5–10.1)
CHLORIDE SERPL-SCNC: 105 MMOL/L (ref 98–107)
CO2 SERPL-SCNC: 21 MMOL/L (ref 21–32)
CREAT SERPL-MCNC: 1.4 MG/DL (ref 0.6–1.3)
GFR SERPLBLD BASED ON 1.73 SQ M-ARVRAT: 49 ML/MIN
GLUCOSE SERPL-MCNC: 133 MG/DL (ref 70–105)
POTASSIUM SERPL-SCNC: 4.5 MMOL/L (ref 3.6–5)
SODIUM SERPL-SCNC: 137 MMOL/L (ref 135–145)

## 2021-12-16 RX ADMIN — PANTOPRAZOLE SODIUM SCH MG: 40 TABLET, DELAYED RELEASE ORAL at 06:25

## 2021-12-16 RX ADMIN — SODIUM CHLORIDE SCH MLS/HR: 900 INJECTION, SOLUTION INTRAVENOUS at 06:23

## 2021-12-16 NOTE — DISCHARGE INST-POST CATH
Discharge Inst-CATH/EP


Problems Reviewed?:  Yes


Post Cardiac Cath/EP D/C Inst


Follow Up/Plan


Appointment with Dr Mayfield in 2-4 weeks


<b>CARDIAC CATH/EP PROCEDURE DISCHARGE INSTRUCTIONS</b>





ACTIVITY





* Go Home directly and rest.


* Limit activity of the leg (or wrist if it was used) for 7 days including 

aerobics, swimming,


   jogging, bicycling, etc.


* Restrict stair-climbing for 7 days if possible, if not, climb up with your 

non-cath leg, then


   bring together on the same step.


* Avoid lifting, pushing, pulling or excessive movement of the affected 

extremity for 7 days.


* Customary sexual activity may be resumed after 2 days-use caution not to use a

position  


   that strains or causes pain to the affected extremity.


* No driving for 24 hours.


* NO SMOKING. 


* Avoid straining for bowel movements for 7 days.


* Gentle walking on level ground is allowed.


* Returning to work will depend on the type of procedure and the results. Your 

doctor will discuss


   this with you.





CALL YOUR DOCTOR FOR ANY OF THE FOLLOWING:





*If bleeding from the puncture site occurs- Apply gentle pressure to site with 

clean cloth and call


   your doctor or EMS.


* If a knot or lump forms under the skin, increases in size, or causes pain.


* If bruising appears to be worsening or moving further down your leg instead of

disappearing.


* Temperature above 101 F.





CARE OF YOUR GROIN INCISION;





* Bruising or purple discoloration of the skin near the puncture site is common.


* You may shower only, no bathtub bathing for 5 days.  Be careful to avoid 

slipping as your


   leg may feel stiff.


* If a closure device was used on your femoral artery, please see the attached 

guide regarding


   care of the device and your leg.


* Leave dressing on FOR 24 hours.





CARE OF YOUR WRIST INCISION;





* Bruising or purple discoloration of the skin near the puncture site is common.


* You may shower.


* DO NOT submerge wrist.


* Leave dressing on FOR 24 hours.











THEO MAYFIELD MD              Dec 16, 2021 06:08

## 2021-12-16 NOTE — CARDIOLOGY PROGRESS NOTE
Subjective


Date Seen by Provider:  Dec 16, 2021


Time Seen by Provider:  08:22


Subjective/Events-last exam


Patient was seen and evaluated bedside laying down comfortably, no chest pain.  

Feeling better.


Review of Systems


General:  No Chills, No Night Sweats, No Fatigue, No Malaise, No Appetite, No 

Other


HEENT:  No Head Aches, No Visual Changes, No Eye Pain, No Ear Pain, No 

Dysphasia, No Sinus Congestion, No Post Nasal Drip, No Sore Throat, No Other


Pulmonary:  No Dyspnea, No Cough, No Pleuritic Chest Pain, No Other


Cardiovascular:  No: Chest Pain, Palpitations, Orthopnea, Paroxysmal Noc. 

Dyspnea, Edema, Lt Headedness, Other





Objective-Cardiology


Exam


Last Set of Vital Signs





Vital Signs








 12/16/21





 07:47


 


Temp 36.0


 


Pulse 67


 


Resp 23


 


B/P (MAP) 132/54 (80)


 


Pulse Ox 96


 


O2 Delivery Room Air








I&O











Intake and Output 


 


 12/16/21





 00:00


 


Intake Total 1630 ml


 


Output Total 350 ml


 


Balance 1280 ml


 


 


 


Intake Oral 1630 ml


 


Output Urine Total 350 ml


 


# Voids 3


 


# Bowel Movements 2








General:  Alert, Oriented X3, Cooperative


HEENT:  Atraumatic, PERRLA


Neck:  Supple, No JVD, No Thyromegaly


Lungs:  Clear to Auscultation, Normal Air Movement


Heart:  Regular Rate, Normal S1, Normal S2, No Murmurs


Abdomen:  Normal Bowel Sounds, Soft, No Tenderness, No Hepatosplenomegaly, No 

Masses


Extremities:  No Clubbing, No Cyanosis, No Edema, Normal Pulses, No 

Tenderness/Swelling


Skin:  No Rashes, No Breakdown, No Significant Lesion


Neuro:  Normal Gait, Normal Speech, Strength at 5/5 X4 Ext, Normal Tone, Sensati

on Intact


Psych/Mental Status:  Mental Status NL, Mood NL





Results


Lab


Laboratory Tests


12/16/21 03:47














A/P-Cardiology


Admission Diagnosis


Coronary artery disease


Hypertension


Hyperlipidemia


Chronic renal insufficiency





Assessment/Plan


Coronary artery disease, extensive disease, multiple intervention in the past.  

Cardiac catheterization with stenting of the distal right coronary artery with 

excellent results, had a chronic total occlusion of the LAD which will be 

intervened on next week by Dr. Gandhi.





Hypertension, continue current medication and monitor tolerance and response





Hyperlipidemia, intolerant to Lipitor in the past, I will try Crestor low-dose 

and evaluate tolerance and response





Chronic renal insufficiency, monitor renal function











THEO ALMEIDA MD              Dec 16, 2021 08:24 given warm blanket

## 2021-12-22 ENCOUNTER — HOSPITAL ENCOUNTER (OUTPATIENT)
Dept: HOSPITAL 75 - CATH | Age: 81
Discharge: HOME | End: 2021-12-22
Attending: INTERNAL MEDICINE
Payer: MEDICARE

## 2021-12-22 VITALS — SYSTOLIC BLOOD PRESSURE: 120 MMHG | DIASTOLIC BLOOD PRESSURE: 53 MMHG

## 2021-12-22 VITALS — DIASTOLIC BLOOD PRESSURE: 50 MMHG | SYSTOLIC BLOOD PRESSURE: 106 MMHG

## 2021-12-22 VITALS — BODY MASS INDEX: 31.99 KG/M2 | WEIGHT: 199.08 LBS | HEIGHT: 65.98 IN

## 2021-12-22 VITALS — SYSTOLIC BLOOD PRESSURE: 116 MMHG | DIASTOLIC BLOOD PRESSURE: 53 MMHG

## 2021-12-22 VITALS — SYSTOLIC BLOOD PRESSURE: 117 MMHG | DIASTOLIC BLOOD PRESSURE: 47 MMHG

## 2021-12-22 VITALS — DIASTOLIC BLOOD PRESSURE: 53 MMHG | SYSTOLIC BLOOD PRESSURE: 109 MMHG

## 2021-12-22 VITALS — SYSTOLIC BLOOD PRESSURE: 121 MMHG | DIASTOLIC BLOOD PRESSURE: 53 MMHG

## 2021-12-22 VITALS — DIASTOLIC BLOOD PRESSURE: 53 MMHG | SYSTOLIC BLOOD PRESSURE: 120 MMHG

## 2021-12-22 VITALS — DIASTOLIC BLOOD PRESSURE: 69 MMHG | SYSTOLIC BLOOD PRESSURE: 142 MMHG

## 2021-12-22 VITALS — DIASTOLIC BLOOD PRESSURE: 52 MMHG | SYSTOLIC BLOOD PRESSURE: 120 MMHG

## 2021-12-22 DIAGNOSIS — E78.2: ICD-10-CM

## 2021-12-22 DIAGNOSIS — N18.30: ICD-10-CM

## 2021-12-22 DIAGNOSIS — Z95.5: ICD-10-CM

## 2021-12-22 DIAGNOSIS — Z79.899: ICD-10-CM

## 2021-12-22 DIAGNOSIS — I12.9: ICD-10-CM

## 2021-12-22 DIAGNOSIS — Z87.891: ICD-10-CM

## 2021-12-22 DIAGNOSIS — Z79.82: ICD-10-CM

## 2021-12-22 DIAGNOSIS — Z79.02: ICD-10-CM

## 2021-12-22 DIAGNOSIS — I25.119: Primary | ICD-10-CM

## 2021-12-22 DIAGNOSIS — G47.33: ICD-10-CM

## 2021-12-22 DIAGNOSIS — E66.9: ICD-10-CM

## 2021-12-22 PROCEDURE — 92920 PRQ TRLUML C ANGIOP 1ART&/BR: CPT

## 2021-12-22 NOTE — CARDIAC CATH REPORT
CARDIAC CATHETERIZATION


DATE OF PROCEDURE: 12/22/2021





INDICATION: Coronary artery disease with angina pectoris.





HISTORY: The patient is a 81 year old male with a known history of coronary 

artery disease with multiple stents in the past.  He has had progressive dyspnea

on exertion.  He was evaluated with an outpatient nuclear stress test that 

showed inferior, apical and distal anterior perfusion defects.  He underwent a 

cardiac catheterization by Carmencita Mayfield MD on 12/15/2021 at which time he was 

found to have a probable chronic total occlusion of the distal left anterior 

descending coronary artery and significant stenosis in the distal right coronary

artery.  His right coronary artery was treated with 1 drug-eluting stent.  Due 

to the residual disease in his distal left anterior descending coronary artery 

he is here today for staged intervention.





PROCEDURES PERFORMED: 


1.  Attempted percutaneous coronary intervention to distal left anterior 

descending coronary artery.  No devices were deployed.





PROCEDURE DESCRIPTION: After informed consent and in the fasting state, left 

heart catheterization was performed through the right radial artery utilizing a 

6 [place] system by percutaneous approach.  A 6 [place] CLS 3.5 guide catheter 

with sideholes was utilized for the diagnostic portion of the procedure.  All 

catheters were exchanged over a guidewire.  Following the procedure, a vascular 

band was applied to the radial artery access site and the sheath was removed 

with good hemostasis.





RESULTS:





HEMODYNAMICS: The opening aortic pressure was 100/34 mmHg.  The aortic valve was

not crossed.





CORONARY ANGIOGRAPHY:





Left main coronary artery: The left main coronary artery was short and there was

a long 50% stenosis from the ostium to the bifurcation.





Left anterior descending coronary artery: There were stents from the proximal 

down to the distal segment which were widely patent.  The vessel was totally 

occluded distally just distal to a previously placed stent with faint bridging 

collaterals and COLE-1 flow to the apex.





Left circumflex coronary artery: There are multiple stents from the proximal 

down to the distal segment which were widely patent.





Right coronary artery: Not evaluated during the study.





PERCUTANEOUS CORONARY INTERVENTION: Percutaneous coronary intervention was 

attempted on the distal left anterior descending coronary artery through a 6 

[place] CLS 3.5 guide catheter with sideholes.  I first attempted to engage the 

vessel with a 6 [place] CLS 4 guide catheter without sideholes but the catheter 

would not engage well and was damping.  This guide catheter was exchanged over a

wire for the CLS 3.5 guide catheter with sideholes.  Once I had adequate guide 

support, I attempted to cross the stenosis in the distal left anterior 

descending coronary artery with a Whisper medium support guidewire but the wire 

would not cross.  I left this wire in place in the mid segment of the vessel and

advanced a Fielder XT guidewire into the left anterior descending coronary 

artery.  This wire would also not easily pass through the occlusion.  I then 

advanced a 1.2 x 8 mm Mini-Trek balloon over the wire for extra support.  The 

wire would still not cross.  At that point, I elected to abort the procedure.  A

follow-up angiogram did not reveal any evidence of dissection or coronary 

perforation.





IMPRESSION:


1.  Status post unsuccessful attempt at opening the chronic total occlusion in 

the distal left anterior descending coronary artery as outlined above.





Certain portions of this document may have been dictated utilizing voice 

recognition technology.  Inherent to this technology, typographical and 

grammatical errors may exist.  As much as I am diligent to identify and correct 

these mistakes, some errors may remain in the document.











GIL PEREZ JR, MD         Dec 22, 2021 11:50

## 2021-12-22 NOTE — PRE-OP NOTE & CONSCIOUS SEDAT
Pre-Operative Progress Note


H&P Reviewed


The H&P was reviewed, patient examined and no changes noted.


Date H&P Reviewed:  Dec 22, 2021


Time H&P Reviewed:  09:22


Pre-Op Diagnosis:  Coronary artery disease with angina pectoris





Conscious Sedation Pre-Proced


ASA Score


3


For ASA 3 and 4: Consider anesthesia and medical clearance. Also, for patients

with a history of failed moderate sedation consider anesthesia.

















Airway 


 


Lungs 


 


Heart 


 


 ASA score


 


 ASA 1: a normal healthy patient


 


 ASA 2:  a patient with a mild systemic disease (mid diabetes, controlled 

hypertension, obesity 


 


 ASA 3:  a patient with a severe systemic disease that limits activity  (angina,

COPD, prior Myocardial infarction)


 


 ASA 4:  a patient with an incapacitating disease that is a constant threat to 

life (CHF, renal failure)


 


 ASA 5:  a moribund patient not expected to survive 24 hrs.  (ruptured aneurysm)


 


 ASA 6:  a declared brain-dead patient whose organs are being harvested.


 


 For emergent operations, add the letter E after the classification











Mallampati Classification


Grade 2





Sedation Plan


Analgesia, Amnesia, Plan communicated to team members, Discussed options with 

patient/fam, Discussed risks with patient/fam


The patient is an appropriate candidate to undergo the planned procedure, 

sedation, and anesthesia.





The patient immediately re-assessed prior to indication.











GIL PEREZ JR, MD         Dec 22, 2021 09:23

## 2021-12-22 NOTE — CONSULTATION-CARDIOLOGY
HPI-Cardiology


Cardiology Consultation:


Date of Consultation


This is a history and physical for outpatient cardiac catheterization.





12/22/2021


Date of Admission


12/22/2021


Attending Physician


Gil Gandhi Jr, MD


Admitting Physician


Jaelyn Velez DO


Consulting Physician


GIL GANDHI JR, MD





HPI:


Time Seen by a Provider:  09:15


Chief Complaint:


Coronary artery disease with angina pectoris here for intervention of the left 

anterior descending coronary artery


I had the pleasure of seeing Moshe in the cardiac catheterization this morning.

 He normally follows with one of my partners.  He has a history of coronary 

artery disease with multiple previous coronary stents.  Prior to some of his 

initial stents, he would have exertional chest discomfort.  He has not had any 

of this chest discomfort but has been noticing progressive dyspnea on exertion 

and fatigue.  If he climbs up 8 steps, he will be very out of breath.  He was 

here last week for diagnostic cardiac catheterization and was found to have 

significant disease of the distal right coronary artery as well as distal left 

anterior descending coronary artery.  He underwent drug-eluting stent placement 

to the distal right coronary artery.  Due to the volume of contrast utilized for

the procedure, Dr. Mayfield elected to do staged intervention to the left anterior 

descending coronary artery.  As such, the patient is here today for staged 

intervention to the distal left anterior descending coronary artery.  Since the 

most recent stents, he has lost some weight but does not feel like his breathing

has changed.  He denies chest discomfort.  He denies paroxysmal nocturnal 

dyspnea, orthopnea, or palpitations.  Sometimes if he stands up too quickly he 

will get lightheaded but denies any syncope.  He gets occasional, mild ankle 

edema if he has been standing for a long time on a given day.  This usually 

resolves by the following morning.





Certain portions of this document may have been dictated utilizing voice 

recognition technology.  Inherent to this technology, typographical and 

grammatical errors may exist.  As much as I am diligent to identify and correct 

these mistakes, some errors may remain in the document.





Review of Systems-Cardiology


Review of Systems


Other comments


Review of 10 organ systems is as per the history of present illness, otherwise 

negative.





PMH-Social-Family Hx


Patient Social History


Marrital Status:  


Smoking Status:  Former Smoker


Former smoker/When Quit:  Dec 21, 1973





Immunizations Up To Date


Tetanus Booster (TDap):  Unknown


Date of Pneumonia Vaccine:  Oct 2, 2018


Date of Influenza Vaccine:  Oct 1, 2016





Past Medical History


PMH


As described under Assessment.





Family Medical History


Family History:  


Cancer


  03 FATHER


  09 SISTER


Cancer of colon


  03 FATHER


Cardiovascular disease


  19 MOTHER


  GRANDMOTHER


  GRANDFATHER


Myocardial infarction


  GRANDFATHER


Stroke


  AUNT





No Family History of:


  Abdominal aortic aneurysm


  Kittitas's disease


  Alcoholism


  Aphasia


  Cataract


  Chest pain


  Congenital heart disease


  Congestive heart failure


  Cystic fibrosis


  Dementia


  Dysphagia


  Family history: Allergy


  Family history: Alzheimer's disease


  Family history: Arthritis


  Family history: Asthma


  Family history: Breast disease


  Family history: Cardiovascular disease


  Family history: Coronary thrombosis


  Family history: Diabetes mellitus


  Family history: Gastrointestinal disease


  Family history: Glaucoma


  Family history: Hypertension


  Family history: Osteoporosis


  Family history: Thyroid disorder


  Headache


  Hearing loss


  Heart disease


  Hereditary disease


  History of - anemia


  History of - disorder


  History of - respiratory disease


  History of drug abuse


  Human immunodeficiency virus (HIV) seropositivity


  Hypercholesterolemia


  Infertile


  Kidney disease


  Malignant neoplasm of lung


  Parkinson's disease


  Prostate cancer


  Psychotic disorder


  Seizure disorder


  Tuberculosis


  Visual impairment





Allergies and Home Medications


Allergies


Coded Allergies:  


     lorazepam (Verified  Allergy, Severe, ANAPHYLAXIS, 12/22/21)


     niacin (Verified  Allergy, Unknown, 2/8/17)


Uncoded Allergies:  


     TAPE (Allergy, Intermediate, BLISTERS, 2/8/17)





Patient Home Medication List


Home Medication List Reviewed:  Yes


Amlodipine Besylate (Norvasc) 10 Mg Tablet, 10 MG PO DAILY, (Reported)


   Entered as Reported by: NALLELY RIVAS on 10/2/19 1220


   Last Action: Reviewed


Aspirin (Aspirin EC) 81 Mg Tablet.dr, 81 MG PO DAILY, (Reported)


   Entered as Reported by: NALLELY RIVAS on 10/2/19 1220


   Last Action: Reviewed


Clopidogrel Bisulfate (Clopidogrel) 75 Mg Tablet, 75 MG PO DAILY


   Prescribed by: THEO MAYFIELD on 12/16/21 0608


   Last Action: Reviewed


Ezetimibe (Ezetimibe) 10 Mg Tablet, 10 MG PO HS, (Reported)


   Entered as Reported by: JAMESON EUCEDA on 12/15/21 0832


   Last Action: Reviewed


Famotidine (Acid Reducer (FAMOTIDINE)) 20 Mg Tablet, 20 MG PO BID, (Reported)


   Entered as Reported by: JAMESON EUCEDA on 12/15/21 0812


   Last Action: Reviewed


Fenofibrate (Fenofibrate) 160 Mg Tablet, 160 MG PO HS, (Reported)


   Entered as Reported by: JAMESON EUCEDA on 12/15/21 0832


   Last Action: Reviewed


Finasteride (Finasteride) 5 Mg Tablet, 5 MG PO DAILY, (Reported)


   Entered as Reported by: NILTON ANNA on 4/14/17 0831


   Last Action: Reviewed


Hydralazine HCl (Hydralazine HCl) 25 Mg Tablet, 25 MG PO BID, (Reported)


   Entered as Reported by: NALLELY RIVAS on 10/2/19 1220


   Last Action: Reviewed


Hydrochlorothiazide (Hydrochlorothiazide) 25 Mg Tablet, 25 MG PO DAILY, 

(Reported)


   Entered as Reported by: JAMESON EUCEDA on 12/15/21 0812


   Last Action: Reviewed


Loratadine (Claritin) 10 Mg Tablet, 10 MG PO DAILY, (Reported)


   Entered as Reported by: JAMESON EUCEDA on 12/15/21 0812


   Last Action: Reviewed


Losartan Potassium (Losartan Potassium) 50 Mg Tablet, 50 MG PO HS, (Reported)


   Entered as Reported by: JAMESON EUCEDA on 12/15/21 0832


   Last Action: Reviewed


Multivitamin (Multivitamins) 1 Each Tablet, 1 EACH PO DAILY, (Reported)


   Entered as Reported by: NALLELY RIVAS on 10/2/19 1220


   Last Action: Reviewed


Omega-3/Dha/Epa/Fish Oil (Fish Oil 1,400 mg Softgel) 1 Each Capsule.dr, 1 EACH 

PO BID, (Reported)


   Entered as Reported by: JAMESON EUCEDA on 12/15/21 0812


   Last Action: Reviewed


Pantoprazole Sodium (Pantoprazole Sodium) 40 Mg Tablet.dr, 40 MG PO BID, 

(Reported)


   Entered as Reported by: JAMESON EUCEDA on 12/15/21 0827


   Last Action: Reviewed


Rosuvastatin Calcium (Rosuvastatin Calcium) 5 Mg Tablet, 5 MG PO HS


   Prescribed by: THEO MAYFIELD on 12/16/21 0608


   Last Action: Reviewed


Simethicone (Gas-X) 125 Mg Tab.chew, 250 MG PO BID, (Reported)


   Entered as Reported by: NALLELY RIVAS on 10/2/19 1220


   Last Action: Reviewed


Tamsulosin HCl (Flomax) 0.4 Mg Cap, 0.4 MG PO BID, (Reported)


   Entered as Reported by: NALLELY RIVAS on 10/2/19 1220


   Last Action: Reviewed


Discontinued Medications


Docusate Sodium (Stool Softener) 100 Mg Tablet, 100 MG PO BID PRN for 

CONSTIPATION-1ST LINE, (Reported)


   Discontinued Reason: No Longer Taking


   Entered as Reported by: RYDER DSOUZA on 1/23/17 1308


Famotidine (Famotidine) 20 Mg Tablet, 20 MG PO BID, (Reported)


   Discontinued Reason: No Longer Taking


   Entered as Reported by: NILTON ANNA on 4/14/17 0831


Fenofibrate (Fenofibrate) 160 Mg Tablet, 160 MG PO HS, (Reported)


   Discontinued Reason: No Longer Taking


   Entered as Reported by: NALLELY RIVAS on 10/2/19 1220


Loratadine (Claritin) 10 Mg Tablet, 10 MG PO DAILY, (Reported)


   Discontinued Reason: No Longer Taking


   Entered as Reported by: CHER MCDANIELS on 2/23/17 1525


Omega-3/Dha/Epa/Fish Oil (Fish Oil 1,400 mg Softgel) 1 Each Capsule.dr, 1,400 MG

PO BID, (Reported)


   Discontinued Reason: No Longer Taking


   Entered as Reported by: CHER MCDANIELS on 2/23/17 1525


Omeprazole (Omeprazole) 20 Mg Capsule.dr, 20 MG PO BID, (Reported)


   Discontinued Reason: No Longer Taking


   Entered as Reported by: NILTON ANNA on 4/14/17 0855


Vitamin B Complex (Vitamin B Complex) 1 Each Tablet, 1 EACH PO DAILY, (Reported)


   Discontinued Reason: No Longer Taking


   Entered as Reported by: NALLELY RIVAS on 10/2/19 1220





Exam


Vital Signs





Vital Signs








  Date Time  Temp Pulse Resp B/P (MAP) Pulse Ox O2 Delivery O2 Flow Rate FiO2


 


12/22/21 09:07 36.2 68 16 142/69 (93) 95 Room Air  








Physical Exam


General: Alert. No acute distress. Well nourished and appears stated age.


Eye: Extraocular movements are intact. Conjunctivae are clear. There are no 

xanthelasma.


HENT: Normocephalic. Atraumatic. Carotid pulsations 2/2 without bruits.


Neck: Jugular venous pressure does not appear elevated. No thyromegaly 

appreciated.


Respiratory: Lungs are clear to auscultation. Respirations are non-labored. 

Breath sounds are equal. Symmetrical chest wall expansion.


Cardiovascular: Normal rate. Regular rhythm. No murmur. No gallop. Point of 

maximal impulse is not appear displaced. Good pulses equal in all extremities. 

No edema.


Gastrointestinal: Soft. Normal bowel sounds.


Skin: Skin turgor is normal. There is no pallor.


Musculoskeletal: No kyphosis or scoliosis appreciated.


Neurologic: Alert and oriented to person, place, time. Cranial nerves 3-12 

appear grossly intact. The patient has good motor tone strength in the upper and

lower extremities bilaterally.


Psychiatric: Cooperative. Appropriate mood & affect.


Labs


Laboratories were obtained on Monday at [facility] and the most 

notable abnormality is his GFR was 46.  His baseline GFR runs 45-55.





Diagnosis/Problems


Diagnosis/Problems





(1) Coronary artery disease involving native coronary artery with angina 

pectoris


Assessment & Plan:  As above, he is here today for staged intervention to the 

distal left anterior descending coronary artery.  This may be a chronic total 

occlusion.  We will continue his current guideline directed medical therapy.  If

this procedure is reasonably straightforward, he may be able to be discharged 

home later today.  The benefits and risks of the procedure have been explained 

to the patient and his wife and all are in agreement to proceed.





(2) Primary hypertension


Assessment & Plan:  He will be continued on his outpatient antihypertensive 

medication.





(3) Mixed hyperlipidemia


Assessment & Plan:  He was started on low-dose statin medication last week.  He 

has been intolerant of statin medications in the past.  He currently seems to be

tolerating low-dose rosuvastatin.





(4) Stage 3 chronic kidney disease


Assessment & Plan:  He will receive extra intravenous normal saline to help 

reduce the risk of contrast-induced nephrotoxicity.





(5) Obesity


Assessment & Plan:  He needs to work on weight loss.





(6) Obstructive sleep apnea of adult


Assessment & Plan:  He will continue to use his CPAP which he brought with him 

to the hospital today in the event he needs to stay overnight.














GIL GANDHI JR, MD         Dec 22, 2021 09:20

## 2022-01-27 ENCOUNTER — APPOINTMENT (RX ONLY)
Dept: URBAN - METROPOLITAN AREA CLINIC 39 | Facility: CLINIC | Age: 82
Setting detail: DERMATOLOGY
End: 2022-01-27

## 2022-01-27 DIAGNOSIS — L81.4 OTHER MELANIN HYPERPIGMENTATION: ICD-10-CM

## 2022-01-27 DIAGNOSIS — D22 MELANOCYTIC NEVI: ICD-10-CM

## 2022-01-27 DIAGNOSIS — Z71.89 OTHER SPECIFIED COUNSELING: ICD-10-CM

## 2022-01-27 DIAGNOSIS — L82.1 OTHER SEBORRHEIC KERATOSIS: ICD-10-CM

## 2022-01-27 DIAGNOSIS — D18.0 HEMANGIOMA: ICD-10-CM

## 2022-01-27 PROBLEM — D22.5 MELANOCYTIC NEVI OF TRUNK: Status: ACTIVE | Noted: 2022-01-27

## 2022-01-27 PROBLEM — D22.72 MELANOCYTIC NEVI OF LEFT LOWER LIMB, INCLUDING HIP: Status: ACTIVE | Noted: 2022-01-27

## 2022-01-27 PROBLEM — D22.61 MELANOCYTIC NEVI OF RIGHT UPPER LIMB, INCLUDING SHOULDER: Status: ACTIVE | Noted: 2022-01-27

## 2022-01-27 PROBLEM — D22.62 MELANOCYTIC NEVI OF LEFT UPPER LIMB, INCLUDING SHOULDER: Status: ACTIVE | Noted: 2022-01-27

## 2022-01-27 PROBLEM — D18.01 HEMANGIOMA OF SKIN AND SUBCUTANEOUS TISSUE: Status: ACTIVE | Noted: 2022-01-27

## 2022-01-27 PROBLEM — D22.71 MELANOCYTIC NEVI OF RIGHT LOWER LIMB, INCLUDING HIP: Status: ACTIVE | Noted: 2022-01-27

## 2022-01-27 PROCEDURE — ? COUNSELING

## 2022-01-27 PROCEDURE — 99213 OFFICE O/P EST LOW 20 MIN: CPT

## 2022-01-27 ASSESSMENT — LOCATION DETAILED DESCRIPTION DERM
LOCATION DETAILED: RIGHT SUPERIOR MEDIAL FOREHEAD
LOCATION DETAILED: RIGHT ANTERIOR DISTAL THIGH
LOCATION DETAILED: RIGHT ANTERIOR PROXIMAL THIGH
LOCATION DETAILED: RIGHT ANTERIOR PROXIMAL UPPER ARM
LOCATION DETAILED: LEFT ANTERIOR PROXIMAL UPPER ARM
LOCATION DETAILED: RIGHT SUPERIOR UPPER BACK
LOCATION DETAILED: LEFT ANTERIOR PROXIMAL THIGH
LOCATION DETAILED: RIGHT MEDIAL INFERIOR CHEST
LOCATION DETAILED: LEFT SUPERIOR UPPER BACK
LOCATION DETAILED: LEFT ANTERIOR DISTAL THIGH

## 2022-01-27 ASSESSMENT — LOCATION SIMPLE DESCRIPTION DERM
LOCATION SIMPLE: RIGHT THIGH
LOCATION SIMPLE: RIGHT UPPER ARM
LOCATION SIMPLE: RIGHT FOREHEAD
LOCATION SIMPLE: LEFT UPPER BACK
LOCATION SIMPLE: LEFT THIGH
LOCATION SIMPLE: CHEST
LOCATION SIMPLE: LEFT UPPER ARM
LOCATION SIMPLE: RIGHT UPPER BACK

## 2022-01-27 ASSESSMENT — LOCATION ZONE DERM
LOCATION ZONE: TRUNK
LOCATION ZONE: LEG
LOCATION ZONE: ARM
LOCATION ZONE: FACE

## 2023-01-30 ENCOUNTER — APPOINTMENT (RX ONLY)
Dept: URBAN - METROPOLITAN AREA CLINIC 39 | Facility: CLINIC | Age: 83
Setting detail: DERMATOLOGY
End: 2023-01-30

## 2023-01-30 DIAGNOSIS — I78.8 OTHER DISEASES OF CAPILLARIES: ICD-10-CM

## 2023-01-30 DIAGNOSIS — D18.0 HEMANGIOMA: ICD-10-CM

## 2023-01-30 DIAGNOSIS — L57.0 ACTINIC KERATOSIS: ICD-10-CM

## 2023-01-30 DIAGNOSIS — L81.3 CAFÉ AU LAIT SPOTS: ICD-10-CM

## 2023-01-30 DIAGNOSIS — D22 MELANOCYTIC NEVI: ICD-10-CM

## 2023-01-30 DIAGNOSIS — L81.4 OTHER MELANIN HYPERPIGMENTATION: ICD-10-CM

## 2023-01-30 DIAGNOSIS — L85.3 XEROSIS CUTIS: ICD-10-CM

## 2023-01-30 DIAGNOSIS — L82.1 OTHER SEBORRHEIC KERATOSIS: ICD-10-CM

## 2023-01-30 DIAGNOSIS — L91.8 OTHER HYPERTROPHIC DISORDERS OF THE SKIN: ICD-10-CM

## 2023-01-30 PROBLEM — D22.72 MELANOCYTIC NEVI OF LEFT LOWER LIMB, INCLUDING HIP: Status: ACTIVE | Noted: 2023-01-30

## 2023-01-30 PROBLEM — D22.71 MELANOCYTIC NEVI OF RIGHT LOWER LIMB, INCLUDING HIP: Status: ACTIVE | Noted: 2023-01-30

## 2023-01-30 PROBLEM — D22.62 MELANOCYTIC NEVI OF LEFT UPPER LIMB, INCLUDING SHOULDER: Status: ACTIVE | Noted: 2023-01-30

## 2023-01-30 PROBLEM — D22.5 MELANOCYTIC NEVI OF TRUNK: Status: ACTIVE | Noted: 2023-01-30

## 2023-01-30 PROBLEM — D22.61 MELANOCYTIC NEVI OF RIGHT UPPER LIMB, INCLUDING SHOULDER: Status: ACTIVE | Noted: 2023-01-30

## 2023-01-30 PROBLEM — D18.01 HEMANGIOMA OF SKIN AND SUBCUTANEOUS TISSUE: Status: ACTIVE | Noted: 2023-01-30

## 2023-01-30 PROCEDURE — ? LIQUID NITROGEN

## 2023-01-30 PROCEDURE — 17000 DESTRUCT PREMALG LESION: CPT

## 2023-01-30 PROCEDURE — ? COUNSELING

## 2023-01-30 PROCEDURE — 99213 OFFICE O/P EST LOW 20 MIN: CPT | Mod: 25

## 2023-01-30 ASSESSMENT — LOCATION SIMPLE DESCRIPTION DERM
LOCATION SIMPLE: LEFT THIGH
LOCATION SIMPLE: LEFT POSTERIOR THIGH
LOCATION SIMPLE: RIGHT LOWER BACK
LOCATION SIMPLE: RIGHT POSTERIOR AXILLA
LOCATION SIMPLE: LEFT EAR
LOCATION SIMPLE: LEFT CHEEK
LOCATION SIMPLE: ABDOMEN
LOCATION SIMPLE: LEFT UPPER BACK
LOCATION SIMPLE: RIGHT THIGH
LOCATION SIMPLE: CHEST
LOCATION SIMPLE: LEFT POSTERIOR AXILLA
LOCATION SIMPLE: LEFT UPPER ARM
LOCATION SIMPLE: RIGHT UPPER ARM

## 2023-01-30 ASSESSMENT — LOCATION DETAILED DESCRIPTION DERM
LOCATION DETAILED: LEFT DISTAL POSTERIOR THIGH
LOCATION DETAILED: RIGHT SUPERIOR MEDIAL LOWER BACK
LOCATION DETAILED: RIGHT POSTERIOR AXILLA
LOCATION DETAILED: LEFT SUPERIOR UPPER BACK
LOCATION DETAILED: EPIGASTRIC SKIN
LOCATION DETAILED: LEFT CENTRAL MALAR CHEEK
LOCATION DETAILED: RIGHT ANTERIOR PROXIMAL UPPER ARM
LOCATION DETAILED: LEFT POSTERIOR AXILLA
LOCATION DETAILED: RIGHT MEDIAL INFERIOR CHEST
LOCATION DETAILED: LEFT SUPERIOR HELIX
LOCATION DETAILED: LEFT ANTERIOR PROXIMAL UPPER ARM
LOCATION DETAILED: RIGHT ANTERIOR PROXIMAL THIGH
LOCATION DETAILED: LEFT ANTERIOR PROXIMAL THIGH

## 2023-01-30 ASSESSMENT — LOCATION ZONE DERM
LOCATION ZONE: FACE
LOCATION ZONE: AXILLAE
LOCATION ZONE: ARM
LOCATION ZONE: EAR
LOCATION ZONE: TRUNK
LOCATION ZONE: LEG

## 2023-01-30 NOTE — PROCEDURE: MIPS QUALITY
Quality 130: Documentation Of Current Medications In The Medical Record: Current Medications Documented
Detail Level: Detailed
Quality 226: Preventive Care And Screening: Tobacco Use: Screening And Cessation Intervention: Patient screened for tobacco use and is an ex/non-smoker
Quality 111:Pneumonia Vaccination Status For Older Adults: Pneumococcal vaccine (PPSV23) administered on or after patient’s 60th birthday and before the end of the measurement period
Quality 110: Preventive Care And Screening: Influenza Immunization: Influenza Immunization previously received during influenza season

## 2023-01-30 NOTE — PROCEDURE: LIQUID NITROGEN
Detail Level: Detailed
Duration Of Freeze Thaw-Cycle (Seconds): 10
Post-Care Instructions: I reviewed with the patient in detail post-care instructions. Patient is to wear sunprotection, and avoid picking at any of the treated lesions. Pt may apply Vaseline to crusted or scabbing areas.
Number Of Freeze-Thaw Cycles: 2 freeze-thaw cycles
Render Note In Bullet Format When Appropriate: No
Show Aperture Variable?: Yes
Consent: The patient's consent was obtained including but not limited to risks of crusting, scabbing, blistering, scarring, darker or lighter pigmentary change, recurrence, incomplete removal and infection.

## 2024-02-01 ENCOUNTER — APPOINTMENT (RX ONLY)
Dept: URBAN - METROPOLITAN AREA CLINIC 39 | Facility: CLINIC | Age: 84
Setting detail: DERMATOLOGY
End: 2024-02-01

## 2024-02-01 DIAGNOSIS — L91.8 OTHER HYPERTROPHIC DISORDERS OF THE SKIN: ICD-10-CM

## 2024-02-01 DIAGNOSIS — L81.4 OTHER MELANIN HYPERPIGMENTATION: ICD-10-CM

## 2024-02-01 DIAGNOSIS — D18.0 HEMANGIOMA: ICD-10-CM

## 2024-02-01 DIAGNOSIS — L81.3 CAFÉ AU LAIT SPOTS: ICD-10-CM

## 2024-02-01 DIAGNOSIS — L82.1 OTHER SEBORRHEIC KERATOSIS: ICD-10-CM

## 2024-02-01 DIAGNOSIS — L57.8 OTHER SKIN CHANGES DUE TO CHRONIC EXPOSURE TO NONIONIZING RADIATION: ICD-10-CM

## 2024-02-01 DIAGNOSIS — Z71.89 OTHER SPECIFIED COUNSELING: ICD-10-CM

## 2024-02-01 DIAGNOSIS — D22 MELANOCYTIC NEVI: ICD-10-CM

## 2024-02-01 PROBLEM — D18.01 HEMANGIOMA OF SKIN AND SUBCUTANEOUS TISSUE: Status: ACTIVE | Noted: 2024-02-01

## 2024-02-01 PROBLEM — D22.71 MELANOCYTIC NEVI OF RIGHT LOWER LIMB, INCLUDING HIP: Status: ACTIVE | Noted: 2024-02-01

## 2024-02-01 PROBLEM — D22.5 MELANOCYTIC NEVI OF TRUNK: Status: ACTIVE | Noted: 2024-02-01

## 2024-02-01 PROBLEM — D22.61 MELANOCYTIC NEVI OF RIGHT UPPER LIMB, INCLUDING SHOULDER: Status: ACTIVE | Noted: 2024-02-01

## 2024-02-01 PROBLEM — D22.62 MELANOCYTIC NEVI OF LEFT UPPER LIMB, INCLUDING SHOULDER: Status: ACTIVE | Noted: 2024-02-01

## 2024-02-01 PROBLEM — D22.72 MELANOCYTIC NEVI OF LEFT LOWER LIMB, INCLUDING HIP: Status: ACTIVE | Noted: 2024-02-01

## 2024-02-01 PROCEDURE — ? COUNSELING

## 2024-02-01 PROCEDURE — ? SUNSCREEN RECOMMENDATIONS

## 2024-02-01 PROCEDURE — 99213 OFFICE O/P EST LOW 20 MIN: CPT

## 2024-02-01 ASSESSMENT — LOCATION ZONE DERM
LOCATION ZONE: LEG
LOCATION ZONE: AXILLAE
LOCATION ZONE: ARM
LOCATION ZONE: TRUNK

## 2024-02-01 ASSESSMENT — LOCATION DETAILED DESCRIPTION DERM
LOCATION DETAILED: RIGHT MID-UPPER BACK
LOCATION DETAILED: LEFT DISTAL POSTERIOR UPPER ARM
LOCATION DETAILED: LEFT SUPERIOR LATERAL MIDBACK
LOCATION DETAILED: RIGHT DISTAL POSTERIOR THIGH
LOCATION DETAILED: LEFT DISTAL POSTERIOR THIGH
LOCATION DETAILED: LEFT AXILLARY VAULT
LOCATION DETAILED: RIGHT AXILLARY VAULT
LOCATION DETAILED: RIGHT DISTAL POSTERIOR UPPER ARM
LOCATION DETAILED: RIGHT SUPERIOR UPPER BACK

## 2024-02-01 ASSESSMENT — LOCATION SIMPLE DESCRIPTION DERM
LOCATION SIMPLE: RIGHT UPPER BACK
LOCATION SIMPLE: LEFT AXILLARY VAULT
LOCATION SIMPLE: LEFT UPPER ARM
LOCATION SIMPLE: LEFT POSTERIOR THIGH
LOCATION SIMPLE: RIGHT AXILLARY VAULT
LOCATION SIMPLE: RIGHT UPPER ARM
LOCATION SIMPLE: LEFT LOWER BACK
LOCATION SIMPLE: RIGHT POSTERIOR THIGH

## 2024-02-01 ASSESSMENT — PAIN INTENSITY VAS: HOW INTENSE IS YOUR PAIN 0 BEING NO PAIN, 10 BEING THE MOST SEVERE PAIN POSSIBLE?: NO PAIN
